# Patient Record
Sex: FEMALE | Race: WHITE | NOT HISPANIC OR LATINO | Employment: OTHER | URBAN - METROPOLITAN AREA
[De-identification: names, ages, dates, MRNs, and addresses within clinical notes are randomized per-mention and may not be internally consistent; named-entity substitution may affect disease eponyms.]

---

## 2019-12-06 ENCOUNTER — OFFICE VISIT - RIVER FALLS (OUTPATIENT)
Dept: FAMILY MEDICINE | Facility: CLINIC | Age: 64
End: 2019-12-06

## 2019-12-06 ENCOUNTER — TRANSFERRED RECORDS (OUTPATIENT)
Dept: HEALTH INFORMATION MANAGEMENT | Facility: CLINIC | Age: 64
End: 2019-12-06

## 2019-12-06 LAB
ALBUMIN (URINE) MG/SPEC: 2.6 MG/DL
ALBUMIN/CREATININE RATIO: 34 MCG/MG CREAT
CREAT SERPL-MCNC: 0.59 MG/DL (ref 0.5–0.99)
CREATININE (URINE): 76 MG/DL (ref 20–275)
GFR SERPL CREATININE-BSD FRML MDRD: 97 ML/MIN/1.73M2
GLUCOSE SERPL-MCNC: 329 MG/DL (ref 65–99)
HBA1C MFR BLD: 11.7 % (ref 0–5.7)
LDLC SERPL CALC-MCNC: 185 MG/DL
POTASSIUM SERPL-SCNC: 4.2 MMOL/L (ref 3.5–5.3)

## 2019-12-07 LAB
BNP SERPL-MCNC: 44 PG/ML
BUN SERPL-MCNC: 12 MG/DL (ref 7–25)
BUN/CREAT RATIO - HISTORICAL: ABNORMAL (ref 6–22)
CALCIUM SERPL-MCNC: 9.7 MG/DL (ref 8.6–10.4)
CHLORIDE BLD-SCNC: 98 MMOL/L (ref 98–110)
CO2 SERPL-SCNC: 26 MMOL/L (ref 20–32)
CREAT SERPL-MCNC: 0.59 MG/DL (ref 0.5–0.99)
CREAT UR-MCNC: 76 MG/DL (ref 20–275)
EGFRCR SERPLBLD CKD-EPI 2021: 97 ML/MIN/1.73M2
ERYTHROCYTE [DISTWIDTH] IN BLOOD BY AUTOMATED COUNT: 13.1 % (ref 11–15)
GLUCOSE BLD-MCNC: 329 MG/DL (ref 65–99)
HBA1C MFR BLD: 11.7 %
HCT VFR BLD AUTO: 43.6 % (ref 35–45)
HGB BLD-MCNC: 14.7 GM/DL (ref 11.7–15.5)
LDLC SERPL CALC-MCNC: 185 MG/DL
MCH RBC QN AUTO: 29.2 PG (ref 27–33)
MCHC RBC AUTO-ENTMCNC: 33.7 GM/DL (ref 32–36)
MCV RBC AUTO: 86.5 FL (ref 80–100)
MICROALBUMIN UR-MCNC: 2.6 MG/DL
MICROALBUMIN/CREAT UR: 34 MG/G{CREAT}
PLATELET # BLD AUTO: 268 10*3/UL (ref 140–400)
PMV BLD: 10.7 FL (ref 7.5–12.5)
POTASSIUM BLD-SCNC: 4.2 MMOL/L (ref 3.5–5.3)
RBC # BLD AUTO: 5.04 10*6/UL (ref 3.8–5.1)
SODIUM SERPL-SCNC: 136 MMOL/L (ref 135–146)
WBC # BLD AUTO: 5.2 10*3/UL (ref 3.8–10.8)

## 2019-12-12 ENCOUNTER — RECORDS - HEALTHEAST (OUTPATIENT)
Dept: ADMINISTRATIVE | Facility: OTHER | Age: 64
End: 2019-12-12

## 2019-12-12 ENCOUNTER — AMBULATORY - HEALTHEAST (OUTPATIENT)
Dept: CARDIOLOGY | Facility: CLINIC | Age: 64
End: 2019-12-12

## 2019-12-13 ENCOUNTER — COMMUNICATION - RIVER FALLS (OUTPATIENT)
Dept: FAMILY MEDICINE | Facility: CLINIC | Age: 64
End: 2019-12-13

## 2019-12-18 ENCOUNTER — OFFICE VISIT - HEALTHEAST (OUTPATIENT)
Dept: CARDIOLOGY | Facility: TELEHEALTH | Age: 64
End: 2019-12-18

## 2019-12-18 DIAGNOSIS — I25.119 CORONARY ARTERY DISEASE INVOLVING NATIVE CORONARY ARTERY OF NATIVE HEART WITH ANGINA PECTORIS (H): ICD-10-CM

## 2019-12-18 DIAGNOSIS — R06.09 DYSPNEA ON EXERTION: ICD-10-CM

## 2019-12-18 DIAGNOSIS — E78.5 HYPERLIPIDEMIA LDL GOAL <70: ICD-10-CM

## 2019-12-18 DIAGNOSIS — E11.59 TYPE 2 DIABETES MELLITUS WITH OTHER CIRCULATORY COMPLICATION, WITH LONG-TERM CURRENT USE OF INSULIN (H): ICD-10-CM

## 2019-12-18 DIAGNOSIS — Z79.4 TYPE 2 DIABETES MELLITUS WITH OTHER CIRCULATORY COMPLICATION, WITH LONG-TERM CURRENT USE OF INSULIN (H): ICD-10-CM

## 2019-12-20 ENCOUNTER — OFFICE VISIT - RIVER FALLS (OUTPATIENT)
Dept: FAMILY MEDICINE | Facility: CLINIC | Age: 64
End: 2019-12-20

## 2019-12-20 ENCOUNTER — OFFICE VISIT (OUTPATIENT)
Dept: PHARMACY | Facility: PHYSICIAN GROUP | Age: 64
End: 2019-12-20
Payer: MEDICAID

## 2019-12-20 DIAGNOSIS — I10 HYPERTENSION, UNSPECIFIED TYPE: ICD-10-CM

## 2019-12-20 DIAGNOSIS — G47.00 INSOMNIA, UNSPECIFIED TYPE: ICD-10-CM

## 2019-12-20 DIAGNOSIS — E78.5 DYSLIPIDEMIA: ICD-10-CM

## 2019-12-20 DIAGNOSIS — F32.A DEPRESSION, UNSPECIFIED DEPRESSION TYPE: ICD-10-CM

## 2019-12-20 DIAGNOSIS — E11.69 TYPE 2 DIABETES MELLITUS WITH OTHER SPECIFIED COMPLICATION, WITH LONG-TERM CURRENT USE OF INSULIN (H): Primary | ICD-10-CM

## 2019-12-20 DIAGNOSIS — I25.10 CORONARY ARTERY DISEASE INVOLVING NATIVE HEART, ANGINA PRESENCE UNSPECIFIED, UNSPECIFIED VESSEL OR LESION TYPE: ICD-10-CM

## 2019-12-20 DIAGNOSIS — J30.81 ALLERGIC RHINITIS DUE TO ANIMALS: ICD-10-CM

## 2019-12-20 DIAGNOSIS — Z79.4 TYPE 2 DIABETES MELLITUS WITH OTHER SPECIFIED COMPLICATION, WITH LONG-TERM CURRENT USE OF INSULIN (H): Primary | ICD-10-CM

## 2019-12-20 DIAGNOSIS — K21.9 GASTROESOPHAGEAL REFLUX DISEASE, ESOPHAGITIS PRESENCE NOT SPECIFIED: ICD-10-CM

## 2019-12-20 DIAGNOSIS — Z78.9 TAKES DIETARY SUPPLEMENTS: ICD-10-CM

## 2019-12-20 PROCEDURE — 99607 MTMS BY PHARM ADDL 15 MIN: CPT | Performed by: PHARMACIST

## 2019-12-20 PROCEDURE — 99605 MTMS BY PHARM NP 15 MIN: CPT | Performed by: PHARMACIST

## 2019-12-20 RX ORDER — METOPROLOL TARTRATE 25 MG/1
25 TABLET, FILM COATED ORAL 2 TIMES DAILY
COMMUNITY
End: 2022-02-10

## 2019-12-20 RX ORDER — CLOPIDOGREL BISULFATE 75 MG/1
75 TABLET ORAL EVERY MORNING
COMMUNITY
End: 2022-03-16

## 2019-12-20 RX ORDER — INSULIN ASPART 100 [IU]/ML
INJECTION, SOLUTION INTRAVENOUS; SUBCUTANEOUS
COMMUNITY
End: 2021-01-21

## 2019-12-20 RX ORDER — NITROGLYCERIN 0.4 MG/1
0.4 TABLET SUBLINGUAL EVERY 5 MIN PRN
COMMUNITY
End: 2022-12-20

## 2019-12-20 RX ORDER — MULTIPLE VITAMINS W/ MINERALS TAB 9MG-400MCG
1 TAB ORAL DAILY
COMMUNITY

## 2019-12-20 RX ORDER — LORATADINE 10 MG/1
10 TABLET ORAL
COMMUNITY

## 2019-12-20 RX ORDER — CITALOPRAM HYDROBROMIDE 40 MG/1
40 TABLET ORAL DAILY
COMMUNITY
End: 2020-01-03 | Stop reason: ALTCHOICE

## 2019-12-20 RX ORDER — METOPROLOL SUCCINATE 25 MG/1
25 TABLET, EXTENDED RELEASE ORAL 2 TIMES DAILY
COMMUNITY
End: 2019-12-20

## 2019-12-20 RX ORDER — INSULIN GLARGINE 100 [IU]/ML
17 INJECTION, SOLUTION SUBCUTANEOUS 2 TIMES DAILY
COMMUNITY
End: 2022-03-16

## 2019-12-20 RX ORDER — LISINOPRIL 40 MG/1
40 TABLET ORAL DAILY
COMMUNITY
End: 2022-03-16

## 2019-12-20 RX ORDER — ROSUVASTATIN CALCIUM 20 MG/1
20 TABLET, COATED ORAL AT BEDTIME
COMMUNITY
End: 2022-05-19

## 2019-12-20 NOTE — PATIENT INSTRUCTIONS
Recommendations from today's MTM visit:                                                    MTM (medication therapy management) is a service provided by a clinical pharmacist designed to help you get the most of out of your medicines.     1. Go to the pharmacy today to  refills of: Jardiance, nitroglycerin, metoprolol tartrate and lisinopril.    2. Stop taking your metformin 1000 mg tabs.    Start taking metformin  mg tabs: 2 tabs by mouth twice daily.  It is best to take these with food.    3. Take the old metformin and the other  tablets to the police station dropbox to dispose of the medications.    4. When you start to feel pain in your chest, if it feels like heartburn pain, take the ranitidine.  If it feels like chest pain/tightness/pain in your arm then take the nitroglycerin.  Nitroglycerin 0.4 mg sublingual tab: Place 1 tablet under the tongue at onset of chest pain.  If pain persists after 5 minutes, call 911 and place a second tablet under the tongue. If pain persists after another 5 minutes, okay to place a third tablet under the tongue.    Be sure to store this medication in the original bottle (out of light) and check the expiration date periodically to ensure your product is not  and will still work properly.    5. Novolog insulin: Start taking this BEFORE eating your meal.  Take 24 units with meals.  Take 8 units with snacks.    6. Start checking your blood sugars 2-3 times/day.  One when fasting in the morning and then 2 hours after meal(s).  Your blood sugars goals are:  Before eating (fasting): 80 - 130 mg/dL  2-hours after meals (post-prandial): less than 180 mg/dL        If you have blurry vision, weakness/fatigue, headache, irritability, shakiness, sweatiness, feeling dizzy or hungry, you may be experiencing low blood sugar.  1. Use your glucometer to check your blood sugar. If your sugar is <70 mg/dL, eat or drink fast-acting sugar (orange juice, 4 sugary candies, 4  glucose tabs).    2. Check blood sugar again 15 minutes later.  If your blood sugar is still below 80 mg/dL, treat again.    3. Always follow-up with a complex carbohydrate (peanut butter, nuts, eggs with cheese) or your regular meal if it is mealtime.    7. Place post-its on your door to help remember to take your medications.    It was great to speak with you today.  I value your experience and would be very thankful for your time with providing feedback on our clinic survey. You may receive a survey via email or text message in the next few days.     Next MTM visit: Follow-up in 2 weeks with Erick Pisano. Make an appointment at the .    To schedule another MTM appointment, please call the clinic directly or you may call the MTM scheduling line at [phone] or toll-free at [phone].     My Clinical Pharmacist's contact information:                                                      It was a pleasure talking with you today!  Please feel free to contact me with any questions or concerns you have.      Carly Pisano, Erick  Medication Therapy Management (MTM) Pharmacist  [facility] (Tu/Th/Fr)  Clinic Phone: [phone]  Pager: [phone]
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
## 2020-01-03 ENCOUNTER — OFFICE VISIT - RIVER FALLS (OUTPATIENT)
Dept: FAMILY MEDICINE | Facility: CLINIC | Age: 65
End: 2020-01-03

## 2020-01-03 ENCOUNTER — COMMUNICATION - RIVER FALLS (OUTPATIENT)
Dept: FAMILY MEDICINE | Facility: CLINIC | Age: 65
End: 2020-01-03

## 2020-01-03 ENCOUNTER — OFFICE VISIT (OUTPATIENT)
Dept: PHARMACY | Facility: PHYSICIAN GROUP | Age: 65
End: 2020-01-03
Payer: MEDICAID

## 2020-01-03 DIAGNOSIS — E11.69 TYPE 2 DIABETES MELLITUS WITH OTHER SPECIFIED COMPLICATION, WITH LONG-TERM CURRENT USE OF INSULIN (H): Primary | ICD-10-CM

## 2020-01-03 DIAGNOSIS — F32.A DEPRESSION, UNSPECIFIED DEPRESSION TYPE: ICD-10-CM

## 2020-01-03 DIAGNOSIS — G47.00 INSOMNIA, UNSPECIFIED TYPE: ICD-10-CM

## 2020-01-03 DIAGNOSIS — Z79.4 TYPE 2 DIABETES MELLITUS WITH OTHER SPECIFIED COMPLICATION, WITH LONG-TERM CURRENT USE OF INSULIN (H): Primary | ICD-10-CM

## 2020-01-03 PROCEDURE — 99607 MTMS BY PHARM ADDL 15 MIN: CPT | Performed by: PHARMACIST

## 2020-01-03 PROCEDURE — 99606 MTMS BY PHARM EST 15 MIN: CPT | Performed by: PHARMACIST

## 2020-01-03 RX ORDER — MIRTAZAPINE 15 MG/1
15 TABLET, FILM COATED ORAL AT BEDTIME
COMMUNITY
End: 2020-01-24

## 2020-01-03 RX ORDER — BLOOD-GLUCOSE METER
KIT MISCELLANEOUS
COMMUNITY
Start: 2017-03-30 | End: 2023-06-20

## 2020-01-03 SDOH — HEALTH STABILITY: MENTAL HEALTH: HOW OFTEN DO YOU HAVE A DRINK CONTAINING ALCOHOL?: MONTHLY OR LESS

## 2020-01-03 SDOH — HEALTH STABILITY: MENTAL HEALTH: HOW OFTEN DO YOU HAVE 6 OR MORE DRINKS ON ONE OCCASION?: NEVER

## 2020-01-03 SDOH — HEALTH STABILITY: MENTAL HEALTH: HOW MANY STANDARD DRINKS CONTAINING ALCOHOL DO YOU HAVE ON A TYPICAL DAY?: 1 OR 2

## 2020-01-03 ASSESSMENT — PATIENT HEALTH QUESTIONNAIRE - PHQ9: SUM OF ALL RESPONSES TO PHQ QUESTIONS 1-9: 12

## 2020-01-03 NOTE — PROGRESS NOTES
"SUBJECTIVE/OBJECTIVE:                Chanelle Billy is a 64 year old female coming in for a follow-up visit for Medication Therapy Management.  She was referred to me from Samanta Maza MD.     Chief Complaint: Follow up from MTM visit on 19.  Personal Healthcare Goals: ***  Tobacco:  reports that she has quit smoking. She has never used smokeless tobacco.  Alcohol: occasionally    Medication Adherence/Access:  Patient takes medications directly from bottles and uses pill box(es) - uses the pill boxes when going out of town.  Patient takes medications 2 time(s) per day; plus the ranitidine as needed.  Per patient, misses medication usually 2-3 times/week  - however, more recently with her move, she has missed more doses of medication. ***  Medication barriers: in the past, trouble getting medication from the pharmacy.  Now thinking that it will be easier since she lives here. ***  The patient fills medications at [place]: NO, fills medications at [facility]    Diabetes:  Testing supplies: {brand}  Pt currently taking:  Metformin  m tabs twice daily (changed from 1000 mg IR BID on 19)   Basaglar (glargine) long-acting insulin: 24 units twice daily  Novolog insulin: 24 units before meals *** and 8 units before snacks *** (dose change on 19)  Jardiance (empagliflozin) 10 mg: once daily (restarted on 19).  {sideeffects:160432}  Eye exam: 2018 - due again  Foot exam: unknown - due  ACEi/ARB: Yes: lisinopril.   Urine Albumin:     Secondary Prevention:  Aspirin: Not taking due to taking clopidogrel  Statin yes - rsocuvastatin.  Diet: ***  Exercise: no routine  Record of home blood sugars:  SMBG: {SELF MONITORIN}.   Ranges {Pt report:380225}: ***  Patient {IS NOT/IS:167849::\"is not experiencing hypoglycemia\"}  Recent symptoms of high blood sugar? {diabetessymptoms:954924}  Date Fasting AM Before lunch After lunch Before Dinner After Dinner Before Bedtime " "Late night                                                                                                 {IMMUNIZATION REMINDER LOOK IN NAVIGATOR:289277}    Depression/Insomnia:  Current medications include: Citalopram 40 mg once daily.   Pt reports that depression symptoms are { :566225}. Current depression symptoms include: ***. Patient reports the following stressors: {STRESSORS:220779::\"none\"}   Medication History: reports that she has been on other medication(s), but does not know name of medication. Outside records faxed over date back to 2017 and patient was on citalopram 40 mg at that time as well.  No flowsheet data found. ***    GERD:  Current medications include: Zantac (ranitidine) 150 mg twice daily as needed.  Pt c/o {gerdxmtm1:945294}.   The patient {DOES/NOT:665847} have a history of GI bleed.  The patient {DOES/NOT:794198} notice symptoms if they miss a dose.  Patient {HAS HAS NOT:391027} tried a trial off of therapy and {IS/IS NOT:9024} interested in doing so. Patient feels that current regimen {IS NOT/IS:899674::\"is not\"} effective.  Future plan to consult with Dr. Willingham for possibility of EGD.    Today's Vitals: There were no vitals taken for this visit.    ASSESSMENT:              {mtmpartdquestion:981847}    Medication Adherence: {adherenceassess:612157}, {ADHERENCEOPTIONSASSES:679732}    Diabetes: {assessmentquestions:245004}  Patient's A1c of ***% {is/is not:225569::\"is\"} at ADA goal of <7%. SMBG fasting sugars {are/arenot:280001::\"are\"} at ADA goal of  mg/dL and post-prandial sugars {are/arenot:024006::\"are\"} at goal of less than 180 mg/dL.  Patient would benefit from ***.    Depression: {assessmentquestions:916188}  All SSRIs are classified as use with caution in elderly population BEERs crit due to association with falls, fractures and hyponatremia risk. However, citalopram at 40 mg/day is additionally not recommended in patients >65 years due to increased QT prolongation risk. " "Since depression not well controlled at this time, recommend therapy modification to alternate serotonin specific reuptake inhibitor. With insomnia history, recommend addition of mirtazapine     GERD: {assessmentquestions:311174}       PLAN:                {RENU?:923276}  ***  1. Diabetes plan  - Basaglar:  - novolog:   - Due for a diabetic foot exam.  Be sure to get this with Dr. Maza at your follow-up appt.   - Due for diabetic eye exam.  Please make an appointment with the eye doctor and bring the communication form with you to the appointment.    2. Depression plan  - Reduce citalopram to 20 mg once daily  - Start mirtazapine 15 mg once daily in the evening for depression and sleep.  - establish care with a therapist. Francisca to help with transportation. ***      *** Symptoms of serotonin syndrome can include: mental status changes (agitation, hallucinations), autonomic instability (fast heart beat, flushing, dizziness, sweating), neuromuscular symptoms (rigidity, incoordination, muscle contractions), and GI symptoms (nausea, vomiting, diarrhea). Serotonin syndrome is a spectrum of symptoms, so it could be mild or severe. If you start having milder symptoms, please contact a health care professional and if you have severe symptoms, please seek medical attention immediately (call 911).      Follow-up appts:  Dr. Maza: make an appt in the next 2 weeks.  Erick Pisano: make an appt in 4 weeks.    I spent {time:680556} with this patient today{MTMpartdbillingquestion:560301}. { :345301}. A copy of the visit note was provided to the patient's {[facility] chart:810974} provider.     Will follow up in ***.    The patient {GIVEN/NOT GIVEN:307332::\"was given\"} a summary of these recommendations as an after visit summary.    Carly Pisano PharmD  Medication Therapy Management (MTM) Pharmacist  [facility] (Tu/Th/Fr)  Clinic Phone: [phone]  Pager: [phone]  "

## 2020-01-03 NOTE — PROGRESS NOTES
SUBJECTIVE/OBJECTIVE:                Chanelle Billy is a 64 year old female coming in for a follow-up visit for Medication Therapy Management.  She was referred to me from Samanta Maza MD.     Chief Complaint: Follow up from MTM visit on 19. Pt needs refills of basaglar and novolog. Needs citalopram refill.  Mildy overwhelmed by all the specialist visits/referrals that she needs to complete.  Personal Healthcare Goals: get home organized so she can find meds; lose weight.  Tobacco:  reports that she has quit smoking. She has never used smokeless tobacco.  Alcohol: occasionally    Medication Adherence/Access:  Patient takes medications directly from bottles and uses pill box(es) - uses the pill boxes when going out of town.  Patient takes medications 2 time(s) per day; plus the ranitidine as needed.  Per patient, misses medication usually 2-3 times/week  - however, more recently with her move, she has missed more doses of medication. States that in the past 2 weeks she has not forgotten her meds (1/3/20).  Medication barriers: in the past, trouble getting medication from the pharmacy.  Now thinking that it will be easier since she lives here. Daughter Dionna can  her to the pharmacy (1/3/20).  The patient fills medications at [facility]: NO, fills medications at [facility]    Diabetes:  Testing supplies: Truetest  Pt currently taking:  Metformin  m tabs twice daily (changed from 1000 mg IR BID on 19), loves the XR form, says that it does not hurt her stomach as much.  Basaglar (glargine) long-acting insulin: 24 units twice daily   Novolog insulin: says that the cannot explain how she is using this currently, she is confused about the sliding scale, says she'll adjust based on her BG, but does not know numbers. Still taking after meals.  Jardiance (empagliflozin) 10 mg: once daily (restarted on 19).  Pt is not experiencing side effects.  Eye exam: 2018 -  due  Foot exam: unknown - due  ACEi/ARB: Yes: lisinopril.   Urine Albumin:     Secondary Prevention:  Aspirin: Not taking due to taking clopidogrel  Statin yes - rosuvastatin.  Diet: eats 2-3 meals per day.  Continues to repeat that they told her to eat 3 small meals and 6 additional meals, but this is just not realistic for her.  She likes her daughter's cooking. Is getting food stamps and going to the food shelf regularly for food.  Exercise: no routine  Record of home blood sugars:  SMBG: see below.   Patient did have one hypoglycemic episode 2 weeks ago, has not since.  Recent symptoms of high blood sugar? none  Date Fasting AM Before lunch After lunch Before Dinner After Dinner Before Bedtime Late night   1/3 170          117      377 (22 units SA after she ate caramel corn and cookies)    115     210           249     98 161  128       83 277 156  292          368     124  160   339       112 345 81 123          Depression/Insomnia:  Current medications include: Citalopram 40 mg once daily. Has not been taking this for the last 2 weeks - they accidentally threw this away when the threw out the  medications.  Pt reports that depression symptoms are worsened since being off citalopram. Current depression symptoms include: becoming worked up and upset about things. Patient reports the following stressors: recent move and financial difficulties, without depression medication.    Medication History: reports that she has been on other medication(s), but does not know name of medication. Outside records faxed over date back to  and patient was on citalopram 40 mg at that time as well.  Still having trouble sleeping.  Does not want to go back on trazodone as it gave her bad nightmares.  Added to allergy list as side effect.  PHQ-9 SCORE 1/3/2020   PHQ-9 Total Score 12   Previous: total depression was 12 from [facility].    Today's Vitals: There were no vitals taken for this  visit.    ASSESSMENT:                Medication Adherence: fair, issues with understanding insulin.  Discussed that she should not be overwhelmed by the referrals and scheduled the appts as she is able.    Diabetes: Uncontrolled  Patient's A1c of 11.7% is not at ADA goal of <7%. SMBG fasting sugars are near ADA goal of  mg/dL and post-prandial sugars are not at goal of less than 180 mg/dL.  Recommend adherence to PRE-prandial insulin with simplified regimen of 24 units before meals and 8 units before snacks.  Patient is easily confused and overwhelmed by different insulin doses.  Teachback was used multiple times to give examples of meals v. Snacks and determine how much PRE-prandial insulin to use.  Suspect that A1c of >11% is reflective of months of non-adherence to insulin/metformin during move and anticipate improvement with next lab since patient has greatly improved adherence even since last visit.    Depression/Insomnia: Needs improvement  Citalopram at 40 mg/day is not recommended in patients >65 years due to increased QT prolongation risk (pt is near 65). Since depression not well controlled at this time and patient has not been taking even the citalopram, recommend therapy modification to mirtazapine which may also benefit insomnia.  Discussed potential to restart citalopram at 20 mg today concomitantly with mirtazapine with Samanta Maza MD - but prefer to start with 1 agent today.  Patient would also benefit from establishing care or returning to therapist.  [facility] nurses will help with this. Encourage that patient attend without daughter since previous records state that pt is discussing frustrations with the daughter during the visit.     PLAN:                  1. Diabetes plan  - Basaglar (silver - long acting insulin):   take 24 units in the morning and in the evening.  - Novolog (blue - short acting insulin):   take 24 units BEFORE meals  Take 8 units BEFORE snacks.    2. Depression plan  -  Start mirtazapine 15 mg once daily in the evening for depression and sleep.  - Establish care with a therapist or return to [place] for therapy. Francisca, one of our care coordinators can help with transportation. [facility] came to visit with pt after PharmD appt.    Follow-up appts:  Dr. Maza: make an appt in the next 1-2 weeks.  Erick Pisano: make an appt in 3-4 weeks.    Follow-up items:  - foot exam  - eye exam  - depression score    I spent 60 minutes with this patient today. All changes were made via verbal approval with Samanta Maza MD. A copy of the visit note was provided to the patient's primary care provider.    The patient was given a summary of these recommendations as an after visit summary.    Carly Pisano PharmD  Medication Therapy Management (MTM) Pharmacist  [facility] (Tu/Th/Fr)  Clinic Phone: [phone]  Pager: [phone]

## 2020-01-03 NOTE — PATIENT INSTRUCTIONS
Recommendations from today's MTM visit:                                                      1. Diabetes plan  - Basaglar (silver - long acting insulin):   take 24 units in the morning and in the evening.  - Novolog (blue - short acting insulin):   take 24 units BEFORE meals  Take 8 units BEFORE snacks.    2. Depression plan  - Start mirtazapine 15 mg once daily in the evening for depression and sleep.  - Establish care with a therapist or return to [place] for therapy. Francisca one of our care coordinators can help with transportation.    It was great to speak with you today.  I value your experience and would be very thankful for your time with providing feedback on our clinic survey. You may receive a survey via email or text message in the next few days.     Next visits:   Dr. Maza: make an appt in the next 1-2 weeks.  Erick Pisano: make an appt in 3-4 weeks for 60 minutes.    To schedule another MTM appointment, please call the clinic directly or you may call the MTM scheduling line at [phone] or toll-free at [phone].     My Clinical Pharmacist's contact information:                                                      It was a pleasure talking with you today!  Please feel free to contact me with any questions or concerns you have.      Carly Pisano PharmD  Medication Therapy Management (MTM) Pharmacist  [facility] (Tu/Th/Fr)  Clinic Phone: [phone]  Pager: [phone]

## 2020-01-10 ENCOUNTER — OFFICE VISIT - RIVER FALLS (OUTPATIENT)
Dept: FAMILY MEDICINE | Facility: CLINIC | Age: 65
End: 2020-01-10

## 2020-01-24 ENCOUNTER — OFFICE VISIT - RIVER FALLS (OUTPATIENT)
Dept: FAMILY MEDICINE | Facility: CLINIC | Age: 65
End: 2020-01-24

## 2020-01-24 ENCOUNTER — OFFICE VISIT (OUTPATIENT)
Dept: PHARMACY | Facility: PHYSICIAN GROUP | Age: 65
End: 2020-01-24
Payer: MEDICAID

## 2020-01-24 DIAGNOSIS — Z79.4 TYPE 2 DIABETES MELLITUS WITH OTHER SPECIFIED COMPLICATION, WITH LONG-TERM CURRENT USE OF INSULIN (H): Primary | ICD-10-CM

## 2020-01-24 DIAGNOSIS — E11.69 TYPE 2 DIABETES MELLITUS WITH OTHER SPECIFIED COMPLICATION, WITH LONG-TERM CURRENT USE OF INSULIN (H): Primary | ICD-10-CM

## 2020-01-24 DIAGNOSIS — F32.A DEPRESSION, UNSPECIFIED DEPRESSION TYPE: ICD-10-CM

## 2020-01-24 DIAGNOSIS — K21.9 GASTROESOPHAGEAL REFLUX DISEASE, ESOPHAGITIS PRESENCE NOT SPECIFIED: ICD-10-CM

## 2020-01-24 PROCEDURE — 99606 MTMS BY PHARM EST 15 MIN: CPT | Performed by: PHARMACIST

## 2020-01-24 PROCEDURE — 99607 MTMS BY PHARM ADDL 15 MIN: CPT | Performed by: PHARMACIST

## 2020-01-24 RX ORDER — ESCITALOPRAM OXALATE 10 MG/1
10 TABLET ORAL AT BEDTIME
COMMUNITY
End: 2020-12-05

## 2020-01-24 RX ORDER — METFORMIN HCL 500 MG
1000 TABLET, EXTENDED RELEASE 24 HR ORAL 2 TIMES DAILY WITH MEALS
COMMUNITY
End: 2022-05-19

## 2020-01-24 NOTE — PROGRESS NOTES
"SUBJECTIVE/OBJECTIVE:                Chanelle Billy is a 64 year old female coming in for a follow-up visit for Medication Therapy Management.  She was referred to me from Samanta Maza MD.     Chief Complaint: Follow up from MTM visit on 1/3/2020.  Pt concerns today: brings all the medications that she needs \"refills\" of.  These bottles do have refills available on the bottles.  Personal Healthcare Goals: get home organized so she can find meds; lose weight.  Tobacco:  reports that she has quit smoking. She has never used smokeless tobacco.  Alcohol: occasionally    Medication Adherence/Access:  Patient takes medications directly from bottles and uses pill box(es) - uses the pill boxes when going out of town.  Patient takes medications 2 time(s) per day; plus the ranitidine as needed.  Per patient, misses medication usually 1-2 times/week, she notices this to be an improvement.  Medication barriers: no trouble getting to the pharmacy, Dionna - daughter will bring her.  The patient fills medications at [facility]: NO, fills medications at [facility]    Diabetes:  Testing supplies: Truetest  Pt currently taking:  Metformin  m tabs twice daily (changed from 1000 mg IR BID on 19), loves the XR form, says that it does not hurt her stomach as much.  Basaglar (glargine) long-acting insulin: 24 units twice daily   Novolog insulin: take 24 units BEFORE meals - using this about twice daily: noon and 4pm.  Today ate a bowl of oatmeal before coming and did not use the insulin.  Take 8 units BEFORE snacks - states that she often forgets this one - especially at night time, but is working on remembering.  Jardiance (empagliflozin) 10 mg: once daily (restarted on 19).  Pt is not experiencing side effects.  Eye exam: 2018 - due  Foot exam: unknown - due  ACEi/ARB: Yes: lisinopril.   Urine Albumin:     Secondary Prevention:  Aspirin: Not taking due to taking clopidogrel  Statin yes - " "rosuvastatin.  Diet: food stamps $16/month.  Likes bread, will be meeting with Avril Martinez, dietitian. She states that she is nervous about this appointment because she doesn't have money to buy whatever food she likes and Dionna and Jaelyn provide a lot of her food.  She knows that Avril is going to tell her not to eat bread.  Exercise: no routine  Record of home blood sugars:  SMBG: see below.   Patient is not experiencing hypoglycemia  Recent symptoms of high blood sugar? fatigue  Date Fasting AM Before lunch After lunch Before Dinner After Dinner Before Bedtime Late night   1/24 172         1/23 216  160       1/22 137     396 (acknowledges that she overeats in the evneing           Depression:  Chanelle has not yet established with a counselor.  Current medications include: Escitalopram 10 mg once daily. Started on 1/10/2020 with 1/2 tab x 1 week, then 1 full tab daily.  Has been taking this once daily in the morning.  Thinks that she is starting to feel \"stiff\" from this medication, asking if she can take 1/2 tablet by mouth twice daily.  Medication history: citalopram 40 mg (unknown start - 1/3/2020) - stopped due to limited efficacy and CV risk with dose >20 mg, mirtazapine (1/3/2020 - 1/10/2020) - stopped due to strange dreams and 1 day of suicidal ideation.  Pt reports that depression symptoms are improved, been \"feeling pretty good\"   Had the inspection of her new place and will not have to worry about this for another year.  Has been sorting through her apartment and has gotten rid of a lot of things.  Patient reports the following stressors: recent move and financial difficulties.  PHQ-9 SCORE 1/3/2020   PHQ-9 Total Score 12     Heartburn:  Current medications include: Protonix (pantoprazole) 40 mg once daily in the morning.  Pt c/o no current symptoms, this has completely resolved her stomach pain.  Medication History: was on ranitidine - states that she would get up in the night and puke when she was on " this one.  The patient does state that she has had stomach ulcers before, but denies bleeding.    Today's Vitals: There were no vitals taken for this visit.    ASSESSMENT:                  Medication Adherence: fair, encouraged patient to start using a pill box every week, even if not traveling.    Diabetes: Uncontrolled  Patient's A1c of 11.7% is not at ADA goal of <7%. SMBG fasting sugars are not at ADA goal of  mg/dL and post-prandial sugars are not at goal of less than 180 mg/dL.  Patient would benefit from increasing long acting insulin dose and increasing adherence to short acting insulin when eating or having snacks.  Need to make small simple changes for patient and large adjustments or change in routine are very overwhelming for patient.    Depression: Improving - will continue current dose at this time.  Musculoskeletal side effects are not noted as common adverse effect with escitalopram, per micromedex.  Discussed that it would be okay to take escitalopram in the evening if she feels that it is causing stiffness after taking.     Heartburn: Improved. Continue current pantoprazole.  Possible that increased abdominal discomfort is related to situational stress. Dr. Maza has previously recommended consider referral to Dr. Willingham for GI assessment - recommend addressing this is future.     PLAN:                  Chanelle's goals:  1. Start taking insulin more consistently: Increase long acting insulin Basaglar to 26 units twice daily.    2. Use a pill box to simplify taking your medications:  Start using the pill box every week, not just when you're traveling.  Provided AM and PM pill boxes today.  Printed fridge list for patient.  3. Make an appointment with a counselor.  Handout provided today.    Chanelle will start taking escitalopram in the evening.  Appointment with Avril Martinez on 2/3/2020 at 10:30am.  Attend the appointment with the podiatrist (for your feet).    PharmD will send Dr. Montenegro  Ethan, cardiologist a message about the stress test - called today and spoke with nurse who will call patient to schedule this stress test.  PharmD will check with Dr. Maza on getting prescription for incontinence pads - message sent.    I spent 60 minutes with this patient today. All changes were made via verbal approval with Hoa Cordero NP. A copy of the visit note was provided to the patient's primary care provider and dietitian.     Will follow up in 5-6 weeks.  - will plan to start med sync at follow-up  - will discuss monthly medication delivery from [facility] (per [place], this would be free for her location).    The patient was given a summary of these recommendations as an after visit summary.    Carly Pisano PharmD  Medication Therapy Management (MTM) Pharmacist  [facility] (Tu/Th/Fr)  Clinic Phone: [phone]  Pager: [phone]

## 2020-01-24 NOTE — PATIENT INSTRUCTIONS
Recommendations from today's MTM visit:                                                      Chanelle's goals:  1. Start taking insulin more consistently: Increase long acting insulin Basaglar to 26 units twice daily.    2. Use a pill box to simplify taking your medications:  Start using the pill box every week, not just when you're traveling.  3. Make an appointment with a counselor.  Handout provided today.    Bring all your medications to our follow-up appointment.    Appointment with Avril Martinez on 2/3/2020 at 10:30am.  Attend the appointment with the podiatrist (for your feet).    PharmD will send Dr. Lan Long, cardiologist a message about the stress test.  PharmD will check with Dr. Maza on getting prescription for incontinence pads.    It was great to speak with you today.  I value your experience and would be very thankful for your time with providing feedback on our clinic survey. You may receive a survey via email or text message in the next few days.     Next MTM visit: schedule for 5-6 weeks. Schedule for 60 minutes at the . Bring all your medications and your pill boxes to this appointment.    To schedule another MTM appointment, please call the clinic directly or you may call the MTM scheduling line at [phone] or toll-free at [phone].     My Clinical Pharmacist's contact information:                                                      It was a pleasure talking with you today!  Please feel free to contact me with any questions or concerns you have.      Carly Pisano PharmD  Medication Therapy Management (MTM) Pharmacist  [facility] (Tu/Th/Fr)  Clinic Phone: [phone]  Pager: [phone]                                                                                                                                                 Medication List          Accurate as of January 24, 2020 10:02 AM. If you have any questions, ask your  nurse or doctor.            CHANGE how you take these medications        Morning Afternoon Evening Bedtime    metFORMIN 500 MG 24 hr tablet  Also known as:  GLUCOPHAGE-XR  Take 1,000 mg by mouth 2 times daily (with meals)  What changed:  Another medication with the same name was removed. Continue taking this medication, and follow the directions you see here.                   CONTINUE taking these medications        Morning Afternoon Evening Bedtime    B-D ULTRA-FINE 33 LANCETS Misc  Use to test 3 to 4 times a day as directed.             clopidogrel 75 MG tablet  Also known as:  PLAVIX  Take 75 mg by mouth every morning               empagliflozin 10 MG Tabs tablet  Also known as:  JARDIANCE  Take 10 mg by mouth daily               escitalopram 10 MG tablet  Also known as:  LEXAPRO  Take 10 mg by mouth daily               insulin glargine 100 UNIT/ML pen  Inject 26 Units Subcutaneous 2 times daily  Doctor's comments:  If Basaglar is not covered by insurance, may substitute Lantus at same dose and frequency.                   lisinopril 40 MG tablet  Also known as:  PRINIVIL/ZESTRIL  Take 40 mg by mouth daily               loratadine 10 MG tablet  Also known as:  CLARITIN  Take 10 mg by mouth At Bedtime               metoprolol tartrate 25 MG tablet  Also known as:  LOPRESSOR  Take 25 mg by mouth 2 times daily                 multivitamin w/minerals tablet  Take 1 tablet by mouth daily               nitroGLYcerin 0.4 MG sublingual tablet  Also known as:  NITROSTAT  Place 0.4 mg under the tongue every 5 minutes as needed For chest pain place 1 tablet under the tongue every 5 minutes for 3 doses. If symptoms persist 5 minutes after 1st dose call 911.             NovoLOG FLEXPEN 100 UNIT/ML pen  24 units under the skin three times daily with meals and 8 units with snacks.  Reason for med:  Type 2 Diabetes  Generic drug:  insulin aspart                     rosuvastatin 20 MG tablet  Also known as:  CRESTOR  Take 20 mg  by mouth At Bedtime               Sharps-A-Gator Locking Bracket Misc  Use as directed             True Metrix Blood Glucose Test test strip  Use to test 3 to 4 times daily as directed.  Generic drug:  blood glucose             ULTICARE MICRO 32G X 4 MM miscellaneous  Use for insulin injection under the skin 4 to 5 times a day as directed  Generic drug:  insulin pen needle

## 2020-02-03 ENCOUNTER — OFFICE VISIT - RIVER FALLS (OUTPATIENT)
Dept: FAMILY MEDICINE | Facility: CLINIC | Age: 65
End: 2020-02-03

## 2020-02-26 NOTE — PROGRESS NOTES
MTM ENCOUNTER  SUBJECTIVE/OBJECTIVE:                           Chanelle Billy is a 64 year old female coming in for a follow-up visit. She was referred to me from Samanta Maza MD. Patient was accompanied by Dionna (daughter) and Jaelyn (friend). Follow-up MTM visit on diabetes.     Chief Complaint: Follow-up from 2020.  Patient would like to discuss her diabetes blood sugars and she feels she has made great improvements.  She also states that she received a voicemail from a Francisca and is wondering if this is from someone at our clinic..  Personal Healthcare Goals: get home organized so she can find meds; lose weight.    Allergies/ADRs: Reviewed in Epic  Tobacco:  reports that she has quit smoking. She has never used smokeless tobacco.  Alcohol: Less than 1 beverage / month  Caffeine: 1 cups/day of coffee/day in the morning, no creamer, no sugar.  Occasional pop.  Activity: No routine   PMH: Reviewed in Epic    Medication Adherence/Access: Patient uses pill box(es) - started this recently, which has been extremely helpful for her to be more adherent.  She is very proud of this.  Patient takes medications 2 time(s) per day.   Per patient, misses medication 0 times per week. - a huge improvement!  Medication barriers: none.   The patient fills medications at [place]: NO, fills medications at [facility], in [place]..    Diabetes:  Testing supplies: Truetest; patient states that she is only getting 25-50 strips at a time and would like a higher quantity so that she does not need to continually run back to the pharmacy.  Pt currently taking:  Metformin  m tabs twice daily (changed from 1000 mg IR BID on 19), loves the XR form, says that it does not hurt her stomach as much.  Basaglar (glargine) long-acting insulin: 26 units twice daily   Novolog insulin: take 24 units BEFORE meals (about 3 times/day) and 8 units BEFORE snacks (about twice daily)  Has been wearing a catherine pack to carry  "insulin and help to remember using this before meals-even when they are out and about.  States that this is really improved her adherence.  Jardiance (empagliflozin) 10 mg: once daily (restarted on 19).  Pt is not experiencing side effects.  Eye exam: 2018 - due  Foot exam: 2020  ACEi/ARB: Yes: lisinopril.   Urine Albumin:     Secondary Prevention:  Aspirin: Not taking due to taking clopidogrel  Statin yes - rosuvastatin.  Diet: food stamps $16/month.  Has been eating smaller portion sizes and feels good about this.  States that she is trying not to snack later at night.  She did meet with our dietitian Yvonne Martinez and found this beneficial although patient does not want to get rid of the egg yolks from her eggs as Yvonne Martinez had recommended.  Exercise: no routine  Record of home blood sugars:  SMBG: see below.   Patient is experiencing hypoglycemia. Frequency of hypoglycemia? 3 times in last month felt symptoms per patient: 52-59, one 42 mg/dL.  Symptoms of low blood sugar? Feeling \"drunk\" drooping face, changes in facial expressions.  States that she will treat these by eating sugar.  Notes that her cat can tell she is low and will not leave her side.  Recent symptoms of high blood sugar? None  Highest B   Date Fasting AM After lunch After Dinner Before Bedtime    105       94 89 78 93    123 80 80     144 110 97     92 98 63 94    86  85     91 210 142     90 59 87 114    150 122 151 90     Lab Results   Component Value Date    A1C 11.7 2019     Depression: Meeting with Sada Aguero, licensed professional counselor in [place] (x1 visit, as recommended at last appt).  Finds that they get along really well (unlike many of her previous counselors) and she will continue to see her.  Current medications include: Escitalopram 10 mg once daily in the evening. States that she likes this medication and she is \"not crabby, not crying.\"  States that " her family also likes her on this medication.  Pt reports that depression symptoms are improved.  Medication History: citalopram 40 mg (unknown start - 1/3/2020) - stopped due to limited efficacy and CV/QT prolongation risk with dose >20 mg, mirtazapine (1/3/2020 - 1/10/2020) - stopped due to strange dreams and 1 day of suicidal ideation (per Dr. Maza's notes).  PHQ-9 SCORE 1/3/2020 2/28/2020   PHQ-9 Total Score 12 9     Today's Vitals: /62   Pulse 65   SpO2 96%     ASSESSMENT:                            Medication Adherence: good, much improving    Diabetes: Improving  Patient's last A1c of 11.7% is not at ADA goal of <7% -and suspect that patient's average blood sugar has significantly improved since this last A1c drawn.  Patient is due for A1c within next few weeks.  SMBG are mostly meeting goal in the AM and post-prandial BG are mostly less than 180 mg/dL with a few symptomatic HYPOglycemia events.  Since fasting sugars are near goal, will continue with current basal insulin and decrease bolus insulin doses.  Suspect that patient has increased her adherence significantly which is resulting in lower blood sugar results.   Encouraged patient to continue smaller portion sizes and limiting carbohydrates.  With frequent lows and continual adjustments in blood sugars, recommend that patient continue to test 3-4 times daily as directed.  Appropriate to send a prescription to pharmacy with a higher quantity of 10 strips.  Brief discussion today about continuous glucose monitor-patient is interested in this in the future -we will discuss at her follow-up visit.  Due for annual diabetes eye exam.  Due for A1c and appointment with Dr. Samanta Maza MD.    Depression: Improving -we will continue to monitor at follow-up.    Francisca, our care coordinator, had previously called patient regarding incontinence pads and will plan to meet with patient today.    PLAN:                            1. Decrease your short  acting insulin (novolog) to:   18 units BEFORE meals  6 units BEFORE snacks  Continue your long acting insulin (basaglar) at:   26 units twice daily    2. We can increase your test strips prescription to 100 strips. Rx sent.    3. Make an appointment for your diabetes eye exam.    4. Make an appointment with Dr. Samanta Maza MD.    5. Meet with care coordinator for incontinence pads today.    I spent 60 minutes with this patient today. All changes were made via verbal approval with Samanta Maza MD. A copy of the visit note was provided to the patient's primary care provider.    Will follow up in 1 month: 60 minutes.    The patient was given a summary of these recommendations.     Caryl Pisano, PharmD  Medication Therapy Management (MTM) Pharmacist  [facility] (Tu/Th/Fr)  Clinic Phone: [phone]  Pager: [phone]

## 2020-02-28 ENCOUNTER — OFFICE VISIT (OUTPATIENT)
Dept: PHARMACY | Facility: PHYSICIAN GROUP | Age: 65
End: 2020-02-28
Payer: MEDICAID

## 2020-02-28 ENCOUNTER — OFFICE VISIT - RIVER FALLS (OUTPATIENT)
Dept: FAMILY MEDICINE | Facility: CLINIC | Age: 65
End: 2020-02-28

## 2020-02-28 VITALS — HEART RATE: 65 BPM | OXYGEN SATURATION: 96 % | DIASTOLIC BLOOD PRESSURE: 62 MMHG | SYSTOLIC BLOOD PRESSURE: 132 MMHG

## 2020-02-28 DIAGNOSIS — Z79.4 TYPE 2 DIABETES MELLITUS WITH OTHER SPECIFIED COMPLICATION, WITH LONG-TERM CURRENT USE OF INSULIN (H): Primary | ICD-10-CM

## 2020-02-28 DIAGNOSIS — E11.69 TYPE 2 DIABETES MELLITUS WITH OTHER SPECIFIED COMPLICATION, WITH LONG-TERM CURRENT USE OF INSULIN (H): Primary | ICD-10-CM

## 2020-02-28 DIAGNOSIS — F32.A DEPRESSION, UNSPECIFIED DEPRESSION TYPE: ICD-10-CM

## 2020-02-28 PROCEDURE — 99607 MTMS BY PHARM ADDL 15 MIN: CPT | Performed by: PHARMACIST

## 2020-02-28 PROCEDURE — 99606 MTMS BY PHARM EST 15 MIN: CPT | Performed by: PHARMACIST

## 2020-02-28 ASSESSMENT — PATIENT HEALTH QUESTIONNAIRE - PHQ9: SUM OF ALL RESPONSES TO PHQ QUESTIONS 1-9: 9

## 2020-02-28 NOTE — PATIENT INSTRUCTIONS
Recommendations from today's MTM visit:                                                      1. Decrease your short acting insulin (novolog) to:   18 units BEFORE meals  6 units BEFORE snacks  Continue your long acting insulin (basaglar) at:   26 units twice daily    2. We can increase your test strips prescription to 100 strips - this should last you 25 days.    3. Make an appointment for your diabetes eye exam.    4. Make an appointment with Dr. Samanta Maza MD.  We can do diabetes labs at this appt.    5. Meet with care coordinator for incontinence pads today.    It was great to speak with you today.  I value your experience and would be very thankful for your time with providing feedback on our clinic survey. You may receive a survey via email or text message in the next few days.     Next MTM visit: 1 month, make an appointment for 60 minutes.    To schedule another MTM appointment, please call the clinic directly or you may call the MTM scheduling line at [phone] or toll-free at [phone].     My Clinical Pharmacist's contact information:                                                      It was a pleasure talking with you today!  Please feel free to contact me with any questions or concerns you have.      Carly Pisano, PharmD  Medication Therapy Management (MTM) Pharmacist  [facility] (Tu/Th/Fr)  Clinic Phone: [phone]  Pager: [phone]

## 2020-03-13 ENCOUNTER — OFFICE VISIT - RIVER FALLS (OUTPATIENT)
Dept: FAMILY MEDICINE | Facility: CLINIC | Age: 65
End: 2020-03-13

## 2020-03-13 ENCOUNTER — COMMUNICATION - RIVER FALLS (OUTPATIENT)
Dept: FAMILY MEDICINE | Facility: CLINIC | Age: 65
End: 2020-03-13

## 2020-03-13 ENCOUNTER — TRANSFERRED RECORDS (OUTPATIENT)
Dept: HEALTH INFORMATION MANAGEMENT | Facility: CLINIC | Age: 65
End: 2020-03-13

## 2020-03-13 LAB
ALBUMIN (URINE) MG/SPEC: 0.3 MG/DL
ALBUMIN/CREATININE RATIO: 6 MCG/MG CREAT
CHOLEST SERPL-MCNC: 167 MG/DL
CREAT SERPL-MCNC: 0.7 MG/DL (ref 0.5–0.99)
CREATININE (URINE): 52 MG/DL (ref 20–275)
GFR SERPL CREATININE-BSD FRML MDRD: 92 ML/MIN/1.73M2
GLUCOSE SERPL-MCNC: 70 MG/DL (ref 65–99)
HBA1C MFR BLD: 6.8 % (ref 0–5.7)
HDLC SERPL-MCNC: 60 MG/DL
LDLC SERPL CALC-MCNC: 71 MG/DL
NONHDLC SERPL-MCNC: 107 MG/DL
POTASSIUM SERPL-SCNC: 4.1 MMOL/L (ref 3.5–5.3)
TRIGL SERPL-MCNC: 276 MG/DL

## 2020-03-13 ASSESSMENT — MIFFLIN-ST. JEOR
SCORE: 1237.61
SCORE: 1232.61

## 2020-03-18 ENCOUNTER — COMMUNICATION - RIVER FALLS (OUTPATIENT)
Dept: FAMILY MEDICINE | Facility: CLINIC | Age: 65
End: 2020-03-18

## 2020-04-14 ENCOUNTER — COMMUNICATION - RIVER FALLS (OUTPATIENT)
Dept: FAMILY MEDICINE | Facility: CLINIC | Age: 65
End: 2020-04-14

## 2020-05-12 ENCOUNTER — OFFICE VISIT - RIVER FALLS (OUTPATIENT)
Dept: FAMILY MEDICINE | Facility: CLINIC | Age: 65
End: 2020-05-12

## 2020-07-02 ENCOUNTER — COMMUNICATION - RIVER FALLS (OUTPATIENT)
Dept: FAMILY MEDICINE | Facility: CLINIC | Age: 65
End: 2020-07-02

## 2020-08-20 ENCOUNTER — TELEPHONE (OUTPATIENT)
Dept: PHARMACY | Facility: PHYSICIAN GROUP | Age: 65
End: 2020-08-20

## 2020-08-20 NOTE — TELEPHONE ENCOUNTER
Pharm[employer]IP Outbound Call:    Reason for call: due for MTM appt  Call attempt: once  Voicemail left: yes - requested pt call back and schedule MTM Appt.    Carly Pisano PharmD  Medication Therapy Management (MTM) Pharmacist  [facility] (Tu/Th/Fr)  Clinic Phone: [phone]  Pager: [phone]

## 2020-09-17 ENCOUNTER — OFFICE VISIT - RIVER FALLS (OUTPATIENT)
Dept: FAMILY MEDICINE | Facility: CLINIC | Age: 65
End: 2020-09-17

## 2020-09-17 ENCOUNTER — VIRTUAL VISIT (OUTPATIENT)
Dept: PHARMACY | Facility: PHYSICIAN GROUP | Age: 65
End: 2020-09-17
Payer: MEDICAID

## 2020-09-17 VITALS
OXYGEN SATURATION: 96 % | DIASTOLIC BLOOD PRESSURE: 70 MMHG | HEART RATE: 64 BPM | SYSTOLIC BLOOD PRESSURE: 130 MMHG | HEIGHT: 62 IN | BODY MASS INDEX: 30 KG/M2 | WEIGHT: 163 LBS

## 2020-09-17 DIAGNOSIS — Z79.4 TYPE 2 DIABETES MELLITUS WITH OTHER SPECIFIED COMPLICATION, WITH LONG-TERM CURRENT USE OF INSULIN (H): Primary | ICD-10-CM

## 2020-09-17 DIAGNOSIS — F32.A DEPRESSION, UNSPECIFIED DEPRESSION TYPE: ICD-10-CM

## 2020-09-17 DIAGNOSIS — R53.83 OTHER FATIGUE: ICD-10-CM

## 2020-09-17 DIAGNOSIS — E78.5 DYSLIPIDEMIA: ICD-10-CM

## 2020-09-17 DIAGNOSIS — E11.69 TYPE 2 DIABETES MELLITUS WITH OTHER SPECIFIED COMPLICATION, WITH LONG-TERM CURRENT USE OF INSULIN (H): Primary | ICD-10-CM

## 2020-09-17 PROCEDURE — 99606 MTMS BY PHARM EST 15 MIN: CPT | Mod: TEL | Performed by: PHARMACIST

## 2020-09-17 PROCEDURE — 99607 MTMS BY PHARM ADDL 15 MIN: CPT | Mod: TEL | Performed by: PHARMACIST

## 2020-09-17 RX ORDER — CALCIUM CARBONATE 500 MG/1
1 TABLET, CHEWABLE ORAL 2 TIMES DAILY
COMMUNITY

## 2020-09-17 RX ORDER — PANTOPRAZOLE SODIUM 20 MG/1
20 TABLET, DELAYED RELEASE ORAL 2 TIMES DAILY
COMMUNITY
End: 2022-05-19

## 2020-09-17 NOTE — PROGRESS NOTES
MTM ENCOUNTER  SUBJECTIVE/OBJECTIVE:                           Chanelle Billy is a 65 year old female called for a follow-up visit. She was referred to me from Samanta Maza MD.  Today's visit is a follow-up MTM visit from 2020.     Patient consented to a telehealth visit: yes  Telemedicine Visit Details  Type of service:  Telephone visit  Start Time: 9:03 am  End Time: 9:51 AM  Originating Location (pt. Location): Home  Distant Location (provider location):  [facility] [place]  Mode of Communication:  Telephone    Chief Complaint: diabetes and depression follow-up.  Personal Healthcare Goals: get home organized so she can find meds; lose weight.    Allergies/ADRs: Reviewed in chart  Tobacco: She reports that she has quit smoking. She has never used smokeless tobacco.  Alcohol: Less than 1 beverage / month  Caffeine: 1 cups/day of coffee/day in the morning, no creamer, no sugar.  Occasional pop.  Activity: No routine, denies walking at this time.  PMH: Reviewed in chart    Providers:  Primary care provider: Samanta Maza MD at Samanta Maza [facility]  Cardiology: Dr. Long  Medication Therapy Management (MTM): Carly Pisano, Erick at [facility]  Diabetes Education: Avril Martinez, RD, CD, CDCES at [facility]    Medication Adherence/Access:   Patient uses pill box(es) - started using these early .  Patient takes medications 2 time(s) per day.   Per patient, misses medication 0 times per week.  Reports that she has been taking her medications as prescribed.  Medication barriers: none.   The patient fills medications at [place]: NO, fills medications at [facility].    Pt routine:   Mornin metformin  1 jardiance  1 clopidogrel  1 lisinpril  1 metoprolol   1 multivitamin  1 pantoprazole  Evenin metformin  1 lortadine  1 escitalopram  1 metoprolol  1 rosuvastatin - pt not taking at this time (thinks she  "ran out of it?)    Type 2 Diabetes:  Testing supplies: Truetest  Following with certified diabetes care and  (CDCES)? Yes - last visit 2020.  Pt currently taking:  Metformin  m tabs twice daily - taking with food.  Basaglar (glargine) long-acting insulin: 26 units twice daily  Novolog insulin: take 18 units BEFORE meals and 6 units BEFORE snacks. Reports that she has been eating less and thinks she has only been using insulin about twice daily with food.  Has been sometimes wearing a catherine pack to carry insulin, which has improved adherence.  Jardiance (empagliflozin) 10 mg: once daily.  Pt does report some increased in urination with jardiance, but still finds the medication beneficial.  Also notices a little stomach upset with metformin, taking TUMS for this or drinking bubly water and this settles it.  Record of home blood sugars:  Lost her BG monitoring log, so does not have any readings today.  Last reading was 123 mg/dL in the afternoon, recently the highest BG has been 150s. Lowest BG was 98 mg/dL.  Symptoms of low blood sugar? Does feel sweating and sticky sometimes - states that she checks her BG then, but today she cannot remember what those readings are.  Pt denies any BG less than 70 mg/dL.  Symptoms of high blood sugar? \"buzzing sound\" - will take BG and then use \"some\" insulin (estimates about 6 units) to lower it and go for a walk.  Lab Results   Component Value Date    A1C 6.8 2020    A1C 11.7 2019   Eye exam: 2018 - due  Foot exam: 2020  Diet: food stamps $16/month.  Has been eating smaller portion sizes and feels good about this.  States that she is trying not to snack later at night.  She did meet with our dietitian Avril Martinez, RD, CD, [facility] and found this beneficial although patient does not want to get rid of the egg yolks from her eggs as [employer] had recommended.  Exercise: no routine - pt would like to get a stroller for her cat so that " she can go walking with the cat to get exercise.  Secondary Prevention:  Aspirin: Not taking due to taking clopidogrel  Statin yes - rosuvastatin - pt does not have this at this time.  ACEi/ARB: Yes: lisinopril.   Urine Albumin:       Dyslipidemia:  Current therapy includes prescribed rosuvastatin 20 mg daily but pt states that she is not taking this anymore. She is not exactly sure why, maybe ran out (?).  Pt reports no significant myalgias or other side effects, history of adverse effects to lipitor (in allergies).  Cannot calculate 10-year ASCVD because history of MI in 2011.      Depression/Fatigue: Has been meeting with Sada Aguero, licensed professional counselor in [place] - but states that she lost her number to Sada, so she has not talked with her recently.  Feels very fatigued, but states that when she gets bored she takes a nap, thinks that this might be keeping her from sleeping at night. Has been helping her daughter with the grandkids and this tires her out. Read books to help make herself tired at night. Also uses a sound machine to help her fall asleep. Wearing an incontinence pad to bed just in case she has an accident, has not been happening very often.  Activities to help with her depression: praying, going to [employer], meeting with Sada, playing with the cat  Current medications include:   Escitalopram 10 mg once daily in the evening, states that she did go on the 20 mg for a little while when the pandemic started but now is back down to 10 mg. Happy on this dose.  Medication History: citalopram 40 mg (unknown start - 1/3/2020) - stopped due to limited efficacy and CV/QT prolongation risk with dose >20 mg, mirtazapine (1/3/2020 - 1/10/2020) - stopped due to strange dreams and 1 day of suicidal ideation (per Dr. Maza's notes).  PHQ 1/3/2020 2/28/2020   PHQ-9 Total Score 12 9   Q9: Thoughts of better off dead/self-harm past 2 weeks Not at all Not at all     Today's Vitals: There were no  vitals taken for this visit.  Telemedicine due to COVID19 precautions.  Pt reports that according to her daughter's scale she is 170 lbs. She is frustrated by this and would like to lose weight, not gain it.    ASSESSMENT:                            Medication Adherence: Pt would benefit from CGM monitoring and bubble packing at [place] [place]. \[facility].\"".    Type 2 Diabetes: recommend improved SMBG.  Patient's A1c of 6.8% is at ADA goal of <7% - however this was from 3/2020 - due for 6 month A1c. SMBG fasting sugars are at ADA goal of  mg/dL and post-prandial sugars are at goal of less than 180 mg/dL- per pt report, but no objective data today.   Education about hypoglycemia provided today and sweating symptoms may be HYPOglycemic. Recommend pt document SMBG - will provide log today.  Will also pursue CGM - have previously discussed with pt, but now pt agrees to trial. Recommend the Freestyle roula 2 due to small size, ease of self-administration and option for setting high and low alarms.  W` ill discuss with PCP and  [facility].  No dose changes today    Dyslipidemia:needs improvement - recommend restart rosuvastatin - will send Rx.    Depression/Fatigue: Recommend follow-up with counselor - will provide telephone number for pt today in mailout. No med changes. Discussed recommendation not to nap in the daytime so that she can get better sleep at night.    PLAN:                             1. Due for A1c lab. You can complete this when you come to clinic for your next appointment with Avril Martinez RD, H[employer], [facility].  2. Start writing down your blood sugars again. See the following pages for a log.  3. Call Sdaa to schedule a counseling appointment  Sada Aguero - Licensed Professional Counselor  [phone]  4. Start taking the rosuvastatin 20 mg tablet: 1 tablet by mouth once daily in the evening.  5. We will try to get you a continuous glucose monitor.  Once you get this, make an appointment with Avril Martinez RD, HAFSA,  [facility] to teach you how to use it.  6. I recommend that you start using bubble packing at [facility] so that you do not have to worry about setting up your pill boxes or missing medications.  7. See the attached updated medication list.    MTM to communicate with CDCES and PCP re: CGM and A1c.  MTM to communicate with [facility] re med packaging.    Pt knows that she is due to see cardiology - will make appt.  Pt knows that she is due for eye appt - will make appt.    I spent 48 minutes with this patient today. All changes were made via collaborative practice agreement with Samanta Maza MD. A copy of the visit note was provided to the patient's primary care provider.    Will follow up in 1 month: : telemedicine: 10 am.    The patient was mailed a summary of these recommendations.  MTM coord to mail.    Kushal BradleyD  Medication Therapy Management (MTM) Pharmacist  [facility] ()  Clinic Phone: [phone]  Pager: [phone]    Addended on 2020.  Called [facility] pharmacy today - they will contact pt and discuss bubble packing options. Cost is $2/card so could package AM in 1 card and PM in 1 card.  Will have [facility] pool fax AM and PM list to [facility] in case pt does want to pack.  Pt routine:   Mornin metformin  1 jardiance  1 clopidogrel  1 lisinpril  1 metoprolol   1 multivitamin  1 pantoprazole  Evenin metformin  1 lortadine  1 escitalopram  1 metoprolol  1 rosuvastatin  KB

## 2020-09-17 NOTE — LETTER
September 17, 2020      Chanelle Milton Wenceslao  [address] [address]        Dear Chanelle,     It was nice to talk with you on the phone today about your medications.  Here is the plan that we agreed upon during your visit.    1. Due for A1c lab. You can complete this when you come to clinic for your next appointment with Avril Martinez RD, CD, [facility].  2. Start writing down your blood sugars again. See the following pages for a log.  3. Call Sada to schedule a counseling appointment  Sada Aguero - Licensed Professional Counselor  [phone]  4. Start taking the rosuvastatin 20 mg tablet: 1 tablet by mouth once daily in the evening.  5. We will try to get you a continuous glucose monitor.  Once you get this, make an appointment with Avril Martinez RD, CD, [facility] to teach you how to use it.  6. I recommend that you start using bubble packing at [facility] so that you do not have to worry about setting up your pill boxes or missing medications.  7. See the attached updated medication list. You can carry this with you.    Follow-up: Tuesday, October 20th: telemedicine: 10 am    Please let me know if you have any further questions or concerns.    Sincerely,    Carly Pisano, PharmD  Medication Therapy Management (MTM) Pharmacist  [facility] (Tu/Th/Fr)  Clinic Phone: [phone]  Pager: [phone]                September Blood Sugar Log:  Goal: Test blood sugar 4 times each day.  Blood sugar goals  Fasting (AM):  mg/dL  2-hours after meal: less than 180 mg/dL    Date Fasting AM 2-hours after breakfast 2-hours after lunch 2-hours after evening meal Before Bedtime   9/18 9/19 9/20 9/21 9/22 9/23 9/24 9/25 9/26 9/27 9/28 9/29 9/30 October Blood Sugar Log:  Goal: Test blood sugar 4 times each day.  Blood sugar  goals  Fasting (AM):  mg/dL  2-hours after meal: less than 180 mg/dL    Date Fasting AM 2-hours after breakfast 2-hours after lunch 2-hours after evening meal Before Bedtime   10/1        10/2        10/3        10/4        10/5        10/6        10/7        10/8        10/9        10/10        10/11        10/12        10/13        10/14        10/15        10/16        10/17        10/18        10/19        10/20        10/21        10/22        10/23        10/24        10/25        10/26        10/27        10/28        10/29        10/30        10/31                          November Blood Sugar Log:  Goal: Test blood sugar 4 times each day.  Blood sugar goals  Fasting (AM):  mg/dL  2-hours after meal: less than 180 mg/dL    Date Fasting AM 2-hours after breakfast 2-hours after lunch 2-hours after evening meal Before Bedtime   11/1        11/2        11/3        11/4        11/5        11/6        11/7        11/8        11/9        11/10        11/11        11/12        11/13        11/14        11/15        11/16        11/17        11/18        11/19        11/20        11/21        11/22        11/23        11/24        11/25        11/26        11/27        11/28        11/29        11/30                            December Blood Sugar Log:  Goal: Test blood sugar 4 times each day.  Blood sugar goals  Fasting (AM):  mg/dL  2-hours after meal: less than 180 mg/dL    Date Fasting AM 2-hours after breakfast 2-hours after lunch 2-hours after evening meal Before Bedtime   12/1        12/2        12/3        12/4        12/5        12/6        12/7        12/8        12/9        12/10        12/11        12/12        12/13        12/14        12/15        12/16        12/17        12/18        12/19        12/20        12/21        12/22        12/23        12/24        12/25        12/26        12/27        12/28        12/29        12/30        12/31

## 2020-10-01 ENCOUNTER — OFFICE VISIT - RIVER FALLS (OUTPATIENT)
Dept: FAMILY MEDICINE | Facility: CLINIC | Age: 65
End: 2020-10-01
Payer: MEDICARE

## 2020-10-01 ASSESSMENT — MIFFLIN-ST. JEOR: SCORE: 1275.72

## 2020-10-07 ENCOUNTER — COMMUNICATION - RIVER FALLS (OUTPATIENT)
Dept: FAMILY MEDICINE | Facility: CLINIC | Age: 65
End: 2020-10-07
Payer: MEDICARE

## 2020-10-09 ENCOUNTER — COMMUNICATION - RIVER FALLS (OUTPATIENT)
Dept: FAMILY MEDICINE | Facility: CLINIC | Age: 65
End: 2020-10-09

## 2020-10-20 ENCOUNTER — VIRTUAL VISIT (OUTPATIENT)
Dept: PHARMACY | Facility: PHYSICIAN GROUP | Age: 65
End: 2020-10-20
Payer: MEDICAID

## 2020-10-20 ENCOUNTER — OFFICE VISIT - RIVER FALLS (OUTPATIENT)
Dept: FAMILY MEDICINE | Facility: CLINIC | Age: 65
End: 2020-10-20

## 2020-10-20 DIAGNOSIS — I25.10 CORONARY ARTERY DISEASE INVOLVING NATIVE HEART, ANGINA PRESENCE UNSPECIFIED, UNSPECIFIED VESSEL OR LESION TYPE: ICD-10-CM

## 2020-10-20 DIAGNOSIS — I10 HYPERTENSION, UNSPECIFIED TYPE: ICD-10-CM

## 2020-10-20 DIAGNOSIS — E11.69 TYPE 2 DIABETES MELLITUS WITH OTHER SPECIFIED COMPLICATION, WITH LONG-TERM CURRENT USE OF INSULIN (H): Primary | ICD-10-CM

## 2020-10-20 DIAGNOSIS — E78.5 DYSLIPIDEMIA: ICD-10-CM

## 2020-10-20 DIAGNOSIS — Z79.4 TYPE 2 DIABETES MELLITUS WITH OTHER SPECIFIED COMPLICATION, WITH LONG-TERM CURRENT USE OF INSULIN (H): Primary | ICD-10-CM

## 2020-10-20 DIAGNOSIS — R12 HEARTBURN: ICD-10-CM

## 2020-10-20 PROCEDURE — 99606 MTMS BY PHARM EST 15 MIN: CPT | Mod: TEL | Performed by: PHARMACIST

## 2020-10-20 PROCEDURE — 99607 MTMS BY PHARM ADDL 15 MIN: CPT | Mod: TEL | Performed by: PHARMACIST

## 2020-10-20 NOTE — PROGRESS NOTES
MTM ENCOUNTER  SUBJECTIVE/OBJECTIVE:                           Chanelle Billy is a 65 year old female called for a follow-up visit. She was referred to me from Samanta Maza MD.  Today's visit is a follow-up MTM visit from 2020.     Chief Complaint: needs rosuvastatin, lisinopril and metformin refills.    Allergies/ADRs: Reviewed in chart  Tobacco: She reports that she has quit smoking. She has never used smokeless tobacco.  Alcohol: Less than 1 beverage / month  Caffeine:1 cups/day of coffee/day in the morning, no creamer, no sugar.  Occasional pop.  Activity: No routine, denies walking at this time.  Past Medical History: Reviewed in chart  Social: lives alone, often visits with her daughter Kunal (family friend).    Providers:  Primary care provider: Samanta Maza MD at [facility]  Cardiology: Dr. Long  Medication Therapy Management (MTM): Carly Pisano PharmD at [facility]  Diabetes Education: Avril Martinez RD, CD, CDCTANK at [facility]     Medication Adherence/Access:   Patient uses pill box(es) - started using these early . Did explore having the pills packed at [facility], but this would be too expensive for her $4/month.  Patient takes medications 2 time(s) per day.   Per patient, misses medication (pills) 0 times per week.  States that she does sometimes miss her insulin.  Medication barriers: none.   The patient fills medications at [place]: NO, fills medications at [facility].     Pt routine:   Mornin metformin  1 jardiance  1 clopidogrel  1 lisinopril  1 metoprolol   1 multivitamin  1 pantoprazole  Evenin metformin  1 lortadine  1 escitalopram  1 metoprolol  1 rosuvastatin  1 pantoprazole    Type 2 Diabetes:  Testing supplies: Truetest  Following with certified diabetes care and  (CDCES)? Yes - last visit 2020.  Reports that recently she has been snacking a lot more,  "not taking her insulin with her as much in the catherine pack, and not watching portion sizes. She is worried that her next A1c will be high.  Pt currently taking:  Metformin  m tabs twice daily - taking with food. If stomach upset, takes some TUMS as needed.  Basaglar (glargine) long-acting insulin: 26 units twice daily  Novolog insulin: take 18 units BEFORE meals and 6 units BEFORE snacks. Reports that sometimes when she snacks (when watching TV), then she does not always use the short-acting insulin.  When she wears the catherine pack to carry insulin, this increases adherence.  Jardiance (empagliflozin) 10 mg: once daily.  Record of home blood sugars:  Checks BG 4 times daily.  Says that she just relocated her BG log paper just this morning. And wrote the 300 mg/dL blood sugar that she had this morning. Otherwise does not have the readings handy today.  Symptoms of low blood sugar? sweaty  Pt denies any BG less than 70 mg/dL.  Symptoms of high blood sugar? \"buzzing sound\"  Lab Results   Component Value Date    A1C 6.8 2020    A1C 11.7 2019   Eye exam: 2018 - due  Foot exam: 2020, also 10/1/2020 with GRETTA.  Diet: food stamps $16/month. When she is out at her daughters, she has good portion sizes. Notes that when she gets bored, she eats. She also starts eating when she watches TV.  Meeting with registered dietitian, Avril Martinez, RD, CD, [facility].  Current weight = 171 lbs, she notes that she has been gaining weight.  Exercise: no routine - pt would like to get a stroller for her cat so that she can go walking with the cat to get exercise. Has not done this yet, but brings it up today.  Secondary Prevention:   Aspirin: Not taking due to taking clopidogrel  Statin yes - rosuvastatin.  ACEi/ARB: Yes: lisinopril.   Urine Albumin:       Dyslipidemia:   Current therapy includes prescribed rosuvastatin 20 mg daily - would like 3 month supply.  Pt reports no significant myalgias or other side " effects, history of adverse effects to lipitor (in allergies).  Cannot calculate 10-year ASCVD because history of MI in 2011.      Hypertension/CAD:  Per chart notes, history of CAD and MI.  Does not currently follow with a cardiologist, but has been referred to see Dr. Arron Long, visit on 12/17/19 and the plan is to have a repeat stress test.  3 stents in her heart: heart attack 7/27/2011, stents put in 9/2/2011.  Saw Dr. Zavala at [facility] did her surgery.  Went back and saw this provider once for a stress test.  Current Meds:  Lisinopril 40 mg: once daily - states that she only got 14 pills last time, wondering if she needs an appointment to get refills.  Metoprolol succinate 25 mg: twice daily  Clopidogrel 75 mg: once daily for secondary prevention.  Nitroglycerin 0.4 mg sublingual tab: Place 1 tablet under the tongue at onset of chest pain.  May repeat x 3 doses q 5 min.  Call 911 after first dose if pain persists. Pt use:last month - found this effective.  Patient does not self-monitor BP.  Patient reports no current medication side effects.  Medication History: amlodipine (stopped due to BP control from other meds).        Heartburn:  Current medications include: Protonix (pantoprazole) 20 mg twice daily (AM when waking and PM before bed). This was changed from 40 mg daily to 20 mg twice daily by PCP.  Notes that she was having an increase in heartburn/stomach pain in th evening so dividing this through the day has helped.  Medication History: ranitidine (ineffective)  The patient does state that she has had stomach ulcers before, but denies bleeding.    Today's Vitals: There were no vitals taken for this visit.    ASSESSMENT:                            Medication Adherence: needs improvement on adherence to insulin.    Type 2 Diabetes:  Patient's A1c of 6.8% is at ADA goal of <7%, however, this is old and she is due for labs - Patient educated again today on this. Patient would benefit from CGM to  ease SMBG, has been testing 4 times/day with insulin injections 4-6 times/day, recommend freestyle roula 2 as this has low and high alarms - will send Rx today.  Also recommend Patient to follow-up with Avril Martinez RD, HAFSA, YUMIKO.  Discussed adherence to insulin and using cell phone alarms and using the catherine pack to carry insulin with her and remind her to use it.    Dyslipidemia: will increase rosuvastatin fill to 3 month supply.    Hypertension/CAD: needs access to lisinopril - will send refill.    Heartburn: improved with pantoprazole 20 mg twice daily.    PLAN:                            1. Schedule an appointment for labs: BMP + A1c this week and BP check. Labs entered.  2. MTM will send 3 month supply of refills, sent for lisinopril, clopidogrel, and rosuvastatin. Patient already has 3 month Rx's for others.  3. Start continuous glucose monitor. Will send Rx for Freestyle Roula 2. Rx sent.  4. Be sure to review your medication list when you fill your pill box and call if you are missing any of the meds on your list.  5. Start using your catherine pack regularly and start using alarms to keep you on a schedule.  6. Future appointments needed:  - make an appointment with Avril Martinez RD, HAFSA, YUMIKO after you receive your continuous glucose monitor.  - make an appointment with cardiology: Dr. Long  - make an appointment with the eye doctor.  - follow-up with Dr. Samanta Maza MD in April 2021.    MTM called [facility] and cancelled pantoprazole 40 mg tab, they have Rx for 20 mg tabs twice daily.    I spent 40 minutes with this patient today. All changes were made via collaborative practice agreement with Samanta Maza MD. A copy of the visit note was provided to the patient's primary care provider.    Will follow up in 1 month: Friday, November 20th: 10am telemedicine.    The patient was mailed a summary of these recommendations. MTM coord to mail.    Carly Pisano, Erick  Medication Therapy Management  (MTM) Pharmacist  [facility] (Tu/Th/Fr)  Clinic Phone: [phone]  Pager: [phone]    Patient consented to a telehealth visit: yes  Telemedicine Visit Details  Type of service:  Telephone visit  Start Time: 10:08 pm  End Time: 10:48 pm  Originating Location (pt. Location): Home  Distant Location (provider location):  [facility]  Mode of Communication:  Telephone    Addended on October 22, 2020  [facility] and stated that they are unable to fill her CGM.   Will send Rx for Freestyle roula 2 to [facility] instead.  KB    Addended on October 23, 2020  Provided CMN form and instructions to GRETTA's pool.  TERRY

## 2020-10-20 NOTE — PATIENT INSTRUCTIONS
Recommendations from today's MTM visit:                                                    MTM (medication therapy management) is a service provided by a clinical pharmacist designed to help you get the most of out of your medicines.     1. Schedule an appointment for labs this week and blood pressure check with the nurse.  2. I sent send 3 month supply of refills for lisinopril, clopidogrel, and rosuvastatin. Your other Rx's already have 3 month orders.  3. Start continuous glucose monitor. Will send Rx for Freestyle Dm 2. Shell Martinez our dietitian and diabetes educator can teach you how to use this.  4. Be sure to review your medication list when you fill your pill box and call if you are missing any of the meds on your list.  I'm including your medication list below.  5. Start using your catherine pack regularly and start using alarms to keep you on a schedule.  6. Future appointments needed:  - [facility] after you receive your continuous glucose monitor.  - make an appointment with cardiology: Dr. Long  - make an appointment with the eye doctor.  - follow-up with Dr. Samanta Maza MD in April 2021.    It was great to speak with you today.  I value your experience and would be very thankful for your time with providing feedback on our clinic survey. You may receive a survey via email or text message in the next few days.     Next MTM visit: Friday, November 20th: 10am telemedicine.    To schedule another MTM appointment, please call the clinic directly or you may call the MTM scheduling line at [phone] or toll-free at [phone].     My Clinical Pharmacist's contact information:                                                      It was a pleasure talking with you today!  Please feel free to contact me with any questions or concerns you have.      Carly Pisano, PharmD  Medication Therapy Management (MTM) Pharmacist  [facility]  "Family Clinics (Tu/Th/Fr)  Clinic Phone: [phone]  Pager: [phone]       Medication List          Accurate as of October 20, 2020  1:51 PM. If you have any questions, ask your nurse or doctor.            CONTINUE taking these medications      Morning Afternoon Evening Bedtime   ASPIRIN NOT PRESCRIBED  Also known as: INTENTIONAL  Antiplatelet medication not prescribed intentionally due to Risk for drug interaction, already on clopidogrel            B-D ULTRA-FINE 33 LANCETS Misc  Use to test 3 to 4 times a day as directed.            calcium carbonate 500 MG chewable tablet  Also known as: TUMS  Take 1 chew tab by mouth 2 times daily As needed for heartburn, acid reflux            clopidogrel 75 MG tablet  Also known as: PLAVIX  Take 75 mg by mouth every morning              empagliflozin 10 MG Tabs tablet  Also known as: JARDIANCE  Take 10 mg by mouth daily              EQ EYE ALLERGY RELIEF OP  Pt does not know what the active drug is. \"Eye allergy relief\"            escitalopram 10 MG tablet  Also known as: LEXAPRO  Take 10 mg by mouth At Bedtime              FreeStyle Dm 2 [place] Systm Nelia            FreeStyle Dm 2 Sensor Systm Misc            insulin glargine 100 UNIT/ML pen  Inject 26 Units Subcutaneous 2 times daily  Doctor's comments: If Basaglar is not covered by insurance, may substitute Lantus at same dose and frequency.          26 units      26 units      lisinopril 40 MG tablet  Also known as: ZESTRIL  Take 40 mg by mouth daily              loratadine 10 MG tablet  Also known as: CLARITIN  Take 10 mg by mouth At Bedtime              metFORMIN 500 MG 24 hr tablet  Also known as: GLUCOPHAGE-XR  Take 1,000 mg by mouth 2 times daily (with meals)        With food if upset stomach      With food if upset stomach      metoprolol tartrate 25 MG tablet  Also known as: LOPRESSOR  Take 25 mg by mouth 2 times daily                multivitamin w/minerals tablet  Take 1 tablet by mouth daily            "   nitroGLYcerin 0.4 MG sublingual tablet  Also known as: NITROSTAT  Place 0.4 mg under the tongue every 5 minutes as needed For chest pain place 1 tablet under the tongue every 5 minutes for 3 doses. If symptoms persist 5 minutes after 1st dose call 911.            NovoLOG FLEXPEN 100 UNIT/ML pen  18 units under the skin three times daily with meals and 6 units with snacks.  Reason for med: Type 2 Diabetes  Generic drug: insulin aspart        18 units BEFORE meals, 6 units BEFORE snacks.     18 units BEFORE meals, 6 units BEFORE snacks     18 units BEFORE meals, 6 units BEFORE snacks      pantoprazole 20 MG EC tablet  Also known as: PROTONIX  Take 20 mg by mouth 2 times daily        Best taken before your meal.      Best taken before your meal.      rosuvastatin 20 MG tablet  Also known as: CRESTOR  Take 20 mg by mouth At Bedtime              Sharps-A-Gator Locking Bracket Misc  Use as directed            True Metrix Blood Glucose Test test strip  Use to test 3 to 4 times daily as directed.  Generic drug: blood glucose            ULTICARE MICRO 32G X 4 MM miscellaneous  Use for insulin injection under the skin 4 to 5 times a day as directed  Generic drug: insulin pen needle

## 2020-10-22 ENCOUNTER — AMBULATORY - RIVER FALLS (OUTPATIENT)
Dept: FAMILY MEDICINE | Facility: CLINIC | Age: 65
End: 2020-10-22

## 2020-10-22 ENCOUNTER — TRANSFERRED RECORDS (OUTPATIENT)
Dept: HEALTH INFORMATION MANAGEMENT | Facility: CLINIC | Age: 65
End: 2020-10-22

## 2020-10-22 LAB
CREAT SERPL-MCNC: 0.73 MG/DL (ref 0.5–0.99)
GFR SERPL CREATININE-BSD FRML MDRD: 86 ML/MIN/1.73M2
GLUCOSE SERPL-MCNC: 239 MG/DL (ref 65–99)
HBA1C MFR BLD: 11.4 % (ref 0–5.7)
POTASSIUM SERPL-SCNC: 3.8 MMOL/L (ref 3.5–5.3)

## 2020-10-23 ENCOUNTER — COMMUNICATION - RIVER FALLS (OUTPATIENT)
Dept: FAMILY MEDICINE | Facility: CLINIC | Age: 65
End: 2020-10-23

## 2020-10-23 LAB
BUN SERPL-MCNC: 19 MG/DL (ref 7–25)
BUN/CREAT RATIO - HISTORICAL: ABNORMAL (ref 6–22)
CALCIUM SERPL-MCNC: 9.7 MG/DL (ref 8.6–10.4)
CHLORIDE BLD-SCNC: 100 MMOL/L (ref 98–110)
CO2 SERPL-SCNC: 24 MMOL/L (ref 20–32)
CREAT SERPL-MCNC: 0.73 MG/DL (ref 0.5–0.99)
EGFRCR SERPLBLD CKD-EPI 2021: 86 ML/MIN/1.73M2
GLUCOSE BLD-MCNC: 239 MG/DL (ref 65–99)
HBA1C MFR BLD: 11.4 %
POTASSIUM BLD-SCNC: 3.8 MMOL/L (ref 3.5–5.3)
SODIUM SERPL-SCNC: 137 MMOL/L (ref 135–146)

## 2020-10-28 ENCOUNTER — COMMUNICATION - RIVER FALLS (OUTPATIENT)
Dept: FAMILY MEDICINE | Facility: CLINIC | Age: 65
End: 2020-10-28

## 2020-11-20 ENCOUNTER — VIRTUAL VISIT (OUTPATIENT)
Dept: PHARMACY | Facility: PHYSICIAN GROUP | Age: 65
End: 2020-11-20
Payer: MEDICAID

## 2020-11-20 ENCOUNTER — OFFICE VISIT - RIVER FALLS (OUTPATIENT)
Dept: FAMILY MEDICINE | Facility: CLINIC | Age: 65
End: 2020-11-20

## 2020-11-20 DIAGNOSIS — E11.69 TYPE 2 DIABETES MELLITUS WITH OTHER SPECIFIED COMPLICATION, WITH LONG-TERM CURRENT USE OF INSULIN (H): Primary | ICD-10-CM

## 2020-11-20 DIAGNOSIS — Z79.4 TYPE 2 DIABETES MELLITUS WITH OTHER SPECIFIED COMPLICATION, WITH LONG-TERM CURRENT USE OF INSULIN (H): Primary | ICD-10-CM

## 2020-11-20 PROCEDURE — 99606 MTMS BY PHARM EST 15 MIN: CPT | Mod: TEL | Performed by: PHARMACIST

## 2020-11-20 PROCEDURE — 99607 MTMS BY PHARM ADDL 15 MIN: CPT | Mod: TEL | Performed by: PHARMACIST

## 2020-11-20 NOTE — PATIENT INSTRUCTIONS
Recommendations from today's MTM visit:                                                    MTM (medication therapy management) is a service provided by a clinical pharmacist designed to help you get the most of out of your medicines.     1. Use insulin routinely:  Basaglar (glargine) long-acting insulin: 26 units twice daily  Novolog insulin: take 18 units BEFORE meals and 6 units BEFORE snacks.    2. Start testing your insulin 4 times a day again.  Your blood sugars goals are:  Before eating (fasting): 80 - 130 mg/dL  2-hours after meals (post-prandial): less than 180 mg/dL    3.  Pharmacy will call you to schedule a time to deliver your continuous glucose monitor. If they leave you a voicemail   [facility] phone: [phone]    4. Once you receive the CGM, meet with Avril Martinez RD, CD, CDCES at [facility] to teach you how to use it and get it set up.    5. Chanelle to call [facility] for another therapy appointment: [phone]    It was great to speak with you today.  I value your experience and would be very thankful for your time with providing feedback on our clinic survey. You may receive a survey via email or text message in the next few days.     Next MTM visit:  December 4th: 10 am telemedicine.    To schedule another MTM appointment, please call the clinic directly or you may call the MTM scheduling line at [phone] or toll-free at [phone].     My Clinical Pharmacist's contact information:                                                      It was a pleasure talking with you today!  Please feel free to contact me with any questions or concerns you have.      Carly Pisano, PharmD  Medication Therapy Management (MTM) Pharmacist  [facility] (Tu/Th/Fr)  Clinic Phone: [phone]  Pager: [phone]

## 2020-11-20 NOTE — PROGRESS NOTES
MTM ENCOUNTER  SUBJECTIVE/OBJECTIVE:                           Chanelle Billy is a 65 year old female called for a follow-up visit. She was referred to me from Samanta Maza MD.  Today's visit is a follow-up MTM visit from 10/20/2020.     Reason for visit: diabetes follow-up.  Patient requests Sada the counselor's phone number again.  States that she got voicemails from , but she is confused by them and cannot get her phone to work correctly.    Allergies/ADRs: Reviewed in chart  Tobacco: She reports that she has quit smoking. She has never used smokeless tobacco.  Alcohol: Less than 1 beverage / month  Caffeine: 1 cup/day coffee in the morning, no creamer, no sugar. Occasional pop.  Activity: No routine, denies walking at this time.  Past Medical History: Reviewed in chart  Social: lives alone, often visits with her daughter Kunal (family family).    Providers:  Primary care provider: Samanta Maza MD at [facility]aza [facility]  Cardiology: Dr. Long  Medication Therapy Management (MTM): Kushal BradleyD at [facility]  Diabetes Education: Avril Martinez, RD, CD, CDCES at [facility]    Medication Adherence/Access:   Patient uses pill box(es) - started using these early .  Patient takes medications 2 time(s) per day.   Per patient, misses medication (pills) 1 times per week.  Has been missing her insulin doses routinely, states that her daughter is getting mad at her for missing things.  Medication barriers: none.   The patient fills medications at [place]: NO, fills medications at [facility].     Pt routine:   Mornin metformin  1 jardiance  1 clopidogrel  1 lisinopril  1 metoprolol   1 multivitamin  1 pantoprazole  Evenin metformin  1 lortadine  1 escitalopram  1 metoprolol  1 rosuvastatin  1 pantoprazole    Type 2 Diabetes:  Testing supplies: truetest. Trying to get CGM From  - has been a hold-up, unclear  "why.  Following with certified diabetes care and  (CDCES)? Yes, but last visit was: 2020.  Pt currently taking:  Metformin  m tabs twice daily - taking with food. If stomach upset, takes some TUMS as needed.  Basaglar (glargine) long-acting insulin: 26 units twice daily, not using routinely.  Novolog insulin: take 18 units BEFORE meals and 6 units BEFORE snacks. Reports that sometimes when she snacks (when watching TV), then she does not always use the short-acting insulin.  States that she lost her catherine pack and so she has been carrying a purse.instead, has not been taking her insulin with her.  Missing lots of insulin doses, especially with snacks.  Jardiance (empagliflozin) 10 mg: once daily.  Record of home blood sugars: has a new notebook to write all her BG readings on.  SMBG: rarely.    276 mg/dL this morning, Prior to that had not tested in a long time.. unable to give readings.  Symptoms of low blood sugar? none, sweaty  Symptoms of high blood sugar? \"buzzing sound\"  Lab Results   Component Value Date    A1C 11.4 10/22/2020    A1C 6.8 2020    A1C 11.7 2019   Eye exam: 2018 - due  Foot exam: 2020, also 10/1/2020 with GRETTA.  Diet: food stamps $16/month. When she is out at her daughters, she has good portion sizes. Notes that when she gets bored, she eats. She also starts eating when she watches TV.  Meeting with registered dietitian, Avril Martinez, RD, CD, [facility].  Exercise: Did get a cat stroller so she has been walking her dog in the stroller.  Secondary Prevention:  Aspirin: Not taking due to taking clopidogrel  Statin yes - rosuvastatin.  ACEi/ARB: Yes: lisinopril.   Urine Albumin:      Today's Vitals: There were no vitals taken for this visit.    ASSESSMENT:                            Medication Adherence: See below for considerations    Type 2 Diabetes:  Patient's A1c of 11.4% is not at ADA goal of <7%. SMBG fasting sugars are not at ADA goal of "  mg/dL and post-prandial sugars are not at goal of less than 180 mg/dL.  Patient would benefit from increased adherence to long and short acting insulin and resume checking BG to help keep her self-motivated and aware of DM control.  Working toward CGM -  Called  Mailorder pharmacy together today during the appointment. Patient needed to provide medicaid insurance information, this was completed.  mailorder pharmacy will contact [place] Medicaid for a PA and then call Patient back to schedule delivery. Patient is aware.    Will provide phone # for counselor again.    PLAN:                            1. Increase adherence to insulin (both long acting and short acting insulin).    2. Start testing your insulin 4 times a day again.  Your blood sugars goals are:  Before eating (fasting): 80 - 130 mg/dL  2-hours after meals (post-prandial): less than 180 mg/dL    3.  Pharmacy will call you to schedule a time to deliver your continuous glucose monitor. If they leave you a voicemail   [facility] [phone]    4. Once you receive the CGM, meet with Avril Martinez RD, CD, CDCES at [facility] to teach you how to use it and get it set up.    5. Chanelle to call Sada for another therapy appointment: [phone]    I spent 45 minutes with this patient today. All changes were made via collaborative practice agreement with Samanta Maza MD. A copy of the visit note was provided to the patient's primary care provider.    Will follow up in 2 weeks - check on CGM delivery and insulin dose adjustment. December 4th: 10 am telemedicine.    The patient was mailed a summary of these recommendations.     Carly Pisano, Erick  Medication Therapy Management (MTM) Pharmacist  [facility] (Tu/Th/Fr)  Clinic Phone: [phone]  Pager: [phone]    Patient consented to a telehealth visit: yes  Telemedicine Visit Details  Type of service:  Telephone visit  Start Time: 10:00  am  End Time: 10:45 am  Originating Location (patient location): Home  Distant Location (provider location):  [facility]  Mode of Communication:  Telephone

## 2020-12-04 ENCOUNTER — OFFICE VISIT - RIVER FALLS (OUTPATIENT)
Dept: FAMILY MEDICINE | Facility: CLINIC | Age: 65
End: 2020-12-04
Payer: MEDICARE

## 2020-12-04 ENCOUNTER — VIRTUAL VISIT (OUTPATIENT)
Dept: PHARMACY | Facility: PHYSICIAN GROUP | Age: 65
End: 2020-12-04
Payer: MEDICAID

## 2020-12-04 DIAGNOSIS — E11.69 TYPE 2 DIABETES MELLITUS WITH OTHER SPECIFIED COMPLICATION, WITH LONG-TERM CURRENT USE OF INSULIN (H): Primary | ICD-10-CM

## 2020-12-04 DIAGNOSIS — Z79.4 TYPE 2 DIABETES MELLITUS WITH OTHER SPECIFIED COMPLICATION, WITH LONG-TERM CURRENT USE OF INSULIN (H): Primary | ICD-10-CM

## 2020-12-04 DIAGNOSIS — F32.A DEPRESSION, UNSPECIFIED DEPRESSION TYPE: ICD-10-CM

## 2020-12-04 DIAGNOSIS — R53.83 OTHER FATIGUE: ICD-10-CM

## 2020-12-04 PROCEDURE — 99607 MTMS BY PHARM ADDL 15 MIN: CPT | Mod: TEL | Performed by: PHARMACIST

## 2020-12-04 PROCEDURE — 99606 MTMS BY PHARM EST 15 MIN: CPT | Mod: TEL | Performed by: PHARMACIST

## 2020-12-04 NOTE — PATIENT INSTRUCTIONS
Recommendations from today's MTM visit:                                                    MTM (medication therapy management) is a service provided by a clinical pharmacist designed to help you get the most of out of your medicines.     1. You should receive a call from [facility] pharmacy when the continuous glucose monitor is ready to be delivered.  2. Stop escitalopram (generic lexapro).  3. Start fluoxetine (generic prozac) 20 mg capsule: 1 capsule by mouth once daily in the morning.  This can help to give you more energy in the morning.  Lowering your blood sugars can also give you more energy in the morning.  4. Call Sada baker to schedule follow-up with her for therapy.  Sada Aguero, Licensed professional Counselor: phone: [phone]    It was great to speak with you today.  I value your experience and would be very thankful for your time with providing feedback on our clinic survey. You may receive a survey via email or text message in the next few days.     Next MTM visit: 2 weeks: Friday December 18th: at 10am:  telephone.    To schedule another MTM appointment, please call the clinic directly or you may call the MTM scheduling line at [phone] or toll-free at [phone].     My Clinical Pharmacist's contact information:                                                      It was a pleasure talking with you today!  Please feel free to contact me with any questions or concerns you have.      Carly Pisano, PharmD  Medication Therapy Management (MTM) Pharmacist  [facility] (Tu/Th/Fr)  Clinic Phone: [phone]  Pager: [phone]

## 2020-12-04 NOTE — PROGRESS NOTES
MTM ENCOUNTER  SUBJECTIVE/OBJECTIVE:                           Chanelle Billy is a 65 year old female called for a follow-up visit. She was referred to me from Samanta Maza MD.  Today's visit is a follow-up MTM visit from 2020.     Reason for visit: diabetes check in; ensure Patient got CGM.    Allergies/ADRs: Reviewed in chart  Tobacco: She reports that she has quit smoking. She has never used smokeless tobacco.  Alcohol: Less than 1 beverage / month  Caffeine: 1 cup/day coffee in the morning, no creamer, no sugar. Occasional pop.  Activity: No routine, denies walking at this time.  Past Medical History: Reviewed in chart  Social: lives alone, often visits with her daughter Kunal (family friend).    Providers:  Primary care provider: Samanta Maza MD at [facility]  Cardiology: Dr. Long  Medication Therapy Management (MTM): Carly Pisano PharmD at [facility]  Diabetes Education: Avril Martinez, RD, CD, CDCTANK at [facility]     Medication Adherence/Access:   Patient uses pill box(es) - started using these early .  Patient takes medications 2 time(s) per day.   Per patient, misses medication (pills) 0-1 times per week.  Feels that she's been more consistent with her insulin dosing, now that she's documenting her readings.  Medication barriers: none.   The patient fills medications at [place]: NO, fills medications at [facility].     Pt routine:   Mornin metformin  1 jardiance  1 clopidogrel  1 lisinopril  1 metoprolol   1 multivitamin  1 pantoprazole  Evenin metformin  1 lortadine  1 escitalopram  1 metoprolol  1 rosuvastatin - confirmed that Patient is taking this at night.  1 pantoprazole    Type 2 Diabetes:  Testing supplies: truetest. Trying to get CGM From  - has been a hold-up, unclear why.  Following with certified diabetes care and  (CDCES)? Yes, but last visit was:  "2020.  Pt currently taking:  Metformin  m tabs twice daily - taking with food. If stomach upset, takes some TUMS as needed.  Basaglar (glargine) long-acting insulin: 26 units twice daily.  Novolog insulin: take 18 units BEFORE meals and 6 units BEFORE snacks. Reports that sometimes when she snacks (when watching TV), then she does not always use the short-acting insulin.  Since last visit, has been taking her insulin more consistently. Comments that she notices her BG to be higher when she has a very large evening meal.  Jardiance (empagliflozin) 10 mg: once daily.  Record of home blood sugars: has a new notebook to write all her BG readings on.  SMBG:   Date Fasting AM Before lunch After lunch Before Dinner After Dinner    190       12/3 157 156  113 178    143  130  257    272 174   225, 236   Symptoms of low blood sugar? none, sweaty  Symptoms of high blood sugar? \"buzzing sound\"        Lab Results   Component Value Date     A1C 11.4 10/22/2020     A1C 6.8 2020     A1C 11.7 2019   Eye exam: 2018 - due  Foot exam: 2020, also 10/1/2020 with GRETTA.  Diet: food stamps $16/month. Overeats when bored or watching TV.  Meeting with registered dietitian, Avril Martinez, RD, CD, [facility].  Likes potato salad, trying to eat less of this.  Exercise: Did get a cat stroller so she has been walking her dog in the stroller.  Secondary Prevention:  Aspirin: Not taking due to taking clopidogrel  Statin yes - rosuvastatin.  ACEi/ARB: Yes: lisinopril.   Urine Albumin:      Depression/Fatigue: Has been meeting with Sada Aguero, licensed professional counselor in [place].  Comments that she just had a bad bout of depression and now has a big mess to clean up at her place.  Feels that she gets pretty tired in the morning and she thinks it is due to her antidepressant, even though she takes it in the evening. Gets irritable toward to family.  Activities to help with her depression: praying, " going to [employer], meeting with Sada, playing with the cat, pushing the cat in the stroller.  Current medications include:   Escitalopram 10 mg once daily in the evening, states that she did go on the 20 mg for a little while when the pandemic started but now is back down to 10 mg. Feeling like this is making her very tired not sure how well it is controlling things at this time.  Medication History: citalopram 40 mg (unknown start - 1/3/2020) - stopped due to limited efficacy and CV/QT prolongation risk with dose >20 mg, mirtazapine (1/3/2020 - 1/10/2020) - stopped due to strange dreams and 1 day of suicidal ideation (per Dr. Maza's notes).  PHQ 1/3/2020 2/28/2020   PHQ-9 Total Score 12 9   Q9: Thoughts of better off dead/self-harm past 2 weeks Not at all Not at all      Today's Vitals: There were no vitals taken for this visit.    ASSESSMENT:                            Medication Adherence: see below for considerations.    Type 2 Diabetes:  Patient's A1c of 11.4% is not at ADA goal of <7%. self-monitoring blood glucose are improved with improved adherence to insulin regimen, yet very above goal which Patient acknowledges can be improved with different dietary choices. Patient has previously been well controlled on this insulin plan with good dietary habits so will not make med changes today.  Called [facility] together. paperwork was faxed to [facility] for PA on the CGM, conistent with [employer] messages. Dr. Samanta Maza MD is planning to address this next week.  Due for diabetic eye exam.    Depression/Fatigue: will try alternate selective serotonin reuptake inhibitor with more morning activating effects, fluoxetine.  Discussed that high blood sugars also cause fatigue so lowering those sugars will also help with fatigue.    PLAN:                            1. We will work on the forms for the continuous glucose monitor at the clinic. You should receive a call from [place] when the CGM is ready  to be delivered.  2. Stop escitalopram (generic lexapro).  3. Start fluoxetine (generic prozac) 20 mg capsule: 1 capsule by mouth once daily in the morning.  This can help to give you more energy in the morning.  Lowering your blood sugars can also give you more energy in the morning.  4. Call Sada therapist to schedule follow-up with her for therapy.  Sada Aguero, Licensed professional Counselor: phone: [phone]    Future: diabetic eye exam - will address at next appointment, trying to keep plan simple as possible each visit.    I spent 35 minutes with this patient today. All changes were made via collaborative practice agreement with Samanta Maza MD. A copy of the visit note was provided to the patient's primary care provider.    Will follow up in 2 weeks: Friday December 18th: at 10am:  telephone.    The patient was mailed a summary of these recommendations. MTM Coord to mail.    Carly Pisano PharmD  Medication Therapy Management (MTM) Pharmacist  [facility] (Tu/Th/Fr)  Clinic Phone: [phone]  Pager: [phone]    Patient consented to a telehealth visit: yes  Telemedicine Visit Details  Type of service:  Telephone visit  Start Time: 10:00 am  End Time: 10:35 am  Originating Location (patient location): Home  Distant Location (provider location[facility] [place]  Mode of Communication:  Telephone      Medication Therapy Recommendations Needing Review  Depression, unspecified depression type    Current Medication: escitalopram (LEXAPRO) 10 MG tablet (Discontinued)   Rationale: More effective medication available - Ineffective medication - Effectiveness   Recommendation: Change Medication - FLUoxetine 20 MG capsule   Status: Accepted per CPA          Rationale: More effective medication available - Ineffective medication - Effectiveness   Recommendation: Change Medication - change from escitalopram to fluoxetine   Status:  Accepted per CPA

## 2020-12-10 ENCOUNTER — COMMUNICATION - RIVER FALLS (OUTPATIENT)
Dept: FAMILY MEDICINE | Facility: CLINIC | Age: 65
End: 2020-12-10

## 2020-12-18 ENCOUNTER — OFFICE VISIT - RIVER FALLS (OUTPATIENT)
Dept: FAMILY MEDICINE | Facility: CLINIC | Age: 65
End: 2020-12-18

## 2020-12-18 ENCOUNTER — VIRTUAL VISIT (OUTPATIENT)
Dept: PHARMACY | Facility: PHYSICIAN GROUP | Age: 65
End: 2020-12-18
Payer: MEDICAID

## 2020-12-18 DIAGNOSIS — F32.A DEPRESSION, UNSPECIFIED DEPRESSION TYPE: ICD-10-CM

## 2020-12-18 DIAGNOSIS — Z79.4 TYPE 2 DIABETES MELLITUS WITH OTHER SPECIFIED COMPLICATION, WITH LONG-TERM CURRENT USE OF INSULIN (H): Primary | ICD-10-CM

## 2020-12-18 DIAGNOSIS — E11.69 TYPE 2 DIABETES MELLITUS WITH OTHER SPECIFIED COMPLICATION, WITH LONG-TERM CURRENT USE OF INSULIN (H): Primary | ICD-10-CM

## 2020-12-18 DIAGNOSIS — R53.83 OTHER FATIGUE: ICD-10-CM

## 2020-12-18 PROCEDURE — 99606 MTMS BY PHARM EST 15 MIN: CPT | Mod: TEL | Performed by: PHARMACIST

## 2020-12-18 PROCEDURE — 99607 MTMS BY PHARM ADDL 15 MIN: CPT | Mod: TEL | Performed by: PHARMACIST

## 2020-12-18 NOTE — PATIENT INSTRUCTIONS
Recommendations from today's MTM visit:                                                    MTM (medication therapy management) is a service provided by a clinical pharmacist designed to help you get the most of out of your medicines.      1. Make an appointment with the dentist.  2. Make an appointment with the eye doctor.  3. Increase Jardiance to 25 mg once daily. Finish the 10 mg tabs first.  4. Decrease Novolog insulin with morning meals: take 6 units BEFORE breakfast and 18 UNITS before lunch and evening meal; 6 units BEFORE snacks. The teach-back method was used to confirm patient understanding.    It was great to speak with you today.  I value your experience and would be very thankful for your time with providing feedback on our clinic survey. You may receive a survey via email or text message in the next few days.     Next MTM visit:  Tuesday Jan 5th :11:00 am telemedicine.    To schedule another MTM appointment, please call the clinic directly or you may call the MTM scheduling line at [phone] or toll-free at [phone].     My Clinical Pharmacist's contact information:                                                      It was a pleasure talking with you today!  Please feel free to contact me with any questions or concerns you have.      Carly Pisano, PharmD  Medication Therapy Management (MTM) Pharmacist

## 2020-12-18 NOTE — PROGRESS NOTES
MTM ENCOUNTER  SUBJECTIVE/OBJECTIVE:                           Chanelle Billy is a 65 year old female called for a follow-up visit. She was referred to me from Samanta Maza MD.  Today's visit is a follow-up MTM visit from 2020.     Reason for visit: diabetes follow-up.    Allergies/ADRs: Reviewed in chart  Tobacco: She reports that she has quit smoking. She has never used smokeless tobacco.  Alcohol: Less than 1 beverage / month  Caffeine: 1 cup/day coffee in the morning, no creamer, no sugar. Occasional pop.  Activity: No routine, denies walking at this time.  Past Medical History: Reviewed in chart  Social: lives alone, often visits with her daughter Kunal (family friend).    Providers:  Primary care provider: Samanta Maza MD at [facility]  Cardiology: Dr. Long  Medication Therapy Management (MTM): Carly Pisano PharmD at [facility]  Diabetes Education: Avril Martinez, RD, CD, [facility] at [facility]    Medication Adherence/Access:   Patient uses pill box(es) - started using these early .  Patient takes medications 2 time(s) per day.   Per patient, misses medication (pills) 0-1 times per week.  Feels that she's been more consistent with her insulin dosing, now that she's documenting her readings.  Medication barriers: none.   The patient fills medications at [place]: NO, fills medications at [facility].     Pt routine:   Mornin metformin  1 jardiance  1 clopidogrel  1 lisinopril  1 metoprolol   1 multivitamin  1 pantoprazole  1 fluoxetine  Evenin metformin  1 lortadine  1 metoprolol  1 rosuvastatin - confirmed that Patient is taking this at night.  1 pantoprazole    Type 2 Diabetes:  Testing supplies:  truetest glucometer. Trying to get CGM From  - has been a hold-up, unclear why.  Last visit was waiting on [facility] paperwork.  Following with certified diabetes care and  (CDCES)?  Yes, Avril Martinez RD, CD, [facility], last visit was 2020.  Pt currently taking:  Metformin  m tabs twice daily - taking with food. If stomach upset, takes some TUMS as needed.  Basaglar (glargine) long-acting insulin: 26 units twice daily.  Novolog insulin: take 18 units BEFORE meals and 6 units BEFORE snacks. Reports that sometimes when she snacks (when watching TV), then she does not always use the short-acting insulin.  : states that she has been taking her insulin with her when going out to her daughters, has also been taking her logbook with her - which she is very proud of.  Jardiance (empagliflozin) 10 mg: once daily.  Glucose tabs: has been carrying these with her when out and about  Record of home blood sugars:  SMB times daily.   Ranges (Patient reported): as below  Symptoms of low blood sugar? Felt tired and icky; lowest since last appointment was 69 mg/dL  Symptoms of high blood sugar? none  Date Fasting AM Before lunch After lunch After Dinner Before Bedtime    94        94 119 218 303 (Patient reports she went out to eat, thinks she was late taking the insulin and also snacked on [place] cookies)     89  77  240   12/15 133  272, 140 237     143 69 (was late to having lunch, was out running errands)  164     115  232 281     164 87  114    12/10 84 193  153      Lab Results   Component Value Date    A1C 11.4 10/22/2020    A1C 6.8 2020    A1C 11.7 2019   Eye exam: 2018 - due  Foot exam: 2020, also 10/1/2020 with GRETTA.  Dental exam: unknown when last appointment was; states that she recently broke a tooth. Planning to get an appointment soon.  Diet:  food stamps $16/month. Overeats when bored or watching TV.  Meeting with registered dietitian, Avril Martinez RD, CD, [facility].  Likes potato salad, trying to eat less of this.  Exercise: Did get a cat stroller so she has been walking her dog in the stroller.  Secondary  "Prevention:  Aspirin: Not taking due to clopidogrel  Statin yes - rosuvastatin.  ACEi/ARB: Yes: lisinopril.   Urine Albumin:       Depression/Fatigue:   Following with Sada Aguero, licensed professional counselor in [place]. Has tried calling her recently, unable to connect. Will keep trying to get an appointment with Sada.  Activities to help with her depression: book club at [employer], praying, going to [employer], meeting with Sada, playing with the cat, pushing the cat in the stroller.  Current medications include:   Fluoxetine 20 mg: once daily in the morning (started 2 weeks ago at [facility] appointment)- states that this is \"working beautifully,\" - feeling less tired since switching meds.  Medication History: escitalopram (stopped 12/2020 when started fluoxetine), citalopram 40 mg (unknown start - 1/3/2020) - stopped due to limited efficacy and CV/QT prolongation risk with dose >20 mg, mirtazapine (1/3/2020 - 1/10/2020) - stopped due to strange dreams and 1 day of suicidal ideation (per Dr. Maza's notes).  PHQ 1/3/2020 2/28/2020   PHQ-9 Total Score 12 9   Q9: Thoughts of better off dead/self-harm past 2 weeks Not at all Not at all     Today's Vitals: There were no vitals taken for this visit.    ASSESSMENT:                            Medication Adherence: improving adherence to insulin.    Type 2 Diabetes:  Patient's A1c of 11.4% is not at ADA goal of <7%. SMBG fasting sugars are much improved and near ADA goal of  mg/dL and post-prandial sugars are not at goal of less than 180 mg/dL.  Recommend dose increase of jardiance to 25 mg to lower post-prandial glucose.  Glucose pattern has been trending lower in the AM and higher in the PM, therefore will decrease short-acting insulin with morning meal (likely smaller meal), to reduce risk of HYPOglycemia mid/late morning. Will use the snacktime dosing of 6 units with breakfast, to help keep instructions simple for Patient to be confident in " remembering.  Reviewed importance of taking glucometer, insulin and glucose tabs with when out or at daughter's house. Praised a job well done of this over the past 2 weeks.  Discussed her higher risk for oral/dental infection due to diabetes and recommend seeing dentist very soon. Also due to diabetes eye exam.  CGM update: called [facility] pharmacy - still no receipt of [place] paperwork; messaged provider and [facility] pool to address this.  Future: recommend BMP And A1c 1/2021.    Depression/Fatigue: improved with selective serotonin reuptake inhibitor change to fluoxetine, will continue monitoring at follow-ups.  Encouraged apt with counselor.    PLAN:                            1. Make an appointment with the dentist.  2. Make an appointment with the eye doctor.  3. Increase Jardiance to 25 mg once daily. (has 3 days left of 10 mg tabs).  4. Decrease Novolog insulin with morning meals: take 6 units BEFORE breakfast and 18 UNITS before lunch and evening meal; 6 units BEFORE snacks. The teach-back method was used to confirm patient understanding.    I spent 40 minutes with this patient today. All changes were made via collaborative practice agreement with Samanta Maza MD. A copy of the visit note was provided to the patient's primary care provider.    Will follow up in 2 weeks: Tuesday Jan 5th :11:00 am telemedicine.    The patient was mailed a summary of these recommendations. MTM coord to mail.    Kushal BradleyD  Medication Therapy Management (MTM) Pharmacist  [facility] (Tu/Th/Fr)  Clinic Phone: [phone]  Pager: [phone]    Patient consented to a telehealth visit: yes  Telemedicine Visit Details  Type of service:  Telephone visit  Start Time: 10:00 am  End Time: 10:40 am  Originating Location (patient location): Home  Distant Location (provider location):  [facility] MTM  Mode of Communication:  Telephone      Medication  Therapy Recommendations  Type 2 diabetes mellitus with other specified complication, with long-term current use of insulin (H)    Current Medication: empagliflozin (JARDIANCE) 25 MG TABS tablet   Rationale: Dose too low - Dosage too low - Effectiveness   Recommendation: Increase Dose - increase from 10 to 25 mg   Status: Accepted per CPA          Current Medication: insulin aspart (NOVOLOG FLEXPEN) 100 UNIT/ML pen   Rationale: Dose too high - Dosage too high - Safety   Recommendation: Decrease Dose - decrease AM mealtime dose   Status: Accepted per CPA

## 2020-12-31 ENCOUNTER — TELEPHONE (OUTPATIENT)
Dept: PHARMACY | Facility: CLINIC | Age: 65
End: 2020-12-31

## 2020-12-31 ENCOUNTER — COMMUNICATION - RIVER FALLS (OUTPATIENT)
Dept: FAMILY MEDICINE | Facility: CLINIC | Age: 65
End: 2020-12-31

## 2020-12-31 NOTE — TELEPHONE ENCOUNTER
We are currently unable to fill the Freestyle Dm 2 for the patient due to anna issues with [employer]. Per rejection patient must fill at [facility] ([phone]) or [facility] ([phone]).        [place] [facility]  [phone]

## 2021-01-05 ENCOUNTER — VIRTUAL VISIT (OUTPATIENT)
Dept: PHARMACY | Facility: PHYSICIAN GROUP | Age: 66
End: 2021-01-05
Payer: MEDICAID

## 2021-01-05 ENCOUNTER — OFFICE VISIT - RIVER FALLS (OUTPATIENT)
Dept: FAMILY MEDICINE | Facility: CLINIC | Age: 66
End: 2021-01-05
Payer: MEDICARE

## 2021-01-05 DIAGNOSIS — J00 ACUTE NASOPHARYNGITIS (COMMON COLD): ICD-10-CM

## 2021-01-05 DIAGNOSIS — Z79.4 TYPE 2 DIABETES MELLITUS WITH OTHER SPECIFIED COMPLICATION, WITH LONG-TERM CURRENT USE OF INSULIN (H): Primary | ICD-10-CM

## 2021-01-05 DIAGNOSIS — E11.69 TYPE 2 DIABETES MELLITUS WITH OTHER SPECIFIED COMPLICATION, WITH LONG-TERM CURRENT USE OF INSULIN (H): Primary | ICD-10-CM

## 2021-01-05 PROCEDURE — 99607 MTMS BY PHARM ADDL 15 MIN: CPT | Mod: TEL | Performed by: PHARMACIST

## 2021-01-05 PROCEDURE — 99606 MTMS BY PHARM EST 15 MIN: CPT | Mod: TEL | Performed by: PHARMACIST

## 2021-01-05 NOTE — PROGRESS NOTES
Medication Therapy Management (MTM) Encounter    ASSESSMENT/PLAN:                            Medication Adherence/Access: working on CGM access - see below.    Sinus symptoms: recommend assessment by primary care provider, as may warrant COVID test - Patient will call and make appointment.  Patient instructed to isolate in the meantime.  Recommend to avoid emergency-C and airborne products that are high in sugar.    Type 2 Diabetes:  Patient's A1c of 11.4% is not at ADA goal of <7%.   SMBG fasting sugars are not at ADA goal of  mg/dL and post-prandial sugars are not at goal of less than 180 mg/dL. BG likely higher secondary to acute bacterial/virus sinus infection, emergency-C and airborne use (high sugar products), and change in dietary patterns due to the holidays.  Notable that Patient did not decrease her AM novolog dose to 6 units as recommended at last appointment. Since blood sugars have been elevated anyway, will continue the previous 18 units before meals and 6 units before snacks doses. Also educated about not taking short-acting insulin if not planning to eat.  Discussed that BG will likely decline now that jardiance dose was increased yesterday. Patient to monitor urinary symptoms.  CGM update: call to [employer] today 1/5 - they had trouble finding her insurance coverage, but were able to locate it and can move forward. Estimate 6 business days processing.  HM: due for dm eye exam, dental exam - will wait until sinus symptoms resolved.  Future: recommend BMP And A1c 1/2021 - will wait until sinus symptoms resolved.    PLAN:   1. Not safe to take novolog when not having a meal. Revert back to previous novolog dose: 18 units before meals and 6 units before snacks.  2. Make an appointment with Samanta Maza MD to discuss head cold symptoms / COVID test?  3. Patient to be prepared for a phone call from [employer] to confirm her CGM order.  Order #[id number].  4. Make an appointment with Sada Aguero  Licensed professional Counselor: phone: [phone]. 5. Check over the counter products to make sure that they do not have high sugar content. Usually gummies and powders to mix with water are higher in sugar.    Future: BMP and A1c (after 1/22/2021) - waiting until cold sx resolve.  Make an appointment with the dentist  - waiting until cold sx resolve.  Make an appointment with the eye doctor - waiting until cold sx resolve.    Will follow up in 2 weeks for 2021 CMR: Thursday Jan 21 at 9:00 am. 60 minute visit. Appointment scheduled x2.    SUBJECTIVE/OBJECTIVE:                           Chanelle Billy is a 65 year old female called for a follow-up visit. She was referred to me from Samanta Maza MD.  Today's visit is a follow-up MTM visit from 12/18/2020.     Reason for visit: diabetes follow-up, also having sinus symptoms.    Allergies/ADRs: Reviewed in chart  Tobacco: She reports that she has quit smoking. She has never used smokeless tobacco.  Alcohol: Less than 1 beverage / month  Caffeine: 1 cup/day coffee in the morning, no creamer, no sugar. Occasional pop.  Activity: minimal, some walking the cat in cat stroller.  Past Medical History: Reviewed in chart  Social: lives alone, often visits with her daughter Kunal (family friend).    Providers:  Primary care provider: Samanta Maza MD at [facility]  Cardiology: Dr. Long  Medication Therapy Management (MTM): Carly Pisano, Erick at [facility]  Diabetes Education: Avril Martinez, RD, CD, CDCES at [facility]     Medication Adherence/Access:   Patient uses pill box(es) - started using these early 2020.  Patient takes medications 2 time(s) per day.   Per patient, misses medication (pills) 0-1 times per week.  Feels that she's been more consistent with her insulin dosing, now that she's documenting her readings.  Medication barriers: none.   The patient fills  medications at [facility]: NO, fills medications at [facility].     Pt routine:   Mornin metformin  1 jardiance  1 clopidogrel  1 lisinopril  1 metoprolol   1 multivitamin  1 pantoprazole  1 fluoxetine  Evenin metformin  1 lortadine  1 metoprolol  1 rosuvastatin  1 pantoprazole    Sinus symptoms:  Has had symptoms since 2020. Was outside during the snow storm, so she suspects that she is having a head cold.  Symptoms include: sneezing, running nose, head cold, eyes watering.  Denies any shortness of breath, denies any nausea, no changes in taste or smell; has not been isolating.  Has been taking airborne OTC and emergency-C.    Type 2 Diabetes:  Testing supplies:  truetest glucometer. Trying to get CGM - [employer] insurance will not fill through  pharmacy; script info was sent to [facility] on 2020. Have not heard an update.  Following with certified diabetes care and  (CDCES)? Yes, Avril Martinez RD, CD, CDCES, last visit was 2020.  Pt currently taking:  Metformin  m tabs twice daily - taking with food. If stomach upset, takes some TUMS as needed.  Basaglar (glargine) long-acting insulin: 26 units twice daily.  Novolog insulin: taking 18 units BEFORE meals and 6 units BEFORE snacks; last visit was instructed to decrease AM pre-meal breakfast insulin to 6 units as well - Patient has not done this since blood sugars were higher.  Jardiance (empagliflozin) 25 mg: once daily (dose increase 2020) - completed the 10 mg tabs before starting the 25 mg - so just started 25 mg tabs yesterday.  History of some urinary urgency, reports that she does wear a incontinence pad overnight.  Glucose tabs: has been carrying these with her when out and about  Record of home blood sugars:  SMBG: 3-4 times daily.   Ranges (Patient reported): as below  Symptoms of low blood sugar? Felt tired and icky; lowest since last appointment was 69 mg/dL  Symptoms of high blood sugar? none  Date  Fasting AM Before lunch After lunch Before Dinner After Dinner Before Bedtime Late night   1/5 164         1/4 112 70 (gave herself novolog insulin in the morning, but had not eaten anything) 144   194    1/3 156 152   184     1/2 188 161 94 119  164    1/1 167 144   374 (had airborne drink)  241   12/31 194   320  225    12/26      471 (had lots of junkfood, also took airborne)      Lab Results   Component Value Date    A1C 11.4 10/22/2020    A1C 6.8 03/13/2020    A1C 11.7 12/06/2019     Eye exam: 4/12/2018 - due; instructed to make appointment at last MTM visit.  Foot exam: 1/31/2020, also 10/1/2020 with [facility].  Dental exam: was instructed to make appointment at last MTM visit - did not discuss today.  Diet:  food stamps $16/month. Overeats when bored or watching TV.  Comments that over the holidays she was eating more sweets, also drinking the airborne powder mixed with water (sugarcontent) and noted increased blood sugars after drinking that.  Meeting with registered dietitian, Avril Martinez, RD, CD, CDCES.  Exercise: walking cat in the cat stroller.  Secondary Prevention:  Aspirin: Not taking due to clopidogrel  Statin yes - rosuvastatin.  ACEi/ARB: Yes: lisinopril.   Urine Albumin:        Today's Vitals: There were no vitals taken for this visit.    I spent 35 minutes with this patient today. All changes were made via collaborative practice agreement with Salo Ruiz MD. A copy of the visit note was provided to the patient's primary care provider.    The patient was mailed a summary of these recommendations. Aliya to mail.    Carly Pisano, KushalD  Medication Therapy Management (MTM) Pharmacist  [facility] (Tu/Th/Fr)  Clinic Phone: [phone]  Pager: [phone]    Patient consented to a telehealth visit: yes  Telemedicine Visit Details  Type of service:  Telephone visit  Start Time: 11:20 am  End Time: 11: 55 am  Originating Location (patient location):  Home  Distant Location (provider location):  [facility]  Mode of Communication:  Telephone      Medication Therapy Recommendations  Type 2 diabetes mellitus with other specified complication, with long-term current use of insulin (H)    Current Medication: insulin aspart (NOVOLOG FLEXPEN) 100 UNIT/ML pen   Rationale: Incorrect administration - Adverse medication event - Safety   Recommendation: Provide Education - education about not taking short acting insulin without eating   Status: Patient Agreed - Adherence/Education

## 2021-01-05 NOTE — PATIENT INSTRUCTIONS
Recommendations from today's MTM visit:                                                    MTM (medication therapy management) is a service provided by a clinical pharmacist designed to help you get the most of out of your medicines.      1. Not safe to take novolog when not having a meal.  Novolog (short-acting insulin) dose: 18 units before meals and 6 units before snacks.  2. Make an appointment with Samanta Maza MD to discuss head cold symptoms.  3. Watch for a phone call from [employer] to confirm your CGM order. Your Order number is [id number].  4. Make an appointment with Sada Aguero, Licensed professional Counselor: phone: [phone].  5. Check over the counter products to make sure that they do not have high sugar content. Usually gummies and powders to mix with water are higher in sugar.    It was great to speak with you today.  I value your experience and would be very thankful for your time with providing feedback on our clinic survey. You may receive a survey via email or text message in the next few days.     Next MTM visit:  Thursday Jan 21 at 9:00 am - please have all of your medications available at this appointment.    To schedule another MTM appointment, please call the clinic directly or you may call the MTM scheduling line at [phone] or toll-free at [phone].     My Clinical Pharmacist's contact information:                                                      It was a pleasure talking with you today!  Please feel free to contact me with any questions or concerns you have.      Carly Pisano, Erick  Medication Therapy Management (MTM) Pharmacist

## 2021-01-12 ENCOUNTER — OFFICE VISIT - RIVER FALLS (OUTPATIENT)
Dept: FAMILY MEDICINE | Facility: CLINIC | Age: 66
End: 2021-01-12

## 2021-01-12 ENCOUNTER — AMBULATORY - RIVER FALLS (OUTPATIENT)
Dept: FAMILY MEDICINE | Facility: CLINIC | Age: 66
End: 2021-01-12

## 2021-01-15 LAB — SARS-COV-2 RNA RESP QL NAA+PROBE: POSITIVE

## 2021-01-21 ENCOUNTER — OFFICE VISIT - RIVER FALLS (OUTPATIENT)
Dept: FAMILY MEDICINE | Facility: CLINIC | Age: 66
End: 2021-01-21

## 2021-01-21 ENCOUNTER — VIRTUAL VISIT (OUTPATIENT)
Dept: PHARMACY | Facility: PHYSICIAN GROUP | Age: 66
End: 2021-01-21
Payer: MEDICAID

## 2021-01-21 ENCOUNTER — COMMUNICATION - RIVER FALLS (OUTPATIENT)
Dept: FAMILY MEDICINE | Facility: CLINIC | Age: 66
End: 2021-01-21

## 2021-01-21 DIAGNOSIS — Z78.9 TAKES DIETARY SUPPLEMENTS: ICD-10-CM

## 2021-01-21 DIAGNOSIS — I10 HYPERTENSION, UNSPECIFIED TYPE: ICD-10-CM

## 2021-01-21 DIAGNOSIS — F32.A DEPRESSION, UNSPECIFIED DEPRESSION TYPE: ICD-10-CM

## 2021-01-21 DIAGNOSIS — U07.1 CLINICAL DIAGNOSIS OF COVID-19: ICD-10-CM

## 2021-01-21 DIAGNOSIS — J30.81 ALLERGIC RHINITIS DUE TO ANIMALS: ICD-10-CM

## 2021-01-21 DIAGNOSIS — Z79.4 TYPE 2 DIABETES MELLITUS WITH OTHER SPECIFIED COMPLICATION, WITH LONG-TERM CURRENT USE OF INSULIN (H): Primary | ICD-10-CM

## 2021-01-21 DIAGNOSIS — G47.00 INSOMNIA, UNSPECIFIED TYPE: ICD-10-CM

## 2021-01-21 DIAGNOSIS — E78.5 DYSLIPIDEMIA: ICD-10-CM

## 2021-01-21 DIAGNOSIS — Z87.11 HISTORY OF GASTRIC ULCER: ICD-10-CM

## 2021-01-21 DIAGNOSIS — I25.10 CORONARY ARTERY DISEASE INVOLVING NATIVE HEART, ANGINA PRESENCE UNSPECIFIED, UNSPECIFIED VESSEL OR LESION TYPE: ICD-10-CM

## 2021-01-21 DIAGNOSIS — R12 HEARTBURN: ICD-10-CM

## 2021-01-21 DIAGNOSIS — R53.83 OTHER FATIGUE: ICD-10-CM

## 2021-01-21 DIAGNOSIS — E11.69 TYPE 2 DIABETES MELLITUS WITH OTHER SPECIFIED COMPLICATION, WITH LONG-TERM CURRENT USE OF INSULIN (H): Primary | ICD-10-CM

## 2021-01-21 PROCEDURE — 99605 MTMS BY PHARM NP 15 MIN: CPT | Mod: TEL | Performed by: PHARMACIST

## 2021-01-21 PROCEDURE — 99607 MTMS BY PHARM ADDL 15 MIN: CPT | Mod: TEL | Performed by: PHARMACIST

## 2021-01-21 RX ORDER — TRIAMCINOLONE ACETONIDE 55 UG/1
2 SPRAY, METERED NASAL DAILY
COMMUNITY
End: 2022-02-09

## 2021-01-21 RX ORDER — INSULIN LISPRO 100 [IU]/ML
INJECTION, SOLUTION INTRAVENOUS; SUBCUTANEOUS
COMMUNITY
End: 2022-05-03

## 2021-01-21 NOTE — PATIENT INSTRUCTIONS
Recommendations from today's MTM visit:                                                    MTM (medication therapy management) is a service provided by a clinical pharmacist designed to help you get the most of out of your medicines.      1. Refill loratadine.  2. Make an appointment for labs at [facility] ([facility]  3. Today we talked about how to treat low blood sugars - see below for more information.  more glucose tablets at the pharmacy to have on hand.  If you have a blood sugar less than 70 mg/dL treat your low blood sugar.  4. Decrease long-acting (basaglar) insulin to 13 units twice daily.   Decrease short-acting (novolog/humalog) insulin to 9 units before meals, no insulin before snacks. Only take your insulin when you are going to eat within 15 minutes.  If you are about to eat a meal and blood sugars is less than 90 mg/dL, then skip the 9 units and do not take any short-acting (novolog/humalog) insulin with that meal.   5. Call and make an appointment with cardiology. Make an appointment with Dr. Lan Long: [phone]    It was great to speak with you today.  I value your experience and would be very thankful for your time with providing feedback on our clinic survey. You may receive a survey via email or text message in the next few days.     Next MTM visit: Friday February 5th, 2021 at 9am: telemedicine.    To schedule another MTM appointment, please call the clinic directly or you may call the MTM scheduling line at [phone] or toll-free at [phone].     My Clinical Pharmacist's contact information:                                                      It was a pleasure talking with you today!  Please feel free to contact me with any questions or concerns you have.      Carly Pisano, PharmD  Medication Therapy Management (MTM) Pharmacist      TREATING LOW blood sugars:    If you have blurry vision, weakness/fatigue, headache, irritability, shakiness, sweatiness, feeling dizzy or hungry, you  may be experiencing low blood sugar.    A) Use your glucometer to check your blood sugar. If your sugar is <70 mg/dL, eat or drink 16 grams of fast-acting sugar (8 oz orange juice, sugary candies, 4 glucose tabs).    B) Check blood sugar again 15 minutes later.  If your blood sugar is still below 80 mg/dL, treat again with fast acting sugar (as above).  C) Once your blood sugar has normalized, always follow-up with a complex carbohydrate (peanut butter, nuts, eggs with cheese) or your regular meal if it is mealtime.

## 2021-01-21 NOTE — PROGRESS NOTES
Medication Therapy Management (MTM) Encounter    ASSESSMENT:                            Medication Adherence/Access: See below for considerations    Positive COVID-19 infection: recovering as expected. Encouraged her to continue resting at home.    Allergic rhinitis: will refill loratadine.    Type 2 Diabetes:  Patient's A1c of 11.4% is not at ADA goal of <7%.   SMBG fasting sugars: several episodes of HYPOglycemia that Patient did not properly treat and re-test.  We reviewed proper insulin administration and not taking insulin unless going to eat within 15 minutes.  We reviewed low blood sugars treatment (and walked through it together on the phone today).  Insulin: will decrease both short-acting and long-acting insulins by 50% and eliminate snacktime insulin.  SGLT2: dose increase to 25 mg has been very effective, likely contributing to her lows - in addition to decreased appetite secondary to COVID  Metformin: continue daily dose 2000 mg/day (at max dose)    Dyslipidemia: planning for fasting lipid panel after 3/2021.    Hypertension/CAD: overdue for cards appointment.    Depression/Fatigue/insomnia: depression has been stable, fatigue is increased secondary to recent COVID infection as expected.    Heartburn/History of stomach ulcers: Stable    Vitamins/Supplements: Stable    PLAN:                            1. Refill loratadine. Rx sent.  2. Make an appointment for labs: Due for labs: A1c, BMP order entered.  3. Re-educate about low blood sugars.  more glucose tablets at the pharmacy to have on hand.  If you have a blood sugar less than 70 mg/dL treat your low blood sugar.  4. Decrease long-acting (basaglar) insulin to 13 units twice daily.   Decrease short-acting (novolog) insulin to 9 units before meals, no insulin before snacks. If you are about to eat a meal and blood sugars is less than 90 mg/dL, then skip the 9 units and do not take any short-acting (novolog) insulin with that meal.   5. Call and  make an appointment with cardiology. Make an appointment with Dr. Lan Long: [phone]    The teach-back method was used to confirm patient understanding.    MTM:   1. Check CGM status.  Update, I called to caty today [phone] to check on CGM status - see other MTM note dated  for more details.    Future: needs eye and dental exam.    Follow-up: 2 weeks: 2021 at 9am: telemedicine.    SUBJECTIVE/OBJECTIVE:                          Chanelle Billy is a 65 year old female called for a follow-up visit. She was referred to me from Samanta Maza MD.  Today's visit is a follow-up MTM visit from 2021.     Reason for visit:  CMR and diabetes follow-up.    Allergies/ADRs: Reviewed in chart  Tobacco: She reports that she has quit smoking. She has never used smokeless tobacco.  Alcohol: not currently using  Caffeine: 1-2 cups/day of coffee, sometimes with cream and sugar.  Activity: minimal, some walking the cat in cat stroller.  Past Medical History: Reviewed in chart  Social: lives alone, often visits with her daughter Kunal (family friend).    Providers:  Primary care provider: [facility]anta [facility]aza MD at [facility]  Cardiology: Dr. Long  Medication Therapy Management (MTM): Carly Pisano, Erick at [facility]  Diabetes Education: Avril Martinez, RD, CD, CDCES at [facility]     Medication Adherence/Access:   Patient uses pill box(es) - started using these early .  Patient takes medications 2 time(s) per day.   Per patient, misses medication (pills) 0-1 times per week.  Medication barriers: none.   The patient fills medications at [place]: NO, fills medications at [facility].    Pt routine:   Mornin metformin y  1 jardiance y  1 clopidogrel y  1 lisinopril y  1 metoprolol y  1 multivitamin y  1 pantoprazole y  1 fluoxetine y  Evenin metformin y  1 loratadine y - but out of at  "this time.  1 metoprolol y  1 rosuvastatin y  1 pantoprazole y    Positive COVID-19 infection:  Tested positive on 2021. States that the state informed her she is past her required quarantine time based on when her symptoms started, but she is still staying home because she is still pretty tired.  States that she is still tired, and having some \"allergy\" symptoms. Some sneezing, running nose.  Has a lowered appetite.  Very thankful that she did not have to go to the hospital.    Allergic Rhinitis:  Historically on allergy medication, seasonal allergies. Also suspected allergy to the cat. Not interested in getting rid of her cat, even if she is allergic.  Recent COVID infection (as above) so unclear what symptoms were sinus/allergy related v. COVID infection symptoms.  Current medications include   Loratadine (Claritin) 10 mg: once daily - historically took at bedtime, but states that she is out of this at this time. Would like a prescription.  Allergy eye relief (does not know drug name): using as needed intermittently.  Triamcinolone (Nasacort) nasal spray: 2 sprays in each nostril daily. Purchasing OTC and using as needed intermittently - was using initially for sinus symptoms: sneezing.  Pt feels that current therapy is effective when she has the loratadine.    Type 2 Diabetes:  Testing supplies: glucometer, trying to get CGM.   Following with certified diabetes care and  (CDCES)? Avril Martinez, RD, CD, CDCES  Pt currently taking:  Metformin  m tabs twice daily - pt taking with food; if having an upset stomach, taking TUMS as needed: not using much recently.  Basaglar (glargine) long-acting insulin: 26 units twice daily.  Novolog/Humalog insulin: taking 18 units BEFORE meals and 6 units BEFORE snacks; last visit was instructed to decrease AM pre-meal breakfast insulin to 6 units as well - Patient has not done this since blood sugars were higher.  Jardiance (empagliflozin) 25 mg: " "once daily (has been on this dose for 2-3 weeks). States that she is going to the bathroom \"a little bit, but not as much.\"  History of some urinary urgency, reports that she does wear a incontinence pad overnight.  Glucose Tabs 4 mg: has been carrying these with her when out and about - cannot find these today.  Record of home blood sugars:  SMBG: 3-4 times daily.   Ranges (patient reported): see below  Symptoms of low blood sugar? Felt tired and icky on 1/19/with low BG.   Today does not have any symptoms: still has not eaten anything since her 69 early this morning, I asked her to check her blood sugars while on the call: 55 mg/dL; Instructed via phone to chew 4 glucose tabs  Immediately - cannot find these. Found orange juice and drank ~8 oz glass. We waited 15 minutes on the phone together and now BG is 54. She sounded surprised that it was falling. I asked if she took her insulin today.  She said yes because she was about to eat breakfast before I called so she has already taken her insulin for breakfast meal (and still has not eaten). I instructed her to drink 2 glasses of orange juice followed by her morning meal. 15 minutes later BG is 68 mg/dL. Checked again 30 minutes later (I called Patient back) blood sugars was >100 mg/dL.  Symptoms of high blood sugar? none  Date Fasting AM Before lunch After lunch Before Bedtime   1/21 69 (early this morning, has not eaten anything yet), 55 (during our call today - see above).      1/20 75  129 159   1/19 65 (felt tired, laid down) 43 (was laying on the couch, ate candy and a sandwich) 52 155   States that she has only had one blood sugar in the 200s since our last appointment.  Lab Results   Component Value Date    A1C 11.4 10/22/2020    A1C 6.8 03/13/2020    A1C 11.7 12/06/2019   Eye exam: 4/12/2018 - due; instructed to make appointment at last MTM visit. Will wait until feeling better post-COVID.  Foot exam: up to date, last 10/1/2020 with GRETTA.  Dental exam: was " instructed to make appointment at last MTM visit, waiting until feeling better from COVID.  Diet: food stamps $16/month. Overeats when bored or watching TV.  Reports that recently she has had a lower appetite, hungry sporadically throughout the day.  Exercise: walking cat in the car stroller.  Secondary Prevention:  Aspirin: Not taking due to taking clopidogrel.  Statin yes - rosuvastatin.  ACEi/ARB: Yes: lisinopril.   Urine Albumin:        Dyslipidemia:  Current therapy includes rosuvastatin 20 mg daily.  Pt reports no significant myalgias or other side effects, history of adverse effects to lipitor (in allergies).  Cannot calculate 10-year ASCVD because history of MI in 2011.      Hypertension/CAD:  Per chart notes, history of CAD and MI.  Does not currently follow with a cardiologist, but has been referred to see Dr. Arron Long, visit on 12/17/19 and the plan is to have a repeat stress test. Still has not followed up with cardiology.  3 stents in her heart: heart attack 7/27/2011, stents put in 9/2/2011.  Saw Dr. Zavala at [facility] did her surgery.  Current Meds:  Lisinopril 40 mg: once daily  Metoprolol succinate 25 mg: twice daily  Clopidogrel 75 mg: once daily for secondary prevention.  Nitroglycerin 0.4 mg sublingual tab: Place 1 tablet under the tongue at onset of chest pain.  May repeat x 3 doses q 5 min.  Call 911 after first dose if pain persists. Pt use: reports last used sometime in 2020, does not know when.  Patient does not self-monitor BP.  Patient reports no current medication side effects.  Medication History: amlodipine (stopped due to BP control from other meds).  BP Readings from Last 3 Encounters:   10/01/20 (!) (P) 140/80   03/13/20 130/70   02/28/20 132/62         Depression/Fatigue/insomnia:  Following with Sada Aguero, licensed professional counselor in [place]. States that she was able to have an appointment with Sada over the past few weeks which went well.  Activities to help  "with her depression: book club at [employer], praying, going to [employer], meeting with Sada, playing with the cat, pushing the cat in the stroller.  Feels better mood during the daytime, some ongoing fatigue (see COVID section above), notes that when she naps during the day she has trouble sleeping at nighttime.  Current medications include:  Fluoxetine 20 mg: once daily in the morning (started 12/2020).  \"It works beautifully\"   Medication History: escitalopram (stopped 12/2020 when started fluoxetine), citalopram 40 mg (unknown start - 1/3/2020) - stopped due to limited efficacy and CV/QT prolongation risk with dose >20 mg, mirtazapine (1/3/2020 - 1/10/2020) - stopped due to strange dreams and 1 day of suicidal ideation (per Dr. Maza's notes).  PHQ-9 SCORE 1/3/2020 2/28/2020   PHQ-9 Total Score 12 9     Heartburn/History of stomach ulcers:  Current medications include: Protonix (pantoprazole) 20 mg twice daily (AM when waking and PM before bed).   Reports that this helps, but she still uses Calcium carbonate as needed for breakthrough stomach/acid reflux symptoms. Estimated use: last use weeks-months ago.  Medication History: ranitidine (ineffective)    Vitamins/Supplements:  Multivitamin: once daily    Today's Vitals: There were no vitals taken for this visit.  ----------------    I spent 70 minutes with this patient today. All changes were made via collaborative practice agreement with Samanta Maza A copy of the visit note was provided to the patient's primary care provider.    The patient was mailed a summary of these recommendations. MTM coord to mail.    Carly Pisano PharmD  Medication Therapy Management (MTM) Pharmacist  [facility] (Tu/Th/Fr)  Clinic Phone: [phone]  Pager: [phone]    Telemedicine Visit Details  Type of service:  Telephone visit  Start Time: 9:05am  End Time: 10:15 am  Originating Location (patient location): Home  Distant Location " (provider location):  [facility]      Medication Therapy Recommendations  Type 2 diabetes mellitus with other specified complication, with long-term current use of insulin (H)    Current Medication: empagliflozin (JARDIANCE) 25 MG TABS tablet   Rationale: Medication requires monitoring - Needs additional monitoring - Safety   Recommendation: Order Lab - rec BMP and A1c - order entered today, pt to make appt   Status: Accepted per CPA          Current Medication: Continuous Blood Gluc  (FREESTYLE ROSAURA 2 READER SYST) LOUIS   Rationale: Medication requires monitoring - Needs additional monitoring - Safety   Recommendation: Self-Monitoring - education about low BG provided. still needs access to CGM with low alarms (other DTP For this)   Status: Patient Agreed - Adherence/Education          Current Medication: insulin aspart (NOVOLOG FLEXPEN) 100 UNIT/ML pen   Rationale: Dose too high - Dosage too high - Safety   Recommendation: Decrease Dose - decrease novolog and basaglar insulin doses   Status: Accepted per CPA

## 2021-02-05 ENCOUNTER — OFFICE VISIT - RIVER FALLS (OUTPATIENT)
Dept: FAMILY MEDICINE | Facility: CLINIC | Age: 66
End: 2021-02-05

## 2021-02-05 ENCOUNTER — VIRTUAL VISIT (OUTPATIENT)
Dept: PHARMACY | Facility: PHYSICIAN GROUP | Age: 66
End: 2021-02-05
Payer: MEDICAID

## 2021-02-05 DIAGNOSIS — E11.69 TYPE 2 DIABETES MELLITUS WITH OTHER SPECIFIED COMPLICATION, WITH LONG-TERM CURRENT USE OF INSULIN (H): Primary | ICD-10-CM

## 2021-02-05 DIAGNOSIS — J30.81 ALLERGIC RHINITIS DUE TO ANIMALS: ICD-10-CM

## 2021-02-05 DIAGNOSIS — Z79.4 TYPE 2 DIABETES MELLITUS WITH OTHER SPECIFIED COMPLICATION, WITH LONG-TERM CURRENT USE OF INSULIN (H): Primary | ICD-10-CM

## 2021-02-05 PROCEDURE — 99607 MTMS BY PHARM ADDL 15 MIN: CPT | Mod: TEL | Performed by: PHARMACIST

## 2021-02-05 PROCEDURE — 99606 MTMS BY PHARM EST 15 MIN: CPT | Mod: TEL | Performed by: PHARMACIST

## 2021-02-05 NOTE — PATIENT INSTRUCTIONS
Recommendations from today's MTM visit:                                                    MTM (medication therapy management) is a service provided by a clinical pharmacist designed to help you get the most of out of your medicines.      1. Increase long-acting tresiba insulin to 15 units twice daily  Increase short-acting humalog insulin to 11 units three times daily with meals; 4 units with snacks.  If blood sugar at the beginning of the meal is less than 90 mg/dL - do not take any short-acting (novolog) insulin.   Continue testing your blood sugars four times daily.    2. Make an appointment with our diabetes educator Avril Martinez RD, CD, CDCES.  Bring your freestyle roula to this appointment and Shell will help you get this set up.    3. Make an appointment with Dr. Samanta Maza MD for labs and diabetes check.    4. Start using your nasacort nasal spray 2 sprays in each nostril every day for the next 2 weeks. This should help with some of your allergy symptoms.    5. Call and make an appointment with cardiology. Make an appointment with Dr. Lan Long: [phone]    It was great to speak with you today.  I value your experience and would be very thankful for your time with providing feedback on our clinic survey. You may receive a survey via email or text message in the next few days.     Next MTM visit: 2/19/2021 at 9:00 am by telephone.    To schedule another MTM appointment, please call the clinic directly or you may call the MTM scheduling line at [phone] or toll-free at [phone].     My Clinical Pharmacist's contact information:                                                      It was a pleasure talking with you today!  Please feel free to contact me with any questions or concerns you have.      Carly Pisano, PharmD  Medication Therapy Management (MTM) Pharmacist

## 2021-02-05 NOTE — PROGRESS NOTES
Medication Therapy Management (MTM) Encounter    ASSESSMENT:                            Medication Adherence/Access: No issues identified    Allergic rhinitis: would benefit from increased adherence to INCS d/t allergy symptoms.  Would benefit from avoidance of triggers, not interested in this.    Type 2 Diabetes:  Patient's A1c of 11.4% is not at ADA goal of <7%. SMBG fasting sugars have gone up since last appointment, as expected due to insulin dose decrease d/t significant HYPOglycemia at last appointment and also as her appetite has increased again. Discussed today that we need to slowly increase insulin again, but Patient needs to report HYPOglycemia.  Patient would benefit from:  Metformin: continue current 2000 mg/day (at max 2000 mg/day)  Long-acting insulin: increase by 2 units  Short-acting insulin: increase by 2 units, will restart the 4 units with snacks d/t high late night blood sugars with snacking.  SGLT2: continue current dose, due for renal monitoring.  CGM: needs education on monitor application - best completed in person; recommend Patient meet with [facility] in clinic for this.  Do not recommend attending in person diabetes education group classes at this time d/t COVID risk. However, this would be a great option for Patient in the future. At this time, recommend follow-up with [facility]  HM: due for A1c, UACR (soon - added to next lab order), BMP    PLAN:                            1. Increase long-acting tresiba insulin to 15 units twice daily  Increase short-acting humalog insulin to 11 units three times daily with meals; 4 units with snacks.  If blood sugar at the beginning of the meal is less than 90 mg/dL - do not take any short-acting (novolog) insulin.   Continue testing your blood sugars four times daily.    2. Make an appointment with our diabetes educator Avril Martinez RD, CD, [facility].  Bring your freestyle roula to this appointment and Shell will help you get this set up.    3. Make an  appointment with Dr. Samanta Maza MD for labs and diabetes check.    4. Start using your nasacort nasal spray 2 sprays in each nostril every day for the next 2 weeks. This should help with some of your allergy symptoms.    5. Call and make an appointment with cardiology. Make an appointment with Dr. Lan Lnog: [phone]    The teach-back method was used to confirm patient understanding.    Future: needs eye and dental exam    Follow-up: 2 weeks telephone appointment 2021 at 9:00 am scheduled x2.    SUBJECTIVE/OBJECTIVE:                          Chanelle Billy is a 65 year old female called for a follow-up visit. She was referred to me from Samanta Maza.  Today's visit is a follow-up MTM visit from 2021     Reason for visit: diabetes follow-up. Patient's runny nose is bothersome.    Allergies/ADRs: Reviewed in chart  Tobacco: She reports that she has quit smoking. She has never used smokeless tobacco.  Alcohol: not currently using  Caffeine: 1-2 cups/day of coffee; sometimes with cream and sugar.  Activity: minimal, walking cat in the cat stroller.  Past Medical History: Reviewed in chart  Social: lives alone, often visits with her daughter Kunal (family friend).    Providers:  Primary care provider: Samanta Maza MD at [facility]  Cardiology: Dr. Long  Medication Therapy Management (MTM): Carly Pisano PharmD at [facility]  Diabetes Education: Avril Martinez, RD, CD, CDCES at [facility]    Medication Adherence/Access:   Patient uses pill box(es) - started using these early .  Patient takes medications 2 time(s) per day.   Per patient, misses medication (pills) 0-1 times per week.  Medication barriers: none.   The patient fills medications at [place]: NO, fills medications at [facility].     Pt routine:   Mornin metformin   1 jardiance   1 clopidogrel  1 lisinopril   1 metoprolol   1  multivitamin   1 pantoprazole   1 fluoxetine   Evenin metformin   1 loratadine   1 metoprolol   1 rosuvastatin   1 pantoprazole     Allergic Rhinitis:  Historically on allergy medication, seasonal allergies. Also suspected allergy to the cat. Not interested in getting rid of her cat, even if she is allergic.  Currently runny nose, no sneezing, no coughing.  Current medications include:  Loratadine (Claritin) 10 mg: once daily at bedtime  - confirms that she received and started taking this.  Allergy eye relief (does not know drug name): using as needed intermittently.  Triamcinolone (Nasacort) nasal spray: purchased OTC, states that she isn't using much, but has sufficient qty at home.    Type 2 Diabetes:  Testing supplies: glucometer, just got her CGM - does not know how to use - needs appointment in person to set up.  Following with certified diabetes care and  (CDCES)? Last visit 2020.  Pt currently taking:  Metformin  m tabs twice daily - pt taking with food; if having an upset stomach, taking TUMS as needed: not using much recently.  Tresiba (glargine) long-acting insulin: 13 units twice daily (dose decrease after lows at last appointment)  Humalog rapid acting insulin: 9 units three times daily before meals; not taking anything with snack (dose decrease after lows at last appointment)  Jardiance (empagliflozin) 25 mg: once daily  History of some urinary urgency, reports that she does wear a incontinence pad overnight. Denies any UTIs or pain/dburning with urination.  Glucose Tabs 4 mg: has been carrying these with her when out and about - confirmed that she found these.  Record of home blood sugars:  SMBG: 3-4 times daily, needs to get set up with her CGM.  Ranges (see below)  Symptoms of low blood sugar? No lows since last appointment; history of lows   Symptoms of high blood sugar? none  Date Fasting AM Before lunch After lunch Before Dinner Before Bedtime    136        2/4 134  193  263   2/3 123  240  348, 351 (ate lots of animal crackers)   2/2 128  201  210   2/1 187 86 (thinks that she added insulin to her prescribed prandial insulin)  255 211     Lab Results   Component Value Date    A1C 11.4 10/22/2020    A1C 6.8 03/13/2020    A1C 11.7 12/06/2019   Eye exam: due - Patient aware.  Foot exam: up to date, last 10/1/2020 with MJP  Diet: food stamps $16/month. Appetite is back - Overeats when bored or watching TV; not buying soda. Started a food journal, reading labels when going shopping, trying to watch portion sizes - states that she knows this is a problem.  Interested in Diabetes education classes - wondering if we'll have this in the future.  Exercise: walking the cat in the stroller.  Secondary Prevention:  Aspirin: Not taking due to taking clopidogrel  Statin rosuvastatin.  ACEi/ARB: Yes: lisinopril.   Urine Albumin:       Today's Vitals: There were no vitals taken for this visit.  ----------------    I spent 37 minutes with this patient today. All changes were made via collaborative practice agreement with Samanta Maza A copy of the visit note was provided to the patient's primary care provider.    The patient was mailed a summary of these recommendations.    Carly Pisano, KushalD  Medication Therapy Management (MTM) Pharmacist  [facility] (Tu/Th/Fr)  Clinic Phone: [phone]  Pager: [phone]    Telemedicine Visit Details  Type of service:  Telephone visit  Start Time: 9:00 am  End Time: 9:37 am  Originating Location (patient location): Home  Distant Location (provider location):  [facility] MTM      Medication Therapy Recommendations  Allergic rhinitis due to animals    Current Medication: triamcinolone (NASACORT) 55 MCG/ACT nasal aerosol   Rationale: Patient forgets to take - Adherence - Adherence   Recommendation: Provide Education   Status: Patient Agreed - Adherence/Education          Type 2 diabetes mellitus with other specified complication, with long-term current use of insulin (H)    Current Medication: insulin glargine (BASAGLAR KWIKPEN) 100 UNIT/ML pen   Rationale: Dose too low - Dosage too low - Effectiveness   Recommendation: Increase Dose   Status: Accepted per CPA          Current Medication: Continuous Blood Gluc  (FREESTYLE ROSAURA 2 READER SYSTM) LOUIS   Rationale: Medication product not available - Adherence - Adherence   Recommendation: Provide Adherence Intervention - working to get CGM through [facility] mail[facility] - pt received 2/2021   Status: Patient Agreed - Adherence/Education          Current Medication: insulin lispro (HUMALOG KWIKPEN) 100 UNIT/ML (1 unit dial) KWIKPEN   Rationale: Dose too low - Dosage too low - Effectiveness   Recommendation: Increase Dose   Status: Accepted per CPA

## 2021-02-11 ENCOUNTER — AMBULATORY - RIVER FALLS (OUTPATIENT)
Dept: FAMILY MEDICINE | Facility: CLINIC | Age: 66
End: 2021-02-11

## 2021-02-11 ENCOUNTER — OFFICE VISIT - RIVER FALLS (OUTPATIENT)
Dept: FAMILY MEDICINE | Facility: CLINIC | Age: 66
End: 2021-02-11

## 2021-02-11 ENCOUNTER — TRANSFERRED RECORDS (OUTPATIENT)
Dept: HEALTH INFORMATION MANAGEMENT | Facility: CLINIC | Age: 66
End: 2021-02-11

## 2021-02-11 LAB
ALBUMIN (URINE) MG/SPEC: 0.4 MG/DL
ALBUMIN/CREATININE RATIO: 7 MCG/MG CREAT
CREAT SERPL-MCNC: 0.68 MG/DL (ref 0.5–0.99)
CREATININE (URINE): 60 MG/DL (ref 20–275)
GFR SERPL CREATININE-BSD FRML MDRD: 92 ML/MIN/1.73M2
GLUCOSE SERPL-MCNC: 105 MG/DL (ref 65–99)
HBA1C MFR BLD: 8.6 % (ref 0–5.7)
POTASSIUM SERPL-SCNC: 4.2 MMOL/L (ref 3.5–5.3)

## 2021-02-11 ASSESSMENT — MIFFLIN-ST. JEOR
SCORE: 1233.98
SCORE: 1228.98

## 2021-02-12 ENCOUNTER — COMMUNICATION - RIVER FALLS (OUTPATIENT)
Dept: FAMILY MEDICINE | Facility: CLINIC | Age: 66
End: 2021-02-12

## 2021-02-12 LAB
BUN SERPL-MCNC: 17 MG/DL (ref 7–25)
BUN/CREAT RATIO - HISTORICAL: ABNORMAL (ref 6–22)
CALCIUM SERPL-MCNC: 9.4 MG/DL (ref 8.6–10.4)
CHLORIDE BLD-SCNC: 101 MMOL/L (ref 98–110)
CO2 SERPL-SCNC: 28 MMOL/L (ref 20–32)
CREAT SERPL-MCNC: 0.68 MG/DL (ref 0.5–0.99)
CREAT UR-MCNC: 60 MG/DL (ref 20–275)
EGFRCR SERPLBLD CKD-EPI 2021: 92 ML/MIN/1.73M2
GLUCOSE BLD-MCNC: 105 MG/DL (ref 65–99)
HBA1C MFR BLD: 8.6 %
MICROALBUMIN UR-MCNC: 0.4 MG/DL
MICROALBUMIN/CREAT UR: 7 MG/G{CREAT}
POTASSIUM BLD-SCNC: 4.2 MMOL/L (ref 3.5–5.3)
SODIUM SERPL-SCNC: 139 MMOL/L (ref 135–146)

## 2021-02-17 ENCOUNTER — COMMUNICATION - RIVER FALLS (OUTPATIENT)
Dept: FAMILY MEDICINE | Facility: CLINIC | Age: 66
End: 2021-02-17

## 2021-02-19 ENCOUNTER — VIRTUAL VISIT (OUTPATIENT)
Dept: PHARMACY | Facility: PHYSICIAN GROUP | Age: 66
End: 2021-02-19
Payer: MEDICAID

## 2021-02-19 ENCOUNTER — OFFICE VISIT - RIVER FALLS (OUTPATIENT)
Dept: FAMILY MEDICINE | Facility: CLINIC | Age: 66
End: 2021-02-19

## 2021-02-19 DIAGNOSIS — Z79.4 TYPE 2 DIABETES MELLITUS WITH OTHER SPECIFIED COMPLICATION, WITH LONG-TERM CURRENT USE OF INSULIN (H): Primary | ICD-10-CM

## 2021-02-19 DIAGNOSIS — E11.69 TYPE 2 DIABETES MELLITUS WITH OTHER SPECIFIED COMPLICATION, WITH LONG-TERM CURRENT USE OF INSULIN (H): Primary | ICD-10-CM

## 2021-02-19 PROCEDURE — 99606 MTMS BY PHARM EST 15 MIN: CPT | Mod: TEL | Performed by: PHARMACIST

## 2021-02-19 PROCEDURE — 99607 MTMS BY PHARM ADDL 15 MIN: CPT | Mod: TEL | Performed by: PHARMACIST

## 2021-02-19 NOTE — PATIENT INSTRUCTIONS
Recommendations from today's MTM visit:                                                    MTM (medication therapy management) is a service provided by a clinical pharmacist designed to help you get the most of out of your medicines.      Great job checking your blood sugars and taking your insulin!  1. Bring freestyle roula 2  into clinic to have Avril Martinez, RD, CD, CDCES reset your low alarms and teach you how to do this.  2. Increase long-acting insulin (basaglar/tresiba) from 15 to 17 units twice daily.    It was great to speak with you today.  I value your experience and would be very thankful for your time with providing feedback on our clinic survey. You may receive a survey via email or text message in the next few days.     Next MTM visit:  Thursday March 11, 9am by telephone.    To schedule another MTM appointment, please call the clinic directly or you may call the MTM scheduling line at [phone] or toll-free at [phone].     My Clinical Pharmacist's contact information:                                                      It was a pleasure talking with you today!  Please feel free to contact me with any questions or concerns you have.      Carly Pisano, PharmD  Medication Therapy Management (MTM) Pharmacist

## 2021-02-19 NOTE — PROGRESS NOTES
Medication Therapy Management (MTM) Encounter    ASSESSMENT:                            Medication Adherence/Access: No issues identified    Type 2 Diabetes:  Patient's A1c of 8.6% is not at ADA goal of <7% - but much improved from >11% previously. SMBG fasting sugars are not at ADA goal of  mg/dL and post-prandial sugars are at goal of less than 180 mg/dL.   Patient would benefit from:  Metformin: continue current 2000 mg/day (at max 2000 mg/day)  Long-acting insulin: increase by 2 units/dose (targeting fasting blood sugars that are elevated).  Short-acting insulin:continue current dose.  SGLT2: continue current dose, renal function stable  CGM: needs assistance with low alarm - [facility]  HM: due for eye and dental exam; UACR stable    PLAN:                            1. Bring freestyle roula 2  into clinic to have Avril Martinez RD, CD, YUMIKO reset your low alarms and teach you how to do this.  2. Increase long-acting insulin to 17 units twice daily.    Future: needs eye and dental exam.    Follow-up: 3 weeks - Thursday March 11, 9am by telephone.    SUBJECTIVE/OBJECTIVE:                          Chanelle Billy is a 65 year old female called for a follow-up visit. She was referred to me from Samanta Maza.  Today's visit is a follow-up MTM visit from 2/5/2021.     Called Patient x 6 times, finally was able to connect with Patient. States that she got a new phone and does not know how to work it very well.    Reason for visit: diabetes follow-up.    Allergies/ADRs: Reviewed in chart  Tobacco: She reports that she has quit smoking. She has never used smokeless tobacco.  Alcohol: not currently using  Caffeine:1-2 cups/day of coffee; sometimes with cream and sugar.  Activity: minimal, walking cat in the cat stroller.  Past Medical History: Reviewed in chart  Social: lives alone, often visits with her daughter Kunal (family friend).    Providers:  Primary care provider: Samanta  MD Shaunna at Samanta Maza MD  Cardiology: Dr. Long  Medication Therapy Management (MTM): Carly Pisano, PharmD at [facility]  Diabetes Education: Avril Martinez RD, CD, YUMIKO at [facility]     Medication Adherence/Access:   Patient uses pill box(es) - started using these early .  Patient takes medications 2 time(s) per day.   Per patient, misses medication (pills) 0-1 times per week.  Medication barriers: none.   The patient fills medications at [place]: NO, fills medications at [facility].    Type 2 Diabetes:  Testing supplies: has freestyle roula 2 and glucometer.  Per YUMIKO note:  Set Up Display  alarms    Low setting 70 and high setting 240mg/dL - thinks that she accidentally reset this to low alarm at 90.  Following with certified diabetes care and  (YUMIKO)? Yes - Avril Martinez RD, HAFSA, YUMIKO  Pt currently taking:  Metformin  m tabs twice daily - pt taking with food; if having an upset stomach, taking TUMS as needed: not using much recently.  Tresiba (glargine) long-acting insulin: 15 units twice daily (dose decrease after lows at last appointment)  Humalog rapid acting insulin: 11 units three times daily before meals; 4 units with snacks; If blood sugar at the beginning of the meal is less than 90 mg/dL - do not take any short-acting (novolog) insulin.  When in the 200s, has been correcting with 4 units - usually late at night.  Jardiance (empagliflozin) 25 mg: once daily  History of some urinary urgency, reports that she does wear a incontinence pad overnight. Denies any UTIs or pain/burning with urination.  Glucose Tabs 4 mg: has been carrying these with her when out and about - confirmed that she found these.  Record of home blood sugars:  SMBG: 3-4 times daily, needs to get set up with her CGM.  Ranges (see below)  Symptoms of low blood sugar? No lows since last appointment; history of lows    Symptoms of high blood sugar? none  Date Fasting AM Before lunch After lunch Before Dinner After Dinner Before Bedtime   2/19 143        2/18 145, 121 131 140 96 204 228, 229   2/17 147, 157 158 97 142 197 197   2/16 90 148 182 151 163 119   2/15 118 199 242 137 206, 222 197   2/14 241, 223 192, 154 115 205, 211 294 174     Lab Results   Component Value Date    A1C 8.6 02/11/2021    A1C 11.4 10/22/2020    A1C 6.8 03/13/2020    A1C 11.7 12/06/2019   Eye exam: due - Patient aware.  Foot exam: up to date, last 10/1/2020 with [facility]  Diet: food stamps $16/month. trying to watch portion sizes and make healthier choices; goes back and forth on eating healthy v. Not.  Exercise: walking the cat in the stroller.  Secondary Prevention:  Aspirin: Not taking due to taking clopidogrel  Statin rosuvastatin.  ACEi/ARB: Yes: lisinopril.   Urine Albumin:      Today's Vitals: There were no vitals taken for this visit.  ----------------    I spent 28 minutes with this patient today. All changes were made via collaborative practice agreement with Samanta Maza A copy of the visit note was provided to the patient's primary care provider.    The patient was mailed a summary of these recommendations.     Carly Pisano, PharmD  Medication Therapy Management (MTM) Pharmacist  [facility] (Tu/Th/Fr)  Clinic Phone: [phone]  Pager: [phone]    Telemedicine Visit Details  Type of service:  Telephone visit  Start Time: 9:52 am  End Time: 10:20 am  Originating Location (patient location): Home  Distant Location (provider location):  [facility] MTM      Medication Therapy Recommendations  Type 2 diabetes mellitus with other specified complication, with long-term current use of insulin (H)    Current Medication: insulin glargine (BASAGLAR KWIKPEN) 100 UNIT/ML pen   Rationale: Dose too low - Dosage too low - Effectiveness   Recommendation: Increase Dose   Status:  Accepted per CPA          Current Medication: empagliflozin (JARDIANCE) 25 MG TABS tablet   Rationale: Medication requires monitoring - Needs additional monitoring - Safety   Recommendation: Order Lab - rec BMP and A1c - order entered today, pt to make appt   Status: Accepted per CPA   Note: labs resulted          Current Medication: Continuous Blood Gluc  ([facility] ROSAURA 2 READER SYSTM) LOUIS   Rationale: Does not understand instructions - Adherence - Adherence   Recommendation: Provide Adherence Intervention - make CDCES appt to change low alarm to less than 70   Status: Patient Agreed - Adherence/Education

## 2021-03-11 ENCOUNTER — OFFICE VISIT - RIVER FALLS (OUTPATIENT)
Dept: FAMILY MEDICINE | Facility: CLINIC | Age: 66
End: 2021-03-11
Payer: MEDICARE

## 2021-03-11 ENCOUNTER — VIRTUAL VISIT (OUTPATIENT)
Dept: PHARMACY | Facility: PHYSICIAN GROUP | Age: 66
End: 2021-03-11
Payer: MEDICAID

## 2021-03-11 VITALS
BODY MASS INDEX: 29.85 KG/M2 | WEIGHT: 162.2 LBS | HEART RATE: 92 BPM | HEIGHT: 62 IN | DIASTOLIC BLOOD PRESSURE: 70 MMHG | SYSTOLIC BLOOD PRESSURE: 109 MMHG

## 2021-03-11 DIAGNOSIS — Z79.4 TYPE 2 DIABETES MELLITUS WITH OTHER SPECIFIED COMPLICATION, WITH LONG-TERM CURRENT USE OF INSULIN (H): Primary | ICD-10-CM

## 2021-03-11 DIAGNOSIS — F32.A DEPRESSION, UNSPECIFIED DEPRESSION TYPE: ICD-10-CM

## 2021-03-11 DIAGNOSIS — R53.83 OTHER FATIGUE: ICD-10-CM

## 2021-03-11 DIAGNOSIS — G47.00 INSOMNIA, UNSPECIFIED TYPE: ICD-10-CM

## 2021-03-11 DIAGNOSIS — E11.69 TYPE 2 DIABETES MELLITUS WITH OTHER SPECIFIED COMPLICATION, WITH LONG-TERM CURRENT USE OF INSULIN (H): Primary | ICD-10-CM

## 2021-03-11 PROCEDURE — 99607 MTMS BY PHARM ADDL 15 MIN: CPT | Mod: TEL | Performed by: PHARMACIST

## 2021-03-11 PROCEDURE — 99606 MTMS BY PHARM EST 15 MIN: CPT | Mod: TEL | Performed by: PHARMACIST

## 2021-03-11 NOTE — PROGRESS NOTES
Medication Therapy Management (MTM) Encounter    ASSESSMENT:                            Medication Adherence/Access: No issues identified    Type 2 Diabetes:  Patient's A1c of 8.6% is not at ADA goal of <7%.   SMBG fasting sugars are mostly at ADA goal of  mg/dL and post-prandial sugars are not at goal of less than 180 mg/dL.  Patient would benefit from:  Metformin: continue current 2000 mg/day (at max 2000 mg/day)  Long-acting insulin: continue current dose  Short-acting insulin: increase short-acting insulin with high carb meal/snack chicken fries.  SGLT2: continue current dose (at max 25 mg/day)  CGM: continue use, working well.  HM: due for eye and dental exam - Patient is aware.    Depression/Fatigue/insomnia: agree with following up with Sada - Patient to make an appointment.  Additionally, higher blood sugars in the evening >250/300 and hx of COVID infection likely contributing to sleepiness/fatigue.  Will work to improve insulin dosing as above.    PLAN:                            1. Continue 17 units long-acting insulin twice daily.  2. If you decide to have the chicken fries, please increase your insulin dose to 11 units (mealtime insulin) + 4 units (snack insulin) = 15 units with chicken fries.  3. Make an appointment with Sada Aguero, Licensed professional Counselor: phone: [phone].    Follow-up: 4 weeks: Friday April 9th at 9am: telephone. Scheduled x2.    SUBJECTIVE/OBJECTIVE:                          Chanelle Billy is a 65 year old female called for a follow-up visit. She was referred to me from Samanta Maza.  Today's visit is a follow-up MTM visit from 2/19/2021.     Reason for visit: diabetes follow-up.    Allergies/ADRs: Reviewed in chart  Tobacco: She reports that she has quit smoking. She has never used smokeless tobacco.  Alcohol: not currently using  Caffeine:1-2 cups/day of coffee; sometimes with cream and sugar.  Activity: minimal, walking cat in the cat  k[place]ar[place].  Past Medical History: Reviewed in chart  Social: lives alone, often visits with her daughter Kunal (family friend).    Providers:  Primary care provider: Samanta Maza MD at [facility]aza [facility]  Cardiology: Dr. Long  Medication Therapy Management (MTM): Carly Pisano, PharmD at [facility]  Diabetes Education: Avril Martinez RD, CD, YUMIKO at [facility]     Medication Adherence/Access:   Patient uses pill box(es).  Patient takes medications 2 time(s) per day + insulin  Per patient, misses medication (pills) 0-1 times per week.  Medication barriers: none.   The patient fills medications at [place]: NO, fills medications at [facility].    Type 2 Diabetes:  Testing supplies: has freestyle roula 2 and glucometer. Will be changing her sensor today.  Per CDCES note:  Low setting 70 and high setting 240mg/dL - confirms that this was corrected.  Following with certified diabetes care and  (CDCES)? Yes - Avril Martinez RD, CD, CDCES  Pt currently taking:  Metformin  m tabs twice daily - pt taking with food; if having an upset stomach, taking TUMS as needed: not using much recently.  Tresiba (glargine) long-acting insulin: 17 units twice daily (increased from 15 units to 17 units at last [facility] appt)  Humalog rapid acting insulin: 11 units three times daily before meals; 4 units with snacks - not using every day.  If blood sugar at the beginning of the meal is less than 90 mg/dL - do not take any short-acting (novolog) insulin.    When in the 200s, has been correcting with 4 units - usually late at night.  Jardiance (empagliflozin) 25 mg: once daily  History of some urinary urgency, reports that she does wear a incontinence pad overnight. Denies any UTIs or pain/burning with urination.  Glucose Tabs 4 mg: Patient has available if needed.  Record of home blood sugars:  SMBG: 3-4 times daily by CGM.  Ranges (see  "below)  Symptoms of low blood sugar? No lows since last appointment; history of lows.  Symptoms of high blood sugar? feels \"blah\"  Date Fasting AM Before lunch After lunch Before Dinner After Dinner Before bedtime Late night/overnight   3/11 Has not checked yet today         3/10 139  120, 108 111 163 142    3/9 132 149, 94  141 97, bored and ate potatoes, fries, junk food. 251 (8:22 pm), 275 (9:11), 264 123, 136, 108   3/8 129 107 127, 164, 180, 172 148 168     3/7 109 115 84,  161 185, 254 Ate chicken fries: 315, 286, 242 142   *taking 11 units short-acting insulin with the chicken fries (mealtime insulin dose). If she sees numbers over 200 in the evening, will correct with an additional 4-5 units.  Lab Results   Component Value Date    A1C 8.6 02/11/2021    A1C 11.4 10/22/2020    A1C 6.8 03/13/2020    A1C 11.7 12/06/2019   Eye exam: due - Patient aware.  Foot exam: up to date, last 10/1/2020 with [facility]  Diet: food stamps $16/month. trying to watch portion sizes and make healthier choices; goes back and forth on eating healthy v. Not. Has been snacking on chicken fries (eats 10+ chicken fries at a time).  Exercise: walking the cat in the stroller.  Secondary Prevention:  Aspirin: Not taking due to taking clopidogrel  Statin rosuvastatin.  ACEi/ARB: Yes: lisinopril.   Urine Albumin:       Depression/Fatigue/insomnia:  Following with Sada Aguero, licensed professional counselor in [place].  3/11: has been feeling more sleepy and down mood - planning to reach out to Sada again, does not recall the last time she had an appointment with Sada.  Activities to help with her depression: book club at [employer], praying, going to [employer], meeting with Sada, playing with the cat, pushing the cat in the stroller.  History of COVID infection 1/2021.  Current medications include:  Fluoxetine 20 mg: once daily in the morning (started 12/2020), prefers this med over previous meds.  Medication History: escitalopram (stopped " 12/2020 when started fluoxetine), citalopram 40 mg (unknown start - 1/3/2020) - stopped due to limited efficacy and CV/QT prolongation risk with dose >20 mg, mirtazapine (1/3/2020 - 1/10/2020) - stopped due to strange dreams and 1 day of suicidal ideation (per Dr. Maza's notes).  PHQ 1/3/2020 2/28/2020   PHQ-9 Total Score 12 9   Q9: Thoughts of better off dead/self-harm past 2 weeks Not at all Not at all     Today's Vitals: There were no vitals taken for this visit.     ----------------    I spent 34 minutes with this patient today. All changes were made via collaborative practice agreement with Samanta Maza. A copy of the visit note was provided to the patient's primary care provider.    The patient was mailed a summary of these recommendations.     Carly Pisano, PharmD  Medication Therapy Management (MTM) Pharmacist  [facility] (Tu/Th/Fr)  Clinic Phone: [phone]  Pager: [phone]    Telemedicine Visit Details  Type of service:  Telephone visit  Start Time: 9:04 am  End Time: 9:38 am  Originating Location (patient location): [place]  Distant Location (provider location):  [facility] MTM      Medication Therapy Recommendations  Type 2 diabetes mellitus with other specified complication, with long-term current use of insulin (H)    Current Medication: Continuous Blood Gluc  (FREESTYLE ROSAURA 2 READER SYSTM) LOUIS   Rationale: Does not understand instructions - Adherence - Adherence   Recommendation: Provide Adherence Intervention - make CDCES appt to change low alarm to less than 70   Status: Patient Agreed - Adherence/Education   Note: Pt corrected this          Current Medication: insulin lispro (HUMALOG KWIKPEN) 100 UNIT/ML (1 unit dial) KWIKPEN   Rationale: Dose too low - Dosage too low - Effectiveness   Recommendation: Increase Dose   Status: Accepted per CPA

## 2021-03-11 NOTE — PATIENT INSTRUCTIONS
Recommendations from today's MTM visit:                                                    MTM (medication therapy management) is a service provided by a clinical pharmacist designed to help you get the most of out of your medicines.      1. Continue 17 units long-acting insulin twice daily.  2. If you decide to have the chicken fries, please increase your insulin dose to 11 units (mealtime insulin) + 4 units (snack insulin) = 15 units with chicken fries.  3. Make an appointment with Sada Aguero, Licensed professional Counselor: phone: [phone].    It was great to speak with you today.  I value your experience and would be very thankful for your time with providing feedback on our clinic survey. You may receive a survey via email or text message in the next few days.     Next MTM visit:  4 weeks: Friday April 9th at 9am: telephone    To schedule another MTM appointment, please call the clinic directly or you may call the MTM scheduling line at [phone] or toll-free at [phone].     My Clinical Pharmacist's contact information:                                                      It was a pleasure talking with you today!  Please feel free to contact me with any questions or concerns you have.      Carly Pisano, PharmD  Medication Therapy Management (MTM) Pharmacist

## 2021-04-13 ENCOUNTER — OFFICE VISIT - RIVER FALLS (OUTPATIENT)
Dept: FAMILY MEDICINE | Facility: CLINIC | Age: 66
End: 2021-04-13
Payer: MEDICARE

## 2021-04-13 ENCOUNTER — TELEPHONE (OUTPATIENT)
Dept: PHARMACY | Facility: PHYSICIAN GROUP | Age: 66
End: 2021-04-13

## 2021-04-13 NOTE — TELEPHONE ENCOUNTER
PharmD Outbound Call:    Reason for call: MTM appointment scheduled for today.  Call attempt: x1, spoke with Patient.    She is currently admitted at [facility], presented to the ED with chest pain, transferred to [facility], planning to undergo bypass surgery.     Will follow-up with MTM upon hospital discharge.  Routing to PCP as DONN Pisano PharmD  Medication Therapy Management (MTM) Pharmacist  [facility]  Pager: [phone]

## 2021-04-29 ENCOUNTER — OFFICE VISIT - RIVER FALLS (OUTPATIENT)
Dept: FAMILY MEDICINE | Facility: CLINIC | Age: 66
End: 2021-04-29

## 2021-04-30 ENCOUNTER — OFFICE VISIT - RIVER FALLS (OUTPATIENT)
Dept: FAMILY MEDICINE | Facility: CLINIC | Age: 66
End: 2021-04-30
Payer: MEDICARE

## 2021-05-04 ENCOUNTER — COMMUNICATION - HEALTHEAST (OUTPATIENT)
Dept: CARDIOLOGY | Facility: CLINIC | Age: 66
End: 2021-05-04

## 2021-05-05 ENCOUNTER — COMMUNICATION - RIVER FALLS (OUTPATIENT)
Dept: FAMILY MEDICINE | Facility: CLINIC | Age: 66
End: 2021-05-05
Payer: MEDICARE

## 2021-06-04 VITALS
RESPIRATION RATE: 16 BRPM | DIASTOLIC BLOOD PRESSURE: 66 MMHG | WEIGHT: 143 LBS | SYSTOLIC BLOOD PRESSURE: 158 MMHG | HEART RATE: 88 BPM

## 2021-06-16 PROBLEM — I25.119 CORONARY ARTERY DISEASE INVOLVING NATIVE CORONARY ARTERY OF NATIVE HEART WITH ANGINA PECTORIS (H): Status: ACTIVE | Noted: 2019-12-18

## 2021-06-16 PROBLEM — E78.5 HYPERLIPIDEMIA LDL GOAL <70: Status: ACTIVE | Noted: 2019-12-18

## 2021-06-16 PROBLEM — R06.09 DYSPNEA ON EXERTION: Status: ACTIVE | Noted: 2019-12-18

## 2021-06-16 PROBLEM — Z79.4 TYPE 2 DIABETES MELLITUS WITH CIRCULATORY DISORDER, WITH LONG-TERM CURRENT USE OF INSULIN (H): Status: ACTIVE | Noted: 2019-12-18

## 2021-06-16 PROBLEM — E11.59 TYPE 2 DIABETES MELLITUS WITH CIRCULATORY DISORDER, WITH LONG-TERM CURRENT USE OF INSULIN (H): Status: ACTIVE | Noted: 2019-12-18

## 2021-06-21 NOTE — LETTER
Letter by Lan Long MD at      Author: Lan Long MD Service: -- Author Type: --    Filed:  Encounter Date: 5/4/2021 Status: (Other)         Chanelle Billy  [address] [address]      May 4, 2021      Dear Chanelle,    This letter is to remind you that you are due for your follow up appointment with Dr. Lan Long. To help ensure you are in the best health possible, a regular follow-up with your cardiologist is essential.     Please call our Patient Scheduling Line at [phone] to schedule your appointment at your earliest convenience.  If you have recently scheduled an appointment, please disregard this letter.    We look forward to seeing you again. As always, we are available at the number  above for any questions or concerns you may have.      Sincerely,     The Physicians and Staff of [facility]

## 2021-06-28 NOTE — PROGRESS NOTES
Progress Notes by Lan Long MD at 12/18/2019  1:50 PM     Author: Lan Long MD Service: -- Author Type: Physician    Filed: 12/18/2019  2:47 PM Encounter Date: 12/18/2019 Status: Signed    : Lan Long MD (Physician)         Thank you, Dr. Maza, for asking the [facility] team to see Ms. Chanelle Billy to evaluate for any artery disease and exercise and.      Assessment/Recommendations   Patient with known coronary artery disease, status post percutaneous intervention for acute coronary syndrome in 2011 but without follow-up thereafter.  She is appropriately on a statin therapy antiplatelet agent but has reduction in her functional capacity with shortness of breath with exertion and some chest discomfort albeit atypical.  Given her type 2 diabetes, known coronary artery disease and shortness of breath, I think further evaluation is warranted and I have recommended a pharmacologic nuclear study.  Will obtain that in the near future.  LDL cholesterol in February 2019 was 145 with triglycerides of 151 total cholesterol of 224 and HDL of 49.  Pending the results of her nuclear study we will review whether or not she was taking her Crestor at that time and if so she may be a candidate for Repatha.    Thank you for allowing us to participate in her care.       History of Present Illness/Subjective    Ms. Chanelle Billy is a 64 y.o. female with known artery disease, status post percutaneous intervention in 2011 for an acute coronary syndrome.  She had a severe lesion in her proximal LAD and by her report had 3 stents placed.  She did have a nuclear exercise test in February 2017 which showed no perfusion defects and a left ventricular ejection fraction of 73%.    She currently complains of exercise intolerance.  She has shortness of breath with activity and she will not take the stairs because she just does not feel like she has the energy or the breathing power.  She  denies orthopnea or paroxysmal nocturnal dyspnea and occasionally can get peripheral edema.  She gets a chest discomfort which is just above her epigastric area and this will typically come on when she lies down at night but can also come on when she is emotionally stressed.  She does not typically get it when she is physically stressed but it can come on at that time.  It is unpredictable, would last for several minutes and not associated with diaphoresis or any radiation.  It is moderate in intensity.    She does not have a history of rheumatic fever, cerebrovascular accident or TIA.  She has not had syncopal episodes in the past.    The risk factors include type 2 diabetes, hypertension, and hyperlipidemia.  Her mother  of a presumed heart attack in her 60s.  Father  of diabetes.  Her graph she lives in [place] but spent a [place] amount of time in [place] to [place].  She is a .  She has 1 daughter and some stepchildren.  She loves cats.           Physical Examination Review of Systems   Vitals:    19 1413   BP: 158/66   Pulse: 88   Resp: 16     There is no height or weight on file to calculate BMI.  Wt Readings from Last 3 Encounters:   19 143 lb (64.9 kg)     General Appearance:   Alert, cooperative and in no acute distress.   ENT/Mouth: Oral mucuos membranes pink and moist .      EYES:  no scleral icterus, normal conjunctivae   Neck: JVP normal. No Hepatojugular reflux. Thyroid not visualized.   Chest/Lungs:   Lungs are clear to auscultation, equal chest wall expansion.   Cardiovascular:   S1, S2 with an over 6 systolic murmur , no clicks or rubs. Brachial, radial and posterior tibial pulses are intact and symetric.  The left posterior tibial pulses slightly diminished when compared to the right.  No carotid bruits noted   Abdomen:  Nontender. BS+. No bruits.   Extremities: No cyanosis, clubbing or edema   Skin: no xanthelasma, warm.    Neurologic: normal   bilateral, no tremors normal gait     Psychiatric: Appropriate affect.      General: WNL  Eyes: Visual Distubance  Ears/Nose/Throat: WNL  Lungs: WNL  Heart: WNL  Stomach: WNL  Bladder: WNL  Muscle/Joints: WNL  Skin: WNL  Nervous System: WNL  Mental Health: WNL     Blood: WNL       Medical History  Surgical History Family History Social History   No past medical history on file. No past surgical history on file. No family history on file. Social History     Socioeconomic History   ? Marital status:      Spouse name: Not on file   ? Number of children: Not on file   ? Years of education: Not on file   ? Highest education level: Not on file   Occupational History   ? Not on file   Social Needs   ? Financial resource strain: Not on file   ? Food insecurity:     Worry: Not on file     Inability: Not on file   ? Transportation needs:     Medical: Not on file     Non-medical: Not on file   Tobacco Use   ? Smoking status: Never Smoker   ? Smokeless tobacco: Never Used   Substance and Sexual Activity   ? Alcohol use: Not Currently   ? Drug use: Never   ? Sexual activity: Not on file   Lifestyle   ? Physical activity:     Days per week: Not on file     Minutes per session: Not on file   ? Stress: Not on file   Relationships   ? Social connections:     Talks on phone: Not on file     Gets together: Not on file     Attends Christian service: Not on file     Active member of club or organization: Not on file     Attends meetings of clubs or organizations: Not on file     Relationship status: Not on file   ? Intimate partner violence:     Fear of current or ex partner: Not on file     Emotionally abused: Not on file     Physically abused: Not on file     Forced sexual activity: Not on file   Other Topics Concern   ? Not on file   Social History Narrative   ? Not on file          Medications  Allergies   Current Outpatient Medications   Medication Sig Dispense Refill   ? citalopram (CELEXA) 40 MG tablet Take 40 mg by  mouth daily.     ? clopidogrel (PLAVIX) 75 mg tablet Take 75 mg by mouth daily.     ? empagliflozin 10 mg Tab Take 10 mg by mouth daily.     ? insulin glargine,hum.rec.anlog (BASAGLAR KWIKPEN U-100 INSULIN SUBQ) Inject under the skin.     ? lisinopril (PRINIVIL,ZESTRIL) 40 MG tablet Take 40 mg by mouth daily.     ? loratadine (CLARITIN) 10 mg tablet Take 10 mg by mouth daily.     ? metFORMIN (GLUCOPHAGE) 1000 MG tablet Take 1,000 mg by mouth 2 (two) times a day.     ? metoprolol tartrate (LOPRESSOR) 25 MG tablet Take 25 mg by mouth daily.     ? NOVOLIN 70-30 FLEXPEN U-100 100 unit/mL (70-30) pen Inject under the skin.     ? ranitidine (ZANTAC) 150 MG tablet Take 150 mg by mouth 2 (two) times a day.     ? rosuvastatin (CRESTOR) 20 MG tablet Take 20 mg by mouth daily.       No current facility-administered medications for this visit.     Allergies not on file      Lab Results    Chemistry/lipid CBC Cardiac Enzymes/BNP/TSH/INR   No results found for: CHOL, HDL, LDLCALC, TRIG, CREATININE, BUN, K, NA, CL, CO2 No results found for: WBC, HGB, HCT, MCV, PLT No results found for: CKTOTAL, CKMB, CKMBINDEX, TROPONINI, BNP, TSH, INR

## 2021-08-19 ENCOUNTER — TRANSFERRED RECORDS (OUTPATIENT)
Dept: HEALTH INFORMATION MANAGEMENT | Facility: CLINIC | Age: 66
End: 2021-08-19

## 2021-08-19 ENCOUNTER — TRANSFERRED RECORDS (OUTPATIENT)
Dept: MULTI SPECIALTY CLINIC | Facility: CLINIC | Age: 66
End: 2021-08-19

## 2021-08-19 ENCOUNTER — OFFICE VISIT - RIVER FALLS (OUTPATIENT)
Dept: FAMILY MEDICINE | Facility: CLINIC | Age: 66
End: 2021-08-19

## 2021-08-19 LAB
CHOLESTEROL (EXTERNAL): 167 MG/DL
CREATININE (EXTERNAL): 0.63 MG/DL (ref 0.5–0.99)
GFR ESTIMATED (EXTERNAL): 93 ML/MIN/1.73M2
GFR ESTIMATED (IF AFRICAN AMERICAN) (EXTERNAL): 108 ML/MIN/1.73M2
GLUCOSE (EXTERNAL): 125 MG/DL (ref 65–99)
HBA1C MFR BLD: 8.7 %
HDLC SERPL-MCNC: 51 MG/DL
LDL CHOLESTEROL (EXTERNAL): 88 MG/DL
NON HDL CHOLESTEROL (EXTERNAL): 116 MG/DL
POTASSIUM (EXTERNAL): 3.7 MMOL/L (ref 3.5–5.3)
TRIGLYCERIDES (EXTERNAL): 185 MG/DL

## 2021-08-19 ASSESSMENT — MIFFLIN-ST. JEOR: SCORE: 1191.9

## 2021-08-20 LAB
BUN SERPL-MCNC: 9 MG/DL (ref 7–25)
BUN/CREAT RATIO - HISTORICAL: ABNORMAL (ref 6–22)
CALCIUM SERPL-MCNC: 9.2 MG/DL (ref 8.6–10.4)
CHLORIDE BLD-SCNC: 109 MMOL/L (ref 98–110)
CHOLEST SERPL-MCNC: 167 MG/DL
CHOLEST/HDLC SERPL: 3.3 {RATIO}
CO2 SERPL-SCNC: 25 MMOL/L (ref 20–32)
CREAT SERPL-MCNC: 0.63 MG/DL (ref 0.5–0.99)
EGFRCR SERPLBLD CKD-EPI 2021: 93 ML/MIN/1.73M2
GLUCOSE BLD-MCNC: 125 MG/DL (ref 65–99)
HBA1C MFR BLD: 8.7 %
HDLC SERPL-MCNC: 51 MG/DL
LDLC SERPL CALC-MCNC: 88 MG/DL
NONHDLC SERPL-MCNC: 116 MG/DL
POTASSIUM BLD-SCNC: 3.7 MMOL/L (ref 3.5–5.3)
SODIUM SERPL-SCNC: 143 MMOL/L (ref 135–146)
TRIGL SERPL-MCNC: 185 MG/DL

## 2021-08-21 LAB
CREAT UR-MCNC: 38 MG/DL (ref 20–275)
MICROALBUMIN UR-MCNC: 0.3 MG/DL
MICROALBUMIN/CREAT UR: 8 MG/G{CREAT}

## 2021-08-26 ENCOUNTER — COMMUNICATION - RIVER FALLS (OUTPATIENT)
Dept: FAMILY MEDICINE | Facility: CLINIC | Age: 66
End: 2021-08-26
Payer: MEDICARE

## 2021-08-26 ENCOUNTER — COMMUNICATION - RIVER FALLS (OUTPATIENT)
Dept: FAMILY MEDICINE | Facility: CLINIC | Age: 66
End: 2021-08-26

## 2021-08-27 ENCOUNTER — COMMUNICATION - RIVER FALLS (OUTPATIENT)
Dept: FAMILY MEDICINE | Facility: CLINIC | Age: 66
End: 2021-08-27

## 2021-09-01 ENCOUNTER — COMMUNICATION - RIVER FALLS (OUTPATIENT)
Dept: FAMILY MEDICINE | Facility: CLINIC | Age: 66
End: 2021-09-01

## 2021-09-20 ENCOUNTER — COMMUNICATION - RIVER FALLS (OUTPATIENT)
Dept: FAMILY MEDICINE | Facility: CLINIC | Age: 66
End: 2021-09-20

## 2021-09-27 ENCOUNTER — OFFICE VISIT - RIVER FALLS (OUTPATIENT)
Dept: FAMILY MEDICINE | Facility: CLINIC | Age: 66
End: 2021-09-27

## 2021-09-27 ASSESSMENT — MIFFLIN-ST. JEOR: SCORE: 1179.65

## 2021-10-01 ENCOUNTER — COMMUNICATION - RIVER FALLS (OUTPATIENT)
Dept: FAMILY MEDICINE | Facility: CLINIC | Age: 66
End: 2021-10-01

## 2021-11-08 ENCOUNTER — OFFICE VISIT - RIVER FALLS (OUTPATIENT)
Dept: FAMILY MEDICINE | Facility: CLINIC | Age: 66
End: 2021-11-08
Payer: MEDICARE

## 2021-11-08 ASSESSMENT — MIFFLIN-ST. JEOR: SCORE: 1191.9

## 2021-11-09 ENCOUNTER — RECORDS - RIVER FALLS (OUTPATIENT)
Dept: FAMILY MEDICINE | Facility: CLINIC | Age: 66
End: 2021-11-09
Payer: MEDICARE

## 2021-11-30 ENCOUNTER — OFFICE VISIT - RIVER FALLS (OUTPATIENT)
Dept: FAMILY MEDICINE | Facility: CLINIC | Age: 66
End: 2021-11-30

## 2021-12-01 ENCOUNTER — COMMUNICATION - RIVER FALLS (OUTPATIENT)
Dept: FAMILY MEDICINE | Facility: CLINIC | Age: 66
End: 2021-12-01
Payer: MEDICARE

## 2021-12-01 LAB
BNP SERPL-MCNC: 564 PG/ML
BUN SERPL-MCNC: 16 MG/DL (ref 7–25)
BUN/CREAT RATIO - HISTORICAL: ABNORMAL (ref 6–22)
CALCIUM SERPL-MCNC: 8.6 MG/DL (ref 8.6–10.4)
CHLORIDE BLD-SCNC: 106 MMOL/L (ref 98–110)
CO2 SERPL-SCNC: 26 MMOL/L (ref 20–32)
CREAT SERPL-MCNC: 0.65 MG/DL (ref 0.5–0.99)
EGFRCR SERPLBLD CKD-EPI 2021: 93 ML/MIN/1.73M2
ERYTHROCYTE [DISTWIDTH] IN BLOOD BY AUTOMATED COUNT: 15.6 % (ref 11–15)
GLUCOSE BLD-MCNC: 106 MG/DL (ref 65–99)
HBA1C MFR BLD: 8.1 %
HCT VFR BLD AUTO: 36.3 % (ref 35–45)
HGB BLD-MCNC: 11.2 GM/DL (ref 11.7–15.5)
MCH RBC QN AUTO: 23.2 PG (ref 27–33)
MCHC RBC AUTO-ENTMCNC: 30.9 GM/DL (ref 32–36)
MCV RBC AUTO: 75.2 FL (ref 80–100)
PLATELET # BLD AUTO: 311 10*3/UL (ref 140–400)
PMV BLD: 9.7 FL (ref 7.5–12.5)
POTASSIUM BLD-SCNC: 4 MMOL/L (ref 3.5–5.3)
RBC # BLD AUTO: 4.83 10*6/UL (ref 3.8–5.1)
SODIUM SERPL-SCNC: 139 MMOL/L (ref 135–146)
WBC # BLD AUTO: 8.2 10*3/UL (ref 3.8–10.8)

## 2021-12-06 ENCOUNTER — OFFICE VISIT - RIVER FALLS (OUTPATIENT)
Dept: FAMILY MEDICINE | Facility: CLINIC | Age: 66
End: 2021-12-06

## 2021-12-06 ENCOUNTER — MEDICAL CORRESPONDENCE (OUTPATIENT)
Dept: HEALTH INFORMATION MANAGEMENT | Facility: CLINIC | Age: 66
End: 2021-12-06
Payer: MEDICARE

## 2021-12-06 ASSESSMENT — MIFFLIN-ST. JEOR: SCORE: 1192.81

## 2021-12-10 ENCOUNTER — OFFICE VISIT - RIVER FALLS (OUTPATIENT)
Dept: FAMILY MEDICINE | Facility: CLINIC | Age: 66
End: 2021-12-10

## 2021-12-10 ASSESSMENT — MIFFLIN-ST. JEOR: SCORE: 1179.2

## 2021-12-11 LAB
BNP SERPL-MCNC: 559 PG/ML
BUN SERPL-MCNC: 21 MG/DL (ref 7–25)
BUN/CREAT RATIO - HISTORICAL: ABNORMAL (ref 6–22)
CALCIUM SERPL-MCNC: 9.7 MG/DL (ref 8.6–10.4)
CHLORIDE BLD-SCNC: 106 MMOL/L (ref 98–110)
CO2 SERPL-SCNC: 27 MMOL/L (ref 20–32)
CREAT SERPL-MCNC: 0.73 MG/DL (ref 0.5–0.99)
EGFRCR SERPLBLD CKD-EPI 2021: 86 ML/MIN/1.73M2
GLUCOSE BLD-MCNC: 112 MG/DL (ref 65–99)
POTASSIUM BLD-SCNC: 4.7 MMOL/L (ref 3.5–5.3)
SODIUM SERPL-SCNC: 141 MMOL/L (ref 135–146)

## 2022-01-04 ENCOUNTER — COMMUNICATION - RIVER FALLS (OUTPATIENT)
Dept: FAMILY MEDICINE | Facility: CLINIC | Age: 67
End: 2022-01-04
Payer: COMMERCIAL

## 2022-02-09 ENCOUNTER — OFFICE VISIT - RIVER FALLS (OUTPATIENT)
Dept: FAMILY MEDICINE | Facility: CLINIC | Age: 67
End: 2022-02-09

## 2022-02-09 ENCOUNTER — OFFICE VISIT (OUTPATIENT)
Dept: CARDIOLOGY | Facility: TELEHEALTH | Age: 67
End: 2022-02-09
Payer: COMMERCIAL

## 2022-02-09 VITALS
HEART RATE: 84 BPM | RESPIRATION RATE: 16 BRPM | SYSTOLIC BLOOD PRESSURE: 114 MMHG | HEIGHT: 62 IN | BODY MASS INDEX: 29.09 KG/M2 | WEIGHT: 158.1 LBS | DIASTOLIC BLOOD PRESSURE: 54 MMHG

## 2022-02-09 DIAGNOSIS — R06.09 DYSPNEA ON EXERTION: ICD-10-CM

## 2022-02-09 DIAGNOSIS — E78.5 HYPERLIPIDEMIA LDL GOAL <70: ICD-10-CM

## 2022-02-09 DIAGNOSIS — I25.119 CORONARY ARTERY DISEASE INVOLVING NATIVE CORONARY ARTERY OF NATIVE HEART WITH ANGINA PECTORIS (H): Primary | ICD-10-CM

## 2022-02-09 PROCEDURE — 99214 OFFICE O/P EST MOD 30 MIN: CPT | Performed by: INTERNAL MEDICINE

## 2022-02-09 RX ORDER — FUROSEMIDE 20 MG
20 TABLET ORAL DAILY
COMMUNITY
Start: 2022-01-04 | End: 2022-05-27

## 2022-02-09 RX ORDER — INSULIN DEGLUDEC 100 U/ML
28 INJECTION, SOLUTION SUBCUTANEOUS DAILY
COMMUNITY
Start: 2021-04-27 | End: 2023-01-24 | Stop reason: DRUGHIGH

## 2022-02-09 RX ORDER — POTASSIUM CHLORIDE 750 MG/1
10 TABLET, EXTENDED RELEASE ORAL DAILY
COMMUNITY
Start: 2022-01-04 | End: 2022-05-27

## 2022-02-09 RX ORDER — ASPIRIN 81 MG/1
81 TABLET, CHEWABLE ORAL DAILY
COMMUNITY
Start: 2021-04-28

## 2022-02-09 ASSESSMENT — MIFFLIN-ST. JEOR: SCORE: 1210.39

## 2022-02-09 NOTE — LETTER
"2/9/2022    Samanta Maza MD  [address] [address]    RE: Chanelle Billy       Dear Colleague,     I had the pleasure of seeing Chanelle Billy in the [facility].    [facility]  [phone]      Assessment/Recommendations   Patient with known coronary artery disease who underwent previous percutaneous intervention in this last year had coronary artery bypass surgery at [facility].  I have not seen her for a few years.  She is doing okay but has reduced left ventricular systolic function on recent echo with apical akinesis.    I would like to titrate her beta-blocker but she does not know her medications.  Because of this we will call her tomorrow and get her med list and hopefully titrate up on her metoprolol or carvedilol which I do when she is on.  I would like to see her back in a month.    Thanks for allowing us to participate in her care    35 minutes spent with patient, chart review, and documentation.       History of Present Illness/Subjective    Ms. Chanelle Billy is a 66 year old female with known coronary artery disease with bypass surgery in 2021.  She had what sounds like unstable angina.  She has been feeling well and had a lot of energy right after surgery but not as much anymore.  She does get little short of breath climbing a flight of stairs.  She denies orthopnea, paroxysmal nocturnal dyspnea and has mild peripheral edema.           Physical Examination Review of Systems   /54 (BP Location: Right arm, Patient Position: Sitting, Cuff Size: Adult Regular)   Pulse 84   Resp 16   Ht 1.575 m (5' 2\")   Wt 71.7 kg (158 lb 1.6 oz)   BMI 28.92 kg/m    Body mass index is 28.92 kg/m .  Wt Readings from Last 3 Encounters:   02/09/22 71.7 kg (158 lb 1.6 oz)   02/11/21 73.6 kg (162 lb 3.2 oz)   10/01/20 (P) 77.7 kg (171 lb 6.4 oz)     General Appearance:   Alert, cooperative and in no acute distress. " "  ENT/Mouth: Patient wearing a mask.      EYES:  no scleral icterus, normal conjunctivae   Neck: JVP normal. No Hepatojugular reflux. Thyroid not visualized.   Chest/Lungs:   Lungs are clear to auscultation, equal chest wall expansion.   Cardiovascular:   S1, S2 without murmur ,clicks or rubs. Brachial, radial pulses are intact and symetric. No carotid bruits noted   Abdomen:  Nontender. BS+.   Extremities: No cyanosis, clubbing or edema   Skin: no xanthelasma, warm.    Neurologic: normal arm movement bilateral, no tremors     Psychiatric: Appropriate affect.      Enc Vitals  BP: 114/54  Pulse: 84  Resp: 16  Weight: 71.7 kg (158 lb 1.6 oz)  Height: 157.5 cm (5' 2\")                                           Medical History  Surgical History Family History Social History   No past medical history on file. No past surgical history on file. No family history on file. Social History     Socioeconomic History     Marital status:      Spouse name: Not on file     Number of children: Not on file     Years of education: Not on file     Highest education level: Not on file   Occupational History     Not on file   Tobacco Use     Smoking status: Former Smoker     Smokeless tobacco: Never Used     Tobacco comment: smoked in high school and a little after her  .   Substance and Sexual Activity     Alcohol use: Yes     Comment: occasionally     Drug use: Not on file     Sexual activity: Not on file   Other Topics Concern     Parent/sibling w/ CABG, MI or angioplasty before 65F 55M? Not Asked   Social History Narrative     Not on file     Social Determinants of Health     Financial Resource Strain: Not on file   Food Insecurity: Not on file   Transportation Needs: Not on file   Physical Activity: Not on file   Stress: Not on file   Social Connections: Not on file   Intimate Partner Violence: Not on file   Housing Stability: Not on file          Medications  Allergies   Current Outpatient Medications   Medication " Sig Dispense Refill     aspirin (ASA) 81 MG chewable tablet Take 81 mg by mouth daily       B-D ULTRA-FINE 33 LANCETS MISC Use to test 3 to 4 times a day as directed.       blood glucose (TRUE METRIX BLOOD GLUCOSE TEST) test strip Use to test 3 to 4 times daily as directed.       calcium carbonate (TUMS) 500 MG chewable tablet Take 1 chew tab by mouth 2 times daily As needed for heartburn, acid reflux       clopidogrel (PLAVIX) 75 MG tablet Take 75 mg by mouth every morning        Continuous Blood Gluc  (FREESTYLE ROSAURA 2 READER SYSTM) LOUIS        Continuous Blood Gluc Sensor (FREESTYLE ROSAURA 2 SENSOR SYSTM) MISC        empagliflozin (JARDIANCE) 25 MG TABS tablet Take 25 mg by mouth daily        FLUoxetine (PROZAC) 20 MG capsule Take 20 mg by mouth daily In the morning       furosemide (LASIX) 20 MG tablet Take 20 mg by mouth daily       insulin degludec (TRESIBA FLEXTOUCH) 100 UNIT/ML pen Inject 30 units once daily in the morning.       insulin glargine (BASAGLAR KWIKPEN) 100 UNIT/ML pen Inject 17 Units Subcutaneous 2 times daily        insulin lispro (HUMALOG KWIKPEN) 100 UNIT/ML (1 unit dial) KWIKPEN 11 units before breakfast, noon and evening meals. 4 units before snacks. If blood sugar at the beginning of the meal is less than 90 mg/dL - do not take any short-acting (novolog) insulin.       insulin pen needle (ULTICARE MICRO) 32G X 4 MM miscellaneous Use for insulin injection under the skin 4 to 5 times a day as directed       lisinopril (PRINIVIL/ZESTRIL) 40 MG tablet Take 40 mg by mouth daily       loratadine (CLARITIN) 10 MG tablet Take 10 mg by mouth At Bedtime        metFORMIN (GLUCOPHAGE-XR) 500 MG 24 hr tablet Take 1,000 mg by mouth 2 times daily (with meals)       metoprolol tartrate (LOPRESSOR) 25 MG tablet Take 25 mg by mouth 2 times daily       multivitamin w/minerals (THERA-VIT-M) tablet Take 1 tablet by mouth daily       Naphazoline-Pheniramine (EQ EYE ALLERGY RELIEF OP) Pt does not know  "what the active drug is. \"Eye allergy relief\"       nitroGLYcerin (NITROSTAT) 0.4 MG sublingual tablet Place 0.4 mg under the tongue every 5 minutes as needed For chest pain place 1 tablet under the tongue every 5 minutes for 3 doses. If symptoms persist 5 minutes after 1st dose call 911.       pantoprazole (PROTONIX) 20 MG EC tablet Take 20 mg by mouth 2 times daily        rosuvastatin (CRESTOR) 20 MG tablet Take 20 mg by mouth At Bedtime        Sharps Container (SHARPS-A-GATOR LOCKING BRACKET) MISC Use as directed       potassium chloride ER (K-TAB/KLOR-CON) 10 MEQ CR tablet Take 10 mEq by mouth daily      Allergies   Allergen Reactions     Atorvastatin Nausea and Vomiting and Diarrhea     Pt reported     Mirtazapine      Nightmares and suicidal ideation for 1 day (per Dr. Maza's note)     Sodium Hypochlorite      Bleeding from nose and eyes after using a bleach product, patient/daughter reported     Animal Dander Other (See Comments)     Cats      Runny nose, pt reported.  Takes allergy medication for this and still lives with a cat.     Trazodone      nightmares         Lab Results    Chemistry/lipid CBC Cardiac Enzymes/BNP/TSH/INR   Lab Results   Component Value Date    CHOL 167 03/13/2020    HDL 60 03/13/2020    TRIG 276 03/13/2020    No results found for: WBC, HGB, HCT, MCV, PLT No results found for: CKTOTAL, CKMB, TROPONINI, BNP, TSH, INR                     "

## 2022-02-09 NOTE — PROGRESS NOTES
"    [facility]  [phone]          Assessment/Recommendations   Patient with known coronary artery disease who underwent previous percutaneous intervention in this last year had coronary artery bypass surgery at [facility].  I have not seen her for a few years.  She is doing okay but has reduced left ventricular systolic function on recent echo with apical akinesis.    I would like to titrate her beta-blocker but she does not know her medications.  Because of this we will call her tomorrow and get her med list and hopefully titrate up on her metoprolol or carvedilol which I do when she is on.  I would like to see her back in a month.    Thanks for allowing us to participate in her care    35 minutes spent with patient, chart review, and documentation.       History of Present Illness/Subjective    Ms. Chanelle Billy is a 66 year old female with known coronary artery disease with bypass surgery in 2021.  She had what sounds like unstable angina.  She has been feeling well and had a lot of energy right after surgery but not as much anymore.  She does get little short of breath climbing a flight of stairs.  She denies orthopnea, paroxysmal nocturnal dyspnea and has mild peripheral edema.           Physical Examination Review of Systems   /54 (BP Location: Right arm, Patient Position: Sitting, Cuff Size: Adult Regular)   Pulse 84   Resp 16   Ht 1.575 m (5' 2\")   Wt 71.7 kg (158 lb 1.6 oz)   BMI 28.92 kg/m    Body mass index is 28.92 kg/m .  Wt Readings from Last 3 Encounters:   02/09/22 71.7 kg (158 lb 1.6 oz)   02/11/21 73.6 kg (162 lb 3.2 oz)   10/01/20 (P) 77.7 kg (171 lb 6.4 oz)     General Appearance:   Alert, cooperative and in no acute distress.   ENT/Mouth: Patient wearing a mask.      EYES:  no scleral icterus, normal conjunctivae   Neck: JVP normal. No Hepatojugular reflux. Thyroid not visualized.   Chest/Lungs:   Lungs are clear to auscultation, equal chest wall " "expansion.   Cardiovascular:   S1, S2 without murmur ,clicks or rubs. Brachial, radial pulses are intact and symetric. No carotid bruits noted   Abdomen:  Nontender. BS+.   Extremities: No cyanosis, clubbing or edema   Skin: no xanthelasma, warm.    Neurologic: normal arm movement bilateral, no tremors     Psychiatric: Appropriate affect.      Enc Vitals  BP: 114/54  Pulse: 84  Resp: 16  Weight: 71.7 kg (158 lb 1.6 oz)  Height: 157.5 cm (5' 2\")                                           Medical History  Surgical History Family History Social History   No past medical history on file. No past surgical history on file. No family history on file. Social History     Socioeconomic History     Marital status:      Spouse name: Not on file     Number of children: Not on file     Years of education: Not on file     Highest education level: Not on file   Occupational History     Not on file   Tobacco Use     Smoking status: Former Smoker     Smokeless tobacco: Never Used     Tobacco comment: smoked in high school and a little after her  .   Substance and Sexual Activity     Alcohol use: Yes     Comment: occasionally     Drug use: Not on file     Sexual activity: Not on file   Other Topics Concern     Parent/sibling w/ CABG, MI or angioplasty before 65F 55M? Not Asked   Social History Narrative     Not on file     Social Determinants of Health     Financial Resource Strain: Not on file   Food Insecurity: Not on file   Transportation Needs: Not on file   Physical Activity: Not on file   Stress: Not on file   Social Connections: Not on file   Intimate Partner Violence: Not on file   Housing Stability: Not on file          Medications  Allergies   Current Outpatient Medications   Medication Sig Dispense Refill     aspirin (ASA) 81 MG chewable tablet Take 81 mg by mouth daily       B-D ULTRA-FINE 33 LANCETS MISC Use to test 3 to 4 times a day as directed.       blood glucose (TRUE METRIX BLOOD GLUCOSE TEST) test " "strip Use to test 3 to 4 times daily as directed.       calcium carbonate (TUMS) 500 MG chewable tablet Take 1 chew tab by mouth 2 times daily As needed for heartburn, acid reflux       clopidogrel (PLAVIX) 75 MG tablet Take 75 mg by mouth every morning        Continuous Blood Gluc  (FREESTYLE ROSAURA 2 READER SYSTM) LOUIS        Continuous Blood Gluc Sensor (FREESTYLE ROSAURA 2 SENSOR SYSTM) MISC        empagliflozin (JARDIANCE) 25 MG TABS tablet Take 25 mg by mouth daily        FLUoxetine (PROZAC) 20 MG capsule Take 20 mg by mouth daily In the morning       furosemide (LASIX) 20 MG tablet Take 20 mg by mouth daily       insulin degludec (TRESIBA FLEXTOUCH) 100 UNIT/ML pen Inject 30 units once daily in the morning.       insulin glargine (BASAGLAR KWIKPEN) 100 UNIT/ML pen Inject 17 Units Subcutaneous 2 times daily        insulin lispro (HUMALOG KWIKPEN) 100 UNIT/ML (1 unit dial) KWIKPEN 11 units before breakfast, noon and evening meals. 4 units before snacks. If blood sugar at the beginning of the meal is less than 90 mg/dL - do not take any short-acting (novolog) insulin.       insulin pen needle (ULTICARE MICRO) 32G X 4 MM miscellaneous Use for insulin injection under the skin 4 to 5 times a day as directed       lisinopril (PRINIVIL/ZESTRIL) 40 MG tablet Take 40 mg by mouth daily       loratadine (CLARITIN) 10 MG tablet Take 10 mg by mouth At Bedtime        metFORMIN (GLUCOPHAGE-XR) 500 MG 24 hr tablet Take 1,000 mg by mouth 2 times daily (with meals)       metoprolol tartrate (LOPRESSOR) 25 MG tablet Take 25 mg by mouth 2 times daily       multivitamin w/minerals (THERA-VIT-M) tablet Take 1 tablet by mouth daily       Naphazoline-Pheniramine (EQ EYE ALLERGY RELIEF OP) Pt does not know what the active drug is. \"Eye allergy relief\"       nitroGLYcerin (NITROSTAT) 0.4 MG sublingual tablet Place 0.4 mg under the tongue every 5 minutes as needed For chest pain place 1 tablet under the tongue every 5 minutes for 3 " doses. If symptoms persist 5 minutes after 1st dose call 911.       pantoprazole (PROTONIX) 20 MG EC tablet Take 20 mg by mouth 2 times daily        rosuvastatin (CRESTOR) 20 MG tablet Take 20 mg by mouth At Bedtime        Sharps Container (SHARPS-A-GATOR LOCKING BRACKET) MISC Use as directed       potassium chloride ER (K-TAB/KLOR-CON) 10 MEQ CR tablet Take 10 mEq by mouth daily      Allergies   Allergen Reactions     Atorvastatin Nausea and Vomiting and Diarrhea     Pt reported     Mirtazapine      Nightmares and suicidal ideation for 1 day (per Dr. Maza's note)     Sodium Hypochlorite      Bleeding from nose and eyes after using a bleach product, patient/daughter reported     Animal Dander Other (See Comments)     Cats      Runny nose, pt reported.  Takes allergy medication for this and still lives with a cat.     Trazodone      nightmares         Lab Results    Chemistry/lipid CBC Cardiac Enzymes/BNP/TSH/INR   Lab Results   Component Value Date    CHOL 167 03/13/2020    HDL 60 03/13/2020    TRIG 276 03/13/2020    No results found for: WBC, HGB, HCT, MCV, PLT No results found for: CKTOTAL, CKMB, TROPONINI, BNP, TSH, INR

## 2022-02-10 ENCOUNTER — TELEPHONE (OUTPATIENT)
Dept: CARDIOLOGY | Facility: CLINIC | Age: 67
End: 2022-02-10
Payer: COMMERCIAL

## 2022-02-10 DIAGNOSIS — I25.119 CORONARY ARTERY DISEASE INVOLVING NATIVE CORONARY ARTERY OF NATIVE HEART WITH ANGINA PECTORIS (H): Primary | ICD-10-CM

## 2022-02-10 DIAGNOSIS — R06.09 DYSPNEA ON EXERTION: ICD-10-CM

## 2022-02-10 RX ORDER — CARVEDILOL 6.25 MG/1
6.25 TABLET ORAL 2 TIMES DAILY WITH MEALS
COMMUNITY
End: 2022-02-14

## 2022-02-10 NOTE — TELEPHONE ENCOUNTER
----- Message from Lan Long MD sent at 2/9/2022  5:01 PM CST -----  Ivy, this patient was in the clinic and did not know her medication list.  Could you call her tomorrow to get the medication list as I would like to titrate up on her beta-blocker.Thanks,Lan

## 2022-02-11 VITALS
HEART RATE: 74 BPM | SYSTOLIC BLOOD PRESSURE: 118 MMHG | TEMPERATURE: 98.2 F | DIASTOLIC BLOOD PRESSURE: 68 MMHG | HEART RATE: 82 BPM | OXYGEN SATURATION: 99 % | OXYGEN SATURATION: 97 % | TEMPERATURE: 97.3 F | DIASTOLIC BLOOD PRESSURE: 60 MMHG | WEIGHT: 148.6 LBS | SYSTOLIC BLOOD PRESSURE: 130 MMHG | WEIGHT: 142.2 LBS | WEIGHT: 151.2 LBS | WEIGHT: 157.4 LBS

## 2022-02-11 VITALS
BODY MASS INDEX: 28.5 KG/M2 | HEIGHT: 62 IN | HEIGHT: 62 IN | BODY MASS INDEX: 28.01 KG/M2 | RESPIRATION RATE: 16 BRPM | SYSTOLIC BLOOD PRESSURE: 120 MMHG | WEIGHT: 152.2 LBS | BODY MASS INDEX: 28.5 KG/M2 | DIASTOLIC BLOOD PRESSURE: 60 MMHG | HEIGHT: 62 IN | WEIGHT: 154.9 LBS | WEIGHT: 154.9 LBS | OXYGEN SATURATION: 98 % | HEART RATE: 76 BPM

## 2022-02-11 VITALS
OXYGEN SATURATION: 96 % | HEIGHT: 62 IN | OXYGEN SATURATION: 98 % | TEMPERATURE: 98.5 F | HEART RATE: 80 BPM | BODY MASS INDEX: 27.99 KG/M2 | WEIGHT: 159.5 LBS | WEIGHT: 152.1 LBS | HEART RATE: 71 BPM | DIASTOLIC BLOOD PRESSURE: 64 MMHG | WEIGHT: 155.1 LBS | BODY MASS INDEX: 28.54 KG/M2 | BODY MASS INDEX: 29.41 KG/M2 | SYSTOLIC BLOOD PRESSURE: 120 MMHG | DIASTOLIC BLOOD PRESSURE: 60 MMHG | SYSTOLIC BLOOD PRESSURE: 120 MMHG | HEIGHT: 62 IN

## 2022-02-11 VITALS
HEART RATE: 82 BPM | HEART RATE: 92 BPM | WEIGHT: 171.4 LBS | BODY MASS INDEX: 31.35 KG/M2 | SYSTOLIC BLOOD PRESSURE: 140 MMHG | DIASTOLIC BLOOD PRESSURE: 80 MMHG | DIASTOLIC BLOOD PRESSURE: 70 MMHG | SYSTOLIC BLOOD PRESSURE: 109 MMHG | TEMPERATURE: 97.2 F | OXYGEN SATURATION: 96 %

## 2022-02-11 VITALS
SYSTOLIC BLOOD PRESSURE: 130 MMHG | HEIGHT: 62 IN | SYSTOLIC BLOOD PRESSURE: 132 MMHG | DIASTOLIC BLOOD PRESSURE: 70 MMHG | HEART RATE: 65 BPM | WEIGHT: 163 LBS | OXYGEN SATURATION: 96 % | BODY MASS INDEX: 30 KG/M2 | HEART RATE: 64 BPM | DIASTOLIC BLOOD PRESSURE: 62 MMHG | TEMPERATURE: 97.4 F

## 2022-02-11 VITALS
HEART RATE: 79 BPM | SYSTOLIC BLOOD PRESSURE: 90 MMHG | WEIGHT: 163.3 LBS | RESPIRATION RATE: 16 BRPM | DIASTOLIC BLOOD PRESSURE: 60 MMHG | OXYGEN SATURATION: 99 %

## 2022-02-11 VITALS — HEIGHT: 62 IN | WEIGHT: 162.2 LBS | BODY MASS INDEX: 29.85 KG/M2

## 2022-02-14 RX ORDER — CARVEDILOL 12.5 MG/1
12.5 TABLET ORAL 2 TIMES DAILY WITH MEALS
Qty: 180 TABLET | Refills: 1 | Status: SHIPPED | OUTPATIENT
Start: 2022-02-14 | End: 2022-03-16

## 2022-02-14 NOTE — TELEPHONE ENCOUNTER
Recommendations discussed with pt.  Pt verbalized understanding as she wrote down instructions and read them back to me.  Pt had no questions.  Carvedilol dose updated at pharmacy.  -rah    ===View-only below this line===  ----- Message -----  From: Lan Long MD  Sent: 2/10/2022  12:16 PM CST  To: JOSE CARLOS Chapin,    Could we double the carvedilol to  12.5 mg twice daily

## 2022-02-16 NOTE — PROGRESS NOTES
Patient:   GARRICK RAMIREZ            MRN: [id number]            FIN: [id number]               Age:   66 years     Sex:  Female     :  1955   Associated Diagnoses:   Type 2 diabetes mellitus without complications   Author:   Avril Martinez      Visit Information   Visit type:  Medical Nutrition Therapy for diabetes management.    Referral source:  Samanta Maza MD.       Chief Complaint   Type II diabetes, Hyperlipidemia, HTN       History of Present Illness   2021 -  Freestyle Dm 2 instruction and ongoing diabetes education     2021 - Follow up diabetes education and download Freestyle Dm 2.      2021 - Follow up education, heart healthy education, download Freestyle Dm 2    2021 - Follow up education, heart healthy meal planning, download Freestyle Dm 2    Patient continues to meet criteria for CGM coverage;   - patient has type two diabetes mellitus; and  - patient has been using CGM daily; and  - patient is insulin treated with multiple daily injections of three or more times per day; and  - patient is frequently adjusting insulin on the basis of CGM readings  - within six months prior to ordering the CGM, the patient has had an in-person visit with the practitioner; and determined that criteria (1-4) above are met; and   - every six months following the initial prescription of the CGM, the treating practitioner has an in-person visit with the patient to assess adherence to their CGM regimen and diabetes treatment plan    Nutrition: Pt and her daughter are working on eating healthier and going grocery shopping together.  They live 5 minutes apart. Pt recalls microwavable meals or convenience foods when cooking independently, daughter is trying to provide more vegetables and leaner proteins for pt.       Insulin: evening dose at 30u Tresiba and this is working much better than dividing it as she had been forgetting often  Humalog not taking often or as directed - may only  take 4u at a meal   11 units afternoon and 15 u evening, does not typically eat breakfast   4u prior to snacks - does not take this in the evening/ HS     Metformin XR 500mg ii tabs BID  Jardiance 25 mg daily   Taking medications as directed.        Exercise: none, went through cardiac rehab and has not been doing the recommended exercises   Uses food stamps and trying to purchase fruits and vegetables   Eye exam due  Skin - no areas of concern  Immunizations - UTD    A1C  8.1% = 186 mg/dL estimated Average Glucose (eAG)  improved but still not at goal   Average sensor glucose 155 mg/dL 90 day   Average sensor glucose 141 mg/dL 28 day would anticipate the next a1c to be improved     28 day report    Time in target range 70 - 180  69%    Very High >250   9%  High >180     17%  Low <70     5%  Very Low <54   0%  CGM active     91%         Health Status   Allergies:    Allergic Reactions (Selected)  Severity Not Documented  Cats (Runny nose)  Lipitor (Diarrhea, nausea, vomiting)  Mirtazapine (Nightmare and suicidal ideation)  Sodium Hypochlorite (Runny nose)  Nonallergic Reactions (Selected)  Low  TraZODone (Nightmares)   Medications:  (Selected)   Prescriptions  Prescribed  Basaglar KwikPen 100 units/mL subcutaneous solution: ( 17 units ), Subcutaneous, bid, # 15 mL, 1 Refill(s), Type: Maintenance, Pharmacy: [facility], dose change as of 2/5/2021, 62, in, 03/13/20 9:35:00 CDT, Height Measured, 171.4, lb, 10/01/20 12:54:00 CDT, Weight Measured  FLUoxetine 20 mg oral capsule: = 1 cap(s), Oral, qam, # 90 cap(s), 3 Refill(s), Type: Maintenance, Pharmacy: [facility], 1 cap(s) Oral qam, 62, in, 02/11/21 16:18:00 CST, Height Measured, 163.3, lb, 04/30/21 10:08:00 CDT, Weight Measured  Freestyle Dm 2 14 day reader: Freestyle Dm 2 14 day reader, See Instructions, Instructions: Use to test blood sugars 4 times daily per , Supply, # 1 EA, 0 Refill(s), Type: Maintenance, faxed to pharmacy (Rx)  Calibrus  Dm 2 14 day sensor: Freestyle Dm 2 14 day sensor, See Instructions, Instructions: Use to test blood sugars 4 times daily. Change sensor every 14 days., Supply, # 6 EA, 3 Refill(s), Type: Maintenance, faxed to pharmacy (Rx)  HumaLOG KwikPen 100 units/mL injectable solution: See Instructions, Instructions: 11u qac and 4u q snacks.+SSI, for -199 +2U, 200-249 +4U, 250-349+8u, 350-399 +10U, >399 +10u and call MD   HOLD if pre-meal bg is <90 mg/dL., # 45 mL, 3 Refill(s), Type: Maintenance, Pharmacy: [facility], dose c...  Incontience diapers: Incontience diapers, See Instructions, Instructions: use 1 diaper per night, Supply, # 30 EA, 11 Refill(s), Type: Maintenance  Jardiance 25 mg oral tablet: = 1 tab(s) ( 25 mg ), Oral, qam, # 90 tab(s), 2 Refill(s), Type: Maintenance, Pharmacy: [facility], dose increase; please d/c the 10 mg tab so it does not get accidentally refilled. Pt would like delivered., 1 tab(s) Oral qam, 62, in, 03/13/20 9:35:...  Lachydrin: Lachydrin, See Instructions, Instructions: Lachydrin or similar product- apply to feet QHS, Supply, PRN: dry skin, # 1 EA, 1 Refill(s), Type: Maintenance, Pharmacy: [facility], Lachydrin or similar product- apply to feet QHS,PRN:dry skin  MetFORMIN (Eqv-Glucophage XR) 500 mg oral tablet, extended release: = 2 tab(s) ( 1,000 mg ), Oral, bid, # 360 tab(s), 3 Refill(s), Type: Maintenance, Pharmacy: [facility], 2 tab(s) Oral bid, 62, in, 02/11/21 16:18:00 CST, Height Measured, 163.3, lb, 04/30/21 10:08:00 CDT, Weight Measured  NITROGLYCERIN 0.4MG TAB SUBLINGUAL: NITROGLYCERIN 0.4MG TAB SUBLINGUAL, See Instructions, Instructions: DISSOLVE ONE TABLET UNDER TONGUE EVERY FIVE MIN FOR CHEST PAIN AS NEEDED UP TO THREE DOSES CALL IF SYMPTOMS PERSIST 5 MIN AFTER 1ST DOSE CALL 911 AFTER 1ST DOSE IF PAIN PERSISTS, Supp...  One-A-Day Women 50 Plus oral tablet: See Instructions, Instructions: may substiute with multivitamin with minerals preferred by insurance  company, # 90 EA, 3 Refill(s), Type: Maintenance, Pharmacy: [facility], may substiute with multivitamin with minerals preferred by insurance company...  ULTICARE MICRO PEN NEEDLES/32G X 4MM [id number] MISC: ULTICARE MICRO PEN NEEDLES/32G X 4MM [id number] MISC, See Instructions, Instructions: USE FOUR TIMES A DAY, Supply, # 100 EA, 3 Refill(s), Type: Maintenance, Pharmacy: [facility], USE FOUR TIMES A DAY, 61.75, in, 09/27/21 15:09:00 CDT, Height Measured,...  ULTICARE MICRO PEN TIP 32G 4MM 50CT MG INJECTABLE: ULTICARE MICRO PEN TIP 32G 4MM 50CT MG INJECTABLE, See Instructions, Instructions: USE FOUR TIMES A DAY, Supply, # 100 unknown unit, 3 Refill(s), Type: Maintenance, Pharmacy: [facility], USE FOUR TIMES A DAY  acetaminophen 500 mg oral tablet: = 2 tab(s) ( 1,000 mg ), Oral, q6 hrs, Instructions: not to exceed 4000 mg/day, PRN: for pain, # 120 tab(s), 0 Refill(s), Type: Maintenance, Pharmacy: [facility], 2 tab(s) Oral q6 hrs,PRN:for pain,Instr:not to exceed 4000 mg/day, 62, in, 02/11/21 16...  aspirin 81 mg oral delayed release tablet: = 1 tab(s) ( 81 mg ), Oral, daily, # 90 tab(s), 3 Refill(s), Type: Maintenance, Pharmacy: [facility], 1 tab(s) Oral daily, 62, in, 02/11/21 16:18:00 CST, Height Measured, 163.3, lb, 04/30/21 10:08:00 CDT, Weight Measured  carvedilol 6.25 mg oral tablet: = 1 tab(s), Oral, bid, # 180 tab(s), 1 Refill(s), Type: Maintenance, Pharmacy: [facility], 1 tab(s) Oral bid, 61.75, in, 08/19/21 12:36:00 CDT, Height Measured, 154.9, lb, 08/19/21 12:36:00 CDT, Weight Measured  clopidogrel 75 mg oral tablet: = 1 tab(s) ( 75 mg ), Oral, daily, Instructions: in the AM, # 90 tab(s), 1 Refill(s), Type: Maintenance, Pharmacy: [facility],  req 90 day supply, 1 tab(s) Oral daily,Instr:in the AM, 62, in, 03/13/20 9:35:00 CDT, Height Measured, 171.4, lb, 10/01...  furosemide 20 mg oral tablet: = 1 tab(s) ( 20 mg ), Oral, daily, # 30 tab(s), 0 Refill(s), Type: Maintenance, Pharmacy: [facility],   tab(s) Oral daily, 61.75, in, 11/08/21 14:11:00 CST, Height Measured, 159.5, lb, 11/30/21 13:50:00 CST, Weight Measured  glucose 4 g oral tablet, chewable: = 4 tab(s) ( 16 gm ), Chewed, once, Instructions: Use if BG <70 mg/dL,  check BG 15.  Repeat if BG remains less than 80 mg/dL., # 4 tab(s), 0 Refill(s), Type: Soft Stop, Pharmacy: [facility], 4 tab(s) Chewed once,Instr:Use if BG <70 mg/dL,  check BG...  lancet bowman: lancet bowman, See Instructions, Instructions: use as directed, Supply, # 1 EA, 0 Refill(s), Type: Maintenance, Pharmacy: [facility], use as directed  lisinopril 5 mg oral tablet: = 1 tab(s) ( 5 mg ), Oral, daily, # 90 tab(s), 3 Refill(s), Type: Maintenance, Pharmacy: [facility], 1 tab(s) Oral daily, 62, in, 02/11/21 16:18:00 CST, Height Measured, 163.3, lb, 04/30/21 10:08:00 CDT, Weight Measured  loratadine 10 mg oral tablet: = 1 tab(s) ( 10 mg ), Oral, qhs, # 90 tab(s), 3 Refill(s), Type: Maintenance, Pharmacy: [facility], 1 tab(s) Oral qhs, 62, in, 02/11/21 16:18:00 CST, Height Measured, 163.3, lb, 04/30/21 10:08:00 CDT, Weight Measured  pantoprazole 20 mg oral delayed release tablet: = 1 tab(s) ( 20 mg ), Oral, bid, # 180 tab(s), 3 Refill(s), Type: Maintenance, Pharmacy: [facility], please cancel the 40 mg 1 po qday if this is covered by her insurance, thanks, 1 tab(s) Oral bid, 62, in, 02/11/21 16:18:00 CST, Height Measured, 16...  potassium chloride 10 mEq oral tablet, extended release: = 1 tab(s) ( 10 mEq ), Oral, daily, # 30 tab(s), 0 Refill(s), Type: Maintenance, Pharmacy: [facility], 1 tab(s) Oral daily, 61.75, in, 11/08/21 14:11:00 CST, Height Measured, 159.5, lb, 11/30/21 13:50:00 CST, Weight Measured  rosuvastatin 20 mg oral tablet: = 1 tab(s) ( 20 mg ), Oral, qhs, # 90 tab(s), 3 Refill(s), Type: Maintenance, Pharmacy: [facility], Pt req 90 day supply, 1 tab(s) Oral qhs, 62, in, 02/11/21 16:18:00 CST, Height Measured, 163.3, lb, 04/30/21 10:08:00 CDT, Weight  Measured  torsemide 20 mg oral tablet: = 1 tab(s) ( 20 mg ), Oral, daily, # 30 tab(s), 0 Refill(s), Type: Maintenance, Pharmacy: [facility], 1 tab(s) Oral daily, 62, in, 02/11/21 16:18:00 CST, Height Measured, 163.3, lb, 04/30/21 10:08:00 CDT, Weight Measured  true metrix glucometer test strips: true metrix glucometer test strips, See Instructions, Instructions: check QID: fasting BG, 2 hours after meals and before bed ., Supply, # 100 EA, 11 Refill(s), Type: Maintenance, Pharmacy: [facility], check QID: fasting BG, 2 hours after meals and...  Documented Medications  Documented  Insulin Lispro KwikPen 100 units/mL injectable solution: 0 Refill(s), Type: Soft Stop  Misc Prescription: Instructions: Eye allergy relief  (pt does not know drug ingredient). Using as needed for eye allergy symptoms., 0 Refill(s), Type: Maintenance  Tresiba FlexTouch 100 units/mL subcutaneous solution: 0 Refill(s), Type: Soft Stop  Tums 500 mg oral tablet, chewable: = 1 tab(s) ( 500 mg ), Chewed, bid, Instructions: as needed for heartburn, stomach pain, acid reflux, 0 Refill(s), Type: Maintenance,    Medications          *denotes recorded medication          FLUoxetine 20 mg oral capsule: 1 cap(s), Oral, qam, 90 cap(s), 3 Refill(s).          lancet bowman: See Instructions, use as directed, 1 EA, 0 Refill(s).          true metrix glucometer test strips: See Instructions, check QID: fasting BG, 2 hours after meals and before bed ., 100 EA, 11 Refill(s).          ULTICARE MICRO PEN TIP 32G 4MM 50CT MG INJECTABLE: See Instructions, USE FOUR TIMES A DAY, 100 unknown unit, 3 Refill(s).          *Misc Prescription: Eye allergy relief  (pt does not know drug ingredient). Using as needed for eye allergy symptoms., 0 Refill(s).          Freestyle Dm 2 14 day sensor: See Instructions, Use to test blood sugars 4 times daily. Change sensor every 14 days., 6 EA, 3 Refill(s).          Freestyle Dm 2 14 day reader: See Instructions, Use to test  blood sugars 4 times daily per , 1 EA, 0 Refill(s).          NITROGLYCERIN 0.4MG TAB SUBLINGUAL: See Instructions, DISSOLVE ONE TABLET UNDER TONGUE EVERY FIVE MIN FOR CHEST PAIN AS NEEDED UP TO THREE DOSES CALL IF SYMPTOMS PERSIST 5 MIN AFTER 1ST DOSE CALL 911 AFTER 1ST DOSE IF PAIN PERSISTS, 25 EA, 3 Refill(s).          ULTICARE MICRO PEN NEEDLES/32G X 4MM [id number] MISC: See Instructions, USE FOUR TIMES A DAY, 100 EA, 3 Refill(s).          Lachydrin: See Instructions, Lachydrin or similar product- apply to feet QHS, PRN: dry skin, 1 EA, 1 Refill(s).          Incontience diapers: See Instructions, use 1 diaper per night, 30 EA, 11 Refill(s).          acetaminophen 500 mg oral tablet: 1,000 mg, 2 tab(s), Oral, q6 hrs, not to exceed 4000 mg/day, PRN: for pain, 120 tab(s), 0 Refill(s).          aspirin 81 mg oral delayed release tablet: 81 mg, 1 tab(s), Oral, daily, 90 tab(s), 3 Refill(s).          *Tums 500 mg oral tablet, chewable: 500 mg, 1 tab(s), Chewed, bid, as needed for heartburn, stomach pain, acid reflux, 0 Refill(s).          carvedilol 6.25 mg oral tablet: 1 tab(s), Oral, bid, 180 tab(s), 1 Refill(s).          clopidogrel 75 mg oral tablet: 75 mg, 1 tab(s), Oral, daily, in the AM, 90 tab(s), 1 Refill(s).          Jardiance 25 mg oral tablet: 25 mg, 1 tab(s), Oral, qam, 90 tab(s), 2 Refill(s).          furosemide 20 mg oral tablet: 20 mg, 1 tab(s), Oral, daily, 30 tab(s), 0 Refill(s).          glucose 4 g oral tablet, chewable: 16 gm, 4 tab(s), Chewed, once, Use if BG <70 mg/dL,  check BG 15.  Repeat if BG remains less than 80 mg/dL., 4 tab(s), 0 Refill(s).          *Tresiba FlexTouch 100 units/mL subcutaneous solution: 0 Refill(s).          Basaglar KwikPen 100 units/mL subcutaneous solution: 17 units, Subcutaneous, bid, 15 mL, 1 Refill(s).          HumaLOG KwikPen 100 units/mL injectable solution: See Instructions, 11u qac and 4u q snacks.+SSI, for -199 +2U, 200-249 +4U, 250-349+8u,  350-399 +10U, >399 +10u and call MD   HOLD if pre-meal bg is <90 mg/dL., 45 mL, 3 Refill(s).          *Insulin Lispro KwikPen 100 units/mL injectable solution: 0 Refill(s).          lisinopril 5 mg oral tablet: 5 mg, 1 tab(s), Oral, daily, 90 tab(s), 3 Refill(s).          loratadine 10 mg oral tablet: 10 mg, 1 tab(s), Oral, qhs, 90 tab(s), 3 Refill(s).          MetFORMIN (Eqv-Glucophage XR) 500 mg oral tablet, extended release: 1,000 mg, 2 tab(s), Oral, bid, 360 tab(s), 3 Refill(s).          One-A-Day Women 50 Plus oral tablet: See Instructions, may substiute with multivitamin with minerals preferred by insurance company, 90 EA, 3 Refill(s).          pantoprazole 20 mg oral delayed release tablet: 20 mg, 1 tab(s), Oral, bid, 180 tab(s), 3 Refill(s).          potassium chloride 10 mEq oral tablet, extended release: 10 mEq, 1 tab(s), Oral, daily, 30 tab(s), 0 Refill(s).          rosuvastatin 20 mg oral tablet: 20 mg, 1 tab(s), Oral, qhs, 90 tab(s), 3 Refill(s).          torsemide 20 mg oral tablet: 20 mg, 1 tab(s), Oral, daily, 30 tab(s), 0 Refill(s).       Problem list:    All Problems  Type 2 diabetes mellitus without complications / SNOMED CT 975236743 / Confirmed  DM type 2 causing vascular disease / SNOMED CT 924724448 / Confirmed  Stable angina / SNOMED CT 092317586 / Confirmed  Pure hypercholesterolemia, unspecified / SNOMED CT 575736803 / Confirmed  Unstable angina / SNOMED CT 4689246 / Confirmed  Polypharmacy / SNOMED CT 558438301 / Confirmed  Unspecified open wound, left foot, initial encounter / SNOMED CT 142104902 / Confirmed  Obstructive sleep apnea (adult) (pediatric) / SNOMED CT 543507079 / Confirmed  Myopia, bilateral / SNOMED CT 23480693 / Confirmed  Major depressive disorder, single episode, unspecified / SNOMED CT 04259685 / Confirmed  Insomnia, unspecified / SNOMED CT 038059610 / Confirmed  Infectious gastroenteritis and colitis, unspecified / SNOMED CT 96915071 / Confirmed  History of MI  (myocardial infarction) / SNOMED CT 4036190750 / Confirmed  Hx of CABG / SNOMED CT 8786672834 / Confirmed  Unspecified hearing loss, bilateral / SNOMED CT 69956169 / Confirmed  Generalized anxiety disorder / SNOMED CT 84390101 / Confirmed  Stress incontinence (female) (male) / SNOMED CT 242056258 / Confirmed  Essential (primary) hypertension / SNOMED CT 97881467 / Confirmed  Atherosclerotic heart disease of native coronary artery without angina pectoris / SNOMED CT 2378869185 / Confirmed  CAD in native artery / SNOMED CT 2787151010 / Confirmed  Combined forms of age-related cataract, left eye / SNOMED CT 86242992 / Confirmed  Combined forms of age-related cataract, right eye / SNOMED CT 75889963 / Confirmed  Acute myocardial infarction, unspecified / SNOMED CT 67035020 / Confirmed  Resolved: Pregnancy / SNOMED CT 198264030      Histories   Past Medical History:    Active  Acute myocardial infarction, unspecified (15587151): Onset in the month of 7/2011 at 55 years  Major depressive disorder, single episode, unspecified (13280780)  Type 2 diabetes mellitus without complications (450506408)  Obstructive sleep apnea (adult) (pediatric) (015082370)  Unspecified hearing loss, bilateral (21554777)  Myopia, bilateral (60057619)  Unspecified open wound, left foot, initial encounter (347686709)  Atherosclerotic heart disease of native coronary artery without angina pectoris (2636018178)  Combined forms of age-related cataract, right eye (37418708)  Generalized anxiety disorder (95870724)  Essential (primary) hypertension (03787864)  Pure hypercholesterolemia, unspecified (731911323)  Insomnia, unspecified (887849590)  Unstable angina (5293180)  Stress incontinence (female) (male) (854101502)  Infectious gastroenteritis and colitis, unspecified (70314587)  Resolved  Pregnancy (235625515):  Resolved in 1976 at 20 years.   Family History:    Diabetes mellitus  Mother  Father  Arthritis  Grandmother (M)  CVA - Cerebrovascular  accident  Mother  Father  Glaucoma  Sister     Procedure history:    Esophagogastroduodenoscopy (SNOMED CT 600639131) on 9/27/2019 at 64 Years.  Comments:  11/22/2019 1:48 PM Ana Langley  Indication: Chronic heartburn.  IOL - Cataract extraction and insertion of intraocular lens (SNOMED CT 0786663898) performed by Jovanni Harris MD on 5/21/2019 at 63 Years.  Comments:  11/22/2019 1:50 PM Ana Langley  Right.  IOL - Cataract extraction and insertion of intraocular lens (SNOMED CT 9473908810) performed by Jovanni Harris MD on 5/7/2019 at 63 Years.  Comments:  11/22/2019 1:50 PM Ana Langley  Left.  Coronary artery stent x 3 (SNOMED CT 8961814503) in the month of 7/2011 at 56 Years.  Angioplasty (SNOMED CT 6564714839) in 2011 at 56 Years.  Tubal ligation (SNOMED CT 532054944) in 1979 at 24 Years.   Social History:        Electronic Cigarette/Vaping Assessment            Electronic Cigarette Use: Never.      Alcohol Assessment            Never      Tobacco Assessment: Past            Former smoker, quit more than 30 days ago, Cigarettes      Substance Abuse Assessment            Never      Home and Environment Assessment            Marital status: Single.  1 children.  Living situation: Home/Independent.  Injuries/Abuse/Neglect in               household: No.  Feels unsafe at home: No.  Family/Friends available for support: Yes.      Nutrition and Health Assessment            Type of diet: Regular.  Wants to lose weight: Yes.  Sleeping concerns: Yes.  Feels highly stressed: Yes.      Exercise and Physical Activity Assessment            Exercise frequency: 1-2 times/week.  Exercise type: Walking.      Sexual Assessment            Sexually active: No.  Identifies as female, Sexual orientation: Straight or heterosexual.  History of STD:               No.  Contraceptive Use Details: Abstinence.  History of sexual abuse: No.        Physical Examination   Measurements from flowsheet : Measurements    12/6/2021 2:21 PM CST Height Measured - Standard 61.75 in    Weight Measured - Standard 155.1 lb    BSA 1.75 m2    Body Mass Index 28.6 kg/m2  HI         Review / Management   Results review:  Lab results   11/30/2021 3:03 PM CST Sodium Level 139 mmol/L    Potassium Level 4.0 mmol/L    Chloride Level 106 mmol/L    CO2 Level 26 mmol/L    Glucose Level 106 mg/dL  HI    BUN 16 mg/dL    Creatinine 0.65 mg/dL    BUN/Creat Ratio NOT APPLICABLE    eGFR 93 mL/min/1.73m2    eGFR African American 107 mL/min/1.73m2    Calcium Level 8.6 mg/dL    Hgb A1c 8.1  HI    B-Natriuretic Peptide 564 pg/mL  HI    WBC 8.2    RBC 4.83    Hgb 11.2 gm/dL  LOW    Hct 36.3 %    MCV 75.2 fL  LOW    MCH 23.2 pg  LOW    MCHC 30.9 gm/dL  LOW    RDW 15.6 %  HI    Platelet 311    MPV 9.7 fL   .       Impression and Plan   Diagnosis     Type 2 diabetes mellitus without complications (QOT26-RA E11.9).         Counseled: Patient, AADE7 Self Care Behaviors     Healthy Eating - Focused on eating the same/ steady amount of carbohydrates at meals for safety and stability of glucose as pt is on set dosing.  Reviewed what foods are primary sources of carbohydrates and how intake affects BG readings, discussed foods pt enjoys and how to incorporate them into her meals.  Added additional foods to list of low cost healthy options for meals and snacks.  Pt is to incorporate more tuna, canned chicken, beans, eggs, and cottage cheese for lower cost protein sources and fruit cups in natural juices, frozen or fresh fruits, frozen vegetables or rinse canned if necessary.    Instructed on Therapeutic Lifestyle Changes (TLC) nutrition plan for heart healthy eating.     Low cholesterol (<200mg), low fat, low saturated fat, zero trans fat   Low sodium (2000mg)   High fiber diet (20 - 30gm); Soluble fiber 10+ gm/ day    Eat more omega 3 fats  Vegetarian at least 1 day per week  Discussed well balanced meals, diabetic plate method as a general rule.  Patient is provided  with numerous ideas to incorporate dietary recommendations.    Being Active - Education on how exercise helps with : education on simple easy ways to move more during the day and exercises that wouldn't require going outside.  Sit to stand x 10, wall push up, chair leg lifts etc...    - lowering BG by increasing the muscles ability to take up and use glucose   - weight loss   - healthier heart (improve lipid profile)   - improve sleep, mood, energy   - decrease stress      Monitoring -  Freestyle roula 2 and will always have BG meter as a back up   Taking Medication -Continue with Tresiba once a day and decrease from 30u to 28u   rapid acting focus on taking it every evening meal as BG mean is 216mg/dL 11u noon meal and 15u evening meal, 4u snack   education on insulin action time, encouraged taking rapid acting 15 min prior to meals and really focus on taking the 4u prior to HS snack   Problem Solving - medical support team assistance, resources provided (written and apps, internet); good control of stress  Healthy Coping - support of friends, family, and medical support team   Reducing Risks - Detailed education on potential complications associated with uncontrolled diabetes and prevention recommendations.  Recommendations include: annual eye exam, good oral cares with brushing BID and flossing daily, routine dental appointments, good foot care tips and shoe wear - discussed monofilament test yearly.  Importance of adequate sleep and good control of BG to prevent developing complications related to uncontrolled DM including nephropathy, neuropathy, retinopathy, and cardiovascular disease.  Weight loss goal of 7 - 10% of current body weight pounds as a reasonable goal to help increase insulin sensitivity.    Goals:   1. BG goals 80 - 130 and post-prandial less than 180 mg/dL; time in target 70%+  2.  A1c goal <7%   3.  Walk/ be active 15 min 5 or more days/ week   4.  Use the diabetic plate method as a reference,  consistent carbohydrate intake due to set insulin dosing   5.  Take medication as directed  Tresiba to 28u once a day  rapid acting 11u noon meal and 15u evening meal, 4u snack   Metformin XR 500mg ii tabs BID  Jardiance 25 mg daily     Follow up in 6-8 weeks       Professional Services   Time spent with pt 60 min   cc Dr. Maza

## 2022-02-16 NOTE — NURSING NOTE
Quick Intake Entered On:  2/5/2020 10:17 PM CST    Performed On:  2/3/2020 10:17 PM CST by Avril Martinez               Summary   Weight Measured :   157.4 lb(Converted to: 157 lb 6 oz, 71.40 kg)    Avril Martinez - 2/5/2020 10:17 PM CST

## 2022-02-16 NOTE — LETTER
(Inserted Image. Unable to display)         February 13, 2020        GARRICK ASHLEY  [address]  [place], [place] [id number]        Dear GARRICK,    Thank you for selecting [facility] for your healthcare needs.    Our records indicate you are due for the following services:     Follow-up office visit      To schedule an appointment or if you have further questions, please contact your primary clinic:   [facility]       [phone]   [facility]       [phone]              [facility]     [phone]      Powered by [employer]    Sincerely,    Samanta Maza MD

## 2022-02-16 NOTE — NURSING NOTE
Medicare Visit Entered On:  8/19/2021 12:57 PM CDT    Performed On:  8/19/2021 12:36 PM CDT by Ruth Ann Adams               Summary   Chief Complaint :   WTM   Weight Measured :   154.9 lb(Converted to: 154 lb 14 oz, 70.261 kg)    Height Measured :   61.75 in(Converted to: 5 ft 2 in, 156.84 cm)    Body Mass Index :   28.56 kg/m2 (HI)    Body Surface Area :   1.75 m2   Systolic Blood Pressure :   120 mmHg   Diastolic Blood Pressure :   60 mmHg   Mean Arterial Pressure :   80 mmHg   Peripheral Pulse Rate :   76 bpm   BP Site :   Right arm   BP Method :   Manual   Respiratory Rate :   16 br/min   Oxygen Saturation :   98 %   Ruth Ann Adams - 8/19/2021 12:36 PM CDT   Health Status   Allergies Verified? :   Yes   Medication History Verified? :   Yes   Pre-Visit Planning Status :   Completed   Tobacco Use? :   Former smoker   Ruth Ann Adams - 8/19/2021 12:36 PM CDT   Consents   Consent for Immunization Exchange :   Consent Granted   Consent for Immunizations to Providers :   Consent Granted   Ruth Ann Adams - 8/19/2021 12:36 PM CDT   Meds / Allergies   (As Of: 8/19/2021 12:57:53 PM CDT)   Allergies (Active)   Cats  Estimated Onset Date:   Unspecified ; Reactions:   Runny nose ; Created By:   Ana Reese; Reaction Status:   Active ; Category:   Drug ; Substance:   Cats ; Type:   Allergy ; Updated By:   Ana Reese; Reviewed Date:   8/19/2021 12:53 PM CDT      Lipitor  Estimated Onset Date:   Unspecified ; Reactions:   Diarrhea, nausea, vomiting ; Created By:   Ana Reese; Reaction Status:   Active ; Category:   Drug ; Substance:   Lipitor ; Type:   Allergy ; Updated By:   Ana Reese; Reviewed Date:   8/19/2021 12:53 PM CDT      mirtazapine  Estimated Onset Date:   Unspecified ; Reactions:   Nightmare, Suicidal ideation ; Comments:     Comment 1: intolerance   ; Created By:   Kushal Pisano , Carly; Reaction Status:   Active ; Category:   Drug ; Substance:   mirtazapine ; Type:   Allergy ; Updated By:   Aliya  Carly Guerrero; Source:   Patient ; Reviewed Date:   8/19/2021 12:53 PM CDT      Sodium Hypochlorite  Estimated Onset Date:   Unspecified ; Reactions:   Runny nose ; Created By:   Ana Reese; Reaction Status:   Active ; Category:   Drug ; Substance:   Sodium Hypochlorite ; Type:   Allergy ; Updated By:   Ana Reese; Reviewed Date:   8/19/2021 12:53 PM CDT      traZODone  Estimated Onset Date:   Unspecified ; Reactions:   Nightmares ; Created By:   Kushal Pisano Kaity; Reaction Status:   Active ; Category:   Drug ; Substance:   traZODone ; Type:   Side Effect ; Severity:   Low ; Updated By:   Kushal Pisano Kaity; Source:   Patient ; Reviewed Date:   8/19/2021 12:53 PM CDT        Medication List   (As Of: 8/19/2021 12:57:53 PM CDT)   Prescription/Discharge Order    acetaminophen  :   acetaminophen ; Status:   Prescribed ; Ordered As Mnemonic:   acetaminophen 500 mg oral tablet ; Simple Display Line:   1,000 mg, 2 tab(s), Oral, q6 hrs, not to exceed 4000 mg/day, PRN: for pain, 120 tab(s), 0 Refill(s) ; Ordering Provider:   Samanta Maza MD; Catalog Code:   acetaminophen ; Order Dt/Tm:   4/30/2021 10:59:48 AM CDT          aspirin  :   aspirin ; Status:   Prescribed ; Ordered As Mnemonic:   aspirin 81 mg oral delayed release tablet ; Simple Display Line:   81 mg, 1 tab(s), Oral, daily, 90 tab(s), 3 Refill(s) ; Ordering Provider:   Samanta Maza MD; Catalog Code:   aspirin ; Order Dt/Tm:   4/30/2021 11:00:03 AM CDT          carvedilol  :   carvedilol ; Status:   Prescribed ; Ordered As Mnemonic:   carvedilol 6.25 mg oral tablet ; Simple Display Line:   6.25 mg, 1 tab(s), Oral, bid, 180 tab(s), 0 Refill(s) ; Ordering Provider:   Samanta Maza MD; Catalog Code:   carvedilol ; Order Dt/Tm:   4/30/2021 11:01:17 AM CDT          clopidogrel  :   clopidogrel ; Status:   Prescribed ; Ordered As Mnemonic:   clopidogrel 75 mg oral tablet ; Simple Display Line:   75 mg, 1 tab(s), Oral, daily, in the AM, 90  tab(s), 1 Refill(s) ; Ordering Provider:   Samanta Maza MD; Catalog Code:   clopidogrel ; Order Dt/Tm:   10/20/2020 12:28:42 PM CDT          empagliflozin  :   empagliflozin ; Status:   Prescribed ; Ordered As Mnemonic:   Jardiance 25 mg oral tablet ; Simple Display Line:   25 mg, 1 tab(s), Oral, qam, 90 tab(s), 2 Refill(s) ; Ordering Provider:   Samanta Maza MD; Catalog Code:   empagliflozin ; Order Dt/Tm:   12/18/2020 10:32:55 AM CST          FLUoxetine  :   FLUoxetine ; Status:   Prescribed ; Ordered As Mnemonic:   FLUoxetine 20 mg oral capsule ; Simple Display Line:   1 cap(s), Oral, qam, 90 cap(s), 3 Refill(s) ; Ordering Provider:   Samanta Maza MD; Catalog Code:   FLUoxetine ; Order Dt/Tm:   4/30/2021 11:12:05 AM CDT          glucose  :   glucose ; Status:   Prescribed ; Ordered As Mnemonic:   glucose 4 g oral tablet, chewable ; Simple Display Line:   16 gm, 4 tab(s), Chewed, once, Use if BG <70 mg/dL,  check BG 15.  Repeat if BG remains less than 80 mg/dL., 4 tab(s), 0 Refill(s) ; Ordering Provider:   Samanta Maza MD; Catalog Code:   glucose ; Order Dt/Tm:   12/20/2019 11:50:23 AM CST          insulin glargine  :   insulin glargine ; Status:   Prescribed ; Ordered As Mnemonic:   Basaglar KwikPen 100 units/mL subcutaneous solution ; Simple Display Line:   17 units, Subcutaneous, bid, 15 mL, 1 Refill(s) ; Ordering Provider:   Samanta Maza MD; Catalog Code:   insulin glargine ; Order Dt/Tm:   1/21/2021 11:28:37 AM CST          insulin lispro  :   insulin lispro ; Status:   Prescribed ; Ordered As Mnemonic:   HumaLOG KwikPen 100 units/mL injectable solution ; Simple Display Line:   See Instructions, 11u qac and 4u q snacks.+SSI, for -199 +2U, 200-249 +4U, 250-349+8u, 350-399 +10U, >399 +10u and call MD   HOLD if pre-meal bg is <90 mg/dL., 45 mL, 3 Refill(s) ; Ordering Provider:   Samanta Maza MD; Catalog Code:   insulin lispro ; Order Dt/Tm:   4/30/2021 11:04:41 AM CDT           lisinopril  :   lisinopril ; Status:   Prescribed ; Ordered As Mnemonic:   lisinopril 5 mg oral tablet ; Simple Display Line:   5 mg, 1 tab(s), Oral, daily, 90 tab(s), 3 Refill(s) ; Ordering Provider:   Samanta Maza MD; Catalog Code:   lisinopril ; Order Dt/Tm:   4/30/2021 11:04:15 AM CDT          loratadine  :   loratadine ; Status:   Prescribed ; Ordered As Mnemonic:   loratadine 10 mg oral tablet ; Simple Display Line:   10 mg, 1 tab(s), Oral, qhs, 90 tab(s), 3 Refill(s) ; Ordering Provider:   Samanta Maza MD; Catalog Code:   loratadine ; Order Dt/Tm:   4/30/2021 11:12:38 AM CDT          metFORMIN  :   metFORMIN ; Status:   Prescribed ; Ordered As Mnemonic:   MetFORMIN (Eqv-Glucophage XR) 500 mg oral tablet, extended release ; Simple Display Line:   1,000 mg, 2 tab(s), Oral, bid, 360 tab(s), 3 Refill(s) ; Ordering Provider:   Samanta Maza MD; Catalog Code:   metFORMIN ; Order Dt/Tm:   4/30/2021 11:12:55 AM CDT          Miscellaneous Prescription  :   Miscellaneous Prescription ; Status:   Prescribed ; Ordered As Mnemonic:   Freestyle Dm 2 14 day reader ; Simple Display Line:   See Instructions, Use to test blood sugars 4 times daily per , 1 EA, 0 Refill(s) ; Ordering Provider:   Samanta Maza MD; Catalog Code:   Miscellaneous Prescription ; Order Dt/Tm:   1/21/2021 11:02:18 AM CST          Miscellaneous Prescription  :   Miscellaneous Prescription ; Status:   Prescribed ; Ordered As Mnemonic:   Freestyle Dm 2 14 day sensor ; Simple Display Line:   See Instructions, Use to test blood sugars 4 times daily. Change sensor every 14 days., 6 EA, 3 Refill(s) ; Ordering Provider:   Samanta Maza MD; Catalog Code:   Miscellaneous Prescription ; Order Dt/Tm:   1/21/2021 11:02:26 AM CST          Miscellaneous Prescription  :   Miscellaneous Prescription ; Status:   Prescribed ; Ordered As Mnemonic:   lancet bowman ; Simple Display Line:   See Instructions, use as directed, 1 EA, 0  Refill(s) ; Ordering Provider:   Samanta Maza MD; Catalog Code:   Miscellaneous Prescription ; Order Dt/Tm:   12/6/2019 10:58:20 AM CST          Miscellaneous Prescription  :   Miscellaneous Prescription ; Status:   Prescribed ; Ordered As Mnemonic:   NITROGLYCERIN 0.4MG TAB SUBLINGUAL ; Simple Display Line:   See Instructions, DISSOLVE ONE TABLET UNDER TONGUE EVERY FIVE MIN FOR CHEST PAIN AS NEEDED UP TO THREE DOSES CALL IF SYMPTOMS PERSIST 5 MIN AFTER 1ST DOSE CALL 911 AFTER 1ST DOSE IF PAIN PERSISTS, 25 EA, 3 Refill(s) ; Ordering Provider:   Samanta Maza MD; Catalog Code:   Miscellaneous Prescription ; Order Dt/Tm:   4/30/2021 11:13:23 AM CDT          Miscellaneous Prescription  :   Miscellaneous Prescription ; Status:   Prescribed ; Ordered As Mnemonic:   true metrix glucometer test strips ; Simple Display Line:   See Instructions, check QID: fasting BG, 2 hours after meals and before bed ., 100 EA, 11 Refill(s) ; Ordering Provider:   Samanta Maza MD; Catalog Code:   Miscellaneous Prescription ; Order Dt/Tm:   2/28/2020 11:37:26 AM CST          Miscellaneous Prescription  :   Miscellaneous Prescription ; Status:   Prescribed ; Ordered As Mnemonic:   ULTICARE MICRO PEN TIP 32G 4MM 50CT MG INJECTABLE ; Simple Display Line:   See Instructions, USE FOUR TIMES A DAY, 100 unknown unit, 3 Refill(s) ; Ordering Provider:   Samanta Maza MD; Catalog Code:   Miscellaneous Prescription ; Order Dt/Tm:   4/23/2020 3:35:28 PM CDT          Miscellaneous Rx Supply  :   Miscellaneous Rx Supply ; Status:   Prescribed ; Ordered As Mnemonic:   Incontience diapers ; Simple Display Line:   See Instructions, use 1 diaper per night, 30 EA, 11 Refill(s) ; Ordering Provider:   Samanta Maza MD; Catalog Code:   Miscellaneous Rx Supply ; Order Dt/Tm:   3/11/2020 10:27:58 AM CDT ; Comment:   Faxed to [facility] @ [phone]          Miscellaneous Rx Supply  :   Miscellaneous Rx Supply ; Status:   Prescribed ;  Ordered As Mnemonic:   Lachydrin ; Simple Display Line:   See Instructions, Lachydrin or similar product- apply to feet QHS, PRN: dry skin, 1 EA, 1 Refill(s) ; Ordering Provider:   Samanta Maza MD; Catalog Code:   Miscellaneous Rx Supply ; Order Dt/Tm:   1/10/2020 10:31:46 AM CST          multivitamin with minerals  :   multivitamin with minerals ; Status:   Prescribed ; Ordered As Mnemonic:   One-A-Day Women 50 Plus oral tablet ; Simple Display Line:   See Instructions, may substiute with multivitamin with minerals preferred by insurance company, 90 EA, 3 Refill(s) ; Ordering Provider:   Samanta Maza MD; Catalog Code:   multivitamin with minerals ; Order Dt/Tm:   4/30/2021 11:15:35 AM CDT          pantoprazole  :   pantoprazole ; Status:   Prescribed ; Ordered As Mnemonic:   pantoprazole 20 mg oral delayed release tablet ; Simple Display Line:   20 mg, 1 tab(s), Oral, bid, 180 tab(s), 3 Refill(s) ; Ordering Provider:   Samanta Maza MD; Catalog Code:   pantoprazole ; Order Dt/Tm:   4/30/2021 11:17:30 AM CDT          rosuvastatin  :   rosuvastatin ; Status:   Prescribed ; Ordered As Mnemonic:   rosuvastatin 20 mg oral tablet ; Simple Display Line:   20 mg, 1 tab(s), Oral, qhs, 90 tab(s), 3 Refill(s) ; Ordering Provider:   Samanta Maza MD; Catalog Code:   rosuvastatin ; Order Dt/Tm:   4/30/2021 11:17:38 AM CDT          torsemide  :   torsemide ; Status:   Prescribed ; Ordered As Mnemonic:   torsemide 20 mg oral tablet ; Simple Display Line:   20 mg, 1 tab(s), Oral, daily, 30 tab(s), 0 Refill(s) ; Ordering Provider:   Samanta Maza MD; Catalog Code:   torsemide ; Order Dt/Tm:   4/30/2021 11:02:30 AM CDT            Home Meds    calcium carbonate  :   calcium carbonate ; Status:   Documented ; Ordered As Mnemonic:   Tums 500 mg oral tablet, chewable ; Simple Display Line:   500 mg, 1 tab(s), Chewed, bid, as needed for heartburn, stomach pain, acid reflux, 0 Refill(s) ; Catalog Code:   calcium  carbonate ; Order Dt/Tm:   9/17/2020 9:47:04 PM CDT          Miscellaneous Prescription  :   Miscellaneous Prescription ; Status:   Documented ; Ordered As Mnemonic:   Misc Prescription ; Simple Display Line:   Eye allergy relief  (pt does not know drug ingredient). Using as needed for eye allergy symptoms., 0 Refill(s) ; Catalog Code:   Miscellaneous Prescription ; Order Dt/Tm:   9/17/2020 9:49:08 PM CDT            Social History   Social History   (As Of: 8/19/2021 12:57:53 PM CDT)   Alcohol:        Never   (Last Updated: 12/10/2019 9:46:11 AM CST by Ludy Arriola)          Tobacco:  Past      Former smoker, quit more than 30 days ago, Cigarettes   (Last Updated: 1/12/2021 2:27:15 PM CST by Ruth Ann Adams)          Electronic Cigarette/Vaping:        Electronic Cigarette Use: Never.   (Last Updated: 1/12/2021 2:27:20 PM CST by Ruth Ann Adams)          Substance Abuse:        Never   (Last Updated: 12/10/2019 9:46:22 AM CST by Ludy Arriola)          Home/Environment:        Marital status: Single.  1 children.  Living situation: Home/Independent.  Injuries/Abuse/Neglect in household: No.  Feels unsafe at home: No.  Family/Friends available for support: Yes.   (Last Updated: 12/10/2019 9:46:51 AM CST by Ludy Arriola)          Nutrition/Health:        Type of diet: Regular.  Wants to lose weight: Yes.  Sleeping concerns: Yes.  Feels highly stressed: Yes.   (Last Updated: 12/10/2019 9:47:05 AM CST by Ludy Arriola)          Exercise:        Exercise frequency: 1-2 times/week.  Exercise type: Walking.   (Last Updated: 12/10/2019 9:47:18 AM CST by Ludy Arriola)          Sexual:        Sexually active: No.  Identifies as female, Sexual orientation: Straight or heterosexual.  History of STD: No.  Contraceptive Use Details: Abstinence.  History of sexual abuse: No.   (Last Updated: 12/10/2019 9:48:22 AM CST by Ludy Arriola)            Geriatric Depression Screening   Geriatric Depression Satisfied Life :   Yes   Geriatric  Depression Dropped Activities :   Yes   Geriatric Depression Life Empty :   No   Geriatric Depression Bored :   Yes   Geriatric Depression Good Spirits :   Yes   Geriatric Depression Afraid Bad Things :   No   Geriatric Depression Feel Happy :   Yes   Geriatric Depression Feel Helpless :   No   Ruth Ann Adams 8/19/2021 12:36 PM CDT   Hearing and Vision Screening   Audiogram Result Right Ear :   Fail   Audiogram Result Left Ear :   Fail   Hearing Screen Comments :   bilateral 2000 and 4000   Visual Acuity Evaluated Left Eye :   20/25   Visual Acuity Evaluated Right Eye :   20/20   Vision Test Type :   Snellen   Ruth Ann Adams 8/19/2021 12:36 PM CDT   Home Safety Screen   Emergency Numbers Kept/Updated :   Yes   Aware of Smoking Dangers :   Yes   Smoke Alarms/Fire Extinguisher Available :   Yes   Mats in Bathtub/Shower :   Yes   Stairway Rails or Banisters :   Yes   Outdoor Clutter Safety :   Yes   Indoor Clutter Safety :   Yes   Electrical Cord Safety :   Yes   Ruth Ann Adams 8/19/2021 12:36 PM CDT   Advance Directives   Advance Directive :   No   Ruth Ann Adams 8/19/2021 12:36 PM CDT   Graham Fall Risk   History of Fall in Last 3 Months Graham :   Yes   Ruth Ann Adams 8/19/2021 12:36 PM CDT   Functional Assessment   Focused Functional Assessment Grid   Bathing :   Independent (2)   Dressing :   Independent (2)   Toileting :   Independent (2)   Transferring Bed or Chair :   Independent (2)   Continence :   Independent (2)   Feeding :   Independent (2)   Ruth Ann Adams 8/19/2021 12:36 PM CDT   ADL Index Score :   12    Capable of Shopping :   Yes   Capable of Walking :   Yes   Capable of Housekeeping :   Yes   Capable of Managing Medications :   Yes   Capable of Handling Finances :   No   Ruth Ann Adams 8/19/2021 12:36 PM CDT

## 2022-02-16 NOTE — TELEPHONE ENCOUNTER
---------------------  From: Caren James (eRx Pool ([id number]))   To: [facility] Message Pool ([id number] [facility] [place]);     Sent: 12/30/2021 2:47:32 PM CST  Subject: FW: Medication Management   Due Date/Time: 12/30/2021 9:34:00 AM CST     Medications started 12/1/21 due to heart failure. She followed up with [facility] on 12/10/21- Okay for refill?    ------------------------------------------  From: ITC  To: Samanta Maza MD  Sent: December 28, 2021 9:34:07 AM CST  Subject: Medication Management  Due: December 15, 2021 8:20:29 PM CST     ** On Hold Pending Signature **     Drug: potassium chloride (Potassium Chloride (Eqv-Klor-Con 10) 10 mEq oral tablet, extended release), TAKE 1 TABLET BY MOUTH ONCE DAILY  Quantity: 30 tab(s)  Days Supply: 30  Refills: 0  Substitutions Allowed  Notes from Pharmacy:     Dispensed Drug: potassium chloride (Potassium Chloride (Eqv-Klor-Con 10) 10 mEq oral tablet, extended release), TAKE 1 TABLET BY MOUTH ONCE DAILY  Quantity: 30 tab(s)  Days Supply: 30  Refills: 0  Substitutions Allowed  Notes from Pharmacy:     ** On Hold Pending Signature **     Drug: furosemide (furosemide 20 mg oral tablet), TAKE 1 TABLET BY MOUTH ONCE DAILY  Quantity: 30 tab(s)  Days Supply: 30  Refills: 0  Substitutions Allowed  Notes from Pharmacy:     Dispensed Drug: furosemide (furosemide 20 mg oral tablet), TAKE 1 TABLET BY MOUTH ONCE DAILY  Quantity: 30 tab(s)  Days Supply: 30  Refills: 0  Substitutions Allowed  Notes from Pharmacy:  ------------------------------------------

## 2022-02-16 NOTE — TELEPHONE ENCOUNTER
---------------------  From: Avril Martinez   To: Samanta Maza MD;     Sent: 12/22/2020 10:25:23 AM CST  Subject: General Message     Please add to your last note       Patient has met criteria for CGM coverage;   - patient has type two diabetes mellitus; and  - patient has been using a blood glucose monitor and performing four or more blood glucose test per day; and  - patient is insulin treated with multiple daily injections of three or more times per day; and  - patient is requiring frequent insulin adjustments on the basis of blood glucose readings  - within six months prior to ordering the CGM, the patient has had an in-person visit with the practitioner; and determined that criteria (1-4) above are met; and   - every six months following the initial prescription of the CGM, the treating practitioner has an in-person visit with the patient to assess adherence to their CGM regimen and diabetes treatment plan---------------------  From: Samanta Maza MD   To: Avril Martinez;     Sent: 12/22/2020 12:44:48 PM CST  Subject: RE: General Message     It is in the HPI of her note from 10/2020---------------------  From: Avril Martinez   To: Samanta Maza MD;     Sent: 12/22/2020 1:16:39 PM CST  Subject: RE: General Message     sorry, I missed that.  I will print and have sent with prior auth---------------------  From: Samanta Maza MD   To: Avril Martinez;     Sent: 12/22/2020 2:28:01 PM CST  Subject: RE: General Message     thanks!

## 2022-02-16 NOTE — TELEPHONE ENCOUNTER
---------------------  From: Samanta Maza MD   To: Kushal Pisano Kaity;     Cc: Care Coordinators [facility]);      Sent: 1/28/2020 7:23:25 PM CST  Subject: RE: MTM note     Sure, will forward to our care coordinators who should be able to help with this.  Thanks to all!      ---------------------  From: Kushal Pisano Kaity   To: Samanta Maza MD;     Sent: 1/24/2020 11:12:15 AM CST  Subject: MTM note     Omero England,    See attached for today's visit note.  A couple thoughts for you:  1. We adjusted her insulin slightly today (hawa gave verbal approval).  2. Doing well on escitalopram.  3. At end of visit she asked for incontinence pads by prescription.  We didn't go into this much (and personally I've never prescribed these).  I told her I would ask you.  4. Has not had cardiology stress test - I called cards and they will reach out to her to schedule this.  5. We're working on med sync and setting up pill boxes. I provided her with an AM and a PM pill box today.    Can you follow-up on #3?    Thanks for the referral  [employer] does not pay for incontinence supplies. [facility]  but it would have to be through a [employer] company (not through pharmacy).     Typically [facility]    [facility] [employer]  [address] [place] [address]  Telephone: [phone]  Fax: [phone]---------------------  From: Francisca August CMA ([facility] Coordinators Pool ([facility]   To: Kushal Pisano Kaity;     Sent: 1/29/2020 2:32:41 PM CST  Subject: RE: MTM note---------------------  From: Kushal Pisano Kaity   To: [facility] Coordinators Pool ([facility]);     Cc: Samanta Maza MD;      Sent: 1/30/2020 7:55:25 AM CST  Subject: RE: MTM note     ThanksFrancisca. Can you send the prescription to the DME provider and help the patient navigate this?  Thanks!!  KBLMTCB to discuss number of pads she uses per day and advise that we will be faxing rx to [facility] but unsure  of coverage.Discussed with pt at her most recent [place] appt. I have faxed order for Incontinence diapers to [facility] along w/ face sheet and insurance cards. Pt uses 1 diaper per night.     Pt is aware insurance may not cover.

## 2022-02-16 NOTE — PROGRESS NOTES
Chief Complaint    Pt c/o retaining water bilateral legs, she thinks she needs a diuretic.  She is also feeling sob and coughing a little when walking up a flight of stairs or walking long distant.  History of Present Illness      as above in [facility], here with her daughter, legs get a little swollen, tries to keep them elevated, has CAD s/p M and cabgI and has DM with complications, poorly controlled      gen anx disorder poorly controlled  Review of Systems      as above  Physical Exam   Vitals & Measurements    HR: 71 (Peripheral)  BP: 120/60  SpO2: 98%     WT: 159.5 lb       lungs ctab, CV nml perfusion rrr  Assessment/Plan       Decreased exercise tolerance (R68.89)        concern for heart failure las as bellow, get echo and f/u with me and with cards         Ordered:          B type natriuretic peptide (BNP)* ([facility]), Specimen Type: Plasma, Collection Date: 11/30/21 14:15:00 CST          Basic Metabolic Panel* ([facility]), Specimen Type: Serum, Collection Date: 11/30/21 14:15:00 CST          CBC (h/h, RBC, indices, WBC, Plt)* ([facility]), Specimen Type: Blood, Collection Date: 11/30/21 14:15:00 CST          Echocardiogram (Request), Priority: Routine, Instructions: transthoracic, Decreased exercise tolerance  Shortness of breath  History of MI (myocardial infarction)  Hx of CABG          Return to Clinic (Request), RFV: later this week or next week                DM type 2 causing vascular disease (E11.59)        recheck hgba1c and follow up with diettina         Ordered:          Hemoglobin A1c* ([facility]), Specimen Type: Blood, Collection Date: 11/30/21 14:39:00 CST                Generalized anxiety disorder (F41.1)        warm hand off done today, pt able to meet CS while in clinic         Ordered:          Referral (Request), 11/30/21 14:37:00 CST, Referred to: Behavioral Health, Generalized anxiety disorder                History of MI (myocardial infarction) (I25.2)        as above         Ordered:           Echocardiogram (Request), Priority: Routine, Instructions: transthoracic, Decreased exercise tolerance  Shortness of breath  History of MI (myocardial infarction)  Hx of CABG          Return to Clinic (Request), RFV: later this week or next week                Hx of CABG (Z95.1)        as above         Ordered:          Echocardiogram (Request), Priority: Routine, Instructions: transthoracic, Decreased exercise tolerance  Shortness of breath  History of MI (myocardial infarction)  Hx of CABG          Return to Clinic (Request), RFV: later this week or next week                Shortness of breath (R06.02)        suspect cad         Ordered:          B type natriuretic peptide (BNP)* ([facility]), Specimen Type: Plasma, Collection Date: 11/30/21 14:15:00 CST          Basic Metabolic Panel* ([facility]), Specimen Type: Serum, Collection Date: 11/30/21 14:15:00 CST          CBC (h/h, RBC, indices, WBC, Plt)* ([facility]), Specimen Type: Blood, Collection Date: 11/30/21 14:15:00 CST          Echocardiogram (Request), Priority: Routine, Instructions: transthoracic, Decreased exercise tolerance  Shortness of breath  History of MI (myocardial infarction)  Hx of CABG          Return to Clinic (Request), RFV: later this week or next week                Orders:         Return to Clinic (Request), RFV: FOB      Total time spent reviewing chart and preparing for appointment, with patient for appointment, and time spent charting and coordinating care on the day of the appointment in minutes was:45  Patient Information     Name:GARRICK RAMIREZ      Address:      [address] [place] [id number]     Sex:Female     YOB: 1955     [phone]     Emergency Contact:KADE MURRELL     MRN:[id number]     FIN:[id number]     Location:[facility]     Date of Service:11/30/2021      Primary Care Physician:       Samanta Maza MD, [phone]      Attending Physician:       Samanta Maza MD,  [phone]  Problem List/Past Medical History    Ongoing     Acute myocardial infarction, unspecified     Atherosclerotic heart disease of native coronary artery without angina pectoris     CAD in native artery     Combined forms of age-related cataract, left eye     Combined forms of age-related cataract, right eye     DM type 2 causing vascular disease     Essential (primary) hypertension     Generalized anxiety disorder     History of MI (myocardial infarction)     Hx of CABG     Infectious gastroenteritis and colitis, unspecified     Insomnia, unspecified     Major depressive disorder, single episode, unspecified     Myopia, bilateral     Obstructive sleep apnea (adult) (pediatric)     Polypharmacy     Pure hypercholesterolemia, unspecified     Stable angina     Stress incontinence (female) (male)     Type 2 diabetes mellitus without complications     Unspecified hearing loss, bilateral     Unspecified open wound, left foot, initial encounter     Unstable angina    Historical     Pregnancy  Procedure/Surgical History     Esophagogastroduodenoscopy (09/27/2019)      Comments: Indication: Chronic heartburn..     IOL - Cataract extraction and insertion of intraocular lens (05/21/2019)      Comments: Right..     IOL - Cataract extraction and insertion of intraocular lens (05/07/2019)      Comments: Left..     Coronary artery stent x 3 (07/2011)     Angioplasty (2011)     Tubal ligation (1979)  Medications    acetaminophen 500 mg oral tablet, 1000 mg= 2 tab(s), Oral, q6 hrs, PRN    aspirin 81 mg oral delayed release tablet, 81 mg= 1 tab(s), Oral, daily, 3 refills    Basaglar KwikPen 100 units/mL subcutaneous solution, 17 units, Subcutaneous, bid, 1 refills    carvedilol 6.25 mg oral tablet, 1 tab(s), Oral, bid, 1 refills    clopidogrel 75 mg oral tablet, 75 mg= 1 tab(s), Oral, daily, 1 refills    FLUoxetine 20 mg oral capsule, 1 cap(s), Oral, qam, 3 refills    Freestyle Dm 2 14 day reader, See Instructions     Freestyle Dm 2 14 day sensor, See Instructions, 3 refills    glucose 4 g oral tablet, chewable, 16 gm= 4 tab(s), Chewed, once    HumaLOG KwikPen 100 units/mL injectable solution, See Instructions, 3 refills    Incontience diapers, See Instructions, 11 refills    Insulin Lispro KwikPen 100 units/mL injectable solution    Jardiance 25 mg oral tablet, 25 mg= 1 tab(s), Oral, qam, 2 refills    Lachydrin, See Instructions, PRN, 1 refills    lancet bowman, See Instructions    lisinopril 5 mg oral tablet, 5 mg= 1 tab(s), Oral, daily, 3 refills    loratadine 10 mg oral tablet, 10 mg= 1 tab(s), Oral, qhs, 3 refills    MetFORMIN (Eqv-Glucophage XR) 500 mg oral tablet, extended release, 1000 mg= 2 tab(s), Oral, bid, 3 refills    Misc Prescription    NITROGLYCERIN 0.4MG TAB SUBLINGUAL, See Instructions, 3 refills    One-A-Day Women 50 Plus oral tablet, See Instructions, 3 refills    pantoprazole 20 mg oral delayed release tablet, 20 mg= 1 tab(s), Oral, bid, 3 refills    rosuvastatin 20 mg oral tablet, 20 mg= 1 tab(s), Oral, qhs, 3 refills    torsemide 20 mg oral tablet, 20 mg= 1 tab(s), Oral, daily    Tresiba FlexTouch 100 units/mL subcutaneous solution    true metrix glucometer test strips, See Instructions, 11 refills    Tums 500 mg oral tablet, chewable, 500 mg= 1 tab(s), Chewed, bid    ULTICARE MICRO PEN NEEDLES/32G X 4MM [id number] MISC, See Instructions, 3 refills    ULTICARE MICRO PEN TIP 32G 4MM 50CT MG INJECTABLE, See Instructions, 3 refills  Allergies    traZODone (Nightmares)    Cats (Runny nose)    Lipitor (Diarrhea, nausea, vomiting)    Sodium Hypochlorite (Runny nose)    mirtazapine (Nightmare, Suicidal ideation)  Social History    Smoking Status     Former smoker     Alcohol      Never     Electronic Cigarette/Vaping      Electronic Cigarette Use: Never.     Exercise      Exercise frequency: 1-2 times/week. Exercise type: Walking.     Home/Environment      Marital status: Single. 1 children. Living situation:  Home/Independent. Injuries/Abuse/Neglect in household: No. Feels unsafe at home: No. Family/Friends available for support: Yes.     Nutrition/Health      Type of diet: Regular. Wants to lose weight: Yes. Sleeping concerns: Yes. Feels highly stressed: Yes.     Sexual      Sexually active: No. Identifies as female, Sexual orientation: Straight or heterosexual. History of STD: No. Contraceptive Use Details: Abstinence. History of sexual abuse: No.     Substance Abuse      Never     Tobacco - Past      Former smoker, quit more than 30 days ago, Cigarettes  Family History    Arthritis: Grandmother (M).    CVA - Cerebrovascular accident: Mother and Father.    Diabetes mellitus: Mother and Father.    Glaucoma: Sister.  Immunizations       Scheduled Immunizations       Dose Date(s)       influenza virus vaccine, inactivated       09/21/2009, 12/18/2013, 10/20/2015, 12/16/2016       pneumococcal (PCV13)       12/16/2016       pneumococcal (PPSV23)       10/20/2015       SARS-CoV-2 (COVID-19) Pfizer-162b2       03/19/2021, 04/09/2021       zoster vaccine, inactivated       08/25/2021       Other Immunizations               influenza virus vaccine, inactivated       10/02/2018, 12/06/2019, 10/01/2020       pneumococcal (PPSV23)       10/01/2020       Td       03/23/2018       tetanus/diphth/pertuss (Tdap) adult/adol       03/23/2018

## 2022-02-16 NOTE — PROGRESS NOTES
Patient:   GARRICK RAMIREZ            MRN: [id number]            FIN: [id number]               Age:   64 years     Sex:  Female     :  1955   Associated Diagnoses:   None   Author:   Shaunna SANTANA, Samanta      Procedure   EKG procedure   Indication: polypharmacy.     Position: supine.     EKG findings   Interpretation: by primary care provider.     Rhythm: sinus normal.     Axis: normal axis, normal configuration.     Within normal limits.     Intervals: CO normal, QRS normal, QT normal.     Normal EKG.     P waves: normal.     QRS complex: normal.     ST-T-U complex: normal.     Interpretation: normal EKG.     Discussed: with patient.        Impression and Plan   Orders

## 2022-02-16 NOTE — TELEPHONE ENCOUNTER
Entered by Marj Iglesias CMA on September 16, 2021 12:03:15 PM CDT  ---------------------  From: Marj Iglesias CMA   To: [employer]    Sent: 9/16/2021 12:03:15 PM CDT  Subject: Medication Management     ** Submitted: **  Order:carvedilol (carvedilol 6.25 mg oral tablet)  1 tab(s)  Oral  bid  Qty:  180 tab(s)        Days Supply:  90        Refills:  1          Substitutions Allowed     Route To [facility]  [facility]    Signed by Marj Iglesias CMA  9/16/2021 5:02:00 PM [facility]    ** Submitted: **  Complete:carvedilol (carvedilol 6.25 mg oral tablet)   Signed by Marj Iglesias CMA  9/16/2021 5:03:00 PM [facility]    ** Not Approved:  **  carvedilol (CARVEDILOL 6.25MG TABLET)  TAKE 1 TABLET BY MOUTH TWICE DAILY  Qty:  180 tab(s)        Days Supply:  90        Refills:  0          Substitutions Allowed     Route To [facility]  [facility]   Signed by Marj Iglesias CMA            PCP:  GRETTA    Medication:  carvedilol  Last Filled:   4/30/21   Quantity: 180  Refills:  0    Date of last office visit and reason:  8/19/21 AWV  Date of last labs pertaining to condition:  8/19/21            ------------------------------------------  From: [employer]  To: Samanta Maza MD  Sent: September 13, 2021 4:27:32 PM CDT  Subject: Medication Management  Due: August 20, 2021 11:16:59 AM CDT     ** On Hold Pending Signature **     Drug: carvedilol (carvedilol 6.25 mg oral tablet), TAKE 1 TABLET BY MOUTH TWICE DAILY  Quantity: 180 tab(s)  Days Supply: 90  Refills: 0  Substitutions Allowed  Notes from Pharmacy:     Dispensed Drug: carvedilol (carvedilol 6.25 mg oral tablet), TAKE 1 TABLET BY MOUTH TWICE DAILY  Quantity: 180 tab(s)  Days Supply: 90  Refills: 0  Substitutions Allowed  Notes from Pharmacy:  ------------------------------------------

## 2022-02-16 NOTE — RESULTS
Diabetes Eye Testing Entered On:  12/20/2021 4:36 PM CST    Performed On:  11/9/2021 4:36 PM CST by Beatris Calvo               Diabetes Eye Testing   Retinopathy Present TR :   Yes   Dilated Retinal Exam Date TR :   11/9/2021 CST   Beatris Calvo - 12/20/2021 4:36 PM CST

## 2022-02-16 NOTE — TELEPHONE ENCOUNTER
Patient:   GARRICK RAMIREZ            MRN: [id number]            FIN: [id number]               Age:   65 years     Sex:  Female     :  1955   Associated Diagnoses:   None   Author:   Kushal Pisano , Carly Suarez Outbound Call:    Reason for call: MTM appointment scheduled for today.  Call attempt: x1, spoke with Patient.    She is currently admitted at [facility], presented to the ED with chest pain, transferred to [facility], planning to undergo bypass surgery.     Will follow-up with MTM upon hospital discharge.  Routing to PCP as FYI.    Carly Pisano PharmD  Medication Therapy Management (MTM) Pharmacist  [facility]  Pager: [phone]

## 2022-02-16 NOTE — LETTER
(Inserted Image. Unable to display)     December 23, 2019      GARRICK RAMIREZ  [address]  [place], [place] [id number]          Dear GARRICK,      Thank you for selecting [facility]) for your healthcare needs.    Your Healthcare Provider has recommended that you schedule a diabetes management appointment with our Certified Diabetes Educator, Avril Martinez RD, CD, CDE.     Please bring your glucose meter and your blood glucose diary to your appointment.    Available services include:  - Education about diabetes with guidance and support   - Education on the 7 Self Care Behaviors for Diabetes Management:     Healthy Eating   portion control, label readings, carbohydrate recommendations, heart healthy guidelines, meal planning    Being Active - Physical activity guidelines     Monitoring   blood glucose targets, evaluation of blood glucose readings and lab results    Taking Medication   medication management    Problem Solving   discuss any concerns/ questions     Healthy Coping   disease progression and management    Reducing Risks   prevention strategies to help avoid potential complications related to uncontrolled diabetes  - Weight loss strategies    To schedule an appointment or if you have further questions, please contact your primary clinic:   [facility]       [phone]   [facility]       [phone]              [facility]     [phone]      Powered by [employer] and [email or web address]    Sincerely,    [facility]

## 2022-02-16 NOTE — TELEPHONE ENCOUNTER
Entered by Ishan Soni CMA on December 10, 2020 11:50:11 AM CST  ---------------------  From: Ishan Soni CMA   To: [employer]    Sent: 12/10/2020 11:50:11 AM CST  Subject: Medication Management     ** Submitted: **  Complete:metoprolol (Metoprolol Tartrate 25 mg oral tablet)   Signed by Ishan Soni CMA  12/10/2020 5:50:00 PM [facility]    ** Approved with modifications: **  metoprolol (METOPROLOL TARTRATE 25MG TABLET)  TAKE ONE TABLET BY MOUTH TWICE DAILY  Qty:  180 EA        Days Supply:  90        Refills:  1          Substitutions Allowed     Route To Pharmacy - [facility]   Signed by Ishan Soni CMA            ** Patient matched by Ishan Soni CMA on 12/10/2020 11:46:18 AM CST **      ------------------------------------------  From: [employer]  To: Samanta Maza MD  Sent: December 10, 2020 9:02:35 AM CST  Subject: Medication Management  Due: December 8, 2020 10:07:06 AM CST     ** On Hold Pending Signature **     Drug: metoprolol (Metoprolol Tartrate 25 mg oral tablet), TAKE ONE TABLET BY MOUTH TWICE DAILY  Quantity: 180 EA  Days Supply: 90  Refills: 1  Substitutions Allowed  Notes from Pharmacy:     Dispensed Drug: metoprolol (Metoprolol Tartrate 25 mg oral tablet), TAKE ONE TABLET BY MOUTH TWICE DAILY  Quantity: 180 EA  Days Supply: 90  Refills: 1  Substitutions Allowed  Notes from Pharmacy:  ------------------------------------------Med Refill      Date of last office visit and reason:  12/4/20 MTM visit      Date of last Med Check / Px:   10/1/20 DM check  Date of last labs pertaining to med:  10/22/20    Note:  Rx filled per protocol.  Ishan Soni CMA    RTC order in chart:  yes    For Protocol refill, has patient been contacted:  _
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
## 2022-02-16 NOTE — TELEPHONE ENCOUNTER
Entered by Cathi Arenas CMA on April 23, 2020 3:37:01 PM CDT  ---------------------  From: Cathi Arenas CMA   To: [facility]    Sent: 4/23/2020 3:37:01 PM CDT  Subject: Medication Management     ** Submitted: **  Order:metoprolol (Metoprolol Tartrate 25 mg oral tablet)  1 tab(s)  Oral  bid  Qty:  180 tab(s)        Refills:  1          Substitutions Allowed     Route To Pharmacy - [facility]    Signed by Cathi Arenas CMA  4/23/2020 8:36:00 PM    ** Submitted: **  Complete:metoprolol (metoprolol tartrate 25 mg oral tablet)   Signed by Cathi Arenas CMA  4/23/2020 8:36:00 PM    ** Not Approved:  **  metoprolol (METOPROLOL TART  25 MG  TAB TABLET)  TAKE 1 TABLET BY MOUTH TWICE DAILY  Qty:  60 unknown unit        Days Supply:  0        Refills:  0          Substitutions Allowed     Route To Pharmacy - [facility]   Signed by Cathi Arenas CMA            ** Submitted: **  Order:metFORMIN (MetFORMIN (Eqv-Glucophage XR) 500 mg oral tablet, extended release)  2 tab(s)  Oral  bid  Qty:  360 tab(s)        Refills:  1          Substitutions Allowed     Route To Pharmacy - [facility]    Signed by Cathi Arenas CMA  4/23/2020 8:35:00 PM    ** Submitted: **  Complete:metFORMIN (metFORMIN 500 mg oral tablet, extended release)   Signed by Cathi Arenas CMA  4/23/2020 8:36:00 PM    ** Not Approved:  **  metFORMIN (METFORMIN    TAB 500MG ER TABLET)  TAKE 2 TABLETS BY MOUTH TWICE DAILY WITH FOOD  Qty:  120 unknown unit        Days Supply:  0        Refills:  0          Substitutions Allowed     Route To Pharmacy - [facility]   Signed by Cathi Arenas CMA            ** Submitted: **  Order:Miscellaneous Prescription (ULTICARE MICRO PEN TIP 32G 4MM 50CT MG INJECTABLE)  See Instructions  USE FOUR TIMES A DAY  Qty:  100 unknown unit        Days Supply:  0        Refills:  3          Substitutions Allowed     Route To Pharmacy - [facility]    Signed by Dagoberto GROVER  Cathi  4/23/2020 8:35:00 PM    ** Not Approved:  **  Order:Miscellaneous Prescription (ULTICARE MICRO PEN TIP 32G 4MM 50CT MG INJECTABLE)  USE FOUR TIMES A DAY  Qty:  100 unknown unit        Days Supply:  0        Refills:  0          Substitutions Allowed     Route To Pharmacy - [facility]   Signed by Cathi Arenas CMA            ** Submitted: **  Order:empagliflozin (Jardiance 10 mg oral tablet)  1 tab(s)  Oral  qam  Qty:  90 tab(s)        Refills:  1          Substitutions Allowed     Route To Pharmacy - [facility]    Signed by Cathi Arenas CMA  4/23/2020 8:33:00 PM    ** Submitted: **  Complete:empagliflozin (Jardiance 10 mg oral tablet)   Signed by Cathi Arenas CMA  4/23/2020 8:35:00 PM    ** Not Approved:  **  empagliflozin (JARDIANCE 10MG TABLET)  TAKE 1 TABLET BY MOUTH EVERY MORNING  Qty:  30 unknown unit        Days Supply:  0        Refills:  0          Substitutions Allowed     Route To Pharmacy - [facility]   Signed by Cathi Arenas CMA            ** Patient matched by Cathi Arenas CMA on 4/23/2020 3:32:10 PM CDT **      ------------------------------------------  From: [facility]  To: Samanta Maza MD  Sent: April 23, 2020 10:51:01 AM CDT  Subject: Medication Management  Due: April 24, 2020 10:51:01 AM CDT    ** On Hold Pending Signature **  Drug: ULTICARE MICRO PEN TIP 32G 4MM 50CT MG INJECTABLE  USE FOUR TIMES A DAY  Quantity: 100 unknown unit  Days Supply: 0  Refills: 0  Substitutions Allowed  Notes from Pharmacy:     Dispensed Drug: ULTICARE MICRO PEN TIP 32G 4MM 50CT MG INJECTABLE  USE FOUR TIMES A DAY  Quantity: 100 unknown unit  Days Supply: 0  Refills: 0  Substitutions Allowed  Notes from Pharmacy:     ** On Hold Pending Signature **  Drug: metoprolol (Metoprolol Tartrate 25 mg oral tablet)  TAKE 1 TABLET BY MOUTH TWICE DAILY  Quantity: 60 unknown unit  Days Supply: 0  Refills: 0  Substitutions Allowed  Notes from Pharmacy:     Dispensed  Drug: metoprolol (Metoprolol Tartrate 25 mg oral tablet)  TAKE 1 TABLET BY MOUTH TWICE DAILY  Quantity: 60 unknown unit  Days Supply: 0  Refills: 0  Substitutions Allowed  Notes from Pharmacy:     ** On Hold Pending Signature **  Drug: empagliflozin (Jardiance 10 mg oral tablet)  TAKE 1 TABLET BY MOUTH EVERY MORNING  Quantity: 30 unknown unit  Days Supply: 0  Refills: 0  Substitutions Allowed  Notes from Pharmacy:     Dispensed Drug: empagliflozin (Jardiance 10 mg oral tablet)  TAKE 1 TABLET BY MOUTH EVERY MORNING  Quantity: 30 unknown unit  Days Supply: 0  Refills: 0  Substitutions Allowed  Notes from Pharmacy:     ** On Hold Pending Signature **  Drug: metFORMIN (MetFORMIN (Eqv-Glucophage XR) 500 mg oral tablet, extended release)  TAKE 2 TABLETS BY MOUTH TWICE DAILY WITH FOOD  Quantity: 120 unknown unit  Days Supply: 0  Refills: 0  Substitutions Allowed  Notes from Pharmacy:     Dispensed Drug: metFORMIN (MetFORMIN (Eqv-Glucophage XR) 500 mg oral tablet, extended release)  TAKE 2 TABLETS BY MOUTH TWICE DAILY WITH FOOD  Quantity: 120 unknown unit  Days Supply: 0  Refills: 0  Substitutions Allowed  Notes from Pharmacy:   ------------------------------------------Med Refill      Date of last office visit and reason:  3/13/20      Date of last Med Check / Px:   3/13/20  Date of last labs pertaining to med:  3/13/20

## 2022-02-16 NOTE — NURSING NOTE
Comprehensive Intake Entered On:  12/6/2019 10:01 AM CST    Performed On:  12/6/2019 9:54 AM CST by Ambika Han CMA               Summary   Chief Complaint :   establish care   Weight Measured :   142.2 lb(Converted to: 142 lb 3 oz, 64.50 kg)    Systolic Blood Pressure :   118 mmHg   Diastolic Blood Pressure :   68 mmHg   Mean Arterial Pressure :   85 mmHg   Peripheral Pulse Rate :   74 bpm   BP Site :   Right arm   BP Method :   Manual   HR Method :   Electronic   Temperature Tympanic :   97.3 DegF(Converted to: 36.3 DegC)  (LOW)    Oxygen Saturation :   97 %   Ambika Han CMA - 12/6/2019 9:54 AM CST   Health Status   Allergies Verified? :   Yes   Medication History Verified? :   Yes   Pre-Visit Planning Status :   Completed   Tobacco Use? :   Never smoker   Ambika Han CMA - 12/6/2019 9:54 AM CST   Consents   Consent for Immunization Exchange :   Consent Granted   Consent for Immunizations to Providers :   Consent Granted   Ambika Han CMA - 12/6/2019 9:54 AM CST   Problems   (As Of: 12/6/2019 10:01:39 AM CST)   Problems(Active)    Acute myocardial infarction, unspecified (SNOMED CT  :37086249 )  Name of Problem:   Acute myocardial infarction, unspecified ; Onset Date:   7/2011 ; Recorder:   Ana Reese; Confirmation:   Confirmed ; Classification:   Medical ; Code:   [id number] ; Contributor System:   [employer] ; Last Updated:   11/22/2019 1:42 PM CST ; Life Cycle Date:   11/22/2019 ; Life Cycle Status:   Active ; Vocabulary:   SNOMED CT        Atherosclerotic heart disease of native coronary artery without angina pectoris (SNOMED CT  :8524214187 )  Name of Problem:   Atherosclerotic heart disease of native coronary artery without angina pectoris ; Recorder:   Ana Reese; Confirmation:   Confirmed ; Classification:   Medical ; Code:   [id number] ; Contributor System:   PowerChart ; Last Updated:   11/22/2019 1:25 PM CST ; Life Cycle Date:   11/22/2019 ; Life Cycle Status:   Active ; Vocabulary:   SNOMED  CT        Combined forms of age-related cataract, left eye (SNOMED CT  :73507237 )  Name of Problem:   Combined forms of age-related cataract, left eye ; Onset Date:   4/30/2019 ; Recorder:   Ana Reese; Confirmation:   Confirmed ; Classification:   Medical ; Code:   [id number] ; Contributor System:   PowerChart ; Last Updated:   11/22/2019 1:27 PM CST ; Life Cycle Date:   11/22/2019 ; Life Cycle Status:   Active ; Vocabulary:   SNOMED CT        Combined forms of age-related cataract, right eye (SNOMED CT  :50418440 )  Name of Problem:   Combined forms of age-related cataract, right eye ; Recorder:   Ana Reese; Confirmation:   Confirmed ; Classification:   Medical ; Code:   [id number] ; Contributor System:   PowerChart ; Last Updated:   11/22/2019 1:27 PM CST ; Life Cycle Date:   11/22/2019 ; Life Cycle Status:   Active ; Vocabulary:   SNOMED CT        Essential (primary) hypertension (SNOMED CT  :09968752 )  Name of Problem:   Essential (primary) hypertension ; Recorder:   Ana Reese; Confirmation:   Confirmed ; Classification:   Medical ; Code:   [id number] ; Contributor System:   PowerChart ; Last Updated:   11/22/2019 1:29 PM CST ; Life Cycle Date:   11/22/2019 ; Life Cycle Status:   Active ; Vocabulary:   SNOMED CT        Generalized anxiety disorder (SNOMED CT  :88684680 )  Name of Problem:   Generalized anxiety disorder ; Recorder:   Ana Reese; Confirmation:   Confirmed ; Classification:   Medical ; Code:   06037018 ; Contributor System:   PowerChart ; Last Updated:   11/22/2019 1:29 PM CST ; Life Cycle Date:   11/22/2019 ; Life Cycle Status:   Active ; Vocabulary:   SNOMED CT        Infectious gastroenteritis and colitis, unspecified (SNOMED CT  :85956994 )  Name of Problem:   Infectious gastroenteritis and colitis, unspecified ; Recorder:   Ana Reese; Confirmation:   Confirmed ; Classification:   Medical ; Code:   [id number] ; Contributor System:   PowerChart ; Last Updated:   11/22/2019 2:20 PM  CST ; Life Cycle Date:   11/22/2019 ; Life Cycle Status:   Active ; Vocabulary:   SNOMED CT        Insomnia, unspecified (SNOMED CT  :663241169 )  Name of Problem:   Insomnia, unspecified ; Recorder:   Ana Reese; Confirmation:   Confirmed ; Classification:   Medical ; Code:   [id number] ; Contributor System:   PowerChart ; Last Updated:   11/22/2019 1:30 PM CST ; Life Cycle Date:   11/22/2019 ; Life Cycle Status:   Active ; Vocabulary:   SNOMED CT        Major depressive disorder, single episode, unspecified (SNOMED CT  :78643022 )  Name of Problem:   Major depressive disorder, single episode, unspecified ; Recorder:   Ana Reese; Confirmation:   Confirmed ; Classification:   Medical ; Code:   83384541 ; Contributor System:   PowerChart ; Last Updated:   11/22/2019 1:28 PM CST ; Life Cycle Date:   11/22/2019 ; Life Cycle Status:   Active ; Vocabulary:   SNOMED CT        Myopia, bilateral (SNOMED CT  :89617464 )  Name of Problem:   Myopia, bilateral ; Recorder:   Ana Reese; Confirmation:   Confirmed ; Classification:   Medical ; Code:   69302552 ; Contributor System:   PowerChart ; Last Updated:   11/22/2019 1:31 PM CST ; Life Cycle Date:   11/22/2019 ; Life Cycle Status:   Active ; Vocabulary:   SNOMED CT        Obstructive sleep apnea (adult) (pediatric) (SNOMED CT  :099871981 )  Name of Problem:   Obstructive sleep apnea (adult) (pediatric) ; Recorder:   Ana Reese; Confirmation:   Confirmed ; Classification:   Medical ; Code:   252853118 ; Contributor System:   PowerChart ; Last Updated:   11/22/2019 1:31 PM CST ; Life Cycle Date:   11/22/2019 ; Life Cycle Status:   Active ; Vocabulary:   SNOMED CT        Pure hypercholesterolemia, unspecified (SNOMED CT  :777229309 )  Name of Problem:   Pure hypercholesterolemia, unspecified ; Recorder:   Ana Reese; Confirmation:   Confirmed ; Classification:   Medical ; Code:   384461743 ; Contributor System:   PowerChart ; Last Updated:   11/22/2019 1:29 PM  CST ; Life Cycle Date:   11/22/2019 ; Life Cycle Status:   Active ; Vocabulary:   SNOMED CT        Stress incontinence (female) (male) (SNOMED CT  :590422817 )  Name of Problem:   Stress incontinence (female) (male) ; Recorder:   Ana Reese; Confirmation:   Confirmed ; Classification:   Medical ; Code:   537184522 ; Contributor System:   PowerChart ; Last Updated:   11/22/2019 2:17 PM CST ; Life Cycle Date:   11/22/2019 ; Life Cycle Status:   Active ; Vocabulary:   SNOMED CT        Type 2 diabetes mellitus without complications (SNOMED CT  :241966669 )  Name of Problem:   Type 2 diabetes mellitus without complications ; Recorder:   Ana Reese; Confirmation:   Confirmed ; Classification:   Medical ; Code:   925964034 ; Contributor System:   PowerChart ; Last Updated:   11/22/2019 1:28 PM CST ; Life Cycle Date:   11/22/2019 ; Life Cycle Status:   Active ; Vocabulary:   SNOMED CT        Unspecified hearing loss, bilateral (SNOMED CT  :46052693 )  Name of Problem:   Unspecified hearing loss, bilateral ; Recorder:   Ana Reese; Confirmation:   Confirmed ; Classification:   Medical ; Code:   12506500 ; Contributor System:   PowerChart ; Last Updated:   11/22/2019 1:25 PM CST ; Life Cycle Date:   11/22/2019 ; Life Cycle Status:   Active ; Vocabulary:   SNOMED CT        Unspecified open wound, left foot, initial encounter (SNOMED CT  :044129023 )  Name of Problem:   Unspecified open wound, left foot, initial encounter ; Recorder:   Ana Reese; Confirmation:   Confirmed ; Classification:   Medical ; Code:   171435276 ; Contributor System:   PowerChart ; Last Updated:   11/22/2019 1:31 PM CST ; Life Cycle Date:   11/22/2019 ; Life Cycle Status:   Active ; Vocabulary:   SNOMED CT        Unstable angina (SNOMED CT  :6889573 )  Name of Problem:   Unstable angina ; Recorder:   Ana Reese; Confirmation:   Confirmed ; Classification:   Medical ; Code:   6152344 ; Contributor System:   PowerChart ; Last Updated:    11/22/2019 1:32 PM CST ; Life Cycle Date:   11/22/2019 ; Life Cycle Status:   Active ; Vocabulary:   SNOMED CT          Meds / Allergies   (As Of: 12/6/2019 10:01:39 AM CST)   Allergies (Active)   atorvastatin  Estimated Onset Date:   Unspecified ; Created By:   Ana Reese; Reaction Status:   Active ; Category:   Drug ; Substance:   atorvastatin ; Type:   Allergy ; Updated By:   Ana Reese; Reviewed Date:   11/22/2019 1:34 PM CST      Cats  Estimated Onset Date:   Unspecified ; Reactions:   Runny nose ; Created By:   Ana Reese; Reaction Status:   Active ; Category:   Drug ; Substance:   Cats ; Type:   Allergy ; Updated By:   Ana Reese; Reviewed Date:   11/22/2019 1:37 PM CST      Lipitor  Estimated Onset Date:   Unspecified ; Reactions:   Diarrhea, nausea, vomiting ; Created By:   Ana Reese; Reaction Status:   Active ; Category:   Drug ; Substance:   Lipitor ; Type:   Allergy ; Updated By:   Ana Reese; Reviewed Date:   11/22/2019 1:38 PM CST      Sodium Hypochlorite  Estimated Onset Date:   Unspecified ; Reactions:   Runny nose ; Created By:   Ana Reese; Reaction Status:   Active ; Category:   Drug ; Substance:   Sodium Hypochlorite ; Type:   Allergy ; Updated By:   Ana Reese; Reviewed Date:   11/22/2019 1:37 PM CST        Medication List   (As Of: 12/6/2019 10:01:39 AM CST)   Home Meds    citalopram  :   citalopram ; Status:   Documented ; Ordered As Mnemonic:   citalopram 40 mg oral tablet ; Simple Display Line:   40 mg, 1 tab(s), Oral, daily, 0 Refill(s) ; Catalog Code:   citalopram ; Order Dt/Tm:   12/6/2019 9:55:57 AM CST          clopidogrel  :   clopidogrel ; Status:   Documented ; Ordered As Mnemonic:   clopidogrel 75 mg oral tablet ; Simple Display Line:   75 mg, 1 tab(s), Oral, daily, 0 Refill(s) ; Catalog Code:   clopidogrel ; Order Dt/Tm:   12/6/2019 9:57:03 AM CST          empagliflozin  :   empagliflozin ; Status:   Documented ; Ordered As Mnemonic:   Jardiance 10 mg oral  tablet ; Simple Display Line:   10 mg, 1 tab(s), Oral, qam, 0 Refill(s) ; Catalog Code:   empagliflozin ; Order Dt/Tm:   12/6/2019 9:57:32 AM CST          insulin glargine  :   insulin glargine ; Status:   Documented ; Ordered As Mnemonic:   Basaglar KwikPen 100 units/mL subcutaneous solution ; Simple Display Line:   Subcutaneous, daily, 0 Refill(s) ; Catalog Code:   insulin glargine ; Order Dt/Tm:   12/6/2019 9:59:06 AM CST          insulin isophane-insulin regular  :   insulin isophane-insulin regular ; Status:   Documented ; Ordered As Mnemonic:   NovoLIN 70/30 FlexPen ; Simple Display Line:   Subcutaneous, 0 Refill(s) ; Catalog Code:   insulin isophane-insulin regular ; Order Dt/Tm:   12/6/2019 9:58:40 AM CST          lisinopril  :   lisinopril ; Status:   Documented ; Ordered As Mnemonic:   lisinopril 40 mg oral tablet ; Simple Display Line:   40 mg, 1 tab(s), Oral, daily, 0 Refill(s) ; Catalog Code:   lisinopril ; Order Dt/Tm:   12/6/2019 9:57:18 AM CST          loratadine  :   loratadine ; Status:   Documented ; Ordered As Mnemonic:   loratadine 10 mg oral capsule ; Simple Display Line:   10 mg, 1 cap(s), Oral, daily, 0 Refill(s) ; Catalog Code:   loratadine ; Order Dt/Tm:   12/6/2019 9:58:20 AM CST          metFORMIN  :   metFORMIN ; Status:   Documented ; Ordered As Mnemonic:   metFORMIN 1000 mg oral tablet ; Simple Display Line:   1,000 mg, 1 tab(s), Oral, bid, 0 Refill(s) ; Catalog Code:   metFORMIN ; Order Dt/Tm:   12/6/2019 9:56:12 AM CST          metoprolol  :   metoprolol ; Status:   Documented ; Ordered As Mnemonic:   metoprolol succinate 25 mg oral capsule, extended release ; Simple Display Line:   25 mg, 1 cap(s), Oral, daily, 0 Refill(s) ; Catalog Code:   metoprolol ; Order Dt/Tm:   12/6/2019 9:57:50 AM CST          raNITIdine  :   raNITIdine ; Status:   Documented ; Ordered As Mnemonic:   raNITIdine 150 mg oral capsule ; Simple Display Line:   150 mg, 1 cap(s), Oral, bid, 0 Refill(s) ; Catalog  Code:   raNITIdine ; Order Dt/Tm:   12/6/2019 9:56:29 AM CST          rosuvastatin  :   rosuvastatin ; Status:   Documented ; Ordered As Mnemonic:   rosuvastatin 20 mg oral tablet ; Simple Display Line:   20 mg, 1 tab(s), Oral, daily, 0 Refill(s) ; Catalog Code:   rosuvastatin ; Order Dt/Tm:   12/6/2019 9:56:47 AM CST

## 2022-02-16 NOTE — TELEPHONE ENCOUNTER
---------------------  From: Samanta Maza MD   To: Care Coordinators Pool ([facility]);     Sent: 4/27/2021 12:07:38 PM CDT  Subject: General Message     pt's daughter reports pt is at [facility] following a CABG and is needing a home health nurse and PT for her to be abl eto be discharged, but the hospital is having a hard time finding a home health care agency, Pt's Daughter, Daksha, has additional hospital SW info at home.  Could you possibly provide them with info for home health resources in this area that they could share with the hospital?Spoke with Daksha at 1228pm.  Gave her [employer] , [facility] [phone] and [employer] .  She is having trouble with insurance coverage with [employer], she has not contacted [employer] or [employer] at this time it is on her list to do.  She can work with the hospital , at [employer] to see if they can reach out to the [facility] of [facility] to see if there is anything that can be changed as far as insurance. We at the clinic cannot do much if it is an insurance coverage problem but we will do our best to help in any way.  She will call back at any time if we can help  in any way.  She verbalized understanding and had no further questions.  Rosalie Suarez CMA.

## 2022-02-16 NOTE — PROGRESS NOTES
Patient:   GARRICK RAMIREZ            MRN: [id number]            FIN: [id number]               Age:   64 years     Sex:  Female     :  1955   Associated Diagnoses:   Type 2 diabetes mellitus without complications   Author:   Avril Martinez      Visit Information   Visit type:  Diabetes Self Management Education .    Referral source:  Shaunna SANTANA, Samanta.       Chief Complaint   Uncontrolled Type II diabetes, Hyperlipidemia, HTN       History of Present Illness   Pt transferred cares 2019 from [facility].  During her transition and moving pt did not take insulin as directed which resulted in a high a1c of 11.7% = 289mg/dL eAG 19 up from 8.5% 19.  Pt had maintained a1c's in the 8-9% range.    Pt has worked with [facility] to simply insulin regimen which has been working well for pt.  Pt was able to correctly recite insulin recommendation and has been testing 4-5x/ day.    BG Testing:    range 66 - 160 readings prior to meals   7 day avg 114 mg/dL  14 day avg 133 mg/dL  30 day avg 156 mg/dL       Insulin: Basaglar 26u BID  Novolog 24 units working on taking this prior to meals  8u prior to snacks  Pt states she is doing much better with insulin adherence   Metformin XR 500mg ii tabs BID  Jardiance 10 mg daily     Pt also brings today a weekly pill box with am and pm dosing.  Pt is very proud to have this organized and taking as directed.      Nutrition: Pt states she is given $16 in food stamps and her daughter will purchase food, diet recall very vague, pt did not want to state her typical intake.  Focus of today's meeting primarily on nutrition recommendations.      Eye exam 18 Dilated Retinal Eye Exam Communication Form provided  Skin - no areas of concern      Health Status   Allergies:    Allergic Reactions (Selected)  Severity Not Documented  Cats (Runny nose)  Lipitor (Diarrhea, nausea, vomiting)  Mirtazapine (Nightmare and suicidal ideation)  Sodium Hypochlorite (Runny  nose)  Nonallergic Reactions (Selected)  Low  TraZODone (Nightmares)   Medications:  (Selected)   Prescriptions  Prescribed  Basaglar KwikPen 100 units/mL subcutaneous solution: ( 26 unit(s) ), Subcutaneous, bid, # 15 mL, 1 Refill(s), Type: Maintenance, Pharmacy: [facility]  Jardiance 10 mg oral tablet: = 1 tab(s) ( 10 mg ), Oral, qam, # 30 tab(s), 3 Refill(s), Type: Maintenance, Pharmacy: [facility], 1 tab(s) Oral qam  Lachydrin: Lachydrin, See Instructions, Instructions: Lachydrin or similar product- apply to feet QHS, Supply, PRN: dry skin, # 1 EA, 1 Refill(s), Type: Maintenance, Pharmacy: [facility], Lachydrin or similar product- apply to feet QHS,PRN:dry skin  Lexapro 10 mg oral tablet: See Instructions, Instructions: 1 tab PO daily, # 30 tab(s), 1 Refill(s), Type: Maintenance, Pharmacy: [facility]  NovoLOG FlexPen 100 units/mL injectable solution: See Instructions, Instructions: inject under the skin:  24 units before meals and 8 units before snacks., # 30 mL, 1 Refill(s), Type: Maintenance, Pharmacy: [facility], inject under the skin:  24 units before meals and 8 units before snacks.  glucose 4 g oral tablet, chewable: = 4 tab(s) ( 16 gm ), Chewed, once, Instructions: Use if BG <70 mg/dL,  check BG 15.  Repeat if BG remains less than 80 mg/dL., # 4 tab(s), 0 Refill(s), Type: Soft Stop, Pharmacy: [facility], 4 tab(s) Chewed once,Instr:Use if BG <70 mg/dL,  check BG...  lancet bowman: lancet bowman, See Instructions, Instructions: use as directed, Supply, # 1 EA, 0 Refill(s), Type: Maintenance, Pharmacy: [facility], use as directed  lisinopril 40 mg oral tablet: = 1 tab(s) ( 40 mg ), Oral, daily, # 30 tab(s), 3 Refill(s), Type: Maintenance, Pharmacy: [facility], 1 tab(s) Oral daily  metFORMIN 500 mg oral tablet, extended release: = 2 tab(s) ( 1,000 mg ), Oral, bid, Instructions: take with food, # 120 tab(s), 3 Refill(s), Type: Maintenance, Pharmacy: [facility], 2 tab(s) Oral bid,Instr:take  with food  metoprolol tartrate 25 mg oral tablet: = 1 tab(s) ( 25 mg ), Oral, bid, # 60 tab(s), 3 Refill(s), Type: Maintenance, Pharmacy: [facility], 1 tab(s) Oral bid  nitroglycerin 0.4 mg sublingual tablet: See Instructions, Instructions: 1 tab under tongue at chest pain.  May repeat x 3 q 5 min.  Call 911 after first dose if pain persists., PRN: for chest pain, # 25 tab(s), 3 Refill(s), Type: Maintenance, Pharmacy: [facility], 1 tab under tongue at ch...  pantoprazole 40 mg oral delayed release tablet: = 1 tab(s) ( 40 mg ), Oral, daily, # 90 tab(s), 3 Refill(s), Type: Maintenance, Pharmacy: [facility], 1 tab(s) Oral daily  true metrix glucometer test strips: true metrix glucometer test strips, See Instructions, Instructions: check blood sugar before each meal and at bedtime, Supply, # 1 box(es), 11 Refill(s), Type: Maintenance, Pharmacy: [facility], check blood sugar before each meal and at bedtime  Documented Medications  Documented  clopidogrel 75 mg oral tablet: = 1 tab(s) ( 75 mg ), Oral, daily, 0 Refill(s), Type: Maintenance  loratadine 10 mg oral capsule: = 1 cap(s) ( 10 mg ), Oral, daily, 0 Refill(s), Type: Maintenance  multivitamin with minerals (w/ Iron): 1 tablet, Oral, daily, 0 Refill(s), Type: Maintenance  raNITIdine 150 mg oral capsule: = 1 cap(s) ( 150 mg ), Oral, bid, PRN: for heartburn, 0 Refill(s), Type: Maintenance  rosuvastatin 20 mg oral tablet: = 1 tab(s) ( 20 mg ), Oral, daily, 0 Refill(s), Type: Maintenance,    Medications          *denotes recorded medication          true metrix glucometer test strips: See Instructions, check blood sugar before each meal and at bedtime, 1 box(es), 11 Refill(s).          lancet bowman: See Instructions, use as directed, 1 EA, 0 Refill(s).          Lachydrin: See Instructions, Lachydrin or similar product- apply to feet QHS, PRN: dry skin, 1 EA, 1 Refill(s).          *clopidogrel 75 mg oral tablet: 75 mg, 1 tab(s), Oral, daily, 0 Refill(s).           Jardiance 10 mg oral tablet: 10 mg, 1 tab(s), Oral, qam, 30 tab(s), 3 Refill(s).          Lexapro 10 mg oral tablet: See Instructions, 1 tab PO daily, 30 tab(s), 1 Refill(s).          glucose 4 g oral tablet, chewable: 16 gm, 4 tab(s), Chewed, once, Use if BG <70 mg/dL,  check BG 15.  Repeat if BG remains less than 80 mg/dL., 4 tab(s), 0 Refill(s).          NovoLOG FlexPen 100 units/mL injectable solution: See Instructions, inject under the skin:  24 units before meals and 8 units before snacks., 30 mL, 1 Refill(s).          Basaglar KwikPen 100 units/mL subcutaneous solution: 26 unit(s), Subcutaneous, bid, 15 mL, 1 Refill(s).          lisinopril 40 mg oral tablet: 40 mg, 1 tab(s), Oral, daily, 30 tab(s), 3 Refill(s).          *loratadine 10 mg oral capsule: 10 mg, 1 cap(s), Oral, daily, 0 Refill(s).          metFORMIN 500 mg oral tablet, extended release: 1,000 mg, 2 tab(s), Oral, bid, take with food, 120 tab(s), 3 Refill(s).          metoprolol tartrate 25 mg oral tablet: 25 mg, 1 tab(s), Oral, bid, 60 tab(s), 3 Refill(s).          *multivitamin with minerals (w/ Iron): 1 tablet, Oral, daily, 0 Refill(s).          nitroglycerin 0.4 mg sublingual tablet: See Instructions, 1 tab under tongue at chest pain.  May repeat x 3 q 5 min.  Call 911 after first dose if pain persists., PRN: for chest pain, 25 tab(s), 3 Refill(s).          pantoprazole 40 mg oral delayed release tablet: 40 mg, 1 tab(s), Oral, daily, 90 tab(s), 3 Refill(s).          *raNITIdine 150 mg oral capsule: 150 mg, 1 cap(s), Oral, bid, PRN: for heartburn, 0 Refill(s).          *rosuvastatin 20 mg oral tablet: 20 mg, 1 tab(s), Oral, daily, 0 Refill(s).       Problem list:    All Problems (Selected)  Acute myocardial infarction, unspecified / SNOMED CT 41276016 / Confirmed  Combined forms of age-related cataract, left eye / SNOMED CT 58769420 / Confirmed  Combined forms of age-related cataract, right eye / SNOMED CT 42757785 / Confirmed  CAD in native  artery / SNOMED CT 7021809575 / Confirmed  Atherosclerotic heart disease of native coronary artery without angina pectoris / SNOMED CT 5772516261 / Confirmed  Essential (primary) hypertension / SNOMED CT 11521455 / Confirmed  Stress incontinence (female) (male) / SNOMED CT 132241851 / Confirmed  Generalized anxiety disorder / SNOMED CT 12909550 / Confirmed  Unspecified hearing loss, bilateral / SNOMED CT 06138627 / Confirmed  History of MI (myocardial infarction) / SNOMED CT 3891985867 / Confirmed  Infectious gastroenteritis and colitis, unspecified / SNOMED CT 68248525 / Confirmed  Insomnia, unspecified / SNOMED CT 491171444 / Confirmed  Major depressive disorder, single episode, unspecified / SNOMED CT 86556550 / Confirmed  Myopia, bilateral / SNOMED CT 21653231 / Confirmed  Obstructive sleep apnea (adult) (pediatric) / SNOMED CT 467578086 / Confirmed  Unspecified open wound, left foot, initial encounter / SNOMED CT 196843335 / Confirmed  Polypharmacy / SNOMED CT 338255483 / Confirmed  Unstable angina / SNOMED CT 0722517 / Confirmed  Pure hypercholesterolemia, unspecified / SNOMED CT 845367248 / Confirmed  Stable angina / SNOMED CT 392742876 / Confirmed  Type 2 diabetes mellitus without complications / SNOMED CT 982964443 / Confirmed      Histories   Past Medical History:    Active  Acute myocardial infarction, unspecified (19753430): Onset in the month of 7/2011 at 55 years  Major depressive disorder, single episode, unspecified (11007013)  Type 2 diabetes mellitus without complications (511740525)  Obstructive sleep apnea (adult) (pediatric) (237399372)  Unspecified hearing loss, bilateral (46122804)  Myopia, bilateral (39300195)  Unspecified open wound, left foot, initial encounter (194151019)  Atherosclerotic heart disease of native coronary artery without angina pectoris (4170856694)  Combined forms of age-related cataract, right eye (24052457)  Generalized anxiety disorder (43496780)  Essential (primary)  hypertension (40230321)  Pure hypercholesterolemia, unspecified (835062213)  Insomnia, unspecified (672389747)  Unstable angina (8547018)  Stress incontinence (female) (male) (330300868)  Infectious gastroenteritis and colitis, unspecified (82449523)  Resolved  Pregnancy (404076832):  Resolved in 1976 at 20 years.   Family History:    Diabetes mellitus  Mother  Father  Arthritis  Grandmother (M)  CVA - Cerebrovascular accident  Mother  Father  Glaucoma  Sister     Procedure history:    Esophagogastroduodenoscopy (SNOMED CT 593912147) on 9/27/2019 at 64 Years.  Comments:  11/22/2019 1:48 PM FAVIAN - Ana Reese  Indication: Chronic heartburn.  IOL - Cataract extraction and insertion of intraocular lens (SNOMED CT 2051531588) performed by Jovanni Harris MD on 5/21/2019 at 63 Years.  Comments:  11/22/2019 1:50 PM Ana Langley  Right.  IOL - Cataract extraction and insertion of intraocular lens (SNOMED CT 3978764184) performed by Jovanni Harris MD on 5/7/2019 at 63 Years.  Comments:  11/22/2019 1:50 PM Ana Langley  Left.  Coronary artery stent x 3 (SNOMED CT 1232405735) in the month of 7/2011 at 56 Years.  Angioplasty (SNOMED CT 4829388039) in 2011 at 56 Years.  Tubal ligation (SNOMED CT 057389799) in 1979 at 24 Years.   Social History:        Alcohol Assessment            Never      Tobacco Assessment: Past            Quit in 1999, Cigarettes      Substance Abuse Assessment            Never      Home and Environment Assessment            Marital status: Single.  1 children.  Living situation: Home/Independent.  Injuries/Abuse/Neglect in               household: No.  Feels unsafe at home: No.  Family/Friends available for support: Yes.      Nutrition and Health Assessment            Type of diet: Regular.  Wants to lose weight: Yes.  Sleeping concerns: Yes.  Feels highly stressed: Yes.      Exercise and Physical Activity Assessment            Exercise frequency: 1-2 times/week.  Exercise type: Walking.       Sexual Assessment            Sexually active: No.  Identifies as female, Sexual orientation: Straight or heterosexual.  History of STD:               No.  Contraceptive Use Details: Abstinence.  History of sexual abuse: No.        Health Maintenance      Recommendations     Pending (in the next year)        OverDue           DM - Eye Exam due  04/12/19  and every 1  year(s)        Due            Alcohol Misuse Screen (Female) due  02/05/20  and every 1  year(s)           Aspirin Therapy for Prevention of CVD (Female) due  02/05/20  and every 5  year(s)           Body Mass Index Check (Female) due  02/05/20  and every 1  year(s)           Cervical Cancer Screen (if sexually active) due  02/05/20  Variable frequency           Colorectal Cancer Screen (Colonoscopy) (Female) due  02/05/20  Variable frequency           Colorectal Cancer Screen (Occult Blood) (Female) due  02/05/20  Variable frequency           Colorectal Cancer Screen (Sigmoidoscopy) (Female) due  02/05/20  Variable frequency           DM - Communication with Managing Provider due  02/05/20  and every 1  year(s)           DM - Foot Exam due  02/05/20  and every 1  year(s)           HIV Screen (if sexually active) (Female) due  02/05/20  and every 1  year(s)           Hepatitis C Screen [id number] (Female) due  02/05/20  One-time only           Lung Cancer Screen (Female) due  02/05/20  and every 1  year(s)           STD Counseling (if sexually active) (Female) due  02/05/20  and every 1  year(s)           Syphilis Screen (if sexually active) (Female) due  02/05/20  and every 1  year(s)           Zoster/Shingles Vaccine due  02/05/20  One-time only        Near Due            DM - HgbA1c near due  03/06/20  and every 3  month(s)        Due In Future            Influenza Vaccine not due until  08/31/20  and every 1  year(s)           Breast Cancer Screen not due until  12/04/20  and every 2  year(s)           DM - Microalbumin not due until  12/06/20  and  every 1  year(s)           Lipid Disorders Screen (Female) not due until  12/06/20  and every 1  year(s)           Depression Screen (Female) not due until  01/10/21  and every 1  year(s)           High Blood Pressure Screen (Female) not due until  01/10/21  and every 1  year(s)           Tobacco Use Screen (Female) not due until  01/10/21  and every 1  year(s)     Satisfied (in the past 1 year)        Satisfied            DM - HgbA1c on  12/06/19.           DM - Microalbumin on  12/06/19.           DM - Microalbumin on  12/06/19.           Depression Screen (Female) on  01/10/20.           Depression Screen (Female) on  01/10/20.           Depression Screen (Female) on  01/10/20.           Depression Screen (Female) on  01/03/20.           Depression Screen (Female) on  01/03/20.           Depression Screen (Female) on  01/03/20.           Depression Screen (Female) on  12/06/19.           Depression Screen (Female) on  12/06/19.           Depression Screen (Female) on  12/06/19.           High Blood Pressure Screen (Female) on  01/10/20.           High Blood Pressure Screen (Female) on  12/06/19.           Influenza Vaccine on  12/06/19.           Lipid Disorders Screen (Female) on  12/06/19.           Tobacco Use Screen (Female) on  01/10/20.           Tobacco Use Screen (Female) on  12/06/19.          Review / Management   Results review:  Lab results   12/6/2019 11:22 AM CST Sodium Level 136 mmol/L    Potassium Level 4.2 mmol/L    Chloride Level 98 mmol/L    CO2 Level 26 mmol/L    Glucose Level 329 mg/dL  HI    BUN 12 mg/dL    Creatinine 0.59 mg/dL    BUN/Creat Ratio NOT APPLICABLE    eGFR 97 mL/min/1.73m2    eGFR African American 112 mL/min/1.73m2    Calcium Level 9.7 mg/dL    Hgb A1c 11.7  HI    LDL Direct 185 mg/dL  HI    B-Natriuretic Peptide 44 pg/mL    U Microalbumin 2.6 mg/dL    Ur Creatinine 76 mg/dL    Ur Microalb/Creat Ratio 34  HI    WBC 5.2    RBC 5.04    Hgb 14.7 gm/dL    Hct 43.6 %    MCV 86.5 fL     MCH 29.2 pg    MCHC 33.7 gm/dL    RDW 13.1 %    Platelet 268    MPV 10.7 fL   .       Impression and Plan   Diagnosis     Type 2 diabetes mellitus without complications (EMR83-XX E11.9).       Counseled: Patient, TIEN Self Care Behaviors     Healthy Eating - Education on what foods are primary sources of carbohydrates and how intake affects BG readings, discussed foods pt enjoys and how to incorporate them into her meals.  We made a list of low cost healthy options for meals and snacks.  Pt is to incorporate more tuna, canned chicken, beans, eggs, and cottage cheese for lower cost protein sources and fruit cups in natural juices, frozen or fresh fruits, frozen vegetables or rinse canned if necessary.    Because pt is taking set doses of insulin encouraged pt to have a steady amount of carbohydrates at each meal for safety and stability of glucose.  Pt does have sporadic low glucose readings likely due to variability of intake.  Discussed well balanced meals, diabetic plate method as a general rule.  Patient is provided with numerous ideas to incorporate dietary recommendations.    Being Active - Education on how exercise helps with :    - lowering BG by increasing the muscles ability to take up and use glucose   - weight loss   - healthier heart (improve lipid profile)   - improve sleep, mood, energy   - decrease stress      Monitoring - QID ac, pt would benefit from CGMS and this was briefly discussed, pt will likely want to move forward with this as she feels more settled.     Taking Medication - Continue with insulin as directed, education on insulin action time, encouraged taking Novolog 15 min prior to meals and snacks   Problem Solving - medical support team assistance, resources provided (written and apps, internet); good control of stress  Healthy Coping - support of friends, family, and medical support team   Reducing Risks - Will discuss at f/u apts.  Weight loss goal of 7 - 10% of current body weight  pounds as a reasonable goal to help increase insulin sensitivity   .      Goals:   1. BG goals 80 - 130 and post-prandial less than 180 mg/dL, Test QID  2.  A1c goal <7% by 8/2020  3.  Walk 15 min 5 or more days/ week   4.  Use the diabetic plate method as a reference, consistent carbohydrate intake due to set insulin dosing       Professional Services   Time spent with pt 60 min   cc Dr. Maza

## 2022-02-16 NOTE — CARE COORDINATION
Pt appears on  [facility] chronic disease panel as out of parameters for elevated A1c.  Pt has appointment 04/29/21 after heart surgery.  Rosalie Suarez, CMA

## 2022-02-16 NOTE — TELEPHONE ENCOUNTER
---------------------  From: Kushal Pisano Kaity   To: [facility] Message Pool ([id number]_[facility]);     Cc: Samanta Maza MD;      Sent: 12/31/2020 3:18:49 PM CST  Subject: CGM Freestyle dm 2 through [facility] pharmacy      Pharmacy cannot fill pt's CGM due to anna issues with [employer] (see marvin below).  Therefore will have [facility] staff fax the following to [facility].  - Rx for freestyle dm 2 sensor  - Rx for freestyle dm 2 reader  - detailed written order (find in chart dated 10/27/2020  - [facility] note from 10/1/2020.    (Inserted Image. Unable to display)     Please let me know if any questions.  KB    We are currently unable to fill the Freestyle Dm 2 for the patient due to anna issues with [employer]. Per rejection patient must fill at [facility] ([phone]) or [facility] ([phone]).     [facility]  [phone]---------------------  From: Samanta Maza MD   To: [facility] Message Pool ([id number] [facility]);     Sent: 12/31/2020 3:44:30 PM CST  Subject: RE: CGM Freestyle dm 2 through [facility] pharmacy     pls help send [facility] pharm orders, thanksEverything was printed and sent back to [facility] for Micky called [facility] for an update. They are processing order and pt will receive a call confirming the order.  [facility] called [facility] for an update today. They stated that the DWO and prescriptions sent are not valid. Please see new CGM documentation note datd 1/21/2021.

## 2022-02-16 NOTE — NURSING NOTE
Quick Intake Entered On:  10/22/2020 2:34 PM CDT    Performed On:  10/22/2020 2:25 PM CDT by Meche Mak CMA               Summary   Chief Complaint :   Blood pressure check.   Systolic Blood Pressure :   109 mmHg   Diastolic Blood Pressure :   70 mmHg   Mean Arterial Pressure :   83 mmHg   Peripheral Pulse Rate :   92 bpm   BP Site :   Right arm   BP Method :   Electronic   HR Method :   Electronic   Meche Mak CMA - 10/22/2020 2:33 PM CDT

## 2022-02-16 NOTE — TELEPHONE ENCOUNTER
Order, GRETTA notes, demographics, and insurance faxed to [facility] @ [phone].  They will call patient to schedule.

## 2022-02-16 NOTE — NURSING NOTE
Comprehensive Intake Entered On:  2021 2:35 PM CST    Performed On:  2021 2:26 PM CST by Ruth Ann Adams               Summary   Chief Complaint :   Pt c/o runny nose and sneezing was advised to get Covid tested. Pt was exposed from her daughter .    Ruth Ann Adams - 2021 2:26 PM CST   Health Status   Allergies Verified? :   Yes   Medication History Verified? :   Yes   Tobacco Use? :   Former smoker   Ruth Ann Adams - 2021 2:26 PM CST   Demographics   Last Name :   ASHLEY   First Name :   GARRICK Crocker Initial :   YANICK   Responsible Party Date of Birth () :   1955 CDT   Contact Relationship to Patient (other) :   Patient   Ruth Ann Adams - 2021 2:26 PM CST   Providers Grid   Provider Name :    Dr. Harris              Provider Specialty :    Eye Doctor                Danis Adamsnna - 2021 2:26 PM CST         Ancillary Services Grid   Name :    William Mast              Type of Service :    Pharmacy                Danis Adamsnna - 2021 2:26 PM CST         Consents   Consent for Immunization Exchange :   Consent Granted   Consent for Immunizations to Providers :   Consent Granted   Bryan Ruth Ann - 2021 2:26 PM CST   Meds / Allergies   (As Of: 2021 2:35:25 PM CST)   Allergies (Active)   Cats  Estimated Onset Date:   Unspecified ; Reactions:   Runny nose ; Created By:   Ana Reese; Reaction Status:   Active ; Category:   Drug ; Substance:   Cats ; Type:   Allergy ; Updated By:   Ana Reese; Reviewed Date:   3/13/2020 9:38 AM CDT      Lipitor  Estimated Onset Date:   Unspecified ; Reactions:   Diarrhea, nausea, vomiting ; Created By:   Ana Reese; Reaction Status:   Active ; Category:   Drug ; Substance:   Lipitor ; Type:   Allergy ; Updated By:   Ana Reese; Reviewed Date:   3/13/2020 9:38 AM CDT      mirtazapine  Estimated Onset Date:   Unspecified ; Reactions:   Nightmare, Suicidal ideation ; Comments:     Comment 1: intolerance   ; Created By:    Bader, Pharm D , Carly; Reaction Status:   Active ; Category:   Drug ; Substance:   mirtazapine ; Type:   Allergy ; Updated By:   Kushal Pisano Kaity; Source:   Patient ; Reviewed Date:   3/13/2020 9:38 AM CDT      Sodium Hypochlorite  Estimated Onset Date:   Unspecified ; Reactions:   Runny nose ; Created By:   Ana Reese; Reaction Status:   Active ; Category:   Drug ; Substance:   Sodium Hypochlorite ; Type:   Allergy ; Updated By:   Ana Reese; Reviewed Date:   3/13/2020 9:38 AM CDT      traZODone  Estimated Onset Date:   Unspecified ; Reactions:   Nightmares ; Created By:   Kushal Pisano Kaity; Reaction Status:   Active ; Category:   Drug ; Substance:   traZODone ; Type:   Side Effect ; Severity:   Low ; Updated By:   Kushal Pisano Kaity; Source:   Patient ; Reviewed Date:   3/13/2020 9:38 AM CDT        Medication List   (As Of: 1/12/2021 2:35:25 PM CST)   Prescription/Discharge Order    clopidogrel  :   clopidogrel ; Status:   Prescribed ; Ordered As Mnemonic:   clopidogrel 75 mg oral tablet ; Simple Display Line:   75 mg, 1 tab(s), Oral, daily, in the AM, 90 tab(s), 1 Refill(s) ; Ordering Provider:   Samanta Maza MD; Catalog Code:   clopidogrel ; Order Dt/Tm:   10/20/2020 12:28:42 PM CDT          empagliflozin  :   empagliflozin ; Status:   Prescribed ; Ordered As Mnemonic:   Jardiance 25 mg oral tablet ; Simple Display Line:   25 mg, 1 tab(s), Oral, qam, 90 tab(s), 2 Refill(s) ; Ordering Provider:   Samanta Maza MD; Catalog Code:   empagliflozin ; Order Dt/Tm:   12/18/2020 10:32:55 AM CST          FLUoxetine  :   FLUoxetine ; Status:   Prescribed ; Ordered As Mnemonic:   FLUoxetine 20 mg oral capsule ; Simple Display Line:   20 mg, 1 cap(s), Oral, daily, in the morning, 30 cap(s), 2 Refill(s) ; Ordering Provider:   Samanta Maza MD; Catalog Code:   FLUoxetine ; Order Dt/Tm:   12/4/2020 10:20:13 AM CST          glucose  :   glucose ; Status:   Prescribed ; Ordered As Mnemonic:    glucose 4 g oral tablet, chewable ; Simple Display Line:   16 gm, 4 tab(s), Chewed, once, Use if BG <70 mg/dL,  check BG 15.  Repeat if BG remains less than 80 mg/dL., 4 tab(s), 0 Refill(s) ; Ordering Provider:   Samanta Maza MD; Catalog Code:   glucose ; Order Dt/Tm:   12/20/2019 11:50:23 AM CST          insulin degludec  :   insulin degludec ; Status:   Prescribed ; Ordered As Mnemonic:   Tresiba FlexTouch 100 units/mL subcutaneous solution ; Simple Display Line:   See Instructions, 26u twice a day, 60 mL, 1 Refill(s) ; Ordering Provider:   Samanta Maza MD; Catalog Code:   insulin degludec ; Order Dt/Tm:   4/8/2020 8:49:57 AM CDT          insulin lispro  :   insulin lispro ; Status:   Prescribed ; Ordered As Mnemonic:   HumaLOG KwikPen 100 units/mL injectable solution ; Simple Display Line:   See Instructions, 6units before breakfast, 18units before lunch and evening meals  6u before snacks  take 15 min prior to eating, 45 mL, 3 Refill(s) ; Ordering Provider:   Samanta Maza MD; Catalog Code:   insulin lispro ; Order Dt/Tm:   12/18/2020 11:57:48 AM CST          lisinopril  :   lisinopril ; Status:   Prescribed ; Ordered As Mnemonic:   lisinopril 40 mg oral tablet ; Simple Display Line:   See Instructions, TAKE 1 TABLET BY MOUTH ONCE DAILY, 90 tab(s), 3 Refill(s) ; Ordering Provider:   Samanta Maza MD; Catalog Code:   lisinopril ; Order Dt/Tm:   10/20/2020 12:26:17 PM CDT          metFORMIN  :   metFORMIN ; Status:   Prescribed ; Ordered As Mnemonic:   MetFORMIN (Eqv-Glucophage XR) 500 mg oral tablet, extended release ; Simple Display Line:   1,000 mg, 2 tab(s), Oral, bid, 360 tab(s), 3 Refill(s) ; Ordering Provider:   Samanta Maza MD; Catalog Code:   metFORMIN ; Order Dt/Tm:   9/17/2020 9:37:33 AM CDT          metoprolol  :   metoprolol ; Status:   Prescribed ; Ordered As Mnemonic:   Metoprolol Tartrate 25 mg oral tablet ; Simple Display Line:   See Instructions, TAKE ONE TABLET BY MOUTH  TWICE DAILY, 180 EA, 1 Refill(s) ; Ordering Provider:   Samanta Maza MD; Catalog Code:   metoprolol ; Order Dt/Tm:   12/10/2020 11:50:08 AM CST          Miscellaneous Prescription  :   Miscellaneous Prescription ; Status:   Prescribed ; Ordered As Mnemonic:   Freestyle Dm 2 14 day reader ; Simple Display Line:   See Instructions, Use to test blood sugars at least every 8 hours., 1 EA, 0 Refill(s) ; Ordering Provider:   Samanta Maza MD; Catalog Code:   Miscellaneous Prescription ; Order Dt/Tm:   10/22/2020 4:24:10 PM CDT          Miscellaneous Prescription  :   Miscellaneous Prescription ; Status:   Prescribed ; Ordered As Mnemonic:   Freestyle Dm 2 14 day sensor ; Simple Display Line:   See Instructions, Use to test blood sugars at least every 8 hours. Change sensor every 14 days., 6 EA, 3 Refill(s) ; Ordering Provider:   Samanta Maza MD; Catalog Code:   Miscellaneous Prescription ; Order Dt/Tm:   10/22/2020 4:24:23 PM CDT          Miscellaneous Prescription  :   Miscellaneous Prescription ; Status:   Prescribed ; Ordered As Mnemonic:   lancet bowman ; Simple Display Line:   See Instructions, use as directed, 1 EA, 0 Refill(s) ; Ordering Provider:   Samanta Maza MD; Catalog Code:   Miscellaneous Prescription ; Order Dt/Tm:   12/6/2019 10:58:20 AM CST          Miscellaneous Prescription  :   Miscellaneous Prescription ; Status:   Prescribed ; Ordered As Mnemonic:   true metrix glucometer test strips ; Simple Display Line:   See Instructions, check QID: fasting BG, 2 hours after meals and before bed ., 100 EA, 11 Refill(s) ; Ordering Provider:   Samanta Maza MD; Catalog Code:   Miscellaneous Prescription ; Order Dt/Tm:   2/28/2020 11:37:26 AM CST          Miscellaneous Prescription  :   Miscellaneous Prescription ; Status:   Prescribed ; Ordered As Mnemonic:   ULTICARE MICRO PEN TIP 32G 4MM 50CT MG INJECTABLE ; Simple Display Line:   See Instructions, USE FOUR TIMES A DAY, 100 unknown  unit, 3 Refill(s) ; Ordering Provider:   Samanta Maza MD; Catalog Code:   Miscellaneous Prescription ; Order Dt/Tm:   4/23/2020 3:35:28 PM CDT          Miscellaneous Rx Supply  :   Miscellaneous Rx Supply ; Status:   Prescribed ; Ordered As Mnemonic:   Incontience diapers ; Simple Display Line:   See Instructions, use 1 diaper per night, 30 EA, 11 Refill(s) ; Ordering Provider:   Samanta Maza MD; Catalog Code:   Miscellaneous Rx Supply ; Order Dt/Tm:   3/11/2020 10:27:58 AM CDT ; Comment:   Faxed to [employer] @ [phone]          Miscellaneous Rx Supply  :   Miscellaneous Rx Supply ; Status:   Prescribed ; Ordered As Mnemonic:   Lachydrin ; Simple Display Line:   See Instructions, Lachydrin or similar product- apply to feet QHS, PRN: dry skin, 1 EA, 1 Refill(s) ; Ordering Provider:   Samanta Maza MD; Catalog Code:   Miscellaneous Rx Supply ; Order Dt/Tm:   1/10/2020 10:31:46 AM CST          nitroglycerin  :   nitroglycerin ; Status:   Prescribed ; Ordered As Mnemonic:   nitroglycerin 0.4 mg sublingual tablet ; Simple Display Line:   See Instructions, 1 tab under tongue at chest pain.  May repeat x 3 q 5 min.  Call 911 after first dose if pain persists., PRN: for chest pain, 25 tab(s), 3 Refill(s) ; Ordering Provider:   Samanta Maza MD; Catalog Code:   nitroglycerin ; Order Dt/Tm:   12/20/2019 11:50:23 AM CST          pantoprazole  :   pantoprazole ; Status:   Prescribed ; Ordered As Mnemonic:   pantoprazole 20 mg oral delayed release tablet ; Simple Display Line:   20 mg, 1 tab(s), Oral, bid, 180 tab(s), 3 Refill(s) ; Ordering Provider:   Samanta Maza MD; Catalog Code:   pantoprazole ; Order Dt/Tm:   10/1/2020 1:31:04 PM CDT          rosuvastatin  :   rosuvastatin ; Status:   Prescribed ; Ordered As Mnemonic:   rosuvastatin 20 mg oral tablet ; Simple Display Line:   20 mg, 1 tab(s), Oral, qhs, 90 tab(s), 3 Refill(s) ; Ordering Provider:   Samanta Maza MD; Catalog Code:    rosuvastatin ; Order Dt/Tm:   10/20/2020 12:27:10 PM CDT            Home Meds    calcium carbonate  :   calcium carbonate ; Status:   Documented ; Ordered As Mnemonic:   Tums 500 mg oral tablet, chewable ; Simple Display Line:   500 mg, 1 tab(s), Chewed, bid, as needed for heartburn, stomach pain, acid reflux, 0 Refill(s) ; Catalog Code:   calcium carbonate ; Order Dt/Tm:   9/17/2020 9:47:04 PM CDT          loratadine  :   loratadine ; Status:   Documented ; Ordered As Mnemonic:   loratadine 10 mg oral capsule ; Simple Display Line:   10 mg, 1 cap(s), Oral, daily, 0 Refill(s) ; Catalog Code:   loratadine ; Order Dt/Tm:   12/6/2019 9:58:20 AM CST          Miscellaneous Prescription  :   Miscellaneous Prescription ; Status:   Documented ; Ordered As Mnemonic:   Misc Prescription ; Simple Display Line:   Eye allergy relief  (pt does not know drug ingredient). Using as needed for eye allergy symptoms., 0 Refill(s) ; Catalog Code:   Miscellaneous Prescription ; Order Dt/Tm:   9/17/2020 9:49:08 PM CDT          multivitamin with minerals  :   multivitamin with minerals ; Status:   Documented ; Ordered As Mnemonic:   multivitamin with minerals (w/ Iron) ; Simple Display Line:   1 tablet, Oral, daily, 0 Refill(s) ; Catalog Code:   multivitamin with minerals ; Order Dt/Tm:   12/20/2019 4:20:37 PM CST            ID Risk Screen   Recent Travel History :   No recent travel   Family Member Travel History :   No recent travel   Other Exposure to Infectious Disease :   Unknown   Ruth Ann Adams - 1/12/2021 2:26 PM CST   Social History   Social History   (As Of: 1/12/2021 2:35:25 PM CST)   Alcohol:        Never   (Last Updated: 12/10/2019 9:46:11 AM CST by Ludy Arriola)          Tobacco:  Past      Former smoker, quit more than 30 days ago, Cigarettes   (Last Updated: 1/12/2021 2:27:15 PM CST by Ruth Ann Adams)          Electronic Cigarette/Vaping:        Electronic Cigarette Use: Never.   (Last Updated: 1/12/2021 2:27:20 PM CST by  Ruth Ann Adams)          Substance Abuse:        Never   (Last Updated: 12/10/2019 9:46:22 AM CST by Ludy Arriola)          Home/Environment:        Marital status: Single.  1 children.  Living situation: Home/Independent.  Injuries/Abuse/Neglect in household: No.  Feels unsafe at home: No.  Family/Friends available for support: Yes.   (Last Updated: 12/10/2019 9:46:51 AM CST by Ludy Arriola)          Nutrition/Health:        Type of diet: Regular.  Wants to lose weight: Yes.  Sleeping concerns: Yes.  Feels highly stressed: Yes.   (Last Updated: 12/10/2019 9:47:05 AM CST by Ludy Arriola)          Exercise:        Exercise frequency: 1-2 times/week.  Exercise type: Walking.   (Last Updated: 12/10/2019 9:47:18 AM CST by Ludy Arriola)          Sexual:        Sexually active: No.  Identifies as female, Sexual orientation: Straight or heterosexual.  History of STD: No.  Contraceptive Use Details: Abstinence.  History of sexual abuse: No.   (Last Updated: 12/10/2019 9:48:22 AM CST by Ludy Arriola)

## 2022-02-16 NOTE — PROGRESS NOTES
Patient:   GARRICK RAMIREZ            MRN: [id number]            FIN: [id number]               Age:   66 years     Sex:  Female     :  1955   Associated Diagnoses:   Type 2 diabetes mellitus without complications   Author:   Avril Martinez      Visit Information   Visit type:  Diabetes Self Management Education .    Accompanied by:  Family member, adult daughter.    Referral source:  Samanta Maza MD.       Chief Complaint   Type II diabetes, Hyperlipidemia, HTN       History of Present Illness   2021 -  Freestyle Dm 2 instruction and ongoing diabetes education     2021 - Follow up diabetes education and download Freestyle Dm 2.    Patient continues to meet criteria for CGM coverage;   - patient has type two diabetes mellitus; and  - patient has been using CGM daily; and  - patient is insulin treated with multiple daily injections of three or more times per day; and  - patient is frequently adjusting insulin on the basis of CGM readings  - within six months prior to ordering the CGM, the patient has had an in-person visit with the practitioner; and determined that criteria (1-4) above are met; and   - every six months following the initial prescription of the CGM, the treating practitioner has an in-person visit with the patient to assess adherence to their CGM regimen and diabetes treatment plan    Nutrition: Pt and her daughter are working on eating healthier and going grocery shopping together.  They live 5 minutes apart. Daughter would like her family to start eating healthier also.     Insulin: Tresiba 15u BID  Humalog 11 units   4u prior to snacks - does not take this in the evening/ HS and can visualize notable spike in HS glucose trend (avg of 211mg/dL     Metformin XR 500mg ii tabs BID  Jardiance 25 mg daily   Taking medications as directed.        Exercise: none, went through cardiac rehab and has not been doing the recommended exercises   Uses food stamps and trying to purchase  fruits and vegetables   Eye exam due  Skin - no areas of concern  Immunizations - UTD    A1C  8.7% = 203 mg/dL estimated Average Glucose (eAG)    Average sensor glucose 158 mg/dL 90 day   Average sensor glucose 158 mg/dL 28 day would anticipate the next a1c to be much improved     28 day report    Time in target range 70 - 180  71%  Very High >250   8%  High >180     21%  Low <70     0%  Very Low <54   0%  CGM active     90%         Health Status   Allergies:    Allergic Reactions (Selected)  Severity Not Documented  Cats (Runny nose)  Lipitor (Diarrhea, nausea, vomiting)  Mirtazapine (Nightmare and suicidal ideation)  Sodium Hypochlorite (Runny nose)  Nonallergic Reactions (Selected)  Low  TraZODone (Nightmares)   Medications:  (Selected)   Prescriptions  Prescribed  Basaglar KwikPen 100 units/mL subcutaneous solution: ( 17 units ), Subcutaneous, bid, # 15 mL, 1 Refill(s), Type: Maintenance, Pharmacy: [facility], dose change as of 2/5/2021, 62, in, 03/13/20 9:35:00 CDT, Height Measured, 171.4, lb, 10/01/20 12:54:00 CDT, Weight Measured  FLUoxetine 20 mg oral capsule: = 1 cap(s), Oral, qam, # 90 cap(s), 3 Refill(s), Type: Maintenance, Pharmacy: [facility], 1 cap(s) Oral qam, 62, in, 02/11/21 16:18:00 CST, Height Measured, 163.3, lb, 04/30/21 10:08:00 CDT, Weight Measured  Freestyle Dm 2 14 day reader: Freestyle Dm 2 14 day reader, See Instructions, Instructions: Use to test blood sugars 4 times daily per , Supply, # 1 EA, 0 Refill(s), Type: Maintenance, faxed to pharmacy (Rx)  Freestyle Dm 2 14 day sensor: Freestyle Dm 2 14 day sensor, See Instructions, Instructions: Use to test blood sugars 4 times daily. Change sensor every 14 days., Supply, # 6 EA, 3 Refill(s), Type: Maintenance, faxed to pharmacy (Rx)  HumaLOG KwikPen 100 units/mL injectable solution: See Instructions, Instructions: 11u qac and 4u q snacks.+SSI, for -199 +2U, 200-249 +4U, 250-349+8u, 350-399 +10U, >399 +10u and  call MD   HOLD if pre-meal bg is <90 mg/dL., # 45 mL, 3 Refill(s), Type: Maintenance, Pharmacy: [facility], dose c...  Incontience diapers: Incontience diapers, See Instructions, Instructions: use 1 diaper per night, Supply, # 30 EA, 11 Refill(s), Type: Maintenance  Jardiance 25 mg oral tablet: = 1 tab(s) ( 25 mg ), Oral, qam, # 90 tab(s), 2 Refill(s), Type: Maintenance, Pharmacy: [facility], dose increase; please d/c the 10 mg tab so it does not get accidentally refilled. Pt would like delivered., 1 tab(s) Oral qam, 62, in, 03/13/20 9:35:...  Lachydrin: Lachydrin, See Instructions, Instructions: Lachydrin or similar product- apply to feet QHS, Supply, PRN: dry skin, # 1 EA, 1 Refill(s), Type: Maintenance, Pharmacy: [facility], Lachydrin or similar product- apply to feet QHS,PRN:dry skin  MetFORMIN (Eqv-Glucophage XR) 500 mg oral tablet, extended release: = 2 tab(s) ( 1,000 mg ), Oral, bid, # 360 tab(s), 3 Refill(s), Type: Maintenance, Pharmacy: [facility], 2 tab(s) Oral bid, 62, in, 02/11/21 16:18:00 CST, Height Measured, 163.3, lb, 04/30/21 10:08:00 CDT, Weight Measured  NITROGLYCERIN 0.4MG TAB SUBLINGUAL: NITROGLYCERIN 0.4MG TAB SUBLINGUAL, See Instructions, Instructions: DISSOLVE ONE TABLET UNDER TONGUE EVERY FIVE MIN FOR CHEST PAIN AS NEEDED UP TO THREE DOSES CALL IF SYMPTOMS PERSIST 5 MIN AFTER 1ST DOSE CALL 911 AFTER 1ST DOSE IF PAIN PERSISTS, Supp...  One-A-Day Women 50 Plus oral tablet: See Instructions, Instructions: may substiute with multivitamin with minerals preferred by insurance company, # 90 EA, 3 Refill(s), Type: Maintenance, Pharmacy: [facility], may substiute with multivitamin with minerals preferred by insurance company...  ULTICARE MICRO PEN TIP 32G 4MM 50CT MG INJECTABLE: ULTICARE MICRO PEN TIP 32G 4MM 50CT MG INJECTABLE, See Instructions, Instructions: USE FOUR TIMES A DAY, Supply, # 100 unknown unit, 3 Refill(s), Type: Maintenance, Pharmacy: [facility], USE FOUR TIMES A  DAY  acetaminophen 500 mg oral tablet: = 2 tab(s) ( 1,000 mg ), Oral, q6 hrs, Instructions: not to exceed 4000 mg/day, PRN: for pain, # 120 tab(s), 0 Refill(s), Type: Maintenance, Pharmacy: [facility], 2 tab(s) Oral q6 hrs,PRN:for pain,Instr:not to exceed 4000 mg/day, 62, in, 02/11/21 16...  aspirin 81 mg oral delayed release tablet: = 1 tab(s) ( 81 mg ), Oral, daily, # 90 tab(s), 3 Refill(s), Type: Maintenance, Pharmacy: [facility], 1 tab(s) Oral daily, 62, in, 02/11/21 16:18:00 CST, Height Measured, 163.3, lb, 04/30/21 10:08:00 CDT, Weight Measured  carvedilol 6.25 mg oral tablet: = 1 tab(s), Oral, bid, # 180 tab(s), 1 Refill(s), Type: Maintenance, Pharmacy: [facility], 1 tab(s) Oral bid, 61.75, in, 08/19/21 12:36:00 CDT, Height Measured, 154.9, lb, 08/19/21 12:36:00 CDT, Weight Measured  clopidogrel 75 mg oral tablet: = 1 tab(s) ( 75 mg ), Oral, daily, Instructions: in the AM, # 90 tab(s), 1 Refill(s), Type: Maintenance, Pharmacy: [facility], pt req 90 day supply, 1 tab(s) Oral daily,Instr:in the AM, 62, in, 03/13/20 9:35:00 CDT, Height Measured, 171.4, lb, 10/01...  glucose 4 g oral tablet, chewable: = 4 tab(s) ( 16 gm ), Chewed, once, Instructions: Use if BG <70 mg/dL,  check BG 15.  Repeat if BG remains less than 80 mg/dL., # 4 tab(s), 0 Refill(s), Type: Soft Stop, Pharmacy: [facility], 4 tab(s) Chewed once,Instr:Use if BG <70 mg/dL,  check BG...  lancet bowman: lancet bowman, See Instructions, Instructions: use as directed, Supply, # 1 EA, 0 Refill(s), Type: Maintenance, Pharmacy: [facility], use as directed  lisinopril 5 mg oral tablet: = 1 tab(s) ( 5 mg ), Oral, daily, # 90 tab(s), 3 Refill(s), Type: Maintenance, Pharmacy: [facility], 1 tab(s) Oral daily, 62, in, 02/11/21 16:18:00 CST, Height Measured, 163.3, lb, 04/30/21 10:08:00 CDT, Weight Measured  loratadine 10 mg oral tablet: = 1 tab(s) ( 10 mg ), Oral, qhs, # 90 tab(s), 3 Refill(s), Type: Maintenance, Pharmacy: [facility], 1 tab(s)  Oral qhs, 62, in, 02/11/21 16:18:00 CST, Height Measured, 163.3, lb, 04/30/21 10:08:00 CDT, Weight Measured  pantoprazole 20 mg oral delayed release tablet: = 1 tab(s) ( 20 mg ), Oral, bid, # 180 tab(s), 3 Refill(s), Type: Maintenance, Pharmacy: [facility], please cancel the 40 mg 1 po qday if this is covered by her insurance, thanks, 1 tab(s) Oral bid, 62, in, 02/11/21 16:18:00 CST, Height Measured, 16...  rosuvastatin 20 mg oral tablet: = 1 tab(s) ( 20 mg ), Oral, qhs, # 90 tab(s), 3 Refill(s), Type: Maintenance, Pharmacy: [facility], Pt req 90 day supply, 1 tab(s) Oral qhs, 62, in, 02/11/21 16:18:00 CST, Height Measured, 163.3, lb, 04/30/21 10:08:00 CDT, Weight Measured  torsemide 20 mg oral tablet: = 1 tab(s) ( 20 mg ), Oral, daily, # 30 tab(s), 0 Refill(s), Type: Maintenance, Pharmacy: [facility], 1 tab(s) Oral daily, 62, in, 02/11/21 16:18:00 CST, Height Measured, 163.3, lb, 04/30/21 10:08:00 CDT, Weight Measured  true metrix glucometer test strips: true metrix glucometer test strips, See Instructions, Instructions: check QID: fasting BG, 2 hours after meals and before bed ., Supply, # 100 EA, 11 Refill(s), Type: Maintenance, Pharmacy: [facility], check QID: fasting BG, 2 hours after meals and...  Documented Medications  Documented  Insulin Lispro KwikPen 100 units/mL injectable solution: 0 Refill(s), Type: Soft Stop  Misc Prescription: Instructions: Eye allergy relief  (pt does not know drug ingredient). Using as needed for eye allergy symptoms., 0 Refill(s), Type: Maintenance  Tresiba FlexTouch 100 units/mL subcutaneous solution: 0 Refill(s), Type: Soft Stop  Tums 500 mg oral tablet, chewable: = 1 tab(s) ( 500 mg ), Chewed, bid, Instructions: as needed for heartburn, stomach pain, acid reflux, 0 Refill(s), Type: Maintenance,    Medications          *denotes recorded medication          FLUoxetine 20 mg oral capsule: 1 cap(s), Oral, qam, 90 cap(s), 3 Refill(s).          lancet bowman: See  Instructions, use as directed, 1 EA, 0 Refill(s).          true metrix glucometer test strips: See Instructions, check QID: fasting BG, 2 hours after meals and before bed ., 100 EA, 11 Refill(s).          ULTICARE MICRO PEN TIP 32G 4MM 50CT MG INJECTABLE: See Instructions, USE FOUR TIMES A DAY, 100 unknown unit, 3 Refill(s).          *Misc Prescription: Eye allergy relief  (pt does not know drug ingredient). Using as needed for eye allergy symptoms., 0 Refill(s).          Freestyle Dm 2 14 day sensor: See Instructions, Use to test blood sugars 4 times daily. Change sensor every 14 days., 6 EA, 3 Refill(s).          Freestyle Dm 2 14 day reader: See Instructions, Use to test blood sugars 4 times daily per , 1 EA, 0 Refill(s).          NITROGLYCERIN 0.4MG TAB SUBLINGUAL: See Instructions, DISSOLVE ONE TABLET UNDER TONGUE EVERY FIVE MIN FOR CHEST PAIN AS NEEDED UP TO THREE DOSES CALL IF SYMPTOMS PERSIST 5 MIN AFTER 1ST DOSE CALL 911 AFTER 1ST DOSE IF PAIN PERSISTS, 25 EA, 3 Refill(s).          Lachydrin: See Instructions, Lachydrin or similar product- apply to feet QHS, PRN: dry skin, 1 EA, 1 Refill(s).          Incontience diapers: See Instructions, use 1 diaper per night, 30 EA, 11 Refill(s).          acetaminophen 500 mg oral tablet: 1,000 mg, 2 tab(s), Oral, q6 hrs, not to exceed 4000 mg/day, PRN: for pain, 120 tab(s), 0 Refill(s).          aspirin 81 mg oral delayed release tablet: 81 mg, 1 tab(s), Oral, daily, 90 tab(s), 3 Refill(s).          *Tums 500 mg oral tablet, chewable: 500 mg, 1 tab(s), Chewed, bid, as needed for heartburn, stomach pain, acid reflux, 0 Refill(s).          carvedilol 6.25 mg oral tablet: 1 tab(s), Oral, bid, 180 tab(s), 1 Refill(s).          clopidogrel 75 mg oral tablet: 75 mg, 1 tab(s), Oral, daily, in the AM, 90 tab(s), 1 Refill(s).          Jardiance 25 mg oral tablet: 25 mg, 1 tab(s), Oral, qam, 90 tab(s), 2 Refill(s).          glucose 4 g oral tablet, chewable: 16  gm, 4 tab(s), Chewed, once, Use if BG <70 mg/dL,  check BG 15.  Repeat if BG remains less than 80 mg/dL., 4 tab(s), 0 Refill(s).          *Tresiba FlexTouch 100 units/mL subcutaneous solution: 0 Refill(s).          Basaglar KwikPen 100 units/mL subcutaneous solution: 17 units, Subcutaneous, bid, 15 mL, 1 Refill(s).          HumaLOG KwikPen 100 units/mL injectable solution: See Instructions, 11u qac and 4u q snacks.+SSI, for -199 +2U, 200-249 +4U, 250-349+8u, 350-399 +10U, >399 +10u and call MD   HOLD if pre-meal bg is <90 mg/dL., 45 mL, 3 Refill(s).          *Insulin Lispro KwikPen 100 units/mL injectable solution: 0 Refill(s).          lisinopril 5 mg oral tablet: 5 mg, 1 tab(s), Oral, daily, 90 tab(s), 3 Refill(s).          loratadine 10 mg oral tablet: 10 mg, 1 tab(s), Oral, qhs, 90 tab(s), 3 Refill(s).          MetFORMIN (Eqv-Glucophage XR) 500 mg oral tablet, extended release: 1,000 mg, 2 tab(s), Oral, bid, 360 tab(s), 3 Refill(s).          One-A-Day Women 50 Plus oral tablet: See Instructions, may substiute with multivitamin with minerals preferred by insurance company, 90 EA, 3 Refill(s).          pantoprazole 20 mg oral delayed release tablet: 20 mg, 1 tab(s), Oral, bid, 180 tab(s), 3 Refill(s).          rosuvastatin 20 mg oral tablet: 20 mg, 1 tab(s), Oral, qhs, 90 tab(s), 3 Refill(s).          torsemide 20 mg oral tablet: 20 mg, 1 tab(s), Oral, daily, 30 tab(s), 0 Refill(s).       Problem list:    All Problems  Unstable angina / SNOMED CT 1512140 / Confirmed  Unspecified open wound, left foot, initial encounter / SNOMED CT 876495988 / Confirmed  Unspecified hearing loss, bilateral / SNOMED CT 38139547 / Confirmed  Type 2 diabetes mellitus without complications / SNOMED CT 047909570 / Confirmed  Stress incontinence (female) (male) / SNOMED CT 046260755 / Confirmed  Stable angina / SNOMED CT 257884226 / Confirmed  Pure hypercholesterolemia, unspecified / SNOMED CT 606502107 / Confirmed  Polypharmacy /  SNOMED CT 137937702 / Confirmed  Obstructive sleep apnea (adult) (pediatric) / SNOMED CT 307739502 / Confirmed  Myopia, bilateral / SNOMED CT 37853433 / Confirmed  Major depressive disorder, single episode, unspecified / SNOMED CT 28610451 / Confirmed  Insomnia, unspecified / SNOMED CT 401842274 / Confirmed  Infectious gastroenteritis and colitis, unspecified / SNOMED CT 99172535 / Confirmed  Hx of CABG / SNOMED CT 6892885604 / Confirmed  History of MI (myocardial infarction) / SNOMED CT 8567697347 / Confirmed  Generalized anxiety disorder / SNOMED CT 52414191 / Confirmed  Essential (primary) hypertension / SNOMED CT 78784854 / Confirmed  Combined forms of age-related cataract, right eye / SNOMED CT 19766015 / Confirmed  Combined forms of age-related cataract, left eye / SNOMED CT 87128757 / Confirmed  CAD in native artery / SNOMED CT 7858824370 / Confirmed  Atherosclerotic heart disease of native coronary artery without angina pectoris / SNOMED CT 0723620236 / Confirmed  Acute myocardial infarction, unspecified / SNOMED CT 69818112 / Confirmed  Resolved: Pregnancy / SNOMED CT 919356895      Histories   Past Medical History:    Active  Acute myocardial infarction, unspecified (81435148): Onset in the month of 7/2011 at 55 years  Major depressive disorder, single episode, unspecified (21155524)  Type 2 diabetes mellitus without complications (397951787)  Obstructive sleep apnea (adult) (pediatric) (525289878)  Unspecified hearing loss, bilateral (81140734)  Myopia, bilateral (10254415)  Unspecified open wound, left foot, initial encounter (336188651)  Atherosclerotic heart disease of native coronary artery without angina pectoris (7450396474)  Combined forms of age-related cataract, right eye (19766015)  Generalized anxiety disorder (59900564)  Essential (primary) hypertension (79500373)  Pure hypercholesterolemia, unspecified (340933951)  Insomnia, unspecified (884645074)  Unstable angina (3762184)  Stress  incontinence (female) (male) (997057798)  Infectious gastroenteritis and colitis, unspecified (69857641)  Resolved  Pregnancy (857549485):  Resolved in 1976 at 20 years.   Family History:    Diabetes mellitus  Mother  Father  Arthritis  Grandmother (M)  CVA - Cerebrovascular accident  Mother  Father  Glaucoma  Sister     Procedure history:    Esophagogastroduodenoscopy (SNOMED CT 680650156) on 9/27/2019 at 64 Years.  Comments:  11/22/2019 1:48 PM Ana Langley  Indication: Chronic heartburn.  IOL - Cataract extraction and insertion of intraocular lens (SNOMED CT 3633109388) performed by Jovanni Harris MD on 5/21/2019 at 63 Years.  Comments:  11/22/2019 1:50 PM Ana Langley  Right.  IOL - Cataract extraction and insertion of intraocular lens (SNOMED CT 8881309361) performed by Jovanni Harris MD on 5/7/2019 at 63 Years.  Comments:  11/22/2019 1:50 PM Ana Langley  Left.  Coronary artery stent x 3 (SNOMED CT 6993186758) in the month of 7/2011 at 56 Years.  Angioplasty (SNOMED CT 0208806435) in 2011 at 56 Years.  Tubal ligation (SNOMED CT 677020844) in 1979 at 24 Years.   Social History:        Electronic Cigarette/Vaping Assessment            Electronic Cigarette Use: Never.      Alcohol Assessment            Never      Tobacco Assessment: Past            Former smoker, quit more than 30 days ago, Cigarettes      Substance Abuse Assessment            Never      Home and Environment Assessment            Marital status: Single.  1 children.  Living situation: Home/Independent.  Injuries/Abuse/Neglect in               household: No.  Feels unsafe at home: No.  Family/Friends available for support: Yes.      Nutrition and Health Assessment            Type of diet: Regular.  Wants to lose weight: Yes.  Sleeping concerns: Yes.  Feels highly stressed: Yes.      Exercise and Physical Activity Assessment            Exercise frequency: 1-2 times/week.  Exercise type: Walking.      Sexual Assessment             Sexually active: No.  Identifies as female, Sexual orientation: Straight or heterosexual.  History of STD:               No.  Contraceptive Use Details: Abstinence.  History of sexual abuse: No.        Physical Examination   Measurements from flowsheet : Measurements   9/27/2021 3:09 PM CDT Height Measured - Standard 61.75 in    Weight Measured - Standard 152.2 lb    BSA 1.73 m2    Body Mass Index 28.06 kg/m2  HI         Review / Management   Results review:  Lab results   8/19/2021 1:40 PM CDT Sodium Level 143 mmol/L    Potassium Level 3.7 mmol/L    Chloride Level 109 mmol/L    CO2 Level 25 mmol/L    Glucose Level 125 mg/dL  HI    BUN 9 mg/dL    Creatinine 0.63 mg/dL    BUN/Creat Ratio NOT APPLICABLE    eGFR 93 mL/min/1.73m2    eGFR African American 108 mL/min/1.73m2    Calcium Level 9.2 mg/dL    Hgb A1c 8.7  HI    Cholesterol 167 mg/dL    Non-    HDL 51 mg/dL    Chol/HDL Ratio 3.3    LDL 88    Triglyceride 185 mg/dL  HI    U Creatinine 38 mg/dL    U Microalbumin 0.3 mg/dL    Ur Microalb/Creat Ratio 8   .       Impression and Plan   Diagnosis     Type 2 diabetes mellitus without complications (IVV05-TD E11.9).         Counseled: Patient, [id number] Self Care Behaviors     Healthy Eating - Focused on eating the same/ steady amount of carbohydrates at meals for safety and stability of glucose as pt is on set dosing.  Reviewed what foods are primary sources of carbohydrates and how intake affects BG readings, discussed foods pt enjoys and how to incorporate them into her meals.  Added additional foods to list of low cost healthy options for meals and snacks.  Pt is to incorporate more tuna, canned chicken, beans, eggs, and cottage cheese for lower cost protein sources and fruit cups in natural juices, frozen or fresh fruits, frozen vegetables or rinse canned if necessary.    Education on choosing heart healthy foods and ways of preparing meals to help prevent heart disease.   Discussed well balanced meals, diabetic  plate method as a general rule.  Patient is provided with numerous ideas to incorporate dietary recommendations.    Being Active - Education on how exercise helps with : education on simple easy ways to move more during the day and exercises that wouldn't require going outside.  Sit to stand x 10, wall push up, chair leg lifts etc...    - lowering BG by increasing the muscles ability to take up and use glucose   - weight loss   - healthier heart (improve lipid profile)   - improve sleep, mood, energy   - decrease stress      Monitoring - started Freestyle roula 2 and will always have BG meter as a back up   Taking Medication - Continue with insulin as directed, education on insulin action time, encouraged taking rapid acting 15 min prior to meals and really focus on taking the 4u prior to HS snack   Problem Solving - medical support team assistance, resources provided (written and apps, internet); good control of stress  Healthy Coping - support of friends, family, and medical support team   Reducing Risks - Detailed education on potential complications associated with uncontrolled diabetes and prevention recommendations.  Recommendations include: annual eye exam, good oral cares with brushing BID and flossing daily, routine dental appointments, good foot care tips and shoe wear - discussed monofilament test yearly.  Importance of adequate sleep and good control of BG to prevent developing complications related to uncontrolled DM including nephropathy, neuropathy, retinopathy, and cardiovascular disease.  Weight loss goal of 7 - 10% of current body weight pounds as a reasonable goal to help increase insulin sensitivity.    Goals:   1. BG goals 80 - 130 and post-prandial less than 180 mg/dL; time in target 70%+  2.  A1c goal <7%   3.  Walk/ be active 15 min 5 or more days/ week   4.  Use the diabetic plate method as a reference, consistent carbohydrate intake due to set insulin dosing       Professional Services    Time spent with pt 60 min   cc Dr. Maza

## 2022-02-16 NOTE — TELEPHONE ENCOUNTER
---------------------  From: Rossana Harrington CMA   To: Kushal Pisano Kaity; Avril Martinez;     Cc: [employer] Message Pool ([address]_[place]);      Sent: 10/21/2020 5:25:45 PM CDT  Subject: General Message     Fax rec'd from [facility]:  [facility] -  stating they are out of network for pt's insurance (not covered here)    Add'l message:  DME; CGM not covered at pharmacy; MD call [facility] [phone] for Dexcom and Dm; [employer] [phone] for Dm only---------------------  From: Ambika Young CMA ([employer] Message Pool ([id number]_[facility]))   To: Samanta Maza MD;     Sent: 10/22/2020 9:00:44 AM CDT  Subject: FW: General Message---------------------  From: Kushal Pisano Kaity   To: Avril Martinez; [employer] Message Pool ([id number]_[place]);     Sent: 10/22/2020 4:23:32 PM CDT  Subject: Freestyle Dm 2     Sent Rx to [facility] pharmacy as they can process CGMs.     [employer] pool - please contact pt and inform her to expect a call from [facility] requesting her insurance information and mailing address.    KB---------------------  From: Samanta Maza MD   To: Phone Messages Pool ([id number]_[place] [employer] [place]);     Sent: 10/22/2020 5:46:43 PM CDT  Subject: RE: General Message     css pls contact pt,  im out until next week [facility].---------------------  From: Kushal Pisano Kaity   To: Ambika Young CMA;     Sent: 10/23/2020 9:52:10 AM CDT  Subject: RE: General Message     Ambika - can you fax the CMN form and GRETTA's last note to  mailorder pharmacy? They will need this to process her CGM through medicare.  I have a copy of the CMN form and can email it to you to print and complete.  What is your email?  KB---------------------  From: Ambika Young CMA ([facility] Message Pool ([id number]_[facility]))   To: Kushal Pisano D , [facility];     Sent: 10/23/2020 10:21:48 AM CDT  Subject: RE: Freestyle Dm 2      [email or web address]---------------------  From: Kushal Pisano Kaity   To: [facility] Anergis Pool ([id number]_[place]);     Sent: 10/23/2020 12:34:10 PM CDT  Subject: RE: Freestyle Dm 2     Thanks! I just email you the form. It does need to be signed by [facility] so we may not be able to complete until she is back in clinic.  This is [facility] mailorder fax: [facility] mailorder pharmacy fax: [phone]  KB---------------------  From: Ambika Young CMA ([employer] Message Pool ([id number]_[facility]))   To: Samanta Maza MD;     Sent: 10/23/2020 1:18:03 PM CDT  Subject: FW: Freestyle Dm 2     [facility]- I have completed this form and placed in your box for signature.Form faxed with [facility] note and signature at [phone]- confirmation received.

## 2022-02-16 NOTE — LETTER
(Inserted Image. Unable to display)         February 17, 2021        GARRICK ASHLEY  [address] [id number]        Dear GARRICK,     Thank you for selecting [facility] for your healthcare needs. Below you will find the results of your recent test(s) done at our clinic.      Your diabetes is not yet as good as we would like it, but you are heading in the right direction!      Result Name Current Result Previous Result Reference Range   Sodium Level (mmol/L)  139 2/11/2021  137 10/22/2020 135 - 146   Potassium Level (mmol/L)  4.2 2/11/2021  3.8 10/22/2020 3.5 - 5.3   Chloride Level (mmol/L)  101 2/11/2021  100 10/22/2020 98 - 110   CO2 Level (mmol/L)  28 2/11/2021  24 10/22/2020 20 - 32   Glucose Level (mg/dL) ((H)) 105 2/11/2021 ((H)) 239 10/22/2020 65 - 99   BUN (mg/dL)  17 2/11/2021  19 10/22/2020 7 - 25   Creatinine Level (mg/dL)  0.68 2/11/2021  0.73 10/22/2020 0.50 - 0.99   BUN/Creat Ratio  NOT APPLICABLE 2/11/2021  NOT APPLICABLE 10/22/2020 6 - 22   eGFR (mL/min/1.73m2)  92 2/11/2021  86 10/22/2020 > OR = 60 -    eGFR  (mL/min/1.73m2)  106 2/11/2021  100 10/22/2020 > OR = 60 -    Calcium Level (mg/dL)  9.4 2/11/2021  9.7 10/22/2020 8.6 - 10.4   Hgb A1c ((H)) 8.6 2/11/2021 ((H)) 11.4 10/22/2020  - <5.7   U Creatinine (mg/dL)  60 2/11/2021  20 - 275   U Microalbumin (mg/dL)  0.4 2/11/2021  0.3 3/13/2020 See Note: -    Ur Microalbumin/Creatinine Ratio  7 2/11/2021  6 3/13/2020  - <30     Please contact my practice at [phone] if you have any questions or concerns.     Sincerely,        Samanta Maza MD      What do your labs mean?  Below is a glossary of commonly ordered labs:  LDL   Bad Cholesterol   HDL   Good Cholesterol  AST/ALT   Liver Function   Cr/Creatinine   Kidney Function  Microalbumin   Kidney Function  BUN   Kidney Function  PSA   Prostate    TSH   Thyroid Hormone  HgbA1c   Diabetes Test   Hgb (Hemoglobin)   Red Blood Cells

## 2022-02-16 NOTE — NURSING NOTE
Comprehensive Intake Entered On:  3/13/2020 9:40 AM CDT    Performed On:  3/13/2020 9:35 AM CDT by Judith Zavala CMA               Summary   Chief Complaint :   Follow up mood/medications   Weight Measured :   163 lb(Converted to: 163 lb 0 oz, 73.94 kg)    Height Measured :   62 in(Converted to: 5 ft 2 in, 157.48 cm)    Body Mass Index :   29.81 kg/m2 (HI)    Body Surface Area :   1.8 m2   Systolic Blood Pressure :   130 mmHg   Diastolic Blood Pressure :   70 mmHg   Mean Arterial Pressure :   90 mmHg   Peripheral Pulse Rate :   64 bpm   BP Site :   Right arm   Pulse Site :   Radial artery   BP Method :   Manual   HR Method :   Manual   Temperature Tympanic :   97.4 DegF(Converted to: 36.3 DegC)  (LOW)    Judith Zavala CMA - 3/13/2020 9:35 AM CDT   Health Status   Allergies Verified? :   Yes   Medication History Verified? :   Yes   Medical History Verified? :   No   Pre-Visit Planning Status :   Completed   Tobacco Use? :   Never smoker   Judith Zavala CMA - 3/13/2020 9:35 AM CDT   Consents   Consent for Immunization Exchange :   Consent Granted   Consent for Immunizations to Providers :   Consent Granted   Judith Zavala CMA - 3/13/2020 9:35 AM CDT   Meds / Allergies   (As Of: 3/13/2020 9:40:45 AM CDT)   Allergies (Active)   Cats  Estimated Onset Date:   Unspecified ; Reactions:   Runny nose ; Created By:   Ana Reese; Reaction Status:   Active ; Category:   Drug ; Substance:   Cats ; Type:   Allergy ; Updated By:   Aan Reese; Reviewed Date:   3/13/2020 9:38 AM CDT      Lipitor  Estimated Onset Date:   Unspecified ; Reactions:   Diarrhea, nausea, vomiting ; Created By:   Ana Reese; Reaction Status:   Active ; Category:   Drug ; Substance:   Lipitor ; Type:   Allergy ; Updated By:   Ana Reese; Reviewed Date:   3/13/2020 9:38 AM CDT      mirtazapine  Estimated Onset Date:   Unspecified ; Reactions:   Nightmare, Suicidal ideation ; Comments:     Comment 1: intolerance   ; Created By:   Kushal Pisano  Carly; Reaction Status:   Active ; Category:   Drug ; Substance:   mirtazapine ; Type:   Allergy ; Updated By:   Kushal Pisano Kaity; Source:   Patient ; Reviewed Date:   3/13/2020 9:38 AM CDT      Sodium Hypochlorite  Estimated Onset Date:   Unspecified ; Reactions:   Runny nose ; Created By:   Ana Reese; Reaction Status:   Active ; Category:   Drug ; Substance:   Sodium Hypochlorite ; Type:   Allergy ; Updated By:   Ana Reese; Reviewed Date:   3/13/2020 9:38 AM CDT      traZODone  Estimated Onset Date:   Unspecified ; Reactions:   Nightmares ; Created By:   Kushal Pisano Kaity; Reaction Status:   Active ; Category:   Drug ; Substance:   traZODone ; Type:   Side Effect ; Severity:   Low ; Updated By:   Kushal Pisano Kaity; Source:   Patient ; Reviewed Date:   3/13/2020 9:38 AM CDT        Medication List   (As Of: 3/13/2020 9:40:45 AM CDT)   Prescription/Discharge Order    empagliflozin  :   empagliflozin ; Status:   Prescribed ; Ordered As Mnemonic:   Jardiance 10 mg oral tablet ; Simple Display Line:   10 mg, 1 tab(s), Oral, qam, 30 tab(s), 3 Refill(s) ; Ordering Provider:   Samanta Maza MD; Catalog Code:   empagliflozin ; Order Dt/Tm:   12/20/2019 11:50:23 AM CST          escitalopram  :   escitalopram ; Status:   Prescribed ; Ordered As Mnemonic:   Lexapro 10 mg oral tablet ; Simple Display Line:   See Instructions, 1 tab PO daily, 30 tab(s), 1 Refill(s) ; Ordering Provider:   Samanta Maza MD; Catalog Code:   escitalopram ; Order Dt/Tm:   1/10/2020 10:27:52 AM CST          glucose  :   glucose ; Status:   Prescribed ; Ordered As Mnemonic:   glucose 4 g oral tablet, chewable ; Simple Display Line:   16 gm, 4 tab(s), Chewed, once, Use if BG <70 mg/dL,  check BG 15.  Repeat if BG remains less than 80 mg/dL., 4 tab(s), 0 Refill(s) ; Ordering Provider:   Samanta Maza MD; Catalog Code:   glucose ; Order Dt/Tm:   12/20/2019 11:50:23 AM CST          insulin aspart  :   insulin aspart ;  Status:   Prescribed ; Ordered As Mnemonic:   NovoLOG FlexPen 100 units/mL injectable solution ; Simple Display Line:   See Instructions, inject under the skin: 18 units before meals and 6 units before snacks., 30 mL, 1 Refill(s) ; Ordering Provider:   Samanta Maza MD; Catalog Code:   insulin aspart ; Order Dt/Tm:   1/3/2020 11:07:19 AM CST          insulin glargine  :   insulin glargine ; Status:   Prescribed ; Ordered As Mnemonic:   Basaglar KwikPen 100 units/mL subcutaneous solution ; Simple Display Line:   26 unit(s), Subcutaneous, bid, 15 mL, 1 Refill(s) ; Ordering Provider:   Samanta Maza MD; Catalog Code:   insulin glargine ; Order Dt/Tm:   1/3/2020 11:07:19 AM CST          lisinopril  :   lisinopril ; Status:   Prescribed ; Ordered As Mnemonic:   lisinopril 40 mg oral tablet ; Simple Display Line:   40 mg, 1 tab(s), Oral, daily, 30 tab(s), 3 Refill(s) ; Ordering Provider:   Samanta Maza MD; Catalog Code:   lisinopril ; Order Dt/Tm:   12/20/2019 11:50:23 AM CST          metFORMIN  :   metFORMIN ; Status:   Prescribed ; Ordered As Mnemonic:   metFORMIN 500 mg oral tablet, extended release ; Simple Display Line:   1,000 mg, 2 tab(s), Oral, bid, take with food, 120 tab(s), 3 Refill(s) ; Ordering Provider:   Samanta Maza MD; Catalog Code:   metFORMIN ; Order Dt/Tm:   12/20/2019 11:50:23 AM CST          metoprolol  :   metoprolol ; Status:   Prescribed ; Ordered As Mnemonic:   metoprolol tartrate 25 mg oral tablet ; Simple Display Line:   25 mg, 1 tab(s), Oral, bid, 60 tab(s), 3 Refill(s) ; Ordering Provider:   Samanta Maza MD; Catalog Code:   metoprolol ; Order Dt/Tm:   12/20/2019 11:50:23 AM CST          Miscellaneous Prescription  :   Miscellaneous Prescription ; Status:   Prescribed ; Ordered As Mnemonic:   lancet bowman ; Simple Display Line:   See Instructions, use as directed, 1 EA, 0 Refill(s) ; Ordering Provider:   Samanta Maza MD; Catalog Code:   Miscellaneous Prescription ;  Order Dt/Tm:   12/6/2019 10:58:20 AM CST          Miscellaneous Prescription  :   Miscellaneous Prescription ; Status:   Prescribed ; Ordered As Mnemonic:   true metrix glucometer test strips ; Simple Display Line:   See Instructions, check QID: fasting BG, 2 hours after meals and before bed ., 100 EA, 11 Refill(s) ; Ordering Provider:   Samanta Maza MD; Catalog Code:   Miscellaneous Prescription ; Order Dt/Tm:   2/28/2020 11:37:26 AM CST          Miscellaneous Rx Supply  :   Miscellaneous Rx Supply ; Status:   Prescribed ; Ordered As Mnemonic:   Incontience diapers ; Simple Display Line:   See Instructions, use 1 diaper per night, 30 EA, 11 Refill(s) ; Ordering Provider:   Samanta Maza MD; Catalog Code:   Miscellaneous Rx Supply ; Order Dt/Tm:   3/11/2020 10:27:58 AM CDT ; Comment:   Faxed to [email or web address] @ [phone]          Miscellaneous Rx Supply  :   Miscellaneous Rx Supply ; Status:   Prescribed ; Ordered As Mnemonic:   Lachydrin ; Simple Display Line:   See Instructions, Lachydrin or similar product- apply to feet QHS, PRN: dry skin, 1 EA, 1 Refill(s) ; Ordering Provider:   Samanta Maza MD; Catalog Code:   Miscellaneous Rx Supply ; Order Dt/Tm:   1/10/2020 10:31:46 AM CST          nitroglycerin  :   nitroglycerin ; Status:   Prescribed ; Ordered As Mnemonic:   nitroglycerin 0.4 mg sublingual tablet ; Simple Display Line:   See Instructions, 1 tab under tongue at chest pain.  May repeat x 3 q 5 min.  Call 911 after first dose if pain persists., PRN: for chest pain, 25 tab(s), 3 Refill(s) ; Ordering Provider:   Samanta Maza MD; Catalog Code:   nitroglycerin ; Order Dt/Tm:   12/20/2019 11:50:23 AM CST          pantoprazole  :   pantoprazole ; Status:   Prescribed ; Ordered As Mnemonic:   pantoprazole 40 mg oral delayed release tablet ; Simple Display Line:   40 mg, 1 tab(s), Oral, daily, 90 tab(s), 3 Refill(s) ; Ordering Provider:   Samanta Maza MD; Catalog Code:   pantoprazole ;  Order Dt/Tm:   1/10/2020 10:27:07 AM CST            Home Meds    clopidogrel  :   clopidogrel ; Status:   Documented ; Ordered As Mnemonic:   clopidogrel 75 mg oral tablet ; Simple Display Line:   75 mg, 1 tab(s), Oral, daily, 0 Refill(s) ; Catalog Code:   clopidogrel ; Order Dt/Tm:   12/6/2019 9:57:03 AM CST          loratadine  :   loratadine ; Status:   Documented ; Ordered As Mnemonic:   loratadine 10 mg oral capsule ; Simple Display Line:   10 mg, 1 cap(s), Oral, daily, 0 Refill(s) ; Catalog Code:   loratadine ; Order Dt/Tm:   12/6/2019 9:58:20 AM CST          multivitamin with minerals  :   multivitamin with minerals ; Status:   Documented ; Ordered As Mnemonic:   multivitamin with minerals (w/ Iron) ; Simple Display Line:   1 tablet, Oral, daily, 0 Refill(s) ; Catalog Code:   multivitamin with minerals ; Order Dt/Tm:   12/20/2019 4:20:37 PM CST          raNITIdine  :   raNITIdine ; Status:   Documented ; Ordered As Mnemonic:   raNITIdine 150 mg oral capsule ; Simple Display Line:   150 mg, 1 cap(s), Oral, bid, PRN: for heartburn, 0 Refill(s) ; Catalog Code:   raNITIdine ; Order Dt/Tm:   12/6/2019 9:56:29 AM CST          rosuvastatin  :   rosuvastatin ; Status:   Documented ; Ordered As Mnemonic:   rosuvastatin 20 mg oral tablet ; Simple Display Line:   20 mg, 1 tab(s), Oral, daily, 0 Refill(s) ; Catalog Code:   rosuvastatin ; Order Dt/Tm:   12/6/2019 9:56:47 AM CST

## 2022-02-16 NOTE — TELEPHONE ENCOUNTER
"---------------------  From: Avril Martinez   To: Amparo Valdez CMA; Kushal Pisano Kaity; [employer] Message Pool ([id number]_[facility]); Samanta Maza MD;     Sent: 1/28/2021 8:55:14 AM CST  Subject: RE: General Message     Hi Dr. Maza,   I talked with Lenin.  You have to addend your note with the following information   How many times she injects insulin and how she is adjusting her insulin ie correction scale or insulin to carb ratio (pt does not have).  Medicare does not accept set doses of insulin for approval of the roula.  So she will need a correction scale added with next visit and then MD notes need to be updated and faxed to Lenin.      Thanks,   Shell       ---------------------  From: Samanta Maza MD   To: Amparo Valdez CMA; [employer] Message Pool ([id number]_[facility]); Kushal Pisano Kaity; Avril Martinez;     Sent: 1/27/2021 10:37:41 PM CST  Subject: General Message     Hi everyone, where are we at with this?---------------------  From: Ruth Ann Adams ([employer] Message Pool ([id number]_[facility]))   To: Samanta Maza MD;     Sent: 1/28/2021 9:30:45 AM CST  Subject: FW: General Message     See form on your desk for sig and date.I just spoke with [employer] representative. Sounds like there was a misunderstanding. The representative first told me the pt needs \"sliding scale\" and then I asked her to clarify if it actually has to be sliding scale. And she said oh sorry if I used the wrong term - is it called basal/bolus? We just need documentation of her current insulin units for each dosing time.   There is no requirement for sliding scale insulin.    Lenin is still waiting on the detailed written order (also called CMN form) and clinical notes from Dr. SINGER with this information.  Pt's current regimen:   basaglar 13 units twice daily  humalog 9 units three times daily before meals---------------------  From: Samanta Maza MD   To: Avril Martinez;     Sent: 1/28/2021 " 11:33:47 AM CST  Subject: RE: General Message     Amparo has the form, I think she is going to bring it over to talk with you about it, thanks---------------------  From: Amparo Valdez CMA   To: Aliya, Pharm D , Carly; Samanta Maza MD; [employer] Message Pool ([id number]_[facility]) (Ruth Ann Adams); Avril Martinez;     Sent: 1/28/2021 2:03:37 PM CST  Subject: RE: General Message     Shell-I keep trying to find you but unsuccessful-I am going to put the form in your chart bowman outside your office if you want to take a peek at it once you have time.---------------------  From: Samanta Maza MD   To: Amparo Valdez CMA;     Sent: 1/28/2021 3:37:46 PM CST  Subject: RE: General Message     Can she have an appointment with Shell so you/she can figure out exactly what I need to type to get her the machine?  I can ask CSS to contact pt to schedule a follow up with you.---------------------  From: Amparo Valdez CMA   To: Avril Martinez;     Sent: 1/28/2021 3:40:36 PM CST  Subject: RE: General Message---------------------  From: Avril Martinez   To: Amparo Valdez CMA;     Sent: 1/28/2021 3:46:52 PM CST  Subject: RE: General Message     I think we have it squared away.  Hold on the appointment for now.        I was at the [employer] doing assessments today ~

## 2022-02-16 NOTE — LETTER
(Inserted Image. Unable to display)            August 27, 2021        GARRICK RAMIREZ  [address] [phone]        Dear GARRICK,     Thank you for selecting [facility] for your healthcare needs. Below you will find the results of your recent test(s) done at our clinic.      This is a copy for you.      Result Name Current Result Previous Result Reference Range   Sodium Level (mmol/L)  143 8/19/2021  139 2/11/2021 135 - 146   Potassium Level (mmol/L)  3.7 8/19/2021  4.2 2/11/2021 3.5 - 5.3   Chloride Level (mmol/L)  109 8/19/2021  101 2/11/2021 98 - 110   CO2 Level (mmol/L)  25 8/19/2021  28 2/11/2021 20 - 32   Glucose Level (mg/dL) ((H)) 125 8/19/2021 ((H)) 105 2/11/2021 65 - 99   BUN (mg/dL)  9 8/19/2021  17 2/11/2021 7 - 25   Creatinine Level (mg/dL)  0.63 8/19/2021  0.68 2/11/2021 0.50 - 0.99   BUN/Creat Ratio  NOT APPLICABLE 8/19/2021  NOT APPLICABLE 2/11/2021 6 - 22   eGFR (mL/min/1.73m2)  93 8/19/2021  92 2/11/2021 > OR = 60 -    eGFR  (mL/min/1.73m2)  108 8/19/2021  106 2/11/2021 > OR = 60 -    Calcium Level (mg/dL)  9.2 8/19/2021  9.4 2/11/2021 8.6 - 10.4   Hgb A1c ((H)) 8.7 8/19/2021 ((H)) 8.6 2/11/2021  - <5.7   Cholesterol (mg/dL)  167 8/19/2021  167 3/13/2020  - <200   Non-HDL Cholesterol  116 8/19/2021  107 3/13/2020  - <130   HDL (mg/dL)  51 8/19/2021  60 3/13/2020 > OR = 50 -    Cholesterol/HDL Ratio  3.3 8/19/2021  2.8 3/13/2020  - <5.0   LDL  88 8/19/2021  71 3/13/2020    Triglyceride (mg/dL) ((H)) 185 8/19/2021 ((H)) 276 3/13/2020  - <150   U Creatinine (mg/dL)  38 8/19/2021  60 2/11/2021 20 - 275   U Microalbumin (mg/dL)  0.3 8/19/2021  0.4 2/11/2021 See Note: -    Ur Microalbumin/Creatinine Ratio  8 8/19/2021  7 2/11/2021  - <30     Please contact my practice at [phone] if you have any questions or concerns.     Sincerely,        Samanta Maza MD      What do your labs mean?  Below is a glossary of commonly ordered labs:  LDL    Bad Cholesterol   HDL   Good Cholesterol  AST/ALT   Liver Function   Cr/Creatinine   Kidney Function  Microalbumin   Kidney Function  BUN   Kidney Function  PSA   Prostate    TSH   Thyroid Hormone  HgbA1c   Diabetes Test   Hgb (Hemoglobin)   Red Blood Cells

## 2022-02-16 NOTE — NURSING NOTE
Depression Screening Entered On:  12/10/2019 9:57 AM CST    Performed On:  12/6/2019 9:55 AM CST by Ludy Arriola               Depression Screening   Little Interest - Pleasure in Activities :   Several days   Feeling Down, Depressed, Hopeless :   More than half the days   Initial Depression Screen Score :   3    Trouble Falling or Staying Asleep :   Nearly every day   Feeling Tired or Little Energy :   More than half the days   Poor Appetite or Overeating :   More than half the days   Feeling Bad About Yourself :   Not at all   Trouble Concentrating :   Several days   Moving or Speaking Slowly :   Several days   Thoughts Better Off Dead or Hurting Self :   Not at all   Detailed Depression Screen Score :   9    Total Depression Screen Score :   12    ALEJANDRA Difficulty with Work, Home, Others :   Somewhat difficult   Ludy Arriola - 12/10/2019 9:55 AM CST

## 2022-02-16 NOTE — PROGRESS NOTES
Chief Complaint    WTM  History of Present Illness      The patient presents today for general exam.  ADL's and safety were reviewed.      1.  Hearing Impairment (hearing aids, symptoms, history):  No problems noted.      2.  Activities of Daily Living (hygiene, eating, cooking, cleaning, shopping, errands): No problems noted.      3.  Falls Risk (walking, transferring, cane, walker, aids):  No problems noted.      4.  Home Safety (surfaces, steps):  No problems noted.         Patient's PHQ9 and CAGE questionnaire reviewed and discussed with patient.             The patient answers questions regarding year, date, and city she lives in appropriately.  No evidence of cognitive impairment is noted.        I have reviewed the completed Health Risk Assessment and Health History forms with the patient and positives are noted.  We have agreed on the plan below for identified risk factors.  Please see copy of scanned forms.      Review of systems is negative except as per HPI including no fevers, chills, sore throat, runny nose, nausea, vomiting, constipation, diarrhea, rash or new skin lesions, chest pain, palpitations, slurred speech, new paresthesia, shortness of breath or wheeze.             She has DM      Exam:      see vitals listed below      General: alert and oriented ×3 no acute distress.      HEENT: Normocephalic and atraumatic.       Eyes pupils are equal round and reactive to light extraocular motion is intact. normal conjunctiva      Hearing is grossly normal and there is no otorrhea. Tympanic membranes are pearly grey with a normal light reflex.      Nares are patent there is no rhinorrhea.       Mucous membranes are moist and pink.      Chest: has bilateral rise with no increased work of breathing. clear to auscultation without wheezes, rhonchi, or rales.      Cardiovascular: normal perfusion and brisk capillary refill. S1S2 with regular rate and rhythm and no murmurs, gallops or rubs.      Musculoskeletal:  no gross focal abnormalities and normal gait.      Neuro: no gross focal abnormalities and memory seems intact.  CN 2-12 are grossly intact.      Psychiatric: speech is clear and coherent and fluent. Patient dressed appropriately for the weather. Mood is appropriate and affect is full.      Abd: no rebound or guarding, normal BS      Skin: no rash or lesion identified      Discussed:      using sunscreen, protecting from sunburn,      taking folic acid 400 mcg daily      refer to usdachoosemyplate.gov, AHA and ADA for diet and exercise recommendations      consume 9399-8485 mg calcium daily      regular self skin checks      get 150min /week of aerobic exercise  Review of Systems      reviewed with pt  Physical Exam   Vitals & Measurements    HR: 76 (Peripheral)  RR: 16  BP: 120/60  SpO2: 98%     HT: 61.75 in  WT: 154.9 lb  BMI: 28.56   Assessment/Plan       CAD in native artery (I25.10)        f/u with cards         Ordered:          Basic Metabolic Panel* ([facility]), Specimen Type: Serum, Collection Date: 08/19/21 13:20:00 CDT          Hemoglobin A1c* ([facility]), Specimen Type: Blood, Collection Date: 08/19/21 13:20:00 CDT          Lipid panel with reflex to direct ldl* ([facility]), Specimen Type: Serum, Collection Date: 08/19/21 13:20:00 CDT                Generalized anxiety disorder (F41.1)        establish with counselor         Ordered:          Referral (Request), 08/19/21 13:21:00 CDT, Referred to: Behavioral Health, Referred to: Lexi Kovacs, Generalized anxiety disorder  Major depressive disorder, single episode, unspecified                Major depressive disorder, single episode, unspecified (F32.1)        establish with counselor         Ordered:          Referral (Request), 08/19/21 13:21:00 CDT, Referred to: Behavioral Health, Referred to: Lxei Kovacs, Generalized anxiety disorder  Major depressive disorder, single episode, unspecified                Osteoporosis screening (Z13.820)          Ordered:          DEXA Scan (Request), Osteoporosis screening                Screen for colon cancer (Z12.11)         Ordered:          Return to Clinic (Request), RFV: FOB                Type 2 diabetes mellitus without complications (E11.9)         Ordered:          Basic Metabolic Panel* ([facility]), Specimen Type: Serum, Collection Date: 08/19/21 13:20:00 CDT          Hemoglobin A1c* ([facility]), Specimen Type: Blood, Collection Date: 08/19/21 13:20:00 CDT          Lipid panel with reflex to direct ldl* ([facility]), Specimen Type: Serum, Collection Date: 08/19/21 13:20:00 CDT          Microalbumin, random urine (w/creatinine)* ([facility]), Specimen Type: Urine, Collection Date: 08/19/21 13:22:00 CDT                Well adult exam (Z00.00)         Ordered:          Basic Metabolic Panel* ([facility]), Specimen Type: Serum, Collection Date: 08/19/21 13:20:00 CDT          Hemoglobin A1c* ([facility]), Specimen Type: Blood, Collection Date: 08/19/21 13:20:00 CDT          Lipid panel with reflex to direct ldl* ([facility]), Specimen Type: Serum, Collection Date: 08/19/21 13:20:00 CDT           Patient Information     Name:GARRICK RAMIREZ      Address:      [address] [place] [id number]     Sex:Female     YOB: 1955     [phone]     Emergency Contact:KADE MURRELL     MRN:[id number]     FIN:[id number]     Location:[facility]     Date of Service:08/19/2021      Primary Care Physician:       Samanta Maza MD, [phone]      Attending Physician:       Samanta Maza MD, [phone]  Problem List/Past Medical History    Ongoing     Acute myocardial infarction, unspecified     Atherosclerotic heart disease of native coronary artery without angina pectoris     CAD in native artery     Combined forms of age-related cataract, left eye     Combined forms of age-related cataract, right eye     Essential (primary) hypertension     Generalized anxiety disorder     History of MI (myocardial  infarction)     Hx of CABG     Infectious gastroenteritis and colitis, unspecified     Insomnia, unspecified     Major depressive disorder, single episode, unspecified     Myopia, bilateral     Obstructive sleep apnea (adult) (pediatric)     Polypharmacy     Pure hypercholesterolemia, unspecified     Stable angina     Stress incontinence (female) (male)     Type 2 diabetes mellitus without complications     Unspecified hearing loss, bilateral     Unspecified open wound, left foot, initial encounter     Unstable angina    Historical     Pregnancy  Procedure/Surgical History     Esophagogastroduodenoscopy (09/27/2019)            Comments: Indication: Chronic heartburn..     IOL - Cataract extraction and insertion of intraocular lens (05/21/2019)            Comments: Right..     IOL - Cataract extraction and insertion of intraocular lens (05/07/2019)            Comments: Left..     Coronary artery stent x 3 (07/2011)           Angioplasty (2011)           Tubal ligation (1979)        Medications    acetaminophen 500 mg oral tablet, 1000 mg= 2 tab(s), Oral, q6 hrs, PRN    aspirin 81 mg oral delayed release tablet, 81 mg= 1 tab(s), Oral, daily, 3 refills    Basaglar KwikPen 100 units/mL subcutaneous solution, 17 units, Subcutaneous, bid, 1 refills    carvedilol 6.25 mg oral tablet, 6.25 mg= 1 tab(s), Oral, bid    clopidogrel 75 mg oral tablet, 75 mg= 1 tab(s), Oral, daily, 1 refills    FLUoxetine 20 mg oral capsule, 1 cap(s), Oral, qam, 3 refills    Freestyle Dm 2 14 day reader, See Instructions    Freestyle Dm 2 14 day sensor, See Instructions, 3 refills    glucose 4 g oral tablet, chewable, 16 gm= 4 tab(s), Chewed, once    HumaLOG KwikPen 100 units/mL injectable solution, See Instructions, 3 refills    Incontience diapers, See Instructions, 11 refills    Insulin Lispro KwikPen 100 units/mL injectable solution    Jardiance 25 mg oral tablet, 25 mg= 1 tab(s), Oral, qam, 2 refills    Lachydrin, See Instructions,  PRN, 1 refills    lancet bowman, See Instructions    lisinopril 5 mg oral tablet, 5 mg= 1 tab(s), Oral, daily, 3 refills    loratadine 10 mg oral tablet, 10 mg= 1 tab(s), Oral, qhs, 3 refills    MetFORMIN (Eqv-Glucophage XR) 500 mg oral tablet, extended release, 1000 mg= 2 tab(s), Oral, bid, 3 refills    Misc Prescription    NITROGLYCERIN 0.4MG TAB SUBLINGUAL, See Instructions, 3 refills    One-A-Day Women 50 Plus oral tablet, See Instructions, 3 refills    pantoprazole 20 mg oral delayed release tablet, 20 mg= 1 tab(s), Oral, bid, 3 refills    rosuvastatin 20 mg oral tablet, 20 mg= 1 tab(s), Oral, qhs, 3 refills    torsemide 20 mg oral tablet, 20 mg= 1 tab(s), Oral, daily    Tresiba FlexTouch 100 units/mL subcutaneous solution    true metrix glucometer test strips, See Instructions, 11 refills    Tums 500 mg oral tablet, chewable, 500 mg= 1 tab(s), Chewed, bid    ULTICARE MICRO PEN TIP 32G 4MM 50CT MG INJECTABLE, See Instructions, 3 refills  Allergies    traZODone (Nightmares)    Cats (Runny nose)    Lipitor (Diarrhea, nausea, vomiting)    Sodium Hypochlorite (Runny nose)    mirtazapine (Nightmare, Suicidal ideation)  Social History    Smoking Status     Former smoker     Alcohol      Never     Electronic Cigarette/Vaping      Electronic Cigarette Use: Never.     Exercise      Exercise frequency: 1-2 times/week. Exercise type: Walking.     Home/Environment      Marital status: Single. 1 children. Living situation: Home/Independent. Injuries/Abuse/Neglect in household: No. Feels unsafe at home: No. Family/Friends available for support: Yes.     Nutrition/Health      Type of diet: Regular. Wants to lose weight: Yes. Sleeping concerns: Yes. Feels highly stressed: Yes.     Sexual      Sexually active: No. Identifies as female, Sexual orientation: Straight or heterosexual. History of STD: No. Contraceptive Use Details: Abstinence. History of sexual abuse: No.     Substance Abuse      Never     Tobacco - Past      Former  smoker, quit more than 30 days ago, Cigarettes  Family History    Arthritis: Grandmother (M).    CVA - Cerebrovascular accident: Mother and Father.    Diabetes mellitus: Mother and Father.    Glaucoma: Sister.  Immunizations       Scheduled Immunizations       Dose Date(s)       influenza virus vaccine, inactivated       09/21/2009, 12/18/2013, 10/20/2015, 12/16/2016       pneumococcal (PCV13)       12/16/2016       pneumococcal (PPSV23)       10/20/2015       Other Immunizations               influenza virus vaccine, inactivated       10/02/2018, 12/06/2019, 10/01/2020       pneumococcal (PPSV23)       10/01/2020       Td       03/23/2018       tetanus/diphth/pertuss (Tdap) adult/adol       03/23/2018

## 2022-02-16 NOTE — NURSING NOTE
Quick Intake Entered On:  12/6/2021 2:21 PM CST    Performed On:  12/6/2021 2:21 PM CST by Avril Martinez               Summary   Advance Directive :   No   Weight Measured :   155.1 lb(Converted to: 155 lb 2 oz, 70.352 kg)    Height Measured :   61.75 in(Converted to: 5 ft 2 in, 156.84 cm)    Body Mass Index :   28.6 kg/m2 (HI)    Body Surface Area :   1.75 m2   Avril Martinez - 12/6/2021 2:21 PM CST

## 2022-02-16 NOTE — TELEPHONE ENCOUNTER
---------------------  From: Ayla Godoy   To: [facility] ([id number] - [place]);     Sent: 4/14/2021 9:10:53 AM CDT  Subject: General Message     Patient called to say she is at [facility] and is scheduled for bypass surgery today, will call back to reschedule appts when she donefyi---------------------  From: Ruth Ann Adams ([facility] ([id number]))   To: Samanta Maza MD;     Sent: 4/14/2021 10:39:41 AM CDT  Subject: FW: General Message---------------------  From: Samanta Maza MD   To: [facility] Message Pool ([id number]);     Sent: 4/14/2021 8:51:10 PM CDT  Subject: RE: General Message     thanks, I sent her a letter yesterday

## 2022-02-16 NOTE — TELEPHONE ENCOUNTER
---------------------  From: Samanta Maza MD   To: [facility] Message [facility] ([id number] - [place]);     Sent: 11/30/2021 2:37:28 PM CST  Subject: General Message     pls fax referral to [facility] thanksFaxed to [facility] [phone]

## 2022-02-16 NOTE — NURSING NOTE
Depression Screening Entered On:  3/17/2020 3:00 PM CDT    Performed On:  3/12/2020 3:00 PM CDT by Ishan Soni CMA               Depression Screening   Little Interest - Pleasure in Activities :   Several days   Feeling Down, Depressed, Hopeless :   More than half the days   Initial Depression Screen Score :   3    Trouble Falling or Staying Asleep :   Several days   Feeling Tired or Little Energy :   More than half the days   Poor Appetite or Overeating :   Nearly every day   Feeling Bad About Yourself :   Not at all   Trouble Concentrating :   More than half the days   Moving or Speaking Slowly :   Not at all   Thoughts Better Off Dead or Hurting Self :   Not at all   Detailed Depression Screen Score :   8    Total Depression Screen Score :   11    ALEJANDRA Difficulty with Work, Home, Others :   Not difficult at all   Ishan Soni CMA - 3/17/2020 3:00 PM CDT

## 2022-02-16 NOTE — TELEPHONE ENCOUNTER
Entered by Ishan Soni CMA on March 26, 2021 10:18:22 AM CDT  ---------------------  From: Ishan Soni CMA   To: [employer]    Sent: 3/26/2021 10:18:22 AM CDT  Subject: Medication Management     ** Not Approved: Patient has requested refill too soon, Pt given #180 and 1 refill 12/10/20 **  metoprolol (METOPROLOL TARTRATE 25MG TABLET)  TAKE ONE TABLET BY MOUTH TWICE DAILY  Qty:  180 EA        Days Supply:  90        Refills:  1          Substitutions Allowed     Route To Pharmacy - [facility]   Signed by Ishan Soni CMA            ------------------------------------------  From: [employer]  To: Samanta Maza MD  Sent: March 26, 2021 10:11:22 AM CDT  Subject: Medication Management  Due: March 24, 2021 8:29:58 PM CDT     ** On Hold Pending Signature **     Drug: metoprolol (Metoprolol Tartrate 25 mg oral tablet), TAKE ONE TABLET BY MOUTH TWICE DAILY  Quantity: 180 EA  Days Supply: 90  Refills: 1  Substitutions Allowed  Notes from Pharmacy:     Dispensed Drug: metoprolol (Metoprolol Tartrate 25 mg oral tablet), TAKE ONE TABLET BY MOUTH TWICE DAILY  Quantity: 180 EA  Days Supply: 90  Refills: 1  Substitutions Allowed  Notes from Pharmacy:  ------------------------------------------

## 2022-02-16 NOTE — TELEPHONE ENCOUNTER
---------------------  From: Dieudonne Spain   To: Samanta Maza MD;     Sent: 1/14/2021 11:08:38 AM CST  Subject: Covid results     Called pt about her POSITIVE covid results. Pt still feels tired but otherwise okay. Told pt to stay quarantined until she hears from [facility] regarding further quarantine instructions. Pt understood and had no questions or concerns at this time.---------------------  From: Samanta Maza MD   To: [facility] Message Pool ([id number] - [place]);     Sent: 1/14/2021 12:14:39 PM CST  Subject: FW: Covid results     please give pt a call and see if she needs a telemed appt, thanksPt states she will call if she needs a telephone visit with you. Right now she states she just feels tired. Our call got disconnected. I called back and left her a message to let us know if she needs anything.

## 2022-02-16 NOTE — LETTER
(Inserted Image. Unable to display)       March 18, 2020      GARRICK RAMIREZ  [address] [phone]        Dear GARRICK,     Thank you for selecting [facility] for your healthcare needs. Below you will find the results of your recent test(s) done at our clinic.      I am so proud of you!  Your HgBA1c looks amazing!  Good job!!      Result Name Current Result Previous Result Reference Range   Sodium Level (mmol/L)  141 3/13/2020  136 12/6/2019 135 - 146   Potassium Level (mmol/L)  4.1 3/13/2020  4.2 12/6/2019 3.5 - 5.3   Chloride Level (mmol/L)  106 3/13/2020  98 12/6/2019 98 - 110   CO2 Level (mmol/L)  28 3/13/2020  26 12/6/2019 20 - 32   Glucose Level (mg/dL)  70 3/13/2020 ((H)) 329 12/6/2019 65 - 99   BUN (mg/dL)  22 3/13/2020  12 12/6/2019 7 - 25   Creatinine Level (mg/dL)  0.70 3/13/2020  0.59 12/6/2019 0.50 - 0.99   BUN/Creat Ratio  NOT APPLICABLE 3/13/2020  NOT APPLICABLE 12/6/2019 6 - 22   eGFR (mL/min/1.73m2)  92 3/13/2020  97 12/6/2019 > OR = 60 -    eGFR  (mL/min/1.73m2)  106 3/13/2020  112 12/6/2019 > OR = 60 -    Calcium Level (mg/dL)  9.2 3/13/2020  9.7 12/6/2019 8.6 - 10.4   Hgb A1c ((H)) 6.8 3/13/2020 ((H)) 11.7 12/6/2019  - <5.7   Cholesterol (mg/dL)  167 3/13/2020   - <200   Non-HDL Cholesterol  107 3/13/2020   - <130   HDL (mg/dL)  60 3/13/2020  > OR = 50 -    Cholesterol/HDL Ratio  2.8 3/13/2020   - <5.0   LDL  71 3/13/2020     Triglyceride (mg/dL) ((H)) 276 3/13/2020   - <150   U Microalbumin (mg/dL)  0.3 3/13/2020  2.6 12/6/2019 See Note: -    Ur Creatinine (mg/dL)  52 3/13/2020  76 12/6/2019 20 - 275   Ur Microalbumin/Creatinine Ratio  6 3/13/2020 ((H)) 34 12/6/2019  - <30   WBC  8.4 3/13/2020  5.2 12/6/2019 3.8 - 10.8   RBC  4.60 3/13/2020  5.04 12/6/2019 3.80 - 5.10   Hgb (gm/dL)  13.5 3/13/2020  14.7 12/6/2019 11.7 - 15.5   Hct (%)  39.5 3/13/2020  43.6 12/6/2019 35.0 - 45.0   MCV (fL)  85.9 3/13/2020  86.5 12/6/2019 80.0 - 100.0   MCH  (pg)  29.3 3/13/2020  29.2 12/6/2019 27.0 - 33.0   MCHC (gm/dL)  34.2 3/13/2020  33.7 12/6/2019 32.0 - 36.0   RDW (%)  12.6 3/13/2020  13.1 12/6/2019 11.0 - 15.0   Platelet  298 3/13/2020  268 12/6/2019 140 - 400   MPV (fL)  10.2 3/13/2020  10.7 12/6/2019 7.5 - 12.5     Please contact my practice at [phone] if you have any questions or concerns.     Sincerely,        Samanta Maza MD      What do your labs mean?  Below is a glossary of commonly ordered labs:  LDL   Bad Cholesterol   HDL   Good Cholesterol  AST/ALT   Liver Function   Cr/Creatinine   Kidney Function  Microalbumin   Kidney Function  BUN   Kidney Function  PSA   Prostate    TSH   Thyroid Hormone  HgbA1c   Diabetes Test   Hgb (Hemoglobin)   Red Blood Cells

## 2022-02-16 NOTE — PROGRESS NOTES
Chief Complaint    DM check.  History of Present Illness      patient present to clinic today for follow up      Patient is here today for follow-up of her diabetes.  She has been doing great with a team approach.  She is following [facility] pharmacy.  As well as with our dietitian.  Unfortunately she has type 2 diabetes and has to perform glucometer  testing 4 or more times per day.  Usually gets about 6 times per day.  She is on insulin treated with multiple daily injections usually about 6 injections/day.  She also requires   frequent insulin adjustments on the basis of blood glucose readings. We have met within six months of ordering the continuous glucose monitor in order to determine that criteria one through four above are met and we shall continue to meet every six months following the initial prescription of the continuous glucose monitor to assess adherence of the continuous glucose monitor.  history of hypertension. Patient is a little more upset today than she usually is. Would like to just monitor this for now.      She has a history of coronary artery disease. She denies any chest paim Or increased shortness of breath with exertion.       Her generalized anxiety disorder Reports that she continues to work with Jennifer Yusuf here and thinks things are going pretty well right now      has been under better control both with the help of medications and with the help of her daughters with medication management.  She comes into clinic today with all of her medications and her med box.  She reports that she is unable to afford [facility] setting up her medications for her but is proud of herself for being able to stay on top of her medications with her med box.  Frequent insulin adjustments on the basis of her blood glucose readings.  We do plan to follow-up regularly to monitor her diabetes care.      She also has a history of coronary artery disease, no chest pain no shortness of breath.  She has a history of  essential hypertension.  Currently tolerating her current medications      Review of systems 12 point review of systems negative except as per HPI      Exam:      General: alert and oriented ×3 no acute distress.      HEENT: pupils are equal round and reactive to light extraocular motion is intact. Normocephalic and atraumatic.       Hearing is grossly normal and there is no otorrhea. TM pearly grey with normal likght reflex      Nares are patent there is no rhinorrhea.       Mucous membranes are moist and pink.      Chest: has bilateral rise with no increased work of breathing.  ctab no wheezes,  rhonchi or rales      Cardiovascular: normal perfusion and brisk capillary refill.  nml s1s2, no m/g/r      Musculoskeletal: no gross focal abnormalities and normal gait.  Foot exam reveals normal sensation and normal dorsal pedis and posterior tibialis pulses by fine monofilament exam and palpation      Neuro: no gross focal abnormalities and memory seems intact.      Psychiatric: speech is clear and coherent and fluent. Patient dressed appropriately for the weather. Mood is appropriate and affect is full.                     Discussed with patient to return to clinic if symptoms worsen or do not improve  Physical Exam   Vitals & Measurements    T: 97.2  F (Tympanic)  HR: 82 (Peripheral)  BP: 140/80  SpO2: 96%     WT: 171.4 lb   Assessment/Plan       CAD in native artery (I25.10)         Ordered:          96943 office outpatient visit 40 minutes (Charge), Quantity: 1, Type 2 diabetes mellitus without complications  CAD in native artery  Essential (primary) hypertension  Generalized anxiety disorder                Encounter for immunization (Z23)         Ordered:          influenza virus vaccine, inactivated, 0.5 mL, IM, once, (Completed)          pneumococcal 23-polyvalent vaccine, 0.5 mL, IM, once, (Completed)          86338 imadm prq id subq/im njxs 1 vaccine (Charge), Quantity: 1, Encounter for immunization           49205 imadm prq id subq/im njxs 1 vaccine (Charge), Quantity: 1, Encounter for immunization          28051 Influenza virus vaccine, quadrivalent, riv4 (Charge), Quantity: 1, Encounter for immunization          33023 pneumococcal polysac vaccine 23-v 2 />yr subq/im (Charge), Quantity: 1, Encounter for immunization                Essential (primary) hypertension (I10)         Ordered:          29352 office outpatient visit 40 minutes (Charge), Quantity: 1, Type 2 diabetes mellitus without complications  CAD in native artery  Essential (primary) hypertension  Generalized anxiety disorder                Generalized anxiety disorder (F41.1)         Ordered:          25233 office outpatient visit 40 minutes (Charge), Quantity: 1, Type 2 diabetes mellitus without complications  CAD in native artery  Essential (primary) hypertension  Generalized anxiety disorder                Type 2 diabetes mellitus without complications (E11.9)         Ordered:          30597 office outpatient visit 40 minutes (Charge), Quantity: 1, Type 2 diabetes mellitus without complications  CAD in native artery  Essential (primary) hypertension  Generalized anxiety disorder                Orders:         lisinopril, = 1 tab(s) ( 40 mg ), Oral, daily, # 30 tab(s), 0 Refill(s), Type: Maintenance, Pharmacy: [facility], DUE FOR APPT PRIOR TO NEXT REFILL, 62, in, 03/13/20 9:35:00 CDT, Height Measured, 163, lb, 03/13/20 9:35:00 CDT, Weight Measured, (Completed)         pantoprazole, = 1 tab(s) ( 20 mg ), Oral, bid, # 180 tab(s), 3 Refill(s), Type: Maintenance, Pharmacy: [facility], please cancel the 40 mg 1 po qday if this is covered by her insurance, thanks, 1 tab(s) Oral bid, 62, in, 03/13/20 9:35:00 CDT, Height Measured, 171..., (Ordered)         Return to Clinic (Request), RFV: Diabetic check. Labs 1 wk prior, Return in 6 months      patient s blood sugars have been much more controlled since she s been able to work with our team. I  think the continuous glucose monitor will add an additional level of ease for her to continue to be successful. She was unable to afford to get her medication set up through [facility] but does plan to continue to use her pill set up. Hypertension should continue to work with our pharmacies. coronary artery disease will continue to work on her diabetes and blood pressure control.      she takes 26 units of long acting insulin BID, 18 units novalog before meals tid and 6 units before snacks bid  Patient Information     Name:GARRICK RAMIREZ      Address:      [address] [place] [id number]     Sex:Female     YOB: 1955     [phone]     Emergency Contact:KADE MURRELL     MRN:[id number]     FIN:[id number]     Location:[facility]     Date of Service:10/01/2020      Primary Care Physician:       Samanta Maza MD, [phone]      Attending Physician:       Samanta Maza MD, [phone]  Problem List/Past Medical History    Ongoing     Acute myocardial infarction, unspecified     Atherosclerotic heart disease of native coronary artery without angina pectoris     CAD in native artery     Combined forms of age-related cataract, left eye     Combined forms of age-related cataract, right eye     Essential (primary) hypertension     Generalized anxiety disorder     History of MI (myocardial infarction)     Infectious gastroenteritis and colitis, unspecified     Insomnia, unspecified     Major depressive disorder, single episode, unspecified     Myopia, bilateral     Obstructive sleep apnea (adult) (pediatric)     Polypharmacy     Pure hypercholesterolemia, unspecified     Stable angina     Stress incontinence (female) (male)     Type 2 diabetes mellitus without complications     Unspecified hearing loss, bilateral     Unspecified open wound, left foot, initial encounter     Unstable angina    Historical     Pregnancy  Procedure/Surgical History      Esophagogastroduodenoscopy (09/27/2019)      Comments: Indication: Chronic heartburn..     IOL - Cataract extraction and insertion of intraocular lens (05/21/2019)      Comments: Right..     IOL - Cataract extraction and insertion of intraocular lens (05/07/2019)      Comments: Left..     Coronary artery stent x 3 (07/2011)     Angioplasty (2011)     Tubal ligation (1979)  Medications    clopidogrel 75 mg oral tablet, 75 mg= 1 tab(s), Oral, daily, 3 refills    escitalopram 10 mg oral tablet, 10 mg= 1 tab(s), Oral, daily, 3 refills    glucose 4 g oral tablet, chewable, 16 gm= 4 tab(s), Chewed, once    HumaLOG KwikPen 100 units/mL injectable solution, See Instructions, 1 refills    Incontience diapers, See Instructions, 11 refills    Jardiance 10 mg oral tablet, 10 mg= 1 tab(s), Oral, qam, 1 refills    Lachydrin, See Instructions, PRN, 1 refills    lancet bowman, See Instructions    loratadine 10 mg oral capsule, 10 mg= 1 cap(s), Oral, daily    MetFORMIN (Eqv-Glucophage XR) 500 mg oral tablet, extended release, 1000 mg= 2 tab(s), Oral, bid, 3 refills    Metoprolol Tartrate 25 mg oral tablet, 25 mg= 1 tab(s), Oral, bid, 1 refills    Misc Prescription    multivitamin with minerals (w/ Iron), 1 tablet, Oral, daily    nitroglycerin 0.4 mg sublingual tablet, See Instructions, PRN, 3 refills    pantoprazole 20 mg oral delayed release tablet, 20 mg= 1 tab(s), Oral, bid, 3 refills    pantoprazole 40 mg oral delayed release tablet, 40 mg= 1 tab(s), Oral, daily, 3 refills    rosuvastatin 20 mg oral tablet, 20 mg= 1 tab(s), Oral, qhs, 11 refills    Tresiba FlexTouch 100 units/mL subcutaneous solution, See Instructions, 1 refills    true metrix glucometer test strips, See Instructions, 11 refills    Tums 500 mg oral tablet, chewable, 500 mg= 1 tab(s), Chewed, bid    ULTICARE MICRO PEN TIP 32G 4MM 50CT MG INJECTABLE, See Instructions, 3 refills  Allergies    traZODone (Nightmares)    Cats (Runny nose)    Lipitor (Diarrhea, nausea,  vomiting)    Sodium Hypochlorite (Runny nose)    mirtazapine (Nightmare, Suicidal ideation)  Social History    Smoking Status     Never smoker     Alcohol      Never     Exercise      Exercise frequency: 1-2 times/week. Exercise type: Walking.     Home/Environment      Marital status: Single. 1 children. Living situation: Home/Independent. Injuries/Abuse/Neglect in household: No. Feels unsafe at home: No. Family/Friends available for support: Yes.     Nutrition/Health      Type of diet: Regular. Wants to lose weight: Yes. Sleeping concerns: Yes. Feels highly stressed: Yes.     Sexual      Sexually active: No. Identifies as female, Sexual orientation: Straight or heterosexual. History of STD: No. Contraceptive Use Details: Abstinence. History of sexual abuse: No.     Substance Abuse      Never     Tobacco - Past      Quit in 1999, Cigarettes  Family History    Arthritis: Grandmother (M).    CVA - Cerebrovascular accident: Mother and Father.    Diabetes mellitus: Mother and Father.    Glaucoma: Sister.  Immunizations      Vaccine Date Status          influenza virus vaccine, inactivated 10/01/2020 Given          pneumococcal (PPSV23) 10/01/2020 Given          influenza virus vaccine, inactivated 12/06/2019 Given          influenza virus vaccine, inactivated 10/02/2018 Recorded          Td 03/23/2018 Recorded          tetanus/diphth/pertuss (Tdap) adult/adol 03/23/2018 Recorded          influenza virus vaccine, inactivated 12/16/2016 Recorded          pneumococcal (PCV13) 12/16/2016 Recorded          pneumococcal (PPSV23) 10/20/2015 Recorded          influenza virus vaccine, inactivated 10/20/2015 Recorded          influenza virus vaccine, inactivated 12/18/2013 Recorded          influenza virus vaccine, inactivated 09/21/2009 Recorded  Patient is on insulin regimen that requires frequent adjustments by the patient based on their blood glucose readings.  She is now getting her long-acting insulin 13 units twice daily.   She has had recent episodes of hypoglycemia so her current insulin regimen includes 9 units of NovoLog before meals, no insulin before snacks and if her blood sugar is less than 90 premeal then she is not to take any of the short acting insulin.

## 2022-02-16 NOTE — LETTER
(Inserted Image. Unable to display)     December 23, 2019      GARRICK RAMIREZ  [address]  [place], [place] [id number]          Dear GARRICK,      Thank you for selecting [facility] (previously [facility]) for your healthcare needs.      Our records indicate you are due for the following services:     Follow-up office visit with Dr. Shai Taylor or Dr. Jonathan Parekh to discuss sleep.      To schedule an appointment or if you have further questions, please contact your primary clinic:   [facility]       [phone]   [facility]       [phone]              [facility]     [phone]      Powered by [employer] and [employer]    Sincerely,    Samanta Maza MD

## 2022-02-16 NOTE — NURSING NOTE
Generalized Anxiety Disorder Screening Entered On:  3/17/2020 3:01 PM CDT    Performed On:  3/17/2020 3:00 PM CDT by Ishan Soni CMA               Generalized Anxiety Disorder Screening   ALEJANDRA Nervous, Anxious On Edge :   More than half the days   ALEJANDRA Control Worrying B :   Several days   ALEJANDRA Worrying Too Much :   More than half the days   ALEJANDRA Trouble Relaxing :   Several days   ALEJANDRA Restless :   Several days   ALEJANDRA Easily Annoyed/Irritable :   Several days   ALEJANDRA Afraid :   More than half the days   ALEJANDRA Total Screening Score :   10    ALEJANDRA Difficulty with Work, Home, Others :   Not difficult at all   Ishan Soni CMA - 3/17/2020 3:00 PM CDT

## 2022-02-16 NOTE — PROGRESS NOTES
Seen for COVID testing at [facility] per Dr. Maza    O2 Sat = 98%  (Children under 12 do not require O2 sat)    Specimen sent to:  [facility]    PUI form faxed to: [facility].

## 2022-02-16 NOTE — TELEPHONE ENCOUNTER
---------------------  From: Samanta Maza MD   To: [facility] Message Pool ([id number]_[place] - [place]);     Sent: 1/12/2021 3:09:31 PM CST  Subject: General Message     please give pt about 10 minutes before calling her to set up a covid curbside test, she is arranging transportation, if she can't come today it could be done tomorrowPt transferred to scheduling.

## 2022-02-16 NOTE — NURSING NOTE
CAGE Assessment Entered On:  3/17/2020 3:00 PM CDT    Performed On:  3/13/2020 3:00 PM CDT by Ishan Soni CMA               Assessment   Have you ever felt you should cut down on your drinking :   No   Have people annoyed you by criticizing your drinking :   No   Have you ever felt bad or guilty about your drinking :   No   Have you ever taken a drink first thing in the morning to steady your nerves or get rid of a hangover (Eye-opener) :   No   CAGE Score :   0    Ishan Soni CMA - 3/17/2020 3:00 PM CDT

## 2022-02-16 NOTE — TELEPHONE ENCOUNTER
---------------------  From: Kushal Pisano Kaity   To: [facility] Message Pool ([id number] - [place]);     Sent: 2020 4:32:04 PM CDT  Subject: Message to [facility] about med packing     Please fax the following message to [facility] pharmacy:  -----------------------------------------    Hello [facility] staff:    Follow-up to my phone call this afternoon -   Please contact pt and describe bubble packing service. Pt has financial constraints so as little # cards as possible would be best - perhaps 1 card AM and 1 card PM.  Pt will be expecting your call.    Here is Chanelle's medication routine:  Mornin metformin  1 jardiance  1 clopidogrel  1 lisinpril  1 metoprolol   1 multivitamin  1 pantoprazole  Evenin metformin  1 lortadine  1 escitalopram  1 metoprolol  1 rosuvastatin    Thanks you and let me know if any questions.  [facility].

## 2022-02-16 NOTE — NURSING NOTE
Quick Intake Entered On:  9/27/2021 3:10 PM CDT    Performed On:  9/27/2021 3:09 PM CDT by Avril Martinez               Summary   Advance Directive :   No   Weight Measured :   152.2 lb(Converted to: 152 lb 3 oz, 69.037 kg)    Height Measured :   61.75 in(Converted to: 5 ft 2 in, 156.84 cm)    Body Mass Index :   28.06 kg/m2 (HI)    Body Surface Area :   1.73 m2   Avril Martinez - 9/27/2021 3:09 PM CDT

## 2022-02-16 NOTE — LETTER
(Inserted Image. Unable to display)                                                                                                [address] [address]                                                December 09, 2019Re: GARRICK RAMIREZ1955Todragan Long [facility] [address]  [address] [place] [id number]: Dr. oLngThe following patient has been referred to your office/practice:  GARRICK RAMIREZ Appointment:  12/18/2019 @ 1:50pmLocation:  [place]Please refer to the attached  clinical documentation for a summary of GARRICK's care.  Please do not hesitate to contact our office if any additional clinical questions arise.  All relevant records and transition of care documents should be mailed or faxed.Your assistance in providing continuity of care is appreciated. Sincerely, [facility] [place] [place] [address](P) [phone](F) [phone]

## 2022-02-16 NOTE — PROGRESS NOTES
Patient:   GARRICK RAMIREZ            MRN: [id number]            FIN: [id number]               Age:   65 years     Sex:  Female     :  1955   Associated Diagnoses:   None   Author:   Avril Martinez      Doctor: Shaunna  Patient Information  (Medicare and address information is on file)  Medicare HICN#: _  Address:_ City:_ State:_ Zip:_  Home Phone:_ Work Phone:_ Other Contact Phone:_    Diabetes self-management training (DSMT) and medical nutrition therapy (MNT) are individual and complementary services to improve diabetes care. For Medicare beneficiaries, both services can be ordered in the same year. Research indicates MNT combined with DSMT improves outcomes.    Diabetes Self-Managment Training (DSMT)  Medicare: 10 hours initial DSMT in 12 month period, plus 2 hours follow-up annually  *Check type of training services and number of hours requested:  _ Initial DSMT:  10 hours or _ no. hrs. requested  X Follow-up DSMT: X 2 hours or _ no. hrs. requested  _ Additional insulin training: _ no. hrs. requested    *Patients with special needs requiring individual DSMT  Check all special needs that apply:  _ Vision _ Hearing  _ Physical  _Cognitive Impairment  _ Language limitations X Other  No classes offered / pandemic precautions     *DSMT Content  X All ten content areas, as appropriate  _ Monitoring diabetes    _ Diabetes as disease process  _ Psychological adjustment _ Physical activity  _ Nutritional management  _ Goal setting, problem solving  _ Medications       _ Prevent, detect and treat acute complications  _ Preconception/pregnancy _ Prevent, detect and teat chronic complications     management or gestational     diabetes management    Medical Nutrition Therapy (MNT)  Medicare: 3 hours initial MNT in the first calendar year, plus two hours follow-up MNT annually. Additional MNT hours available for change in medical condition, treament and/or diagnosis.  *Check the type of MNT and/or number of additional  hours requested:  _ Initial MNT:   X Annual follow-up MNT  _ Additional MNT services in the same calendar year, per RD recommendations  _ number of additional hours requested    Please specify change in medical condition, treatment and/or diagnosis      *Diagnosis  Please send recent labs for patient eligibility & outcomes monitoring  _ Type 1 uncontrolled  _ Type 1 controlled  X Type 2 uncontrolled  _ Type 2 controlled  _ Gestational diabetes _ Other _    Complications/Comorbidities  Check all that apply:  X Hypertension   X Dyslipidemia _ Stroke      _ Nephropathy  _ Neuropathy    _ Retinopathy  _ Renal disease   _ CHD  _ PVD       _ Pregnancy  _ Non-healing wound _ Obesity    _ Mental/affective disorder     _ Other _    Current Diabetes Medications  Specify type, dose and frequency  Oral: Jardiance 25mg, Metformin XR 500mg ii tabs BID   Insulin: Basaglar 15u BID, Humalog 11u before meals and 4u before snacks   Patient now uses: X Pen _ Needle _ Pump    Patient Behavior Goals/Plan of Care    A1c <7%,  Minimize the risk for hypoglycemia and reduce risks of developing complications related to uncontrolled diabetes.    Signature and UPIN #: (UPIN and NPI are on file)  Group/Practice name, address and phone: [facility], [facility] [facility]  [address] [phone]

## 2022-02-16 NOTE — TELEPHONE ENCOUNTER
Entered by Ambika Young CMA on April 22, 2020 9:41:43 AM CDT  ---------------------  From: Ambika Young CMA   To: William Mast    Sent: 4/22/2020 9:41:43 AM CDT  Subject: Medication Management     ** Submitted: **  Order:lisinopril (lisinopril 40 mg oral tablet)  1 tab(s)  Oral  daily  Qty:  90 tab(s)        Refills:  0          Substitutions Allowed     Route To Pharmacy - [facility]    Signed by Ambika Young CMA  4/22/2020 2:41:00 PM    ** Submitted: **  Complete:lisinopril (lisinopril 40 mg oral tablet)   Signed by Ambika Young CMA  4/22/2020 2:41:00 PM    ** Not Approved:  **  lisinopril (LISINOPRIL   TAB 40MG TABLET)  TAKE 1 TABLET BY MOUTH ONCE DAILY  Qty:  30 unknown unit        Days Supply:  0        Refills:  0          Substitutions Allowed     Route To Pharmacy - [facility]   Note from Pharmacy:  90 DAY FILL PLEASE?  Signed by Ambika Young CMA            ------------------------------------------  From: William Mast  To: Samanta Maza MD  Sent: April 21, 2020 9:59:24 AM CDT  Subject: Medication Management  Due: April 22, 2020 9:59:24 AM CDT    ** On Hold Pending Signature **  Drug: lisinopril (lisinopril 40 mg oral tablet)  TAKE 1 TABLET BY MOUTH ONCE DAILY  Quantity: 30 unknown unit  Days Supply: 0  Refills: 0  Substitutions Allowed  Notes from Pharmacy: 90 DAY FILL PLEASE?    Dispensed Drug: lisinopril (lisinopril 40 mg oral tablet)  TAKE 1 TABLET BY MOUTH ONCE DAILY  Quantity: 30 unknown unit  Days Supply: 0  Refills: 0  Substitutions Allowed  Notes from Pharmacy: 90 DAY FILL PLEASE?  ------------------------------------------Date of last office visit and reason:  3/13/2020 med check      Date of last Med Check / Px:   3/13/2020  Date of last labs pertaining to med:  3/13/2020    RTC order in chart:  yes, MTM check 1 week    For Protocol refill, has patient been contacted:  n/a

## 2022-02-16 NOTE — TELEPHONE ENCOUNTER
Entered by Natalia Tierney LPN on October 27, 2021 9:43:24 AM CDT  ---------------------  From: Natalia Tierney LPN   To: [facility]    Sent: 10/27/2021 9:43:23 AM CDT  Subject: Medication Management     ** Submitted: **  Order:Miscellaneous Prescription (ULTICARE MICRO PEN NEEDLES/32G X 4MM [id number] MISC)  See Instructions  USE FOUR TIMES A DAY  Qty:  100 EA        Days Supply:  25        Refills:  3          Substitutions Allowed     Route To Pharmacy - [facility]    Signed by Natalia Tierney LPN  10/27/2021 2:42:00 PM [facility]    ** Not Approved:  **  Miscellaneous Prescription (ULTICARE MICRO PEN NEEDLES/32G X 4MM [id number] MISC)  USE FOUR TIMES A DAY  Qty:  100 EA        Days Supply:  25        Refills:  3          Substitutions Allowed     Route To Pharmacy - [facility]   Signed by Natalia Tierney LPN          Med Refill      Date of last office visit and reason:  _8/19/21      Date of last Med Check / Px:   8/19/21  Date of last labs pertaining to med:  _    RTC order in chart:  yes- placed 8/19/21 to  6 months for DM check and labs    For Protocol refill, has patient been contacted:  no        ** Patient matched by Natalia Tierney LPN on 10/27/2021 9:35:25 AM CDT **      ------------------------------------------  From: [employer]  To: Samanta Maza MD  Sent: October 25, 2021 9:35:44 AM CDT  Subject: Medication Management  Due: October 21, 2021 8:18:04 PM CDT     ** On Hold Pending Signature **     Drug: ULTICARE MICRO PEN NEEDLES/32G X 4MM [id number] MISC, USE FOUR TIMES A DAY  Quantity: 100 EA  Days Supply: 25  Refills: 3  Substitutions Allowed  Notes from Pharmacy:     Dispensed Drug: ULTICARE MICRO PEN NEEDLES/32G X 4MM [id number] MISC, USE FOUR TIMES A DAY  Quantity: 100 EA  Days Supply: 25  Refills: 3  Substitutions Allowed  Notes from Pharmacy:  ------------------------------------------

## 2022-02-16 NOTE — TELEPHONE ENCOUNTER
"---------------------  From: Kushal Pisano , Carly   To: [employer] Message Pool ([id number]_[facility]);     Cc: Samanta Maza MD;      Sent: 1/21/2021 11:01:40 AM CST !  Subject: Freestyle Dm 2 CGM through [employer]     Patient has been waiting 3 months for this CGM    Called to [facility] today.     They informed me that the prescriptions for Chanelle's Freestyle dm 2 and the detailed written order are not valid as they do not specify # of insulin injections per day and # of times to test per day.     Please fax the following to [facility]:  1. New Rx for Freestyle dm 2 reader: test blood sugar 4 times daily per   2. New Rx for Freestyle dm 2 sensor: test blood sugar 4 times daily  3. Detailed written order: please ensure you have the ppk for the Freestyle Dm 2 detailed written order.  The file from 10/27/2020 will not be valid as we need to make edits and per Medicare there cannot be scribbles on the form. So we need a new form. Let me know if you need me to email this to you.   -Prescribed # of glucose tests per day: must say \"4 times daily\"  - Current insulin regimen: check the box that says multiple daily injections-number per day: must say 5 per day.  Must be signed by [facility].    Thank you for your attention to this.  [facility] contact info:  (Inserted Image. Unable to display)---------------------  From: Rtuh Ann Adams ([employer] Message Pool ([id number]_[facility]))   To: Samanta Maza MD;     Sent: 1/21/2021 11:42:11 AM CST  Subject: FW: Freestyle Dm 2 CGM through [employer]---------------------  From: Samanta Maza MD   To: [employer] Pool ([id number]_[facility]);     Cc: Avril Martinez;      Sent: 1/21/2021 2:31:33 PM CST  Subject: RE: Freestyle Dm 2 CGM through [employer]     Omero Vallecillo, do you think you can do this?  Or maybe Shell Martinez could help.  I will includer her on this message too.Recieved a call from [employer] at 3:15pm they are wondering on the status of this " order.    They gave me a fax number of [phone]  contact #: [phone]    Account #: [id number]---------------------  From: Ruth Ann Adams ([email or web address] Message Pool ([id number]_[facility]))   To: Avril Martinez;     Sent: 1/21/2021 4:40:02 PM CST  Subject: FW: Freestyle Dm 2 CGM through [facility]---------------------  From: Avril Martinez   To: [email or web address] Message Pool ([id number]_[place] [facility] [place]);     Sent: 1/21/2021 5:59:15 PM CST  Subject: RE: Freestyle Dm 2 CGM through [facility]     I will need to look into this further week of 1/25---------------------  From: Ruth Ann Adams ([email or web address] Message Pool ([id number]_[facility]))   To: Amparo Valdez CMA;     Sent: 1/22/2021 1:55:41 PM CST  Subject: FW: Freestyle Dm 2 CGM through [facility]

## 2022-02-16 NOTE — PROGRESS NOTES
Chief Complaint    2 week f/u on [facility] pharm. Discuss Mirtazapine.  History of Present Illness      patient present to clinic today for follow up.  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       is getting help from her daughter to get her meds straightened out and liks working with [facility], offered to have pt cont to work with [facility] for mood but she would like to cont to f/u with both of us.  she feels supported and likes her care team.      she has established with cardiology      she has been seen by Dr. Harris with Optho in 11/2019 so is up to date on vision exam, has an appt coming up with him soon for follow up.      she has hx of decreased sensaitonin her left heal,      she reports she has a hx ofmaking poor choices for relationship partners and is not planning to be deloris relationship but expresses sexual frustration from reading romance novels or from not acheiving climax with mastubtion      Review of systems is negative except as per HPI including:  no fevers, chills, sore throat, runny nose, nausea, vomiting, constipation, diarrhea, rash or new skin lesions, chest pain, palpitations, slurred speech, new paresthesia, shortness of breath or wheeze.      Exam:      General: alert and oriented ×3 no acute distress.      HEENT: pupils are equal round and reactive to light extraocular motion is intact. Normocephalic and atraumatic.       Hearing is grossly normal and there is no otorrhea.       Nares are patent there is no rhinorrhea.       Mucous membranes are moist and pink.      Chest: has bilateral rise with no increased work of breathing.      Cardiovascular: normal perfusion and brisk capillary refill.      Musculoskeletal: no gross focal abnormalities and normal gait.      Neuro:  no gross focal abnormalities and memory seems intact.  suspect borderline intelligence vs possible beginning ementia      Psychiatric: speech is clear and coherent and fluent. Patient dressed appropriately for the weather. Mood is appropriate and affect is full.  inight is fair, judgment is fair,       d                     Discussed with patient to return to clinic if symptoms worsen or do not improve  Physical Exam   Vitals & Measurements    T: 98.2   F (Tympanic)  HR: 82(Peripheral)  BP: 130/60  SpO2: 99%     WT: 151.2 lb   Assessment/Plan       Atherosclerotic heart disease of native coronary artery without angina pectoris (I25.10)         Ordered:          99162 office outpatient visit 40 minutes (Charge), Quantity: 1, Atherosclerotic heart disease of native coronary artery without angina pectoris  Essential (primary) hypertension  Generalized anxiety disorder  Type 2 diabetes mellitus without complications                Diabetic neuropathy (E11.40)         Ordered:          Referral (Request), 01/10/20 10:29:00 CST, Referred to: Podiatry, Reason for referral: diabetic neuropathy, Diabetic neuropathy                Essential (primary) hypertension (I10)         Ordered:          36422 office outpatient visit 40 minutes (Charge), Quantity: 1, Atherosclerotic heart disease of native coronary artery without angina pectoris  Essential (primary) hypertension  Generalized anxiety disorder  Type 2 diabetes mellitus without complications                Generalized anxiety disorder (F41.1)         Ordered:          50901 office outpatient visit 40 minutes (Charge), Quantity: 1, Atherosclerotic heart disease of native coronary artery without angina pectoris  Essential (primary) hypertension  Generalized anxiety disorder  Type 2 diabetes mellitus without complications                Type 2 diabetes mellitus without complications (E11.9)         Ordered:          66836 office outpatient visit 40 minutes (Charge),  Quantity: 1, Atherosclerotic heart disease of native coronary artery without angina pectoris  Essential (primary) hypertension  Generalized anxiety disorder  Type 2 diabetes mellitus without complications                Orders:         escitalopram, See Instructions, Instructions: 1/2 tab po qday x1 week then up to 1 tab po qday, # 30 tab(s), 1 Refill(s), Type: Maintenance, Pharmacy: [facility], 1/2 tab po qday x1 week then up to 1 tab po qday, (Ordered)         mirtazapine, = 1 tab(s) ( 15 mg ), Oral, hs, # 30 tab(s), 1 Refill(s), Type: Maintenance, Pharmacy: [facility], please discontinue citalopram 40 mg. Please  to take mirtaz in the evening., 1 tab(s) Oral hs, (Completed)         Miscellaneous Rx Supply, Lachydrin, See Instructions, Instructions: Lachydrin or similar product- apply to feet QHS, Supply, PRN: dry skin, # 1 EA, 1 Refill(s), Type: Maintenance, Pharmacy: [facility], Lachydrin or similar product- apply to feet QHS,PRN:dry skin, (Ordered)         pantoprazole, = 1 tab(s) ( 40 mg ), Oral, daily, # 90 tab(s), 3 Refill(s), Type: Maintenance, Pharmacy: [facility], 1 tab(s) Oral daily, (Ordered)         Return to Clinic (Request), RFV: f/u med and mood, Return in 1 month      45 minutes spent with patient in direct face to face contact, > 50% of time spent counseling and coordinating care.   Patient Information     Name:GARRICK RAMIREZ      Address:      [address] [place] [id number]     Sex:Female     YOB: 1955     [phone]     Emergency Contact:KADE MURRELL     MRN:[id number]     FIN:[id number]     Location:[facility] [place]     Date of Service:01/10/2020      Primary Care Physician:       Samanta Maza MD, [phone]      Attending Physician:       Samanta Maza MD, [phone]  Problem List/Past Medical History    Ongoing     Acute myocardial infarction, unspecified     Atherosclerotic heart disease of native  coronary artery without angina pectoris     CAD in native artery     Combined forms of age-related cataract, left eye     Combined forms of age-related cataract, right eye     Essential (primary) hypertension     Generalized anxiety disorder     History of MI (myocardial infarction)     Infectious gastroenteritis and colitis, unspecified     Insomnia, unspecified     Major depressive disorder, single episode, unspecified     Myopia, bilateral     Obstructive sleep apnea (adult) (pediatric)     Polypharmacy     Pure hypercholesterolemia, unspecified     Stable angina     Stress incontinence (female) (male)     Type 2 diabetes mellitus without complications     Unspecified hearing loss, bilateral     Unspecified open wound, left foot, initial encounter     Unstable angina    Historical     Pregnancy  Procedure/Surgical History     Esophagogastroduodenoscopy (09/27/2019)            Comments: Indication: Chronic heartburn..     IOL - Cataract extraction and insertion of intraocular lens (05/21/2019)            Comments: Right..     IOL - Cataract extraction and insertion of intraocular lens (05/07/2019)            Comments: Left..     Coronary artery stent x 3 (07.2011)           Angioplasty (2011)           Tubal ligation (1979)        Medications    Basaglar KwikPen 100 units/mL subcutaneous solution, 24 unit(s), Subcutaneous, bid, 1 refills    clopidogrel 75 mg oral tablet, 75 mg= 1 tab(s), Oral, daily    glucose 4 g oral tablet, chewable, 16 gm= 4 tab(s), Chewed, once    Jardiance 10 mg oral tablet, 10 mg= 1 tab(s), Oral, qam, 3 refills    Lachydrin, See Instructions, PRN, 1 refills    lancet bowman, See Instructions    Lexapro 10 mg oral tablet, See Instructions, 1 refills    lisinopril 40 mg oral tablet, 40 mg= 1 tab(s), Oral, daily, 3 refills    loratadine 10 mg oral capsule, 10 mg= 1 cap(s), Oral, daily    metFORMIN 500 mg oral tablet, extended release, 1000 mg= 2 tab(s), Oral, bid, 3 refills    metoprolol  tartrate 25 mg oral tablet, 25 mg= 1 tab(s), Oral, bid, 3 refills    multivitamin with minerals (w/ Iron), 1 tablet, Oral, daily    nitroglycerin 0.4 mg sublingual tablet, See Instructions, PRN, 3 refills    NovoLOG FlexPen 100 units/mL injectable solution, See Instructions, 1 refills    pantoprazole 40 mg oral delayed release tablet, 40 mg= 1 tab(s), Oral, daily, 3 refills    raNITIdine 150 mg oral capsule, 150 mg= 1 cap(s), Oral, bid, PRN    rosuvastatin 20 mg oral tablet, 20 mg= 1 tab(s), Oral, daily    true metrix glucometer test strips, See Instructions, 11 refills  Allergies    traZODone (Nightmares)    Cats (Runny nose)    Lipitor (Diarrhea, nausea, vomiting)    Sodium Hypochlorite (Runny nose)    mirtazapine (Suicidal ideation)  Social History    Smoking Status - 01/10/2020     Never smoker     Alcohol      Never, 12/10/2019     Exercise      Exercise frequency: 1-2 times/week. Exercise type: Walking., 12/10/2019     Home/Environment      Marital status: Single. 1 children. Living situation: Home/Independent. Injuries/Abuse/Neglect in household: No. Feels unsafe at home: No. Family/Friends available for support: Yes., 12/10/2019     Nutrition/Health      Type of diet: Regular. Wants to lose weight: Yes. Sleeping concerns: Yes. Feels highly stressed: Yes., 12/10/2019     Sexual      Sexually active: No. Identifies as female, Sexual orientation: Straight or heterosexual. History of STD: No. Contraceptive Use Details: Abstinence. History of sexual abuse: No., 12/10/2019     Substance Abuse      Never, 12/10/2019     Tobacco - Past, 11/22/2019      Quit in 1999, Cigarettes, 11/22/2019  Family History    Arthritis: Grandmother (M).    CVA - Cerebrovascular accident: Mother and Father.    Diabetes mellitus: Mother and Father.    Glaucoma: Sister.  Immunizations      Vaccine Date Status          influenza virus vaccine, inactivated 12/06/2019 Given          influenza virus vaccine, inactivated 10/02/2018 Recorded           Td 03/23/2018 Recorded          tetanus/diphth/pertuss (Tdap) adult/adol 03/23/2018 Recorded          influenza virus vaccine, inactivated 12/16/2016 Recorded          pneumococcal (PCV13) 12/16/2016 Recorded          pneumococcal (PPSV23) 10/20/2015 Recorded          influenza virus vaccine, inactivated 10/20/2015 Recorded          influenza virus vaccine, inactivated 12/18/2013 Recorded          influenza virus vaccine, inactivated 09/21/2009 Recorded  Lab Results       Lab Results (Last 4 results within 90 days)        Sodium Level: 136 mmol/L [135 mmol/L - 146 mmol/L] (12/06/19 11:22:00)       Potassium Level: 4.2 mmol/L [3.5 mmol/L - 5.3 mmol/L] (12/06/19 11:22:00)       Chloride Level: 98 mmol/L [98 mmol/L - 110 mmol/L] (12/06/19 11:22:00)       CO2 Level: 26 mmol/L [20 mmol/L - 32 mmol/L] (12/06/19 11:22:00)       Glucose Level: 329 mg/dL High [65 mg/dL - 99 mg/dL] (12/06/19 11:22:00)       BUN: 12 mg/dL [7 mg/dL - 25 mg/dL] (12/06/19 11:22:00)       Creatinine Level: 0.59 mg/dL [0.5 mg/dL - 0.99 mg/dL] (12/06/19 11:22:00)       BUN/Creat Ratio: NOT APPLICABLE [6  - 22] (12/06/19 11:22:00)       eGFR: 97 mL/min/1.73m2 (12/06/19 11:22:00)       eGFR : 112 mL/min/1.73m2 (12/06/19 11:22:00)       Calcium Level: 9.7 mg/dL [8.6 mg/dL - 10.4 mg/dL] (12/06/19 11:22:00)       Hgb A1c: 11.7 High (12/06/19 11:22:00)       LDL Direct: 185 mg/dL High (12/06/19 11:22:00)       B-Natriuretic Peptide (BNP): 44 pg/mL (12/06/19 11:22:00)       U Microalbumin: 2.6 mg/dL (12/06/19 11:22:00)       Ur Creatinine: 76 mg/dL [20 mg/dL - 275 mg/dL] (12/06/19 11:22:00)       Ur Microalbumin/Creatinine Ratio: 34 High (12/06/19 11:22:00)       WBC: 5.2 [3.8  - 10.8] (12/06/19 11:22:00)       RBC: 5.04 [3.8  - 5.1] (12/06/19 11:22:00)       Hgb: 14.7 gm/dL [11.7 gm/dL - 15.5 gm/dL] (12/06/19 11:22:00)       Hct: 43.6 % [35 % - 45 %] (12/06/19 11:22:00)       MCV: 86.5 fL [80 fL - 100 fL] (12/06/19 11:22:00)        MCH: 29.2 pg [27 pg - 33 pg] (12/06/19 11:22:00)       MCHC: 33.7 gm/dL [32 gm/dL - 36 gm/dL] (12/06/19 11:22:00)       RDW: 13.1 % [11 % - 15 %] (12/06/19 11:22:00)       Platelet: 268 [140  - 400] (12/06/19 11:22:00)       MPV: 10.7 fL [7.5 fL - 12.5 fL] (12/06/19 11:22:00)  Foot exam patient has normal dorsal pedis and posterior tibialis pulses bilaterally.  She is brisk capillary refill.  She has no skin breakdown but does have some scaling diffusely as well as some thickened skin on her feet.  She has normal sensation by monofilament exam except at her left heel.

## 2022-02-16 NOTE — TELEPHONE ENCOUNTER
---------------------  From: Rivka Bales CMA ([facility] Pool ([id number]_[facility]))   To: [facility] Message Pool ([id number]_[place] [facility] [place]);     Sent: 3/17/2021 1:29:53 PM CDT  Subject: FW: Medication Management   Due Date/Time: 3/17/2021 8:56:00 AM CDT     last filled 12/20/19 #25, 3 refills  10/1/20 DM f/u        ** Submitted: **  Order:FLUoxetine (FLUoxetine 20 mg oral capsule)  1 cap(s)  Oral  qam  Qty:  30 cap(s)        Refills:  0          Substitutions Allowed     Route To Pharmacy - [email or web address]    Signed by Rivka Bales CMA  3/17/2021 6:28:00 PM UT    ** Submitted: **  Complete:FLUoxetine (FLUoxetine 20 mg oral capsule)   Signed by Rivka Bales CMA  3/17/2021 6:29:00 PM UTC    ** Not Approved:  **  FLUoxetine (FLUOXETINE HCL 20MG CAPSULE)  TAKE 1 CAPSULE BY MOUTH AM- STOP ESCITALOPRAM  Qty:  30 EA        Days Supply:  30        Refills:  2          Substitutions Allowed     Route To Pharmacy - [email or web address]   Signed by Rivka Bales CMA            ------------------------------------------  From: [employer]  To: Samanta Maza MD  Sent: March 16, 2021 8:56:55 AM CDT  Subject: Medication Management  Due: March 16, 2021 4:14:17 PM CDT     ** On Hold Pending Signature **     Drug: FLUoxetine (FLUoxetine 20 mg oral capsule), TAKE 1 CAPSULE BY MOUTH AM- STOP ESCITALOPRAM  Quantity: 30 EA  Days Supply: 30  Refills: 2  Substitutions Allowed  Notes from Pharmacy:     Dispensed Drug: FLUoxetine (FLUoxetine 20 mg oral capsule), TAKE 1 CAPSULE BY MOUTH AM- STOP ESCITALOPRAM  Quantity: 30 EA  Days Supply: 30  Refills: 2  Substitutions Allowed  Notes from Pharmacy:     ** On Hold Pending Signature **     Drug: NITROGLYCERIN 0.4MG TAB SUBLINGUAL, DISSOLVE ONE TABLET UNDER TONGUE EVERY FIVE MIN FOR CHEST PAIN AS NEEDED UP TO THREE DOSES CALL IF SYMPTOMS PERSIST 5 MIN AFTER 1ST DOSE CALL 911 AFTER 1ST DOSE IF PAIN PERSISTS  Quantity: 25 EA  Days Supply: 25  Refills: 3  Substitutions Allowed  Notes from Pharmacy:      Dispensed Drug: NITROGLYCERIN 0.4MG TAB SUBLINGUAL, DISSOLVE ONE TABLET UNDER TONGUE EVERY FIVE MIN FOR CHEST PAIN AS NEEDED UP TO THREE DOSES CALL IF SYMPTOMS PERSIST 5 MIN AFTER 1ST DOSE CALL 911 AFTER 1ST DOSE IF PAIN PERSISTS  Quantity: 25 EA  Days Supply: 25  Refills: 3  Substitutions Allowed  Notes from Pharmacy:  ---------------------------------------------------------------  From: Ruth Ann Adams ([employer] Message Pool ([id number]_[facility]))   To: Samanta Maza MD;     Sent: 3/18/2021 4:59:50 PM CDT  Subject: FW: Medication Management   Due Date/Time: 3/17/2021 8:56:00 AM CDT---------------------  From: Samanta Maza MD   To: [facility]    Sent: 3/25/2021 12:50:16 PM CDT  Subject: FW: Medication Management     ** Approved with modifications: **  Miscellaneous Prescription (NITROGLYCERIN 0.4MG TAB SUBLINGUAL)  DISSOLVE ONE TABLET UNDER TONGUE EVERY FIVE MIN FOR CHEST PAIN AS NEEDED UP TO THREE DOSES CALL IF SYMPTOMS PERSIST 5 MIN AFTER 1ST DOSE CALL 911 AFTER 1ST DOSE IF PAIN PERSISTS  Qty:  25 EA        Days Supply:  25        Refills:  3          Substitutions Allowed     Route To Pharmacy - [facility]

## 2022-02-16 NOTE — NURSING NOTE
Quick Intake Entered On:  11/8/2021 2:11 PM CST    Performed On:  11/8/2021 2:11 PM CST by Avril Martinez               Summary   Advance Directive :   No   Weight Measured :   154.9 lb(Converted to: 154 lb 14 oz, 70.261 kg)    Height Measured :   61.75 in(Converted to: 5 ft 2 in, 156.84 cm)    Body Mass Index :   28.56 kg/m2 (HI)    Body Surface Area :   1.75 m2   Avril Martinez - 11/8/2021 2:11 PM CST

## 2022-02-16 NOTE — NURSING NOTE
Quick Intake Entered On:  2/11/2021 4:19 PM CST    Performed On:  2/11/2021 4:18 PM CST by Avril Martinez               Summary   Weight Measured :   162.2 lb(Converted to: 162 lb 3 oz, 73.573 kg)    Height Measured :   62 in(Converted to: 5 ft 2 in, 157.48 cm)    Body Mass Index :   29.66 kg/m2 (HI)    Body Surface Area :   1.79 m2   Avril Martinez - 2/11/2021 4:18 PM CST

## 2022-02-16 NOTE — LETTER
(Inserted Image. Unable to display)         October 23, 2020        GARRICK RAMIREZ  [address] [phone]        Dear GARRICK,     Thank you for selecting [facility] for your healthcare needs. Below you will find the results of your recent test(s) done at our clinic.     H   Please continue to work with  and Shell Martinez.  Your HgBA1C goal is less than 8.  You had been doing great!!!      Result Name Current Result Previous Result Reference Range   Sodium Level (mmol/L)  137 10/22/2020  141 3/13/2020 135 - 146   Potassium Level (mmol/L)  3.8 10/22/2020  4.1 3/13/2020 3.5 - 5.3   Chloride Level (mmol/L)  100 10/22/2020  106 3/13/2020 98 - 110   CO2 Level (mmol/L)  24 10/22/2020  28 3/13/2020 20 - 32   Glucose Level (mg/dL) ((H)) 239 10/22/2020  70 3/13/2020 65 - 99   BUN (mg/dL)  19 10/22/2020  22 3/13/2020 7 - 25   Creatinine Level (mg/dL)  0.73 10/22/2020  0.70 3/13/2020 0.50 - 0.99   BUN/Creat Ratio  NOT APPLICABLE 10/22/2020  NOT APPLICABLE 3/13/2020 6 - 22   eGFR (mL/min/1.73m2)  86 10/22/2020  92 3/13/2020 > OR = 60 -    eGFR  (mL/min/1.73m2)  100 10/22/2020  106 3/13/2020 > OR = 60 -    Calcium Level (mg/dL)  9.7 10/22/2020  9.2 3/13/2020 8.6 - 10.4   Hgb A1c ((H)) 11.4 10/22/2020 ((H)) 6.8 3/13/2020  - <5.7     Please contact my practice at [phone] if you have any questions or concerns.     Sincerely,        Samanta Maza MD      What do your labs mean?  Below is a glossary of commonly ordered labs:  LDL   Bad Cholesterol   HDL   Good Cholesterol  AST/ALT   Liver Function   Cr/Creatinine   Kidney Function  Microalbumin   Kidney Function  BUN   Kidney Function  PSA   Prostate    TSH   Thyroid Hormone  HgbA1c   Diabetes Test   Hgb (Hemoglobin)   Red Blood Cells

## 2022-02-16 NOTE — TELEPHONE ENCOUNTER
"---------------------  From: Trevor/Alison MARLOW (Phone Messages Pool ([id number]))   To: [facility] Message Pool ([address] [place] [address]);     Sent: 10/28/2020 1:41:36 PM CDT  Subject: General Message     Phone Message    PCP: GRETTA    Time of Call: [id number]    Phone Number: [phone]    Returned call at: n/a    Note: Received message from Uday from [facility] about the Freestyle Dm 2 and a certificate of medical necessity and chart notes. He stated that the notes need to meet medicare guidelines and is wondering if the notes can be fixed and faxed back at [phone].---------------------  From: Ambika Young CMA ([facility] Message Pool ([facility]))   To: Bob Maza MDica;     Cc: Kushal Pisano , Carly;      Sent: 10/28/2020 1:53:52 PM CDT  Subject: FW: General Message     Dr. Pisano,    Do you know what ENMANUEL needs to do to her note to make this meet medicare guidelines?    Ambika Perez---------------------  From: Kushal Pisano Kaity   To: [facility] Message Pool ([facility]);     Sent: 10/28/2020 3:36:32 PM CDT  Subject: RE: General Message     KALEY Apple connected with Uday via [employer].    The CMN form is the one I emailed you. Uday says there is an error on that form.  When it asks number of glucose tests it says 98 hours. This needs to be corrected for medicare to cover it. Needs to say testing at least 4 times daily.    As far as the note - They need more specific details about her insulin regimen to submit to medicare.     [facility] - can you add this to your note:  \"insulin regimen:long-acting insulin: inject 26 units twice daily; Novolog insulin: inject 18 units BEFORE meals three times daily and 6 units BEFORE snacks.\"    If there are further questions or you can call Uday Merlos directly at [phone]    KB---------------------  From: Kushal Pisano , Carly   To: Shaunna SANTANA Samanta;     Sent: 10/28/2020 3:41:28 PM CDT  Subject: FW: General " "Message---------------------  From: Samanta Maza MD   To: Kushal Pisano Kaity;     Cc: IMN Message Pool ([id number]_[place]);      Sent: 10/28/2020 8:14:58 PM CDT  Subject: RE: General Message     thanks, note has been revised, does it have to be faxed to the pharmacy?Re-faxed.---------------------  From: Kushal Pisano Kaity   To: Samanta Maza MD;     Cc: IMN Message Pool ([id number]_[facility]) ([place] Ambika GROVER);      Sent: 10/29/2020 9:02:53 AM CDT  Subject: RE: General Message     Dr. Maza,     Did you update the note from 10/1? Ambika - which note did you fax over?  Uday (pharmacy) said he got the updated note, but it doesn't look like anything was updated?  I checked the note from 10/1 and I don't see the bit about the insulin regimen in that note.. maybe I'm missing it?    [facility] faxed 10/1 note...[facility] please advise on update.---------------------  From: Kushal Pisano Kaity   To: IMN Message Pool ([id number]_[place]);     Sent: 10/29/2020 2:13:43 PM CDT  Subject: RE: General Message     Ambika - did you refax the CMN form? We need to change the \"98 hours\" which I suspect is meant to say q8 hours :) to \"4 times daily.\" Medicare will not accept every 8 hours.    And Yes, we still need [facility] to update the insulin regimen on the 10/1 note or notify where this revision was made.    [facility] CMN form was changed.noted, thanks!---------------------  From: Kushal Pisano Kaity   To: Samanta Maza MD;     Sent: 10/29/2020 4:04:17 PM CDT  Subject: FW: General Message     DANAYP - It looks like you updated the note from 3/2020, not 10/2020.   Can you please add this:   \"insulin regimen:long-acting insulin: inject 26 units twice daily; Novolog insulin: inject 18 units BEFORE meals three times daily and 6 units BEFORE snacks\"  to the note from 10/2020? Thanks!  KB---------------------  From: Shaunna SANTANA, Samanta   To: Aliya, Pharm D , [facility];     Cc: GRETTA Message Pool ([id number]_[facility]);   "    Sent: 10/29/2020 5:34:20 PM CDT  Subject: RE: General Message     note updated, let's give it a try!Faxed.---------------------  From: Kushal Pisano , Carly   To: [facility] Message Pool ([id number]_[place]);     Sent: 11/3/2020 10:23:48 PM CST  Subject: RE: General Message     Received message from [facility] - all documentation is complete - thanks!  CSS - can you contact pt and ask her to call  pharmacy [facility] mailorder pharmacy phone: [phone] to arrange delivery of medication?  KBPt notified at 1030- she states she will call [facility] mail order today to set up delivery.

## 2022-02-16 NOTE — TELEPHONE ENCOUNTER
---------------------  From: Natalia Tierney LPN (Phone Messages Pool ([id number]_[facility]))   To: [facility] Message Pool ([id number]_[place]);     Sent: 12/2/2020 3:43:39 PM CST  Subject: PA fax     Phone Message    PCP:   GRETTA      Time of Call:  2:42pm       Person Calling:  [facility]  Phone number:  [phone]    Note:   Pharmacy LM stating they had sent faxed for a [place] Medicaid PA on 11/24 and 11/30 to be filled out. pharmacy asking that these be completed and sent back. Fax [phone].    Call if not received and they will refax.    Last office visit and reason:  _---------------------  From: Cathi Arenas CMA ([facility] Message Pool ([id number]_[facility]))   To: Samanta Maza MD;     Sent: 12/2/2020 3:52:53 PM CST  Subject: FW: PA fax     Have you filled out a PA for pt recently?---------------------  From: Samanta Maza MD   To: [facility] Message Pool ([id number]_[place]);     Sent: 12/3/2020 4:58:37 PM CST  Subject: RE: PA fax     no, not recenlty, I can check my box next weekI had a visit with the pt today; she has not yet recieved the CGM From [facility]. We called [facility] today and they are waiting on [place] medicaid PA forms to be completed by ENMANUEL and faxed back.  TERRY

## 2022-02-16 NOTE — NURSING NOTE
Comprehensive Intake Entered On:  10/1/2020 1:03 PM CDT    Performed On:  10/1/2020 12:54 PM CDT by Ambika Young CMA               Summary   Chief Complaint :   DM check.    Weight Measured :   171.4 lb(Converted to: 171 lb 6 oz, 77.746 kg)    Systolic Blood Pressure :   140 mmHg (HI)    Diastolic Blood Pressure :   80 mmHg   Mean Arterial Pressure :   100 mmHg   Peripheral Pulse Rate :   82 bpm   BP Site :   Right arm   BP Method :   Manual   HR Method :   Electronic   Temperature Tympanic :   97.2 DegF(Converted to: 36.2 DegC)  (LOW)    Oxygen Saturation :   96 %   Ambika Young CMA - 10/1/2020 12:54 PM CDT   Health Status   Allergies Verified? :   Yes   Medication History Verified? :   Yes   Pre-Visit Planning Status :   N/A   Tobacco Use? :   Never smoker   Ambika Young CMA - 10/1/2020 12:54 PM CDT   Consents   Consent for Immunization Exchange :   Consent Granted   Consent for Immunizations to Providers :   Consent Granted   Ambika Young CMA 10/1/2020 12:54 PM CDT   Meds / Allergies   (As Of: 10/1/2020 1:03:16 PM CDT)   Allergies (Active)   Cats  Estimated Onset Date:   Unspecified ; Reactions:   Runny nose ; Created By:   Ana Reese; Reaction Status:   Active ; Category:   Drug ; Substance:   Cats ; Type:   Allergy ; Updated By:   Ana Reese; Reviewed Date:   3/13/2020 9:38 AM CDT      Lipitor  Estimated Onset Date:   Unspecified ; Reactions:   Diarrhea, nausea, vomiting ; Created By:   Ana Reese; Reaction Status:   Active ; Category:   Drug ; Substance:   Lipitor ; Type:   Allergy ; Updated By:   Ana Reese; Reviewed Date:   3/13/2020 9:38 AM CDT      mirtazapine  Estimated Onset Date:   Unspecified ; Reactions:   Nightmare, Suicidal ideation ; Comments:     Comment 1: intolerance   ; Created By:   Kushal Pisano Kaity; Reaction Status:   Active ; Category:   Drug ; Substance:   mirtazapine ; Type:   Allergy ; Updated By:   Kushal Pisano Kaity; Source:   Patient ; Reviewed Date:    3/13/2020 9:38 AM CDT      Sodium Hypochlorite  Estimated Onset Date:   Unspecified ; Reactions:   Runny nose ; Created By:   Ana Reese; Reaction Status:   Active ; Category:   Drug ; Substance:   Sodium Hypochlorite ; Type:   Allergy ; Updated By:   Ana Reese; Reviewed Date:   3/13/2020 9:38 AM CDT      traZODone  Estimated Onset Date:   Unspecified ; Reactions:   Nightmares ; Created By:   Kushal Pisano Kaity; Reaction Status:   Active ; Category:   Drug ; Substance:   traZODone ; Type:   Side Effect ; Severity:   Low ; Updated By:   Kushal Pisano Kaity; Source:   Patient ; Reviewed Date:   3/13/2020 9:38 AM CDT        Medication List   (As Of: 10/1/2020 1:03:16 PM CDT)   Prescription/Discharge Order    clopidogrel  :   clopidogrel ; Status:   Prescribed ; Ordered As Mnemonic:   clopidogrel 75 mg oral tablet ; Simple Display Line:   75 mg, 1 tab(s), Oral, daily, in the AM, 30 tab(s), 3 Refill(s) ; Ordering Provider:   Samanta Maza MD; Catalog Code:   clopidogrel ; Order Dt/Tm:   9/18/2020 4:26:12 PM CDT          empagliflozin  :   empagliflozin ; Status:   Prescribed ; Ordered As Mnemonic:   Jardiance 10 mg oral tablet ; Simple Display Line:   10 mg, 1 tab(s), Oral, qam, 90 tab(s), 1 Refill(s) ; Ordering Provider:   Samanta Maza MD; Catalog Code:   empagliflozin ; Order Dt/Tm:   4/23/2020 3:33:57 PM CDT          escitalopram  :   escitalopram ; Status:   Prescribed ; Ordered As Mnemonic:   escitalopram 10 mg oral tablet ; Simple Display Line:   10 mg, 1 tab(s), Oral, daily, qhs, 90 tab(s), 3 Refill(s) ; Ordering Provider:   Samanta Maza MD; Catalog Code:   escitalopram ; Order Dt/Tm:   3/13/2020 10:37:26 AM CDT          glucose  :   glucose ; Status:   Prescribed ; Ordered As Mnemonic:   glucose 4 g oral tablet, chewable ; Simple Display Line:   16 gm, 4 tab(s), Chewed, once, Use if BG <70 mg/dL,  check BG 15.  Repeat if BG remains less than 80 mg/dL., 4 tab(s), 0 Refill(s) ; Ordering  Provider:   Samanta Maza MD; Catalog Code:   glucose ; Order Dt/Tm:   12/20/2019 11:50:23 AM CST          insulin degludec  :   insulin degludec ; Status:   Prescribed ; Ordered As Mnemonic:   Tresiba FlexTouch 100 units/mL subcutaneous solution ; Simple Display Line:   See Instructions, 26u twice a day, 60 mL, 1 Refill(s) ; Ordering Provider:   Samanta Maza MD; Catalog Code:   insulin degludec ; Order Dt/Tm:   4/8/2020 8:49:57 AM CDT          insulin lispro  :   insulin lispro ; Status:   Prescribed ; Ordered As Mnemonic:   HumaLOG KwikPen 100 units/mL injectable solution ; Simple Display Line:   See Instructions, 18u before each meal   6u before snacks  take 15 min prior to eating, 90 mL, 1 Refill(s) ; Ordering Provider:   Samanta Maza MD; Catalog Code:   insulin lispro ; Order Dt/Tm:   4/8/2020 8:48:12 AM CDT          metFORMIN  :   metFORMIN ; Status:   Prescribed ; Ordered As Mnemonic:   MetFORMIN (Eqv-Glucophage XR) 500 mg oral tablet, extended release ; Simple Display Line:   1,000 mg, 2 tab(s), Oral, bid, 360 tab(s), 3 Refill(s) ; Ordering Provider:   Samanta Maza MD; Catalog Code:   metFORMIN ; Order Dt/Tm:   9/17/2020 9:37:33 AM CDT          metoprolol  :   metoprolol ; Status:   Prescribed ; Ordered As Mnemonic:   Metoprolol Tartrate 25 mg oral tablet ; Simple Display Line:   25 mg, 1 tab(s), Oral, bid, 180 tab(s), 1 Refill(s) ; Ordering Provider:   Samanta Maza MD; Catalog Code:   metoprolol ; Order Dt/Tm:   4/23/2020 3:36:37 PM CDT          Miscellaneous Prescription  :   Miscellaneous Prescription ; Status:   Prescribed ; Ordered As Mnemonic:   lancet bowman ; Simple Display Line:   See Instructions, use as directed, 1 EA, 0 Refill(s) ; Ordering Provider:   Samanta Maza MD; Catalog Code:   Miscellaneous Prescription ; Order Dt/Tm:   12/6/2019 10:58:20 AM CST          Miscellaneous Prescription  :   Miscellaneous Prescription ; Status:   Prescribed ; Ordered As Mnemonic:    true metrix glucometer test strips ; Simple Display Line:   See Instructions, check QID: fasting BG, 2 hours after meals and before bed ., 100 EA, 11 Refill(s) ; Ordering Provider:   Samanta Maza MD; Catalog Code:   Miscellaneous Prescription ; Order Dt/Tm:   2/28/2020 11:37:26 AM CST          Miscellaneous Prescription  :   Miscellaneous Prescription ; Status:   Prescribed ; Ordered As Mnemonic:   ULTICARE MICRO PEN TIP 32G 4MM 50CT MG INJECTABLE ; Simple Display Line:   See Instructions, USE FOUR TIMES A DAY, 100 unknown unit, 3 Refill(s) ; Ordering Provider:   Samanta Maza MD; Catalog Code:   Miscellaneous Prescription ; Order Dt/Tm:   4/23/2020 3:35:28 PM CDT          Miscellaneous Rx Supply  :   Miscellaneous Rx Supply ; Status:   Prescribed ; Ordered As Mnemonic:   Incontience diapers ; Simple Display Line:   See Instructions, use 1 diaper per night, 30 EA, 11 Refill(s) ; Ordering Provider:   Samanta Maza MD; Catalog Code:   Miscellaneous Rx Supply ; Order Dt/Tm:   3/11/2020 10:27:58 AM CDT ; Comment:   Faxed to [employer] @ [phone]          Miscellaneous Rx Supply  :   Miscellaneous Rx Supply ; Status:   Prescribed ; Ordered As Mnemonic:   Lachydrin ; Simple Display Line:   See Instructions, Lachydrin or similar product- apply to feet QHS, PRN: dry skin, 1 EA, 1 Refill(s) ; Ordering Provider:   Samanta Maza MD; Catalog Code:   Miscellaneous Rx Supply ; Order Dt/Tm:   1/10/2020 10:31:46 AM CST          nitroglycerin  :   nitroglycerin ; Status:   Prescribed ; Ordered As Mnemonic:   nitroglycerin 0.4 mg sublingual tablet ; Simple Display Line:   See Instructions, 1 tab under tongue at chest pain.  May repeat x 3 q 5 min.  Call 911 after first dose if pain persists., PRN: for chest pain, 25 tab(s), 3 Refill(s) ; Ordering Provider:   Samanta Maza MD; Catalog Code:   nitroglycerin ; Order Dt/Tm:   12/20/2019 11:50:23 AM CST          pantoprazole  :   pantoprazole ; Status:    Prescribed ; Ordered As Mnemonic:   pantoprazole 40 mg oral delayed release tablet ; Simple Display Line:   40 mg, 1 tab(s), Oral, daily, 90 tab(s), 3 Refill(s) ; Ordering Provider:   Samanta Maza MD; Catalog Code:   pantoprazole ; Order Dt/Tm:   1/10/2020 10:27:07 AM CST          rosuvastatin  :   rosuvastatin ; Status:   Prescribed ; Ordered As Mnemonic:   rosuvastatin 20 mg oral tablet ; Simple Display Line:   20 mg, 1 tab(s), Oral, qhs, 30 tab(s), 11 Refill(s) ; Ordering Provider:   Samanta Maza MD; Catalog Code:   rosuvastatin ; Order Dt/Tm:   9/17/2020 8:26:57 PM CDT            Home Meds    calcium carbonate  :   calcium carbonate ; Status:   Documented ; Ordered As Mnemonic:   Tums 500 mg oral tablet, chewable ; Simple Display Line:   500 mg, 1 tab(s), Chewed, bid, as needed for heartburn, stomach pain, acid reflux, 0 Refill(s) ; Catalog Code:   calcium carbonate ; Order Dt/Tm:   9/17/2020 9:47:04 PM CDT          loratadine  :   loratadine ; Status:   Documented ; Ordered As Mnemonic:   loratadine 10 mg oral capsule ; Simple Display Line:   10 mg, 1 cap(s), Oral, daily, 0 Refill(s) ; Catalog Code:   loratadine ; Order Dt/Tm:   12/6/2019 9:58:20 AM CST          Miscellaneous Prescription  :   Miscellaneous Prescription ; Status:   Documented ; Ordered As Mnemonic:   Misc Prescription ; Simple Display Line:   Eye allergy relief  (pt does not know drug ingredient). Using as needed for eye allergy symptoms., 0 Refill(s) ; Catalog Code:   Miscellaneous Prescription ; Order Dt/Tm:   9/17/2020 9:49:08 PM CDT          multivitamin with minerals  :   multivitamin with minerals ; Status:   Documented ; Ordered As Mnemonic:   multivitamin with minerals (w/ Iron) ; Simple Display Line:   1 tablet, Oral, daily, 0 Refill(s) ; Catalog Code:   multivitamin with minerals ; Order Dt/Tm:   12/20/2019 4:20:37 PM CST            ID Risk Screen   Recent Travel History :   No recent travel   Family Member Travel History :    No recent travel   Other Exposure to Infectious Disease :   Unknown   Hannah GROVER, Ambika - 10/1/2020 12:54 PM CDT

## 2022-02-16 NOTE — TELEPHONE ENCOUNTER
Entered by Fahad Herrera on October 09, 2020 9:31:53 AM CDT  ---------------------  From: Fahad Herrera   To: [facility]    Sent: 10/9/2020 9:31:53 AM CDT  Subject: Medication Management     ** Submitted: **  Order:lisinopril (lisinopril 40 mg oral tablet)  See Instructions  TAKE 1 TABLET BY MOUTH ONCE DAILY -DUE FOR APPT PRIOR TO NEXT REFILL  Qty:  14 tab(s)        Refills:  0          Substitutions Allowed     Route To Pharmacy - [facility]    Signed by Fahad Herrera  10/9/2020 2:31:00 PM [facility]    ** Not Approved:  **  lisinopril (LISINOPRIL   TAB 40MG TABLET)  TAKE 1 TABLET BY MOUTH ONCE DAILY -DUE FOR APPT PRIOR TO NEXT REFILL  Qty:  30 unknown unit        Days Supply:  0        Refills:  0          Substitutions Allowed     Route To Pharmacy - [facility]   Signed by Fahad Herrera            ** Patient matched by Fahad Herrera on 10/9/2020 9:30:38 AM CDT **      ------------------------------------------  From: [employer]  To: Samanta Maza MD  Sent: October 8, 2020 12:30:28 PM CDT  Subject: Medication Management  Due: October 8, 2020 2:03:37 PM CDT     ** On Hold Pending Signature **     Dispensed Drug: lisinopril (lisinopril 40 mg oral tablet), TAKE 1 TABLET BY MOUTH ONCE DAILY -DUE FOR APPT PRIOR TO NEXT REFILL  Quantity: 30 unknown unit  Days Supply: 0  Refills: 0  Substitutions Allowed  Notes from Pharmacy:  ------------------------------------------Send 2 week supply. Pt has appt on 10/20

## 2022-02-16 NOTE — TELEPHONE ENCOUNTER
---------------------  From: Samanta Maza MD   To: Care Coordinators Pool ([facility]);     Sent: 12/6/2019 10:35:57 AM CST  Subject: General Message     please enroll pt in care coordination, alex f/u next week when GRETTA note donePt is to see MJP back in 4 weeks to review-she had several referrals placed-will make sure those appointments completed and we receive notes. I think it would be best to meet with pt and family face to face to discuss CC (sound like pt has some memory issues).     I will make sure that notes are received from speciality appointments and that appt gets schedule w/ GRETTA.     Ambika-tiffany grab Rosalie or I when she comes in next so we can discuss CC w/ her.---------------------  From: Francisca August CMA (Care Coordinators Pool ([facility]))   To: [facility] Message Pool ([id number]_[place]);     Cc: Rosalie Suarez CMA;      Sent: 12/12/2019 1:48:54 PM CST  Subject: RE: General Messagejudge.meo appt completed and note printed/sent back to [place] to be scanned in pt's chart. No podiatry appt completed yet.

## 2022-02-16 NOTE — TELEPHONE ENCOUNTER
---------------------  From: Kushal Pisano , Carly   To: Samanta Maza MD; Avril Martinez;     Cc: [facility] Message Pool ([id number]_[place]);      Sent: 9/22/2020 7:26:31 AM CDT  Subject: RE: MTM update     [facility] pool - please contact pt to discuss scheduling visit with Dr. Maza in order to get her started on the CGM. (also due for labs).  See below for details.  KB  ---------------------  From: Avril Matrinez   To: Samanta Maza MD; Kushal Pisano , Carly;     Sent: 9/21/2020 7:59:59 AM CDT  Subject: RE: MTM update     Hi  The freestyle roula 2 will work great for her.  Pt will need to see Dr. Maza before orders are written since it has been more than 6 months and the previous visit was not for diabetes.       Dr. Maza please include this in your note when you see her.      Patient has met criteria for CGM coverage;   - patient has type two diabetes mellitus; and  - patient has been using a blood glucose monitor and performing four or more blood glucose test per day; and  - patient is insulin treated with multiple daily injections of three or more times per day; and  - patient is requiring frequent insulin adjustments on the basis of blood glucose readings  - within six months prior to ordering the CGM, the patient has had an in-person visit with the practitioner; and determined that criteria (1-4) above are met; and   - every six months following the initial prescription of the CGM, the treating practitioner has an in-person visit with the patient to assess adherence to their CGM regimen and diabetes treatment plan       Please let me know after you see her.    Thank you,   Avril       ---------------------  From: Kushal Pisano Kaity   To: Samanta Maza MD; Avril Martinez;     Sent: 9/17/2020 10:04:04 PM CDT  Subject: MTM update     Rody -   I spoke with Chanelle today for MTM.     Here is the plan (see below).    You'll see my recommendation for a CGM. We've previously  discussed and pt is on board now. I recommend the Dm 2 - as this device is small in size, easy to self-administer (key for this pt) and does allow for low and high alarms.  Shell /Samanta - do you have a preference on which CGM we pursue for her?  Shell are you able to help with paperwork or we can have Samanta's CSS staff help, too.  I would recommend  mailorder pharmacy since pt now has medicare.  I was hoping that after she receives the CGM, she could come to clinic for teaching and A1c lab.    1. Due for A1c lab. You can complete this when you come to clinic for your next appointment with Avril Martinez RD, CD, VIKTORES.  2. Start writing down your blood sugars again. See the following pages for a log.  3. Call Sada to schedule a counseling appointment  Sada Aguero - Licensed Professional Counselor  [phone]  4. Start taking the rosuvastatin 20 mg tablet: 1 tablet by mouth once daily in the evening.  5. We will try to get you a continuous glucose monitor.  Once you get this, make an appointment with Avril Martinez RD, CD, CDCES to teach you how to use it.  6. I recommend that you start using bubble packing at [facility] so that you do not have to worry about setting up your pill boxes or missing medications.  7. See the attached updated medication list.Patient notified- transferred to scheduling.

## 2022-02-16 NOTE — PROGRESS NOTES
"   Patient:   GARRICK RAMIREZ            MRN: [id number]            FIN: [id number]               Age:   64 years     Sex:  Female     :  1955   Associated Diagnoses:   Type 2 diabetes mellitus without complications   Author:   Avril Martinez      Visit Information   Visit type:  Diabetes Self Management Education visit was conducted via telephone due to the COVID-19 pandemic. Patient consent, as follows, for telephone visit was obtained.  \"You have been scheduled for a telephone visit, which is a billable service.  This is a replacement for a face-to-face visit that is being recommended at this time to help keep our patients safe.  If during your phone visit you need a face - to - face visit, your phone visit will be cancelled and you will be rescheduled to come in to the clinic.  Are you agreeable with proceeding with a telephone visit?\".    Referral source:  Shaunna SANTANA, Samanta.       Chief Complaint   Type II diabetes, Hyperlipidemia, HTN       History of Present Illness   Pt transferred cares 2019 from [facility].  During her transition and moving pt did not take insulin as directed which resulted in a high a1c of 11.7% = 289mg/dL eAG 19 up from 8.5% 19.  Pt had maintained a1c's in the 8-9% range.  Since pt started working with [facility] and [facility] pt's a1c improved to 6.8% on 3/13/2020!   Pt has worked with [facility]TM to simply insulin regimen which has been working well for pt.  Pt was able to correctly recite insulin recommendation and has been testing 3 - 4x/ day.    BG Testing:    range 79 - 150's readings prior to meals   7 day avg 123 mg/dL  14 day avg 127 mg/dL  30 day avg 126 mg/dL       Insulin: Basaglar 26u BID  Novolog 18 units working on taking this prior to meals (dosing has decreased with improved nutritional choices)   6u prior to snacks  Pt states she is doing much better with insulin adherence   Metformin XR 500mg ii tabs BID  Jardiance 10 mg daily     Pt also brings today a " weekly pill box with am and pm dosing.  Pt is very proud to have this organized and taking as directed.      Nutrition: Pt states she is given $16 in food stamps and her daughter will purchase food, pt is now reading labels and has decreased sweets and decreased breads, Increased nonstarchy vegetables.    Eye exam 4/12/18 Dilated Retinal Eye Exam Communication Form provided during previous visit   Skin - no areas of concern  Immunizations -       Health Status   Allergies:    Allergic Reactions (Selected)  Severity Not Documented  Cats (Runny nose)  Lipitor (Diarrhea, nausea, vomiting)  Mirtazapine (Nightmare and suicidal ideation)  Sodium Hypochlorite (Runny nose)  Nonallergic Reactions (Selected)  Low  TraZODone (Nightmares)   Medications:  (Selected)   Prescriptions  Prescribed  HumaLOG KwikPen 100 units/mL injectable solution: See Instructions, Instructions: 24u before each meal   8u before snacks  take 15 min prior to eating, # 90 mL, 1 Refill(s), Type: Maintenance, Pharmacy: [facility], 24u before each meal ; 8u before snacks; take 15 min prior to eating  Incontience diapers: Incontience diapers, See Instructions, Instructions: use 1 diaper per night, Supply, # 30 EA, 11 Refill(s), Type: Maintenance  Jardiance 10 mg oral tablet: = 1 tab(s) ( 10 mg ), Oral, qam, # 90 tab(s), 1 Refill(s), Type: Maintenance, Pharmacy: [facility]  Lachydrin: Lachydrin, See Instructions, Instructions: Lachydrin or similar product- apply to feet QHS, Supply, PRN: dry skin, # 1 EA, 1 Refill(s), Type: Maintenance, Pharmacy: [facility], Lachydrin or similar product- apply to feet QHS,PRN:dry skin  Lexapro 20 mg oral tablet: = 1 tab(s) ( 20 mg ), Oral, daily, # 30 tab(s), 1 Refill(s), Type: Maintenance, Pharmacy: [facility], 1 tab(s) Oral daily  MetFORMIN (Eqv-Glucophage XR) 500 mg oral tablet, extended release: = 2 tab(s) ( 1,000 mg ), Oral, bid, # 360 tab(s), 1 Refill(s), Type: Maintenance, Pharmacy: [facility]  Metoprolol  Tartrate 25 mg oral tablet: = 1 tab(s) ( 25 mg ), Oral, bid, # 180 tab(s), 1 Refill(s), Type: Maintenance, Pharmacy: [facility]  Tresiba FlexTouch 100 units/mL subcutaneous solution: See Instructions, Instructions: 26u twice a day, # 60 mL, 1 Refill(s), Type: Maintenance, Pharmacy: [facility], 26u twice a day  ULTICARE MICRO PEN TIP 32G 4MM 50CT MG INJECTABLE: ULTICARE MICRO PEN TIP 32G 4MM 50CT MG INJECTABLE, See Instructions, Instructions: USE FOUR TIMES A DAY, Supply, # 100 unknown unit, 3 Refill(s), Type: Maintenance, Pharmacy: [facility], USE FOUR TIMES A DAY  escitalopram 10 mg oral tablet: = 1 tab(s) ( 10 mg ), Oral, daily, # 90 tab(s), 3 Refill(s), Type: Maintenance, Pharmacy: [facility], to replace previous Rx for 20 mg, 1 tab(s) Oral daily  glucose 4 g oral tablet, chewable: = 4 tab(s) ( 16 gm ), Chewed, once, Instructions: Use if BG <70 mg/dL,  check BG 15.  Repeat if BG remains less than 80 mg/dL., # 4 tab(s), 0 Refill(s), Type: Soft Stop, Pharmacy: [facility], 4 tab(s) Chewed once,Instr:Use if BG <70 mg/dL,  check BG...  lancet bowman: lancet bowman, See Instructions, Instructions: use as directed, Supply, # 1 EA, 0 Refill(s), Type: Maintenance, Pharmacy: [facility], use as directed  lisinopril 40 mg oral tablet: = 1 tab(s), Oral, daily, # 90 tab(s), 0 Refill(s), Type: Maintenance, Pharmacy: [facility]  nitroglycerin 0.4 mg sublingual tablet: See Instructions, Instructions: 1 tab under tongue at chest pain.  May repeat x 3 q 5 min.  Call 911 after first dose if pain persists., PRN: for chest pain, # 25 tab(s), 3 Refill(s), Type: Maintenance, Pharmacy: [facility], 1 tab under tongue at ch...  pantoprazole 40 mg oral delayed release tablet: = 1 tab(s) ( 40 mg ), Oral, daily, # 90 tab(s), 3 Refill(s), Type: Maintenance, Pharmacy: [facility], 1 tab(s) Oral daily  true metrix glucometer test strips: true metrix glucometer test strips, See Instructions, Instructions: check QID: fasting BG, 2  hours after meals and before bed ., Supply, # 100 EA, 11 Refill(s), Type: Maintenance, Pharmacy: [facility], check QID: fasting BG, 2 hours after meals and...  Documented Medications  Documented  clopidogrel 75 mg oral tablet: = 1 tab(s) ( 75 mg ), Oral, daily, 0 Refill(s), Type: Maintenance  loratadine 10 mg oral capsule: = 1 cap(s) ( 10 mg ), Oral, daily, 0 Refill(s), Type: Maintenance  multivitamin with minerals (w/ Iron): 1 tablet, Oral, daily, 0 Refill(s), Type: Maintenance  raNITIdine 150 mg oral capsule: = 1 cap(s) ( 150 mg ), Oral, bid, PRN: for heartburn, 0 Refill(s), Type: Maintenance  rosuvastatin 20 mg oral tablet: = 1 tab(s) ( 20 mg ), Oral, daily, 0 Refill(s), Type: Maintenance,    Medications          *denotes recorded medication          lancet bowman: See Instructions, use as directed, 1 EA, 0 Refill(s).          true metrix glucometer test strips: See Instructions, check QID: fasting BG, 2 hours after meals and before bed ., 100 EA, 11 Refill(s).          ULTICARE MICRO PEN TIP 32G 4MM 50CT MG INJECTABLE: See Instructions, USE FOUR TIMES A DAY, 100 unknown unit, 3 Refill(s).          Lachydrin: See Instructions, Lachydrin or similar product- apply to feet QHS, PRN: dry skin, 1 EA, 1 Refill(s).          Incontience diapers: See Instructions, use 1 diaper per night, 30 EA, 11 Refill(s).          *clopidogrel 75 mg oral tablet: 75 mg, 1 tab(s), Oral, daily, 0 Refill(s).          Jardiance 10 mg oral tablet: 10 mg, 1 tab(s), Oral, qam, 90 tab(s), 1 Refill(s).          Lexapro 20 mg oral tablet: 20 mg, 1 tab(s), Oral, daily, 30 tab(s), 1 Refill(s).          escitalopram 10 mg oral tablet: 10 mg, 1 tab(s), Oral, daily, 90 tab(s), 3 Refill(s).          glucose 4 g oral tablet, chewable: 16 gm, 4 tab(s), Chewed, once, Use if BG <70 mg/dL,  check BG 15.  Repeat if BG remains less than 80 mg/dL., 4 tab(s), 0 Refill(s).          Tresiba FlexTouch 100 units/mL subcutaneous solution: See Instructions, 26u  twice a day, 60 mL, 1 Refill(s).          HumaLOG KwikPen 100 units/mL injectable solution: See Instructions, 24u before each meal   8u before snacks  take 15 min prior to eating, 90 mL, 1 Refill(s).          lisinopril 40 mg oral tablet: 1 tab(s), Oral, daily, 90 tab(s), 0 Refill(s).          *loratadine 10 mg oral capsule: 10 mg, 1 cap(s), Oral, daily, 0 Refill(s).          MetFORMIN (Eqv-Glucophage XR) 500 mg oral tablet, extended release: 1,000 mg, 2 tab(s), Oral, bid, 360 tab(s), 1 Refill(s).          Metoprolol Tartrate 25 mg oral tablet: 25 mg, 1 tab(s), Oral, bid, 180 tab(s), 1 Refill(s).          *multivitamin with minerals (w/ Iron): 1 tablet, Oral, daily, 0 Refill(s).          nitroglycerin 0.4 mg sublingual tablet: See Instructions, 1 tab under tongue at chest pain.  May repeat x 3 q 5 min.  Call 911 after first dose if pain persists., PRN: for chest pain, 25 tab(s), 3 Refill(s).          pantoprazole 40 mg oral delayed release tablet: 40 mg, 1 tab(s), Oral, daily, 90 tab(s), 3 Refill(s).          *raNITIdine 150 mg oral capsule: 150 mg, 1 cap(s), Oral, bid, PRN: for heartburn, 0 Refill(s).          *rosuvastatin 20 mg oral tablet: 20 mg, 1 tab(s), Oral, daily, 0 Refill(s).       Problem list:    All Problems  Acute myocardial infarction, unspecified / SNOMED CT 62172604 / Confirmed  Combined forms of age-related cataract, left eye / SNOMED CT 46578506 / Confirmed  Combined forms of age-related cataract, right eye / SNOMED CT 20991450 / Confirmed  CAD in native artery / SNOMED CT 0973152779 / Confirmed  Atherosclerotic heart disease of native coronary artery without angina pectoris / SNOMED CT 9387489720 / Confirmed  Essential (primary) hypertension / SNOMED CT 04501887 / Confirmed  Stress incontinence (female) (male) / SNOMED CT 340932960 / Confirmed  Generalized anxiety disorder / SNOMED CT 98717839 / Confirmed  Unspecified hearing loss, bilateral / SNOMED CT 78418678 / Confirmed  History of MI  (myocardial infarction) / SNOMED CT 7655410231 / Confirmed  Infectious gastroenteritis and colitis, unspecified / SNOMED CT 14285861 / Confirmed  Insomnia, unspecified / SNOMED CT 717707692 / Confirmed  Major depressive disorder, single episode, unspecified / SNOMED CT 60054914 / Confirmed  Myopia, bilateral / SNOMED CT 02389037 / Confirmed  Obstructive sleep apnea (adult) (pediatric) / SNOMED CT 057301393 / Confirmed  Unspecified open wound, left foot, initial encounter / SNOMED CT 934088163 / Confirmed  Polypharmacy / SNOMED CT 479839900 / Confirmed  Unstable angina / SNOMED CT 0123237 / Confirmed  Pure hypercholesterolemia, unspecified / SNOMED CT 291472325 / Confirmed  Stable angina / SNOMED CT 635601558 / Confirmed  Type 2 diabetes mellitus without complications / SNOMED CT 554066081 / Confirmed  Resolved: Pregnancy / SNOMED CT 397496830      Histories   Past Medical History:    Active  Acute myocardial infarction, unspecified (81734819): Onset in the month of 7/2011 at 55 years  Major depressive disorder, single episode, unspecified (33544033)  Type 2 diabetes mellitus without complications (390728248)  Obstructive sleep apnea (adult) (pediatric) (523201701)  Unspecified hearing loss, bilateral (29811998)  Myopia, bilateral (53655655)  Unspecified open wound, left foot, initial encounter (818395322)  Atherosclerotic heart disease of native coronary artery without angina pectoris (5959655284)  Combined forms of age-related cataract, right eye (97711268)  Generalized anxiety disorder (27636182)  Essential (primary) hypertension (40680294)  Pure hypercholesterolemia, unspecified (077767031)  Insomnia, unspecified (241567442)  Unstable angina (3341774)  Stress incontinence (female) (male) (533595636)  Infectious gastroenteritis and colitis, unspecified (79813886)  Resolved  Pregnancy (537179191):  Resolved in 1976 at 20 years.   Family History:    Diabetes mellitus  Mother  Father  Arthritis  Grandmother (M)  CVA  - Cerebrovascular accident  Mother  Father  Glaucoma  Sister     Procedure history:    Esophagogastroduodenoscopy (SNOMED CT 807026115) on 9/27/2019 at 64 Years.  Comments:  11/22/2019 1:48 PM Ana Langley  Indication: Chronic heartburn.  IOL - Cataract extraction and insertion of intraocular lens (SNOMED CT 1193468317) performed by Jovanni Harris MD on 5/21/2019 at 63 Years.  Comments:  11/22/2019 1:50 PM Ana Langley  Right.  IOL - Cataract extraction and insertion of intraocular lens (SNOMED CT 8517784557) performed by Jovanni Harris MD on 5/7/2019 at 63 Years.  Comments:  11/22/2019 1:50 PM Ana Langley  Left.  Coronary artery stent x 3 (SNOMED CT 7784727697) in the month of 7/2011 at 56 Years.  Angioplasty (SNOMED CT 7142119890) in 2011 at 56 Years.  Tubal ligation (SNOMED CT 725538629) in 1979 at 24 Years.   Social History:        Alcohol Assessment            Never      Tobacco Assessment: Past            Quit in 1999, Cigarettes      Substance Abuse Assessment            Never      Home and Environment Assessment            Marital status: Single.  1 children.  Living situation: Home/Independent.  Injuries/Abuse/Neglect in               household: No.  Feels unsafe at home: No.  Family/Friends available for support: Yes.      Nutrition and Health Assessment            Type of diet: Regular.  Wants to lose weight: Yes.  Sleeping concerns: Yes.  Feels highly stressed: Yes.      Exercise and Physical Activity Assessment            Exercise frequency: 1-2 times/week.  Exercise type: Walking.      Sexual Assessment            Sexually active: No.  Identifies as female, Sexual orientation: Straight or heterosexual.  History of STD:               No.  Contraceptive Use Details: Abstinence.  History of sexual abuse: No.        Review / Management   Results review:  Lab results   3/13/2020 10:41 AM CDT Sodium Level 141 mmol/L    Potassium Level 4.1 mmol/L    Chloride Level 106 mmol/L    CO2 Level  28 mmol/L    Glucose Level 70 mg/dL    BUN 22 mg/dL    Creatinine 0.70 mg/dL    BUN/Creat Ratio NOT APPLICABLE    eGFR 92 mL/min/1.73m2    eGFR African American 106 mL/min/1.73m2    Calcium Level 9.2 mg/dL    Hgb A1c 6.8  HI    Cholesterol 167 mg/dL    Non-    HDL 60 mg/dL    Chol/HDL Ratio 2.8    LDL 71    Triglyceride 276 mg/dL  HI    U Microalbumin 0.3 mg/dL    Ur Creatinine 52 mg/dL    Ur Microalb/Creat Ratio 6    WBC 8.4    RBC 4.60    Hgb 13.5 gm/dL    Hct 39.5 %    MCV 85.9 fL    MCH 29.3 pg    MCHC 34.2 gm/dL    RDW 12.6 %    Platelet 298    MPV 10.2 fL   .       Impression and Plan   Diagnosis     Type 2 diabetes mellitus without complications ([id number] E11.9).       Counseled: Patient, TIEN Self Care Behaviors   Pt states she is a work in progress - continuing to make steps for improved diabetes control.      Healthy Eating - Reviewed previous education and focused on looking at carbohydrates on a label instead of sugar which is a difficult concept for pt to grasp.  Education on what foods are primary sources of carbohydrates and how intake affects BG readings, discussed foods pt enjoys and how to incorporate them into her meals.  Added additional foods to list of low cost healthy options for meals and snacks.  Pt is to incorporate more tuna, canned chicken, beans, eggs, and cottage cheese for lower cost protein sources and fruit cups in natural juices, frozen or fresh fruits, frozen vegetables or rinse canned if necessary.    Because pt is taking set doses of insulin encouraged pt to have a steady amount of carbohydrates at each meal for safety and stability of glucose.  Pt does have sporadic low glucose readings likely due to variability of intake.  Education on choosing heart healthy foods and ways of preparing meals to help prevent heart disease.   Discussed well balanced meals, diabetic plate method as a general rule.  Patient is provided with numerous ideas to incorporate dietary  recommendations.    Being Active - Education on how exercise helps with :    - lowering BG by increasing the muscles ability to take up and use glucose   - weight loss   - healthier heart (improve lipid profile)   - improve sleep, mood, energy   - decrease stress      Monitoring - QID ac, pt would benefit from CGMS and this was briefly discussed, pt will likely want to move forward with this as she feels more settled.     Taking Medication - Continue with insulin as directed, education on insulin action time, encouraged taking Novolog 15 min prior to meals and snacks   Problem Solving - medical support team assistance, resources provided (written and apps, internet); good control of stress  Healthy Coping - support of friends, family, and medical support team   Reducing Risks - Detailed education on potential complications associated with uncontrolled diabetes and prevention recommendations.  Recommendations include: annual eye exam, good oral cares with brushing BID and flossing daily, routine dental appointments, good foot care tips and shoe wear - discussed monofilament test yearly.  Importance of adequate sleep and good control of BG to prevent developing complications related to uncontrolled DM including nephropathy, neuropathy, retinopathy, and cardiovascular disease.  Weight loss goal of 7 - 10% of current body weight pounds as a reasonable goal to help increase insulin sensitivity.    Goals:   1. BG goals 80 - 130 and post-prandial less than 180 mg/dL, Test QID  2.  A1c goal <7% by 8/2020 - goal met!   3.  Walk 15 min 5 or more days/ week   4.  Use the diabetic plate method as a reference, consistent carbohydrate intake due to set insulin dosing       Professional [employer]   Call start time 10:32  Call end time 11:09  Total time on call 37 minutes  cc Dr. Maza

## 2022-02-16 NOTE — TELEPHONE ENCOUNTER
Entered by Skye Lynn on April 13, 2021 10:09:54 AM CDT  Done.      ---------------------  From: Kushal Pisano D , Carly   To: [facility] Pool ([id number]);     Sent: 4/13/2021 9:14:11 AM CDT  Subject: please unarrive and cancel today's MTM appt     Please unarrive and cancel today's MTM appt - pt is admitted to hospital at this time.  KB

## 2022-02-16 NOTE — TELEPHONE ENCOUNTER
Entered by Rivka Bales CMA on March 18, 2021 1:47:54 PM CDT  ---------------------  From: Rivka Bales CMA   To: [facility]    Sent: 3/18/2021 1:47:54 PM CDT  Subject: Medication Management     ** Not Approved: request sent to provider to review yesterday **  Miscellaneous Prescription (NITROGLYCERIN 0.4MG TAB SUBLINGUAL)  DISSOLVE ONE TABLET UNDER TONGUE EVERY FIVE MIN FOR CHEST PAIN AS NEEDED UP TO THREE DOSES CALL IF SYMPTOMS PERSIST 5 MIN AFTER 1ST DOSE CALL 911 AFTER 1ST DOSE IF PAIN PERSISTS  Qty:  25 EA        Days Supply:  25        Refills:  3          Substitutions Allowed     Route To Pharmacy - [facility]   Note from Pharmacy:  2ND REQUEST  Signed by Rivka Bales CMA            ------------------------------------------  From: [employer]  To: Samanta Maza MD  Sent: March 17, 2021 4:44:09 PM CDT  Subject: Medication Management  Due: March 18, 2021 11:57:49 AM CDT     ** On Hold Pending Signature **     Drug: NITROGLYCERIN 0.4MG TAB SUBLINGUAL, DISSOLVE ONE TABLET UNDER TONGUE EVERY FIVE MIN FOR CHEST PAIN AS NEEDED UP TO THREE DOSES CALL IF SYMPTOMS PERSIST 5 MIN AFTER 1ST DOSE CALL 911 AFTER 1ST DOSE IF PAIN PERSISTS  Quantity: 25 EA  Days Supply: 25  Refills: 3  Substitutions Allowed  Notes from Pharmacy: 2ND REQUEST     Dispensed Drug: NITROGLYCERIN 0.4MG TAB SUBLINGUAL, DISSOLVE ONE TABLET UNDER TONGUE EVERY FIVE MIN FOR CHEST PAIN AS NEEDED UP TO THREE DOSES CALL IF SYMPTOMS PERSIST 5 MIN AFTER 1ST DOSE CALL 911 AFTER 1ST DOSE IF PAIN PERSISTS  Quantity: 25 EA  Days Supply: 25  Refills: 3  Substitutions Allowed  Notes from Pharmacy: 2ND REQUEST  ------------------------------------------

## 2022-02-16 NOTE — LETTER
(Inserted Image. Unable to display)     March 12, 2021      GARRICK RAMIREZ  [address]  [place], [place] [id number]          Dear GARRICK,      Thank you for selecting [facility] (previously [facility]) for your healthcare needs.    Your Healthcare Provider has recommended that you schedule a diabetes management appointment with our Certified Diabetes Educator, Avril Martinez RD, CD, CDE.     Please bring your glucose meter and your blood glucose diary to your appointment.    Available services include:  - Education about diabetes with guidance and support   - Education on the 7 Self Care Behaviors for Diabetes Management:     Healthy Eating   portion control, label readings, carbohydrate recommendations, heart healthy guidelines, meal planning    Being Active - Physical activity guidelines     Monitoring   blood glucose targets, evaluation of blood glucose readings and lab results    Taking Medication   medication management    Problem Solving   discuss any concerns/ questions     Healthy Coping   disease progression and management    Reducing Risks   prevention strategies to help avoid potential complications related to uncontrolled diabetes  - Weight loss strategies      (FYI   Regarding office visit: In some instances, a video visit may be offered as an option.)        To schedule an appointment or if you have further questions, please contact your clinic at [phone].      Powered by [employer]    Sincerely,    Avril Martinez RD, CD, CDE

## 2022-02-16 NOTE — TELEPHONE ENCOUNTER
---------------------  From: Samanta Maza MD   To: [facility] Message Pool ([id number] - [place]);     Sent: 12/1/2021 4:08:22 PM CST  Subject: appointment     please let pt know her labs do suggest she has heart failure.  I'm sending in a prescription for furosemide.  It can wash away potassium so I'm sending in a potassium supplement also.  I's like her to check her weight every day and come into an appointment next week.        Results:  Date Result Name Ind Value Ref Range   11/30/2021 3:03 PM Hgb A1c ((H)) 8.1 ( - <5.7)   11/30/2021 3:03 PM B-Natriuretic Peptide (BNP) ((H)) 564 pg/mL ( - <100)   11/30/2021 3:03 PM Glucose Level ((H)) 106 mg/dL (65 - 99)   11/30/2021 3:03 PM BUN  16 mg/dL (7 - 25)   11/30/2021 3:03 PM Creatinine Level  0.65 mg/dL (0.50 - 0.99)   11/30/2021 3:03 PM eGFR  93 mL/min/1.73m2 (> OR = 60 - )   11/30/2021 3:03 PM eGFR African American  107 mL/min/1.73m2 (> OR = 60 - )   11/30/2021 3:03 PM BUN/Creat Ratio  NOT APPLICABLE (6 - 22)   11/30/2021 3:03 PM Sodium Level  139 mmol/L (135 - 146)   11/30/2021 3:03 PM Potassium Level  4.0 mmol/L (3.5 - 5.3)   11/30/2021 3:03 PM Chloride Level  106 mmol/L (98 - 110)   11/30/2021 3:03 PM CO2 Level  26 mmol/L (20 - 32)   11/30/2021 3:03 PM Calcium Level  8.6 mg/dL (8.6 - 10.4)   11/30/2021 3:03 PM WBC  8.2 (3.8 - 10.8)   11/30/2021 3:03 PM RBC  4.83 (3.80 - 5.10)   11/30/2021 3:03 PM Hgb ((L)) 11.2 gm/dL (11.7 - 15.5)   11/30/2021 3:03 PM Hct  36.3 % (35.0 - 45.0)   11/30/2021 3:03 PM MCV ((L)) 75.2 fL (80.0 - 100.0)   11/30/2021 3:03 PM MCH ((L)) 23.2 pg (27.0 - 33.0)   11/30/2021 3:03 PM MCHC ((L)) 30.9 gm/dL (32.0 - 36.0)   11/30/2021 3:03 PM RDW ((H)) 15.6 % (11.0 - 15.0)   11/30/2021 3:03 PM Platelet  311 (140 - 400)   11/30/2021 3:03 PM MPV  9.7 fL (7.5 - 12.5)called pt and let her know to  the new prescriptions, monitor daily weights, rtc later this week or early next week, make appt with cardiology, and go to ED if Sx worsen

## 2022-02-16 NOTE — LETTER
(Inserted Image. Unable to display)       August 24, 2021        GARRICK ASHLEY  [address]  [place] [address]        Dear GARRICK,      Thank you for selecting [facility] for your healthcare needs.      Your bone density results indicate:     Osteopenia in hip/spine (mild bone thinning   not osteoporosis)    Our recommendation is:      _   Calcium    Recommended daily amounts for men and women are:  o Men age 50 - 69  1000 mg  o Men age 70+  1200 mg  o Women age 50+  1200 mg  Vitamin D    800 - 1000 IU daily  Weight bearing Exercise    30 minutes or more several times a week  Avoid excess alcohol and smoking  Prevent falling    Avoid excessive sedation or hypotension (low blood pressure)    Improve strength    Decrease or remove environmental hazards      Repeat bone density in 2 year(s)                Please contact my practice at [phone] if you have any questions or concerns.     Sincerely,        Samanta Maza MD

## 2022-02-16 NOTE — TELEPHONE ENCOUNTER
Orders brought to [facility], patient is aware they will contact her to schedule.Clarify Echo vs stress test

## 2022-02-16 NOTE — NURSING NOTE
Comprehensive Intake Entered On:  1/10/2020 9:53 AM CST    Performed On:  1/10/2020 9:47 AM CST by Ambika Han CMA               Summary   Chief Complaint :   2 week f/u on [facility]TM pharm. Discuss Mirtazapine.    Weight Measured :   151.2 lb(Converted to: 151 lb 3 oz, 68.58 kg)    Systolic Blood Pressure :   130 mmHg   Diastolic Blood Pressure :   60 mmHg   Mean Arterial Pressure :   83 mmHg   Peripheral Pulse Rate :   82 bpm   BP Site :   Right arm   BP Method :   Manual   HR Method :   Electronic   Temperature Tympanic :   98.2 DegF(Converted to: 36.8 DegC)    Oxygen Saturation :   99 %   Ambika Han CMA - 1/10/2020 9:47 AM CST   Health Status   Allergies Verified? :   Yes   Medication History Verified? :   Yes   Pre-Visit Planning Status :   Completed   Tobacco Use? :   Never smoker   Ambika Han CMA - 1/10/2020 9:47 AM CST   Consents   Consent for Immunization Exchange :   Consent Granted   Consent for Immunizations to Providers :   Consent Granted   Ambika Han CMA - 1/10/2020 9:47 AM CST   Meds / Allergies   (As Of: 1/10/2020 9:53:10 AM CST)   Allergies (Active)   Cats  Estimated Onset Date:   Unspecified ; Reactions:   Runny nose ; Created By:   Ana Reese; Reaction Status:   Active ; Category:   Drug ; Substance:   Cats ; Type:   Allergy ; Updated By:   Ana Reese; Reviewed Date:   1/3/2020 3:53 PM CST      Lipitor  Estimated Onset Date:   Unspecified ; Reactions:   Diarrhea, nausea, vomiting ; Created By:   Ana Reese; Reaction Status:   Active ; Category:   Drug ; Substance:   Lipitor ; Type:   Allergy ; Updated By:   Ana Reese; Reviewed Date:   1/3/2020 3:53 PM CST      Sodium Hypochlorite  Estimated Onset Date:   Unspecified ; Reactions:   Runny nose ; Created By:   Ana Reese; Reaction Status:   Active ; Category:   Drug ; Substance:   Sodium Hypochlorite ; Type:   Allergy ; Updated By:   Ana Reese; Reviewed Date:   1/3/2020 3:53 PM CST      traZODone  Estimated Onset Date:    Unspecified ; Reactions:   Nightmares ; Created By:   Kushal Pisano Kaity; Reaction Status:   Active ; Category:   Drug ; Substance:   traZODone ; Type:   Side Effect ; Severity:   Low ; Updated By:   Kushal Pisano Kaity; Source:   Patient ; Reviewed Date:   1/3/2020 3:53 PM CST        Medication List   (As Of: 1/10/2020 9:53:10 AM CST)   Prescription/Discharge Order    empagliflozin  :   empagliflozin ; Status:   Prescribed ; Ordered As Mnemonic:   Jardiance 10 mg oral tablet ; Simple Display Line:   10 mg, 1 tab(s), Oral, qam, 30 tab(s), 3 Refill(s) ; Ordering Provider:   Samanta Maza MD; Catalog Code:   empagliflozin ; Order Dt/Tm:   12/20/2019 11:50:23 AM CST          glucose  :   glucose ; Status:   Prescribed ; Ordered As Mnemonic:   glucose 4 g oral tablet, chewable ; Simple Display Line:   16 gm, 4 tab(s), Chewed, once, Use if BG <70 mg/dL,  check BG 15.  Repeat if BG remains less than 80 mg/dL., 4 tab(s), 0 Refill(s) ; Ordering Provider:   Samanta Maza MD; Catalog Code:   glucose ; Order Dt/Tm:   12/20/2019 11:50:23 AM CST          insulin aspart  :   insulin aspart ; Status:   Prescribed ; Ordered As Mnemonic:   NovoLOG FlexPen 100 units/mL injectable solution ; Simple Display Line:   See Instructions, inject under the skin:  24 units before meals and 8 units before snacks., 30 mL, 1 Refill(s) ; Ordering Provider:   Samanta Maza MD; Catalog Code:   insulin aspart ; Order Dt/Tm:   1/3/2020 11:07:19 AM CST          insulin glargine  :   insulin glargine ; Status:   Prescribed ; Ordered As Mnemonic:   Basaglar KwikPen 100 units/mL subcutaneous solution ; Simple Display Line:   24 unit(s), Subcutaneous, bid, 15 mL, 1 Refill(s) ; Ordering Provider:   Samanta Maza MD; Catalog Code:   insulin glargine ; Order Dt/Tm:   1/3/2020 11:07:19 AM CST          lisinopril  :   lisinopril ; Status:   Prescribed ; Ordered As Mnemonic:   lisinopril 40 mg oral tablet ; Simple Display Line:   40 mg, 1  tab(s), Oral, daily, 30 tab(s), 3 Refill(s) ; Ordering Provider:   Samanta Maza MD; Catalog Code:   lisinopril ; Order Dt/Tm:   12/20/2019 11:50:23 AM CST          metFORMIN  :   metFORMIN ; Status:   Prescribed ; Ordered As Mnemonic:   metFORMIN 500 mg oral tablet, extended release ; Simple Display Line:   1,000 mg, 2 tab(s), Oral, bid, take with food, 120 tab(s), 3 Refill(s) ; Ordering Provider:   Samanta Maza MD; Catalog Code:   metFORMIN ; Order Dt/Tm:   12/20/2019 11:50:23 AM CST          metoprolol  :   metoprolol ; Status:   Prescribed ; Ordered As Mnemonic:   metoprolol tartrate 25 mg oral tablet ; Simple Display Line:   25 mg, 1 tab(s), Oral, bid, 60 tab(s), 3 Refill(s) ; Ordering Provider:   Samanta Maza MD; Catalog Code:   metoprolol ; Order Dt/Tm:   12/20/2019 11:50:23 AM CST          mirtazapine  :   mirtazapine ; Status:   Prescribed ; Ordered As Mnemonic:   mirtazapine 15 mg oral tablet ; Simple Display Line:   15 mg, 1 tab(s), Oral, hs, 30 tab(s), 1 Refill(s) ; Ordering Provider:   Samanta Maza MD; Catalog Code:   mirtazapine ; Order Dt/Tm:   1/3/2020 11:04:47 AM CST          Miscellaneous Prescription  :   Miscellaneous Prescription ; Status:   Prescribed ; Ordered As Mnemonic:   lancet bowman ; Simple Display Line:   See Instructions, use as directed, 1 EA, 0 Refill(s) ; Ordering Provider:   Samanta Maza MD; Catalog Code:   Miscellaneous Prescription ; Order Dt/Tm:   12/6/2019 10:58:20 AM CST          Miscellaneous Prescription  :   Miscellaneous Prescription ; Status:   Prescribed ; Ordered As Mnemonic:   true metrix glucometer test strips ; Simple Display Line:   See Instructions, check blood sugar before each meal and at bedtime, 1 box(es), 11 Refill(s) ; Ordering Provider:   Samanta Maza MD; Catalog Code:   Miscellaneous Prescription ; Order Dt/Tm:   12/6/2019 10:31:02 AM CST          nitroglycerin  :   nitroglycerin ; Status:   Prescribed ; Ordered As Mnemonic:    nitroglycerin 0.4 mg sublingual tablet ; Simple Display Line:   See Instructions, 1 tab under tongue at chest pain.  May repeat x 3 q 5 min.  Call 911 after first dose if pain persists., PRN: for chest pain, 25 tab(s), 3 Refill(s) ; Ordering Provider:   Samanta Maza MD; Catalog Code:   nitroglycerin ; Order Dt/Tm:   12/20/2019 11:50:23 AM CST            Home Meds    clopidogrel  :   clopidogrel ; Status:   Documented ; Ordered As Mnemonic:   clopidogrel 75 mg oral tablet ; Simple Display Line:   75 mg, 1 tab(s), Oral, daily, 0 Refill(s) ; Catalog Code:   clopidogrel ; Order Dt/Tm:   12/6/2019 9:57:03 AM CST          loratadine  :   loratadine ; Status:   Documented ; Ordered As Mnemonic:   loratadine 10 mg oral capsule ; Simple Display Line:   10 mg, 1 cap(s), Oral, daily, 0 Refill(s) ; Catalog Code:   loratadine ; Order Dt/Tm:   12/6/2019 9:58:20 AM CST          multivitamin with minerals  :   multivitamin with minerals ; Status:   Documented ; Ordered As Mnemonic:   multivitamin with minerals (w/ Iron) ; Simple Display Line:   1 tablet, Oral, daily, 0 Refill(s) ; Catalog Code:   multivitamin with minerals ; Order Dt/Tm:   12/20/2019 4:20:37 PM CST          raNITIdine  :   raNITIdine ; Status:   Documented ; Ordered As Mnemonic:   raNITIdine 150 mg oral capsule ; Simple Display Line:   150 mg, 1 cap(s), Oral, bid, PRN: for heartburn, 0 Refill(s) ; Catalog Code:   raNITIdine ; Order Dt/Tm:   12/6/2019 9:56:29 AM CST          rosuvastatin  :   rosuvastatin ; Status:   Documented ; Ordered As Mnemonic:   rosuvastatin 20 mg oral tablet ; Simple Display Line:   20 mg, 1 tab(s), Oral, daily, 0 Refill(s) ; Catalog Code:   rosuvastatin ; Order Dt/Tm:   12/6/2019 9:56:47 AM CST

## 2022-02-16 NOTE — NURSING NOTE
Depression Screening Entered On:  2/28/2020 11:23 AM CST    Performed On:  2/28/2020 11:23 AM CST by Ishan Soni CMA               Depression Screening   Little Interest - Pleasure in Activities :   Several days   Feeling Down, Depressed, Hopeless :   Several days   Initial Depression Screen Score :   2    Trouble Falling or Staying Asleep :   More than half the days   Feeling Tired or Little Energy :   Several days   Poor Appetite or Overeating :   More than half the days   Feeling Bad About Yourself :   Not at all   Trouble Concentrating :   Several days   Moving or Speaking Slowly :   Several days   Thoughts Better Off Dead or Hurting Self :   Not at all   Detailed Depression Screen Score :   7    Total Depression Screen Score :   9    ALEJANDRA Difficulty with Work, Home, Others :   Somewhat difficult   Ishan Soni CMA - 2/28/2020 11:23 AM CST

## 2022-02-16 NOTE — PROGRESS NOTES
Chief Complaint    Follow up mood/medications  History of Present Illness      patient present to clinic today for follow up      mood is improved but pt still anxious and having insomnia due to racing thoughts and worries, tolerating lexapro and less sleepy taking it at night      hx of type 2 diabetes mellitus, working with dietitian, evening BS have been upper 100's ams look good, premeals look good, says she has been snacking at night.       working with dietitian and pharmacist to manage dm and chronic conditions, here with her daughter and family friend,      feels like she is doing good, better now that she is living with her daughter.  likes her counselor.      hx of cad, has met her new cardiologist and likes him      she is having some paresthesias in her hands and feet,      she checks her blood sugars qid, she takes 26 units if her long acting insulin bid, 18 units novalog TID before meals and  6 units with snacks usually bid      Review of systems 12 point review of systems negative except as per HPI      Exam:      General: alert and oriented ×3 no acute distress.      HEENT: pupils are equal round and reactive to light extraocular motion is intact. Normocephalic and atraumatic.       Hearing is grossly normal and there is no otorrhea. TM pearly grey with normal likght reflex      Nares are patent there is no rhinorrhea.       Mucous membranes are moist and pink.      Chest: has bilateral rise with no increased work of breathing.  ctab no wheezes,  rhonchi or rales      Cardiovascular: normal perfusion and brisk capillary refill.  nml s1s2, no m/g/r      Musculoskeletal: no gross focal abnormalities and normal gait.      Neuro: no gross focal abnormalities and memory seems intact.      Psychiatric: speech is clear and coherent and fluent. Patient dressed appropriately for the weather. Mood is anxious and affect is anxious. judgement is fair, insight is fair,                     Discussed with patient  to return to clinic if symptoms worsen or do not improve  Physical Exam   Vitals & Measurements    T: 97.4   F (Tympanic)  HR: 64(Peripheral)  BP: 130/70     HT: 62 in  WT: 163 lb  BMI: 29.81   Assessment/Plan       Atherosclerotic heart disease of native coronary artery without angina pectoris (I25.10)         Ordered:          [id number] office outpatient visit 40 minutes (Charge), Quantity: 1, Type 2 diabetes mellitus without complications  Atherosclerotic heart disease of native coronary artery without angina pectoris  Essential (primary) hypertension  Major depressive disorder, single episode, unspecified          Basic Metabolic Panel* ([facility]), Specimen Type: Serum, Collection Date: 03/13/20 10:02:00 CDT          CBC (h/h, RBC, indices, WBC, Plt)* ([facility]), Specimen Type: Blood, Collection Date: 03/13/20 10:02:00 CDT          Hemoglobin A1c* ([facility]), Specimen Type: Blood, Collection Date: 03/13/20 10:02:00 CDT          Lipid panel with reflex to direct ldl* ([facility]), Specimen Type: Serum, Collection Date: 03/13/20 10:02:00 CDT          Microalbumin, random urine (w/creatinine)* ([facility]), Specimen Type: Urine, Collection Date: 03/13/20 10:02:00 CDT                Essential (primary) hypertension (I10)         Ordered:          [id number] office outpatient visit 40 minutes (Charge), Quantity: 1, Type 2 diabetes mellitus without complications  Atherosclerotic heart disease of native coronary artery without angina pectoris  Essential (primary) hypertension  Major depressive disorder, single episode, unspecified          Basic Metabolic Panel* ([facility]), Specimen Type: Serum, Collection Date: 03/13/20 10:02:00 CDT          CBC (h/h, RBC, indices, WBC, Plt)* ([facility]), Specimen Type: Blood, Collection Date: 03/13/20 10:02:00 CDT          Hemoglobin A1c* ([facility]), Specimen Type: Blood, Collection Date: 03/13/20 10:02:00 CDT          Lipid panel with reflex to direct ldl* ([facility]), Specimen Type: Serum, Collection Date: 03/13/20  10:02:00 CDT          Microalbumin, random urine (w/creatinine)* ([facility]), Specimen Type: Urine, Collection Date: 03/13/20 10:02:00 CDT                Insomnia, unspecified (G47.00)         will see if we can find a med to help with this that does not increase risk of qt prolongation                Major depressive disorder, single episode, unspecified (F32.1)         mood is not yet well controlled but is improved         Ordered:          [id number] office outpatient visit 40 minutes (Charge), Quantity: 1, Type 2 diabetes mellitus without complications  Atherosclerotic heart disease of native coronary artery without angina pectoris  Essential (primary) hypertension  Major depressive disorder, single episode, unspecified          Basic Metabolic Panel* ([facility]), Specimen Type: Serum, Collection Date: 03/13/20 10:02:00 CDT          CBC (h/h, RBC, indices, WBC, Plt)* ([facility]), Specimen Type: Blood, Collection Date: 03/13/20 10:02:00 CDT          Hemoglobin A1c* ([facility]), Specimen Type: Blood, Collection Date: 03/13/20 10:02:00 CDT          Lipid panel with reflex to direct ldl* ([facility]), Specimen Type: Serum, Collection Date: 03/13/20 10:02:00 CDT          Microalbumin, random urine (w/creatinine)* ([facility]), Specimen Type: Urine, Collection Date: 03/13/20 10:02:00 CDT                Paresthesia (R20.2)         suspect diabetic neuropathy, but possible carpal tunnel will refer to neurology to help get daignosis         Ordered:          Referral (Request), 03/13/20 10:08:00 CDT, Referred to: Neurology, Paresthesia                Type 2 diabetes mellitus without complications (E11.9)         cont to work with dietitian nad pharm and recheck [id number], expect this to be improved.         Ordered:          [id number] office outpatient visit 40 minutes (Charge), Quantity: 1, Type 2 diabetes mellitus without complications  Atherosclerotic heart disease of native coronary artery without angina pectoris  Essential (primary) hypertension   Major depressive disorder, single episode, unspecified          Basic Metabolic Panel* ([facility]), Specimen Type: Serum, Collection Date: 03/13/20 10:02:00 CDT          CBC (h/h, RBC, indices, WBC, Plt)* ([facility]), Specimen Type: Blood, Collection Date: 03/13/20 10:02:00 CDT          Hemoglobin A1c* ([facility]), Specimen Type: Blood, Collection Date: 03/13/20 10:02:00 CDT          Lipid panel with reflex to direct ldl* ([facility]), Specimen Type: Serum, Collection Date: 03/13/20 10:02:00 CDT          Microalbumin, random urine (w/creatinine)* ([facility]), Specimen Type: Urine, Collection Date: 03/13/20 10:02:00 CDT                Orders:         escitalopram, See Instructions, Instructions: 1 tab PO daily, # 30 tab(s), 1 Refill(s), Type: Hard Stop, Pharmacy: [facility], (Completed)         escitalopram, = 1 tab(s) ( 20 mg ), Oral, daily, # 30 tab(s), 1 Refill(s), Type: Maintenance, Pharmacy: [facility], 1 tab(s) Oral daily, (Ordered)         Miscellaneous Rx Supply, Incontience diapers, See Instructions, Instructions: use 1 diaper per night, Supply, # 30 EA, 11 Refill(s), Type: Maintenance, (Ordered)      45 minutes spent with patient in direct face to face contact, > 50% of time spent counseling and coordinating care as noted above.   Patient Information     Name:GARRICK RAMIREZ      Address:      [address] [place] [id number]     Sex:Female     YOB: 1955     [phone]     Emergency Contact:KADE MURRELL     MRN:[id number]     FIN:[id number]     Location:[facility]     Date of Service:03/13/2020      Primary Care Physician:       Samanta Maza MD, [phone]      Attending Physician:       Samanta Maza MD, [phone]  Problem List/Past Medical History    Ongoing     Acute myocardial infarction, unspecified     Atherosclerotic heart disease of native coronary artery without angina pectoris     CAD in native artery     Combined forms of age-related  cataract, left eye     Combined forms of age-related cataract, right eye     Essential (primary) hypertension     Generalized anxiety disorder     History of MI (myocardial infarction)     Infectious gastroenteritis and colitis, unspecified     Insomnia, unspecified     Major depressive disorder, single episode, unspecified     Myopia, bilateral     Obstructive sleep apnea (adult) (pediatric)     Polypharmacy     Pure hypercholesterolemia, unspecified     Stable angina     Stress incontinence (female) (male)     Type 2 diabetes mellitus without complications     Unspecified hearing loss, bilateral     Unspecified open wound, left foot, initial encounter     Unstable angina    Historical     Pregnancy  Procedure/Surgical History     Esophagogastroduodenoscopy (09/27/2019)      Comments: Indication: Chronic heartburn..     IOL - Cataract extraction and insertion of intraocular lens (05/21/2019)      Comments: Right..     IOL - Cataract extraction and insertion of intraocular lens (05/07/2019)      Comments: Left..     Coronary artery stent x 3 (07.2011)     Angioplasty (2011)     Tubal ligation (1979)  Medications    Basaglar KwikPen 100 units/mL subcutaneous solution, 26 unit(s), Subcutaneous, bid, 1 refills    clopidogrel 75 mg oral tablet, 75 mg= 1 tab(s), Oral, daily    glucose 4 g oral tablet, chewable, 16 gm= 4 tab(s), Chewed, once    Incontience diapers, See Instructions, 11 refills    Jardiance 10 mg oral tablet, 10 mg= 1 tab(s), Oral, qam, 3 refills    Lachydrin, See Instructions, PRN, 1 refills    lancet bowman, See Instructions    Lexapro 20 mg oral tablet, 20 mg= 1 tab(s), Oral, daily, 1 refills    lisinopril 40 mg oral tablet, 40 mg= 1 tab(s), Oral, daily, 3 refills    loratadine 10 mg oral capsule, 10 mg= 1 cap(s), Oral, daily    metFORMIN 500 mg oral tablet, extended release, 1000 mg= 2 tab(s), Oral, bid, 3 refills    metoprolol tartrate 25 mg oral tablet, 25 mg= 1 tab(s), Oral, bid, 3 refills     multivitamin with minerals (w/ Iron), 1 tablet, Oral, daily    nitroglycerin 0.4 mg sublingual tablet, See Instructions, PRN, 3 refills    NovoLOG FlexPen 100 units/mL injectable solution, See Instructions, 1 refills    pantoprazole 40 mg oral delayed release tablet, 40 mg= 1 tab(s), Oral, daily, 3 refills    raNITIdine 150 mg oral capsule, 150 mg= 1 cap(s), Oral, bid, PRN    rosuvastatin 20 mg oral tablet, 20 mg= 1 tab(s), Oral, daily    true metrix glucometer test strips, See Instructions, 11 refills  Allergies    traZODone (Nightmares)    Cats (Runny nose)    Lipitor (Diarrhea, nausea, vomiting)    Sodium Hypochlorite (Runny nose)    mirtazapine (Nightmare, Suicidal ideation)  Social History    Smoking Status - 03/13/2020     Never smoker     Alcohol      Never, 12/10/2019     Exercise      Exercise frequency: 1-2 times/week. Exercise type: Walking., 12/10/2019     Home/Environment      Marital status: Single. 1 children. Living situation: Home/Independent. Injuries/Abuse/Neglect in household: No. Feels unsafe at home: No. Family/Friends available for support: Yes., 12/10/2019     Nutrition/Health      Type of diet: Regular. Wants to lose weight: Yes. Sleeping concerns: Yes. Feels highly stressed: Yes., 12/10/2019     Sexual      Sexually active: No. Identifies as female, Sexual orientation: Straight or heterosexual. History of STD: No. Contraceptive Use Details: Abstinence. History of sexual abuse: No., 12/10/2019     Substance Abuse      Never, 12/10/2019     Tobacco - Past, 11/22/2019      Quit in 1999, Cigarettes, 11/22/2019  Family History    Arthritis: Grandmother (M).    CVA - Cerebrovascular accident: Mother and Father.    Diabetes mellitus: Mother and Father.    Glaucoma: Sister.  Immunizations      Vaccine Date Status          influenza virus vaccine, inactivated 12/06/2019 Given          influenza virus vaccine, inactivated 10/02/2018 Recorded          Td 03/23/2018 Recorded           tetanus/diphth/pertuss (Tdap) adult/adol 03/23/2018 Recorded          influenza virus vaccine, inactivated 12/16/2016 Recorded          pneumococcal (PCV13) 12/16/2016 Recorded          pneumococcal (PPSV23) 10/20/2015 Recorded          influenza virus vaccine, inactivated 10/20/2015 Recorded          influenza virus vaccine, inactivated 12/18/2013 Recorded          influenza virus vaccine, inactivated 09/21/2009 Recorded

## 2022-02-16 NOTE — NURSING NOTE
Comprehensive Intake Entered On:  11/30/2021 1:58 PM CST    Performed On:  11/30/2021 1:50 PM CST by Ruth Ann Adams               Summary   Chief Complaint :   Pt c/o retaining water bilateral legs, she thinks she needs a diuretic.  She is also feeling sob and coughing a little when walking up a flight of stairs or walking long distant.    Advance Directive :   No   Weight Measured :   159.5 lb(Converted to: 159 lb 8 oz, 72.348 kg)    Systolic Blood Pressure :   120 mmHg   Diastolic Blood Pressure :   60 mmHg   Mean Arterial Pressure :   80 mmHg   Peripheral Pulse Rate :   71 bpm   BP Site :   Right arm   BP Method :   Manual   Oxygen Saturation :   98 %   Ruth Ann Adams - 11/30/2021 1:50 PM CST   Health Status   Allergies Verified? :   Yes   Medication History Verified? :   Yes   Tobacco Use? :   Former smoker   Ruth Ann Adams - 11/30/2021 1:50 PM CST   Consents   Consent for Immunization Exchange :   Consent Granted   Consent for Immunizations to Providers :   Consent Granted   Ruth Ann Adams - 11/30/2021 1:50 PM CST   Meds / Allergies   (As Of: 11/30/2021 1:58:33 PM CST)   Allergies (Active)   Cats  Estimated Onset Date:   Unspecified ; Reactions:   Runny nose ; Created By:   Ana Reese; Reaction Status:   Active ; Category:   Drug ; Substance:   Cats ; Type:   Allergy ; Updated By:   Ana Reese; Reviewed Date:   11/30/2021 1:55 PM CST      Lipitor  Estimated Onset Date:   Unspecified ; Reactions:   Diarrhea, nausea, vomiting ; Created By:   Ana Reese; Reaction Status:   Active ; Category:   Drug ; Substance:   Lipitor ; Type:   Allergy ; Updated By:   Ana Reese; Reviewed Date:   11/30/2021 1:55 PM CST      mirtazapine  Estimated Onset Date:   Unspecified ; Reactions:   Nightmare, Suicidal ideation ; Comments:     Comment 1: intolerance   ; Created By:   Kushal Pisano , Carly; Reaction Status:   Active ; Category:   Drug ; Substance:   mirtazapine ; Type:   Allergy ; Updated By:   Kushal Pisano  Carly CASTELAN; Source:   Patient ; Reviewed Date:   11/30/2021 1:55 PM CST      Sodium Hypochlorite  Estimated Onset Date:   Unspecified ; Reactions:   Runny nose ; Created By:   Ana Reese; Reaction Status:   Active ; Category:   Drug ; Substance:   Sodium Hypochlorite ; Type:   Allergy ; Updated By:   Ana Reese; Reviewed Date:   11/30/2021 1:55 PM CST      traZODone  Estimated Onset Date:   Unspecified ; Reactions:   Nightmares ; Created By:   Kushal Pisano Kaity; Reaction Status:   Active ; Category:   Drug ; Substance:   traZODone ; Type:   Side Effect ; Severity:   Low ; Updated By:   Kushal Pisano Kaity; Source:   Patient ; Reviewed Date:   11/30/2021 1:55 PM CST        Medication List   (As Of: 11/30/2021 1:58:33 PM CST)   Prescription/Discharge Order    acetaminophen  :   acetaminophen ; Status:   Prescribed ; Ordered As Mnemonic:   acetaminophen 500 mg oral tablet ; Simple Display Line:   1,000 mg, 2 tab(s), Oral, q6 hrs, not to exceed 4000 mg/day, PRN: for pain, 120 tab(s), 0 Refill(s) ; Ordering Provider:   Samanta Maza MD; Catalog Code:   acetaminophen ; Order Dt/Tm:   4/30/2021 10:59:48 AM CDT          aspirin  :   aspirin ; Status:   Prescribed ; Ordered As Mnemonic:   aspirin 81 mg oral delayed release tablet ; Simple Display Line:   81 mg, 1 tab(s), Oral, daily, 90 tab(s), 3 Refill(s) ; Ordering Provider:   Samanta Maza MD; Catalog Code:   aspirin ; Order Dt/Tm:   4/30/2021 11:00:03 AM CDT          carvedilol  :   carvedilol ; Status:   Prescribed ; Ordered As Mnemonic:   carvedilol 6.25 mg oral tablet ; Simple Display Line:   1 tab(s), Oral, bid, 180 tab(s), 1 Refill(s) ; Ordering Provider:   Samanta Maza MD; Catalog Code:   carvedilol ; Order Dt/Tm:   9/16/2021 12:02:57 PM CDT          clopidogrel  :   clopidogrel ; Status:   Prescribed ; Ordered As Mnemonic:   clopidogrel 75 mg oral tablet ; Simple Display Line:   75 mg, 1 tab(s), Oral, daily, in the AM, 90 tab(s), 1  Refill(s) ; Ordering Provider:   Samanta Maza MD; Catalog Code:   clopidogrel ; Order Dt/Tm:   10/20/2020 12:28:42 PM CDT          empagliflozin  :   empagliflozin ; Status:   Prescribed ; Ordered As Mnemonic:   Jardiance 25 mg oral tablet ; Simple Display Line:   25 mg, 1 tab(s), Oral, qam, 90 tab(s), 2 Refill(s) ; Ordering Provider:   Samanta Maza MD; Catalog Code:   empagliflozin ; Order Dt/Tm:   12/18/2020 10:32:55 AM CST          FLUoxetine  :   FLUoxetine ; Status:   Prescribed ; Ordered As Mnemonic:   FLUoxetine 20 mg oral capsule ; Simple Display Line:   1 cap(s), Oral, qam, 90 cap(s), 3 Refill(s) ; Ordering Provider:   Samanta Maza MD; Catalog Code:   FLUoxetine ; Order Dt/Tm:   4/30/2021 11:12:05 AM CDT          glucose  :   glucose ; Status:   Prescribed ; Ordered As Mnemonic:   glucose 4 g oral tablet, chewable ; Simple Display Line:   16 gm, 4 tab(s), Chewed, once, Use if BG <70 mg/dL,  check BG 15.  Repeat if BG remains less than 80 mg/dL., 4 tab(s), 0 Refill(s) ; Ordering Provider:   Samanta Maza MD; Catalog Code:   glucose ; Order Dt/Tm:   12/20/2019 11:50:23 AM CST          insulin glargine  :   insulin glargine ; Status:   Prescribed ; Ordered As Mnemonic:   Basaglar KwikPen 100 units/mL subcutaneous solution ; Simple Display Line:   17 units, Subcutaneous, bid, 15 mL, 1 Refill(s) ; Ordering Provider:   Samanta Maza MD; Catalog Code:   insulin glargine ; Order Dt/Tm:   1/21/2021 11:28:37 AM CST          insulin lispro  :   insulin lispro ; Status:   Prescribed ; Ordered As Mnemonic:   HumaLOG KwikPen 100 units/mL injectable solution ; Simple Display Line:   See Instructions, 11u qac and 4u q snacks.+SSI, for -199 +2U, 200-249 +4U, 250-349+8u, 350-399 +10U, >399 +10u and call MD   HOLD if pre-meal bg is <90 mg/dL., 45 mL, 3 Refill(s) ; Ordering Provider:   Samanta Maza MD; Catalog Code:   insulin lispro ; Order Dt/Tm:   4/30/2021 11:04:41 AM CDT           lisinopril  :   lisinopril ; Status:   Prescribed ; Ordered As Mnemonic:   lisinopril 5 mg oral tablet ; Simple Display Line:   5 mg, 1 tab(s), Oral, daily, 90 tab(s), 3 Refill(s) ; Ordering Provider:   Samanta Maza MD; Catalog Code:   lisinopril ; Order Dt/Tm:   4/30/2021 11:04:15 AM CDT          loratadine  :   loratadine ; Status:   Prescribed ; Ordered As Mnemonic:   loratadine 10 mg oral tablet ; Simple Display Line:   10 mg, 1 tab(s), Oral, qhs, 90 tab(s), 3 Refill(s) ; Ordering Provider:   Samanta Maza MD; Catalog Code:   loratadine ; Order Dt/Tm:   4/30/2021 11:12:38 AM CDT          metFORMIN  :   metFORMIN ; Status:   Prescribed ; Ordered As Mnemonic:   MetFORMIN (Eqv-Glucophage XR) 500 mg oral tablet, extended release ; Simple Display Line:   1,000 mg, 2 tab(s), Oral, bid, 360 tab(s), 3 Refill(s) ; Ordering Provider:   Samanta Maza MD; Catalog Code:   metFORMIN ; Order Dt/Tm:   4/30/2021 11:12:55 AM CDT          Miscellaneous Prescription  :   Miscellaneous Prescription ; Status:   Prescribed ; Ordered As Mnemonic:   Freestyle Dm 2 14 day reader ; Simple Display Line:   See Instructions, Use to test blood sugars 4 times daily per , 1 EA, 0 Refill(s) ; Ordering Provider:   Samanta Maza MD; Catalog Code:   Miscellaneous Prescription ; Order Dt/Tm:   1/21/2021 11:02:18 AM CST          Miscellaneous Prescription  :   Miscellaneous Prescription ; Status:   Prescribed ; Ordered As Mnemonic:   Freestyle Dm 2 14 day sensor ; Simple Display Line:   See Instructions, Use to test blood sugars 4 times daily. Change sensor every 14 days., 6 EA, 3 Refill(s) ; Ordering Provider:   Samanta Maza MD; Catalog Code:   Miscellaneous Prescription ; Order Dt/Tm:   1/21/2021 11:02:26 AM CST          Miscellaneous Prescription  :   Miscellaneous Prescription ; Status:   Prescribed ; Ordered As Mnemonic:   lancet bowman ; Simple Display Line:   See Instructions, use as directed, 1 EA, 0  Refill(s) ; Ordering Provider:   Samanta Maza MD; Catalog Code:   Miscellaneous Prescription ; Order Dt/Tm:   12/6/2019 10:58:20 AM CST          Miscellaneous Prescription  :   Miscellaneous Prescription ; Status:   Prescribed ; Ordered As Mnemonic:   NITROGLYCERIN 0.4MG TAB SUBLINGUAL ; Simple Display Line:   See Instructions, DISSOLVE ONE TABLET UNDER TONGUE EVERY FIVE MIN FOR CHEST PAIN AS NEEDED UP TO THREE DOSES CALL IF SYMPTOMS PERSIST 5 MIN AFTER 1ST DOSE CALL 911 AFTER 1ST DOSE IF PAIN PERSISTS, 25 EA, 3 Refill(s) ; Ordering Provider:   Samanta Maza MD; Catalog Code:   Miscellaneous Prescription ; Order Dt/Tm:   4/30/2021 11:13:23 AM CDT          Miscellaneous Prescription  :   Miscellaneous Prescription ; Status:   Prescribed ; Ordered As Mnemonic:   true metrix glucometer test strips ; Simple Display Line:   See Instructions, check QID: fasting BG, 2 hours after meals and before bed ., 100 EA, 11 Refill(s) ; Ordering Provider:   Samanta Maza MD; Catalog Code:   Miscellaneous Prescription ; Order Dt/Tm:   2/28/2020 11:37:26 AM CST          Miscellaneous Prescription  :   Miscellaneous Prescription ; Status:   Prescribed ; Ordered As Mnemonic:   ULTICARE MICRO PEN NEEDLES/32G X 4MM 15OS3RW MISC ; Simple Display Line:   See Instructions, USE FOUR TIMES A DAY, 100 EA, 3 Refill(s) ; Ordering Provider:   Samanta Maza MD; Catalog Code:   Miscellaneous Prescription ; Order Dt/Tm:   10/27/2021 9:42:36 AM CDT          Miscellaneous Prescription  :   Miscellaneous Prescription ; Status:   Prescribed ; Ordered As Mnemonic:   ULTICARE MICRO PEN TIP 32G 4MM 50CT MG INJECTABLE ; Simple Display Line:   See Instructions, USE FOUR TIMES A DAY, 100 unknown unit, 3 Refill(s) ; Ordering Provider:   Samanta Maza MD; Catalog Code:   Miscellaneous Prescription ; Order Dt/Tm:   4/23/2020 3:35:28 PM CDT          Miscellaneous Rx Supply  :   Miscellaneous Rx Supply ; Status:   Prescribed ; Ordered As  Mnemonic:   Incontience diapers ; Simple Display Line:   See Instructions, use 1 diaper per night, 30 EA, 11 Refill(s) ; Ordering Provider:   Samanta Maza MD; Catalog Code:   Miscellaneous Rx Supply ; Order Dt/Tm:   3/11/2020 10:27:58 AM CDT ; Comment:   Faxed to [employer] @ [phone]          Miscellaneous Rx Supply  :   Miscellaneous Rx Supply ; Status:   Prescribed ; Ordered As Mnemonic:   Lachydrin ; Simple Display Line:   See Instructions, Lachydrin or similar product- apply to feet QHS, PRN: dry skin, 1 EA, 1 Refill(s) ; Ordering Provider:   Samanta Maza MD; Catalog Code:   Miscellaneous Rx Supply ; Order Dt/Tm:   1/10/2020 10:31:46 AM CST          multivitamin with minerals  :   multivitamin with minerals ; Status:   Prescribed ; Ordered As Mnemonic:   One-A-Day Women 50 Plus oral tablet ; Simple Display Line:   See Instructions, may substiute with multivitamin with minerals preferred by insurance company, 90 EA, 3 Refill(s) ; Ordering Provider:   Samanta Maza MD; Catalog Code:   multivitamin with minerals ; Order Dt/Tm:   4/30/2021 11:15:35 AM CDT          pantoprazole  :   pantoprazole ; Status:   Prescribed ; Ordered As Mnemonic:   pantoprazole 20 mg oral delayed release tablet ; Simple Display Line:   20 mg, 1 tab(s), Oral, bid, 180 tab(s), 3 Refill(s) ; Ordering Provider:   Samatna Maza MD; Catalog Code:   pantoprazole ; Order Dt/Tm:   4/30/2021 11:17:30 AM CDT          rosuvastatin  :   rosuvastatin ; Status:   Prescribed ; Ordered As Mnemonic:   rosuvastatin 20 mg oral tablet ; Simple Display Line:   20 mg, 1 tab(s), Oral, qhs, 90 tab(s), 3 Refill(s) ; Ordering Provider:   Samanta Maza MD; Catalog Code:   rosuvastatin ; Order Dt/Tm:   4/30/2021 11:17:38 AM CDT          torsemide  :   torsemide ; Status:   Prescribed ; Ordered As Mnemonic:   torsemide 20 mg oral tablet ; Simple Display Line:   20 mg, 1 tab(s), Oral, daily, 30 tab(s), 0 Refill(s) ; Ordering Provider:    Shaunna SANTANA Samanta; Catalog Code:   torsemide ; Order Dt/Tm:   4/30/2021 11:02:30 AM CDT            Home Meds    calcium carbonate  :   calcium carbonate ; Status:   Documented ; Ordered As Mnemonic:   Tums 500 mg oral tablet, chewable ; Simple Display Line:   500 mg, 1 tab(s), Chewed, bid, as needed for heartburn, stomach pain, acid reflux, 0 Refill(s) ; Catalog Code:   calcium carbonate ; Order Dt/Tm:   9/17/2020 9:47:04 PM CDT          insulin degludec  :   insulin degludec ; Status:   Documented ; Ordered As Mnemonic:   Tresiba FlexTouch 100 units/mL subcutaneous solution ; Simple Display Line:   0 Refill(s) ; Catalog Code:   insulin degludec ; Order Dt/Tm:   8/19/2021 1:15:52 PM CDT ; Comment:   Responsible Provider: ELSA CISSE          insulin lispro  :   insulin lispro ; Status:   Documented ; Ordered As Mnemonic:   Insulin Lispro KwikPen 100 units/mL injectable solution ; Simple Display Line:   0 Refill(s) ; Catalog Code:   insulin lispro ; Order Dt/Tm:   8/19/2021 1:16:49 PM CDT ; Comment:   Responsible Provider: ELSA CISSE          [place] Prescription  :   Miscellaneous Prescription ; Status:   Documented ; Ordered As Mnemonic:   Misc Prescription ; Simple Display Line:   Eye allergy relief  (pt does not know drug ingredient). Using as needed for eye allergy symptoms., 0 Refill(s) ; Catalog Code:   Miscellaneous Prescription ; Order Dt/Tm:   9/17/2020 9:49:08 PM CDT            Social History   Social History   (As Of: 11/30/2021 1:58:33 PM CST)   Alcohol:        Never   (Last Updated: 12/10/2019 9:46:11 AM CST by Ludy Arriola)          Tobacco:  Past      Former smoker, quit more than 30 days ago, Cigarettes   (Last Updated: 1/12/2021 2:27:15 PM CST by Ruth Ann Adams)          Electronic Cigarette/Vaping:        Electronic Cigarette Use: Never.   (Last Updated: 1/12/2021 2:27:20 PM CST by Ruth Ann Adams)          Substance Abuse:        Never   (Last Updated: 12/10/2019 9:46:22 AM CST by  Ludy Arriola)          Home/Environment:        Marital status: Single.  1 children.  Living situation: Home/Independent.  Injuries/Abuse/Neglect in household: No.  Feels unsafe at home: No.  Family/Friends available for support: Yes.   (Last Updated: 12/10/2019 9:46:51 AM CST by Ludy Arriola)          Nutrition/Health:        Type of diet: Regular.  Wants to lose weight: Yes.  Sleeping concerns: Yes.  Feels highly stressed: Yes.   (Last Updated: 12/10/2019 9:47:05 AM CST by Ludy Arriola)          Exercise:        Exercise frequency: 1-2 times/week.  Exercise type: Walking.   (Last Updated: 12/10/2019 9:47:18 AM CST by Ludy Arriola)          Sexual:        Sexually active: No.  Identifies as female, Sexual orientation: Straight or heterosexual.  History of STD: No.  Contraceptive Use Details: Abstinence.  History of sexual abuse: No.   (Last Updated: 12/10/2019 9:48:22 AM CST by Ludy Arriola)

## 2022-02-16 NOTE — PROGRESS NOTES
Patient:   GARRICK RAMIREZ            MRN: [id number]            FIN: [id number]               Age:   64 years     Sex:  Female     :  1955   Associated Diagnoses:   None   Author:   Avril Martinez      Doctor: Shaunna  Patient Information  (Medicare and address information is on file)  Medicare HICN#: _  Address:_ City:_ State:_ Zip:_  Home Phone:_ Work Phone:_ Other Contact Phone:_    Diabetes self-management training (DSMT) and medical nutrition therapy (MNT) are individual and complementary services to improve diabetes care. For Medicare beneficiaries, both services can be ordered in the same year. Research indicates MNT combined with DSMT improves outcomes.    Diabetes Self-Managment Training (DSMT)  Medicare: 10 hours initial DSMT in 12 month period, plus 2 hours follow-up annually  *Check type of training services and number of hours requested:  _ Initial DSMT:  10 hours or _ no. hrs. requested  X Follow-up DSMT: X 2 hours or _ no. hrs. requested  _ Additional insulin training: _ no. hrs. requested    *Patients with special needs requiring individual DSMT  Check all special needs that apply:  _ Vision _ Hearing  _ Physical  _Cognitive Impairment  _ Language limitations X Other  No classes offered    *DSMT Content  X All ten content areas, as appropriate  _ Monitoring diabetes    _ Diabetes as disease process  _ Psychological adjustment _ Physical activity  _ Nutritional management  _ Goal setting, problem solving  _ Medications       _ Prevent, detect and treat acute complications  _ Preconception/pregnancy _ Prevent, detect and teat chronic complications     management or gestational     diabetes management    Medical Nutrition Therapy (MNT)  Medicare: 3 hours initial MNT in the first calendar year, plus two hours follow-up MNT annually. Additional MNT hours available for change in medical condition, treament and/or diagnosis.  *Check the type of MNT and/or number of additional hours requested:  _  Initial MNT:   X Annual follow-up MNT  _ Additional MNT services in the same calendar year, per RD recommendations  _ number of additional hours requested    Please specify change in medical condition, treatment and/or diagnosis      *Diagnosis  Please send recent labs for patient eligibility & outcomes monitoring  _ Type 1 uncontrolled  _ Type 1 controlled  X Type 2 uncontrolled  _ Type 2 controlled  _ Gestational diabetes _ Other _    Complications/Comorbidities  Check all that apply:  X Hypertension   X Dyslipidemia _ Stroke      _ Nephropathy  _ Neuropathy    _ Retinopathy  _ Renal disease   _ CHD  _ PVD       _ Pregnancy  _ Non-healing wound _ Obesity    _ Mental/affective disorder     _ Other _    Current Diabetes Medications  Specify type, dose and frequency  Oral: Jardiance 10mg, Metformin XR 500mg ii tabs BID   Insulin: Basaglar 26u BID, Novolog 24u before meals and 8u before snacks   Patient now uses: X Pen _ Needle _ Pump    Patient Behavior Goals/Plan of Care    A1c <7%,  Minimize the risk for hypoglycemia and reduce risks of developing complications related to uncontrolled diabetes.    Signature and UPIN #: (UPIN and NPI are on file)  Group/Practice name, address and phone: [facility], [facility] [place]  [id number] [phone]

## 2022-02-16 NOTE — NURSING NOTE
Comprehensive Intake Entered On:  12/10/2021 1:26 PM CST    Performed On:  12/10/2021 1:21 PM CST by Judith Zavala CMA               Summary   Chief Complaint :   Follow up SOB/leg swelling, gets headaches once in a while     Advance Directive :   No   Weight Measured :   152.1 lb(Converted to: 152 lb 2 oz, 68.991 kg)    Height Measured :   61.75 in(Converted to: 5 ft 2 in, 156.84 cm)    Body Mass Index :   28.04 kg/m2 (HI)    Body Surface Area :   1.73 m2   Systolic Blood Pressure :   120 mmHg   Diastolic Blood Pressure :   64 mmHg   Mean Arterial Pressure :   83 mmHg   Peripheral Pulse Rate :   80 bpm   BP Site :   Right arm   Pulse Site :   Radial artery   BP Method :   Manual   HR Method :   Electronic   Temperature Tympanic :   98.5 DegF(Converted to: 36.9 DegC)    Oxygen Saturation :   96 %   Judith Zavala CMA - 12/10/2021 1:21 PM CST   Health Status   Allergies Verified? :   Yes   Medication History Verified? :   Yes   Medical History Verified? :   No   Pre-Visit Planning Status :   Not completed   Tobacco Use? :   Former smoker   Judith Zavala CMA - 12/10/2021 1:21 PM CST   Meds / Allergies   (As Of: 12/10/2021 1:26:18 PM CST)   Allergies (Active)   Cats  Estimated Onset Date:   Unspecified ; Reactions:   Runny nose ; Created By:   Ana Reese; Reaction Status:   Active ; Category:   Drug ; Substance:   Cats ; Type:   Allergy ; Updated By:   Ana Reese; Reviewed Date:   12/10/2021 1:21 PM CST      Lipitor  Estimated Onset Date:   Unspecified ; Reactions:   Diarrhea, nausea, vomiting ; Created By:   Ana Reese; Reaction Status:   Active ; Category:   Drug ; Substance:   Lipitor ; Type:   Allergy ; Updated By:   Ana Reese; Reviewed Date:   12/10/2021 1:21 PM CST      mirtazapine  Estimated Onset Date:   Unspecified ; Reactions:   Nightmare, Suicidal ideation ; Comments:     Comment 1: intolerance   ; Created By:   Kushal Pisano Kaity; Reaction Status:   Active ; Category:   Drug ; Substance:    mirtazapine ; Type:   Allergy ; Updated By:   Kushal Pisano Kaity; Source:   Patient ; Reviewed Date:   12/10/2021 1:21 PM CST      Sodium Hypochlorite  Estimated Onset Date:   Unspecified ; Reactions:   Runny nose ; Created By:   Ana Reese; Reaction Status:   Active ; Category:   Drug ; Substance:   Sodium Hypochlorite ; Type:   Allergy ; Updated By:   Ana Reese; Reviewed Date:   12/10/2021 1:21 PM CST      traZODone  Estimated Onset Date:   Unspecified ; Reactions:   Nightmares ; Created By:   Kushal Pisano Kaity; Reaction Status:   Active ; Category:   Drug ; Substance:   traZODone ; Type:   Side Effect ; Severity:   Low ; Updated By:   Kushal Pisano Kaity; Source:   Patient ; Reviewed Date:   12/10/2021 1:21 PM CST        Medication List   (As Of: 12/10/2021 1:26:18 PM CST)   Prescription/Discharge Order    acetaminophen  :   acetaminophen ; Status:   Prescribed ; Ordered As Mnemonic:   acetaminophen 500 mg oral tablet ; Simple Display Line:   1,000 mg, 2 tab(s), Oral, q6 hrs, not to exceed 4000 mg/day, PRN: for pain, 120 tab(s), 0 Refill(s) ; Ordering Provider:   Samanta Maza MD; Catalog Code:   acetaminophen ; Order Dt/Tm:   4/30/2021 10:59:48 AM CDT          aspirin  :   aspirin ; Status:   Prescribed ; Ordered As Mnemonic:   aspirin 81 mg oral delayed release tablet ; Simple Display Line:   81 mg, 1 tab(s), Oral, daily, 90 tab(s), 3 Refill(s) ; Ordering Provider:   Samanta Maza MD; Catalog Code:   aspirin ; Order Dt/Tm:   4/30/2021 11:00:03 AM CDT          carvedilol  :   carvedilol ; Status:   Prescribed ; Ordered As Mnemonic:   carvedilol 6.25 mg oral tablet ; Simple Display Line:   1 tab(s), Oral, bid, 180 tab(s), 1 Refill(s) ; Ordering Provider:   Samanta Maza MD; Catalog Code:   carvedilol ; Order Dt/Tm:   9/16/2021 12:02:57 PM CDT          clopidogrel  :   clopidogrel ; Status:   Prescribed ; Ordered As Mnemonic:   clopidogrel 75 mg oral tablet ; Simple Display Line:    75 mg, 1 tab(s), Oral, daily, in the AM, 90 tab(s), 1 Refill(s) ; Ordering Provider:   Samanta Maza MD; Catalog Code:   clopidogrel ; Order Dt/Tm:   10/20/2020 12:28:42 PM CDT          empagliflozin  :   empagliflozin ; Status:   Prescribed ; Ordered As Mnemonic:   Jardiance 25 mg oral tablet ; Simple Display Line:   25 mg, 1 tab(s), Oral, qam, 90 tab(s), 2 Refill(s) ; Ordering Provider:   Samanta Maza MD; Catalog Code:   empagliflozin ; Order Dt/Tm:   12/18/2020 10:32:55 AM CST          FLUoxetine  :   FLUoxetine ; Status:   Prescribed ; Ordered As Mnemonic:   FLUoxetine 20 mg oral capsule ; Simple Display Line:   1 cap(s), Oral, qam, 90 cap(s), 3 Refill(s) ; Ordering Provider:   Samanta Maza MD; Catalog Code:   FLUoxetine ; Order Dt/Tm:   4/30/2021 11:12:05 AM CDT          furosemide  :   furosemide ; Status:   Prescribed ; Ordered As Mnemonic:   furosemide 20 mg oral tablet ; Simple Display Line:   20 mg, 1 tab(s), Oral, daily, 30 tab(s), 0 Refill(s) ; Ordering Provider:   Samanta Maza MD; Catalog Code:   furosemide ; Order Dt/Tm:   12/1/2021 4:08:42 PM CST          glucose  :   glucose ; Status:   Prescribed ; Ordered As Mnemonic:   glucose 4 g oral tablet, chewable ; Simple Display Line:   16 gm, 4 tab(s), Chewed, once, Use if BG <70 mg/dL,  check BG 15.  Repeat if BG remains less than 80 mg/dL., 4 tab(s), 0 Refill(s) ; Ordering Provider:   Samanta Maza MD; Catalog Code:   glucose ; Order Dt/Tm:   12/20/2019 11:50:23 AM CST          insulin glargine  :   insulin glargine ; Status:   Prescribed ; Ordered As Mnemonic:   Basaglar KwikPen 100 units/mL subcutaneous solution ; Simple Display Line:   17 units, Subcutaneous, bid, 15 mL, 1 Refill(s) ; Ordering Provider:   Samanta Maza MD; Catalog Code:   insulin glargine ; Order Dt/Tm:   1/21/2021 11:28:37 AM CST          insulin lispro  :   insulin lispro ; Status:   Prescribed ; Ordered As Mnemonic:   HumaLOG KwikPen 100 units/mL  injectable solution ; Simple Display Line:   See Instructions, 11u qac and 4u q snacks.+SSI, for -199 +2U, 200-249 +4U, 250-349+8u, 350-399 +10U, >399 +10u and call MD   HOLD if pre-meal bg is <90 mg/dL., 45 mL, 3 Refill(s) ; Ordering Provider:   Samanta Maza MD; Catalog Code:   insulin lispro ; Order Dt/Tm:   4/30/2021 11:04:41 AM CDT          lisinopril  :   lisinopril ; Status:   Prescribed ; Ordered As Mnemonic:   lisinopril 5 mg oral tablet ; Simple Display Line:   5 mg, 1 tab(s), Oral, daily, 90 tab(s), 3 Refill(s) ; Ordering Provider:   Samanta Maza MD; Catalog Code:   lisinopril ; Order Dt/Tm:   4/30/2021 11:04:15 AM CDT          loratadine  :   loratadine ; Status:   Prescribed ; Ordered As Mnemonic:   loratadine 10 mg oral tablet ; Simple Display Line:   10 mg, 1 tab(s), Oral, qhs, 90 tab(s), 3 Refill(s) ; Ordering Provider:   Samanta Maza MD; Catalog Code:   loratadine ; Order Dt/Tm:   4/30/2021 11:12:38 AM CDT          metFORMIN  :   metFORMIN ; Status:   Prescribed ; Ordered As Mnemonic:   MetFORMIN (Eqv-Glucophage XR) 500 mg oral tablet, extended release ; Simple Display Line:   1,000 mg, 2 tab(s), Oral, bid, 360 tab(s), 3 Refill(s) ; Ordering Provider:   Samanta Maza MD; Catalog Code:   metFORMIN ; Order Dt/Tm:   4/30/2021 11:12:55 AM CDT          Miscellaneous Prescription  :   Miscellaneous Prescription ; Status:   Prescribed ; Ordered As Mnemonic:   Freestyle Dm 2 14 day reader ; Simple Display Line:   See Instructions, Use to test blood sugars 4 times daily per , 1 EA, 0 Refill(s) ; Ordering Provider:   Samanta Mzaa MD; Catalog Code:   Miscellaneous Prescription ; Order Dt/Tm:   1/21/2021 11:02:18 AM CST          Miscellaneous Prescription  :   Miscellaneous Prescription ; Status:   Prescribed ; Ordered As Mnemonic:   Freestyle Dm 2 14 day sensor ; Simple Display Line:   See Instructions, Use to test blood sugars 4 times daily. Change sensor every  14 days., 6 EA, 3 Refill(s) ; Ordering Provider:   Samanta Maza MD; Catalog Code:   Miscellaneous Prescription ; Order Dt/Tm:   1/21/2021 11:02:26 AM CST          Miscellaneous Prescription  :   Miscellaneous Prescription ; Status:   Prescribed ; Ordered As Mnemonic:   lancet bowman ; Simple Display Line:   See Instructions, use as directed, 1 EA, 0 Refill(s) ; Ordering Provider:   Samanta Maza MD; Catalog Code:   Miscellaneous Prescription ; Order Dt/Tm:   12/6/2019 10:58:20 AM CST          Miscellaneous Prescription  :   Miscellaneous Prescription ; Status:   Prescribed ; Ordered As Mnemonic:   NITROGLYCERIN 0.4MG TAB SUBLINGUAL ; Simple Display Line:   See Instructions, DISSOLVE ONE TABLET UNDER TONGUE EVERY FIVE MIN FOR CHEST PAIN AS NEEDED UP TO THREE DOSES CALL IF SYMPTOMS PERSIST 5 MIN AFTER 1ST DOSE CALL 911 AFTER 1ST DOSE IF PAIN PERSISTS, 25 EA, 3 Refill(s) ; Ordering Provider:   Samanta Maza MD; Catalog Code:   Miscellaneous Prescription ; Order Dt/Tm:   4/30/2021 11:13:23 AM CDT          Miscellaneous Prescription  :   Miscellaneous Prescription ; Status:   Prescribed ; Ordered As Mnemonic:   true metrix glucometer test strips ; Simple Display Line:   See Instructions, check QID: fasting BG, 2 hours after meals and before bed ., 100 EA, 11 Refill(s) ; Ordering Provider:   Samanta Maza MD; Catalog Code:   Miscellaneous Prescription ; Order Dt/Tm:   2/28/2020 11:37:26 AM CST          Miscellaneous Prescription  :   Miscellaneous Prescription ; Status:   Prescribed ; Ordered As Mnemonic:   ULTICARE MICRO PEN NEEDLES/32G X 4MM [id number] MISC ; Simple Display Line:   See Instructions, USE FOUR TIMES A DAY, 100 EA, 3 Refill(s) ; Ordering Provider:   Samanta Maza MD; Catalog Code:   Miscellaneous Prescription ; Order Dt/Tm:   10/27/2021 9:42:36 AM CDT          Miscellaneous Prescription  :   Miscellaneous Prescription ; Status:   Prescribed ; Ordered As Mnemonic:   ULTICARE MICRO PEN TIP  32G 4MM 50CT MG INJECTABLE ; Simple Display Line:   See Instructions, USE FOUR TIMES A DAY, 100 unknown unit, 3 Refill(s) ; Ordering Provider:   Samanta Maza MD; Catalog Code:   Miscellaneous Prescription ; Order Dt/Tm:   4/23/2020 3:35:28 PM CDT          Miscellaneous Rx Supply  :   Miscellaneous Rx Supply ; Status:   Prescribed ; Ordered As Mnemonic:   Incontience diapers ; Simple Display Line:   See Instructions, use 1 diaper per night, 30 EA, 11 Refill(s) ; Ordering Provider:   Samanta Maza MD; Catalog Code:   Miscellaneous Rx Supply ; Order Dt/Tm:   3/11/2020 10:27:58 AM CDT ; Comment:   Faxed to [employer] @ [phone]          Miscellaneous Rx Supply  :   Miscellaneous Rx Supply ; Status:   Prescribed ; Ordered As Mnemonic:   Lachydrin ; Simple Display Line:   See Instructions, Lachydrin or similar product- apply to feet QHS, PRN: dry skin, 1 EA, 1 Refill(s) ; Ordering Provider:   Samanta Maza MD; Catalog Code:   Miscellaneous Rx Supply ; Order Dt/Tm:   1/10/2020 10:31:46 AM CST          multivitamin with minerals  :   multivitamin with minerals ; Status:   Prescribed ; Ordered As Mnemonic:   One-A-Day Women 50 Plus oral tablet ; Simple Display Line:   See Instructions, may substiute with multivitamin with minerals preferred by insurance company, 90 EA, 3 Refill(s) ; Ordering Provider:   Samanta Maza MD; Catalog Code:   multivitamin with minerals ; Order Dt/Tm:   4/30/2021 11:15:35 AM CDT          pantoprazole  :   pantoprazole ; Status:   Prescribed ; Ordered As Mnemonic:   pantoprazole 20 mg oral delayed release tablet ; Simple Display Line:   20 mg, 1 tab(s), Oral, bid, 180 tab(s), 3 Refill(s) ; Ordering Provider:   Samanta Maza MD; Catalog Code:   pantoprazole ; Order Dt/Tm:   4/30/2021 11:17:30 AM CDT          potassium chloride  :   potassium chloride ; Status:   Prescribed ; Ordered As Mnemonic:   potassium chloride 10 mEq oral tablet, extended release ; Simple Display  Line:   10 mEq, 1 tab(s), Oral, daily, 30 tab(s), 0 Refill(s) ; Ordering Provider:   Samanta Maza MD; Catalog Code:   potassium chloride ; Order Dt/Tm:   12/1/2021 4:08:58 PM CST          rosuvastatin  :   rosuvastatin ; Status:   Prescribed ; Ordered As Mnemonic:   rosuvastatin 20 mg oral tablet ; Simple Display Line:   20 mg, 1 tab(s), Oral, qhs, 90 tab(s), 3 Refill(s) ; Ordering Provider:   Samanta Maza MD; Catalog Code:   rosuvastatin ; Order Dt/Tm:   4/30/2021 11:17:38 AM CDT          torsemide  :   torsemide ; Status:   Prescribed ; Ordered As Mnemonic:   torsemide 20 mg oral tablet ; Simple Display Line:   20 mg, 1 tab(s), Oral, daily, 30 tab(s), 0 Refill(s) ; Ordering Provider:   Samanta Maza MD; Catalog Code:   torsemide ; Order Dt/Tm:   4/30/2021 11:02:30 AM CDT            Home Meds    calcium carbonate  :   calcium carbonate ; Status:   Documented ; Ordered As Mnemonic:   Tums 500 mg oral tablet, chewable ; Simple Display Line:   500 mg, 1 tab(s), Chewed, bid, as needed for heartburn, stomach pain, acid reflux, 0 Refill(s) ; Catalog Code:   calcium carbonate ; Order Dt/Tm:   9/17/2020 9:47:04 PM CDT          insulin degludec  :   insulin degludec ; Status:   Documented ; Ordered As Mnemonic:   Tresiba FlexTouch 100 units/mL subcutaneous solution ; Simple Display Line:   0 Refill(s) ; Catalog Code:   insulin degludec ; Order Dt/Tm:   8/19/2021 1:15:52 PM CDT ; Comment:   Responsible Provider: ELSA CISSE          insulin lispro  :   insulin lispro ; Status:   Documented ; Ordered As Mnemonic:   Insulin Lispro KwikPen 100 units/mL injectable solution ; Simple Display Line:   0 Refill(s) ; Catalog Code:   insulin lispro ; Order Dt/Tm:   8/19/2021 1:16:49 PM CDT ; Comment:   Responsible Provider: ELSA CISSE          Miscellaneous Prescription  :   Miscellaneous Prescription ; Status:   Documented ; Ordered As Mnemonic:   Misc Prescription ; Simple Display Line:   Eye allergy relief  (pt does  not know drug ingredient). Using as needed for eye allergy symptoms., 0 Refill(s) ; Catalog Code:   Miscellaneous Prescription ; Order Dt/Tm:   9/17/2020 9:49:08 PM CDT            Social History   Social History   (As Of: 12/10/2021 1:26:18 PM CST)   Alcohol:        Never   (Last Updated: 12/10/2019 9:46:11 AM CST by Ludy Arriola)          Tobacco:  Past      Former smoker, quit more than 30 days ago, Cigarettes   (Last Updated: 1/12/2021 2:27:15 PM CST by Ruth Ann Adams)          Electronic Cigarette/Vaping:        Electronic Cigarette Use: Never.   (Last Updated: 1/12/2021 2:27:20 PM CST by Ruth Ann Adams)          Substance Abuse:        Never   (Last Updated: 12/10/2019 9:46:22 AM CST by Ludy Arriola)          Home/Environment:        Marital status: Single.  1 children.  Living situation: Home/Independent.  Injuries/Abuse/Neglect in household: No.  Feels unsafe at home: No.  Family/Friends available for support: Yes.   (Last Updated: 12/10/2019 9:46:51 AM CST by Ludy Arriola)          Nutrition/Health:        Type of diet: Regular.  Wants to lose weight: Yes.  Sleeping concerns: Yes.  Feels highly stressed: Yes.   (Last Updated: 12/10/2019 9:47:05 AM CST by Ludy Arriola)          Exercise:        Exercise frequency: 1-2 times/week.  Exercise type: Walking.   (Last Updated: 12/10/2019 9:47:18 AM CST by Ludy Arriola)          Sexual:        Sexually active: No.  Identifies as female, Sexual orientation: Straight or heterosexual.  History of STD: No.  Contraceptive Use Details: Abstinence.  History of sexual abuse: No.   (Last Updated: 12/10/2019 9:48:22 AM CST by Ludy Arriola)

## 2022-02-16 NOTE — CARE COORDINATION
Dilated Retinal Eye Exam Communication Form provided at pt's visit with DM educator on 02/03/2020  Rosalie Suarez CMA.

## 2022-02-16 NOTE — TELEPHONE ENCOUNTER
---------------------  From: Meche Mak CMA   To: Samanta Maza MD;     Sent: 10/22/2020 2:36:11 PM CDT  Subject: BP check     Patient presented for BP check at 1425 today.    Waited 5 minutes resting BP electronic reading 109/70 HR 92.---------------------  From: Samanta Maza MD   To: Meche Mak CMA;     Sent: 10/22/2020 5:49:35 PM CDT  Subject: RE: BP check     thanks   pls chart in her vitals if not yet doneDone.

## 2022-02-16 NOTE — LETTER
(Inserted Image. Unable to display)   June 23, 2020        GARRICK RAMIREZ  [address] [id number]        Dear GARRICK,      Thank you for selecting [facility] for your healthcare needs.    Our records indicate you are due for the following services:     Medication Therapy Management Consultation ~ Our records indicate that your Healthcare Provider has recommended that you meet with our specially trained pharmacist here in the clinic to review how your medications are working and discuss any questions that you may have.     The service is called Medication Therapy Management or MTM and the goal is to make sure that you are getting that most benefit from your medications.    Our PharmD, Carly Pisano, will meet with you here at the clinic one-on-one to review all of your medications, both prescriptions and over the counter products.  If the pharmacist has any recommendations for changing your medications, they will work with your provider here in the clinic to do that for you.  The pharmacist can also follow-up with you over time to make sure things are going well.    Appointments are available on Tuesdays and Thursdays and Fridays.     To schedule an appointment or if you have further questions, please contact your primary clinic:   [facility]       [phone]   [facility]       [phone]              [facility]     [phone]      Powered by [employer]    Sincerely,    Carly Pisano, Pharm D

## 2022-02-16 NOTE — TELEPHONE ENCOUNTER
Entered by Francisca August CMA on January 03, 2020 10:09:58 AM CST  Gathered resources for [facility] and [facility] Transportation. Gave to Carly to share with pt OR advised I would be happy to meet face to face w/ pt if she would like to explain resources. I told her I would also be able to schedule rides for pt if she lets me know time/date of appt.       ---------------------  From: Kushal Pisano D , Carly   To: Francisca August CMA;     Sent: 1/3/2020 9:18:24 AM CST  Subject: Transportation resource     Omero Espinosa,   Here is the patient.  I'm meeting with her again today and hoping to connect her with behavioral health/social work - but I'm concerned about transportation.  Thanks for your help.  KBI met with pt and daughter today and provided transportation resources in addition I wrote out a list of the appointments MJP wanted her to make. Advised to schedule these when she was able to avoid getting overwhelmed with appts. My card given w/ my direct # to call w/ question or concerns.

## 2022-02-16 NOTE — TELEPHONE ENCOUNTER
---------------------  From: Samanta Maza MD   To: [facility]);     Sent: 3/13/2020 10:04:06 AM CDT  Subject: General Message     please help patient get her stress test ordered and wondering about incontinence padsare you the ordering provider for the stress test or is it coming from [facility]? If its from you, can you place the order?     I have talked to Chanelle about the incontinence pads on several occasions. I have advised Medicare will not cover BUT [employer] MAY. I have faxed orders to [facility] already to review coverage and I told her that they would reach out to her if they can supply. Otherwise, I told her she would need to purchase out of pocket.---------------------  From: Francisca August CMA ([facility]))   To: Samanta Maza MD;     Sent: 3/16/2020 3:26:01 PM CDT  Subject: RE: General Message?   ?   ---------------------  From: Samanta Maza  To: Francisca August CMA ([facility]))  Sent: March 16, 2020 7:29 PM CDT  Subject: RE: General Message  ???  Her cardiologist had ordered it.? I believe she saw Dr. Long.---------------------  From: Francisca August CMA ([facility]))   To: Alison Hill;     Sent: 3/17/2020 2:01:56 PM CDT  Subject: FW: General MessageCalled Dr. Long's office. They stated that they had tried to call pt in January to get this set up but she did not answer. They stated that they would try to call pt again to get this scheduled.---------------------  From: Alison Hill   To: Samanta Maza MD;     Sent: 3/17/2020 3:08:56 PM CDT  Subject: FW: General Message

## 2022-02-16 NOTE — PROGRESS NOTES
Patient:   GARRICK RAMIREZ            MRN: [id number]            FIN: [id number]               Age:   66 years     Sex:  Female     :  1955   Associated Diagnoses:   Type 2 diabetes mellitus without complications   Author:   Avril Martinez      Visit Information   Visit type:  Medical Nutrition Therapy for diabetes management.    Accompanied by:  Family member, adult daughter.    Referral source:  Samanta Maza MD.       Chief Complaint   Type II diabetes, Hyperlipidemia, HTN       History of Present Illness   2021 -  Freestyle Dm 2 instruction and ongoing diabetes education     2021 - Follow up diabetes education and download Freestyle Dm 2.      2021 - Follow up education, heart healthy education, download Freestyle Dm 2    Patient continues to meet criteria for CGM coverage;   - patient has type two diabetes mellitus; and  - patient has been using CGM daily; and  - patient is insulin treated with multiple daily injections of three or more times per day; and  - patient is frequently adjusting insulin on the basis of CGM readings  - within six months prior to ordering the CGM, the patient has had an in-person visit with the practitioner; and determined that criteria (1-4) above are met; and   - every six months following the initial prescription of the CGM, the treating practitioner has an in-person visit with the patient to assess adherence to their CGM regimen and diabetes treatment plan    Nutrition: Pt and her daughter are working on eating healthier and going grocery shopping together.  They live 5 minutes apart. Daughter would like her family to start eating healthier also.     Insulin: Tresiba 15u BID BUT has been forgetting to take am dose 3/7 days.  Will change to q evening dose at 30u   Humalog 11 units afternoon and evening, does not typically eat breakfast   4u prior to snacks - does not take this in the evening/ HS     Metformin XR 500mg ii tabs BID  Jardiance 25 mg  daily   Taking medications as directed.        Exercise: none, went through cardiac rehab and has not been doing the recommended exercises   Uses food stamps and trying to purchase fruits and vegetables   Eye exam due  Skin - no areas of concern  Immunizations - UTD    A1C  8.7% = 203 mg/dL estimated Average Glucose (eAG)    Average sensor glucose 158 mg/dL 90 day   Average sensor glucose 158 mg/dL 28 day would anticipate the next a1c to be much improved     28 day report    Time in target range 70 - 180  57%  lower was 71%  Very High >250   17%  High >180     25%  Low <70     1%  Very Low <54   0%  CGM active     80%         Health Status   Allergies:    Allergic Reactions (Selected)  Severity Not Documented  Cats (Runny nose)  Lipitor (Diarrhea, nausea, vomiting)  Mirtazapine (Nightmare and suicidal ideation)  Sodium Hypochlorite (Runny nose)  Nonallergic Reactions (Selected)  Low  TraZODone (Nightmares)   Medications:  (Selected)   Prescriptions  Prescribed  Basaglar KwikPen 100 units/mL subcutaneous solution: ( 17 units ), Subcutaneous, bid, # 15 mL, 1 Refill(s), Type: Maintenance, Pharmacy: [facility], dose change as of 2/5/2021, 62, in, 03/13/20 9:35:00 CDT, Height Measured, 171.4, lb, 10/01/20 12:54:00 CDT, Weight Measured  FLUoxetine 20 mg oral capsule: = 1 cap(s), Oral, qam, # 90 cap(s), 3 Refill(s), Type: Maintenance, Pharmacy: [facility], 1 cap(s) Oral qam, 62, in, 02/11/21 16:18:00 CST, Height Measured, 163.3, lb, 04/30/21 10:08:00 CDT, Weight Measured  Freestyle Dm 2 14 day reader: Freestyle Dm 2 14 day reader, See Instructions, Instructions: Use to test blood sugars 4 times daily per , Supply, # 1 EA, 0 Refill(s), Type: Maintenance, faxed to pharmacy (Rx)  Freestyle Dm 2 14 day sensor: Freestyle Dm 2 14 day sensor, See Instructions, Instructions: Use to test blood sugars 4 times daily. Change sensor every 14 days., Supply, # 6 EA, 3 Refill(s), Type: Maintenance, faxed to  pharmacy (Rx)  HumaLOG KwikPen 100 units/mL injectable solution: See Instructions, Instructions: 11u qac and 4u q snacks.+SSI, for -199 +2U, 200-249 +4U, 250-349+8u, 350-399 +10U, >399 +10u and call MD   HOLD if pre-meal bg is <90 mg/dL., # 45 mL, 3 Refill(s), Type: Maintenance, Pharmacy: [facility], dose c...  Incontience diapers: Incontience diapers, See Instructions, Instructions: use 1 diaper per night, Supply, # 30 EA, 11 Refill(s), Type: Maintenance  Jardiance 25 mg oral tablet: = 1 tab(s) ( 25 mg ), Oral, qam, # 90 tab(s), 2 Refill(s), Type: Maintenance, Pharmacy: [facility], dose increase; please d/c the 10 mg tab so it does not get accidentally refilled. Pt would like delivered., 1 tab(s) Oral qam, 62, in, 03/13/20 9:35:...  Lachydrin: Lachydrin, See Instructions, Instructions: Lachydrin or similar product- apply to feet QHS, Supply, PRN: dry skin, # 1 EA, 1 Refill(s), Type: Maintenance, Pharmacy: [facility], Lachydrin or similar product- apply to feet QHS,PRN:dry skin  MetFORMIN (Eqv-Glucophage XR) 500 mg oral tablet, extended release: = 2 tab(s) ( 1,000 mg ), Oral, bid, # 360 tab(s), 3 Refill(s), Type: Maintenance, Pharmacy: [facility], 2 tab(s) Oral bid, 62, in, 02/11/21 16:18:00 CST, Height Measured, 163.3, lb, 04/30/21 10:08:00 CDT, Weight Measured  NITROGLYCERIN 0.4MG TAB SUBLINGUAL: NITROGLYCERIN 0.4MG TAB SUBLINGUAL, See Instructions, Instructions: DISSOLVE ONE TABLET UNDER TONGUE EVERY FIVE MIN FOR CHEST PAIN AS NEEDED UP TO THREE DOSES CALL IF SYMPTOMS PERSIST 5 MIN AFTER 1ST DOSE CALL 911 AFTER 1ST DOSE IF PAIN PERSISTS, Supp...  One-A-Day Women 50 Plus oral tablet: See Instructions, Instructions: may substiute with multivitamin with minerals preferred by insurance company, # 90 EA, 3 Refill(s), Type: Maintenance, Pharmacy: [facility], may substiute with multivitamin with minerals preferred by insurance company...  ULTICARE MICRO PEN NEEDLES/32G X 4MM [id number] MISC: ULTICARE MICRO  PEN NEEDLES/32G X 4MM [id number] MISC, See Instructions, Instructions: USE FOUR TIMES A DAY, Supply, # 100 EA, 3 Refill(s), Type: Maintenance, Pharmacy: [facility], USE FOUR TIMES A DAY, 61.75, in, 09/27/21 15:09:00 CDT, Height Measured,...  ULTICARE MICRO PEN TIP 32G 4MM 50CT MG INJECTABLE: ULTICARE MICRO PEN TIP 32G 4MM 50CT MG INJECTABLE, See Instructions, Instructions: USE FOUR TIMES A DAY, Supply, # 100 unknown unit, 3 Refill(s), Type: Maintenance, Pharmacy: [facility], USE FOUR TIMES A DAY  acetaminophen 500 mg oral tablet: = 2 tab(s) ( 1,000 mg ), Oral, q6 hrs, Instructions: not to exceed 4000 mg/day, PRN: for pain, # 120 tab(s), 0 Refill(s), Type: Maintenance, Pharmacy: [facility], 2 tab(s) Oral q6 hrs,PRN:for pain,Instr:not to exceed 4000 mg/day, 62, in, 02/11/21 16...  aspirin 81 mg oral delayed release tablet: = 1 tab(s) ( 81 mg ), Oral, daily, # 90 tab(s), 3 Refill(s), Type: Maintenance, Pharmacy: [facility], 1 tab(s) Oral daily, 62, in, 02/11/21 16:18:00 CST, Height Measured, 163.3, lb, 04/30/21 10:08:00 CDT, Weight Measured  carvedilol 6.25 mg oral tablet: = 1 tab(s), Oral, bid, # 180 tab(s), 1 Refill(s), Type: Maintenance, Pharmacy: [facility], 1 tab(s) Oral bid, 61.75, in, 08/19/21 12:36:00 CDT, Height Measured, 154.9, lb, 08/19/21 12:36:00 CDT, Weight Measured  clopidogrel 75 mg oral tablet: = 1 tab(s) ( 75 mg ), Oral, daily, Instructions: in the AM, # 90 tab(s), 1 Refill(s), Type: Maintenance, Pharmacy: [facility], pt req 90 day supply, 1 tab(s) Oral daily,Instr:in the AM, 62, in, 03/13/20 9:35:00 CDT, Height Measured, 171.4, lb, 10/01...  glucose 4 g oral tablet, chewable: = 4 tab(s) ( 16 gm ), Chewed, once, Instructions: Use if BG <70 mg/dL,  check BG 15.  Repeat if BG remains less than 80 mg/dL., # 4 tab(s), 0 Refill(s), Type: Soft Stop, Pharmacy: [facility], 4 tab(s) Chewed once,Instr:Use if BG <70 mg/dL,  check BG...  lancet bowman: lancet bowman, See Instructions, Instructions: use  as directed, Supply, # 1 EA, 0 Refill(s), Type: Maintenance, Pharmacy: [facility], use as directed  lisinopril 5 mg oral tablet: = 1 tab(s) ( 5 mg ), Oral, daily, # 90 tab(s), 3 Refill(s), Type: Maintenance, Pharmacy: [facility], 1 tab(s) Oral daily, 62, in, 02/11/21 16:18:00 CST, Height Measured, 163.3, lb, 04/30/21 10:08:00 CDT, Weight Measured  loratadine 10 mg oral tablet: = 1 tab(s) ( 10 mg ), Oral, qhs, # 90 tab(s), 3 Refill(s), Type: Maintenance, Pharmacy: [facility], 1 tab(s) Oral qhs, 62, in, 02/11/21 16:18:00 CST, Height Measured, 163.3, lb, 04/30/21 10:08:00 CDT, Weight Measured  pantoprazole 20 mg oral delayed release tablet: = 1 tab(s) ( 20 mg ), Oral, bid, # 180 tab(s), 3 Refill(s), Type: Maintenance, Pharmacy: [facility], please cancel the 40 mg 1 po qday if this is covered by her insurance, thanks, 1 tab(s) Oral bid, 62, in, 02/11/21 16:18:00 CST, Height Measured, 16...  rosuvastatin 20 mg oral tablet: = 1 tab(s) ( 20 mg ), Oral, qhs, # 90 tab(s), 3 Refill(s), Type: Maintenance, Pharmacy: [facility], Pt req 90 day supply, 1 tab(s) Oral qhs, 62, in, 02/11/21 16:18:00 CST, Height Measured, 163.3, lb, 04/30/21 10:08:00 CDT, Weight Measured  torsemide 20 mg oral tablet: = 1 tab(s) ( 20 mg ), Oral, daily, # 30 tab(s), 0 Refill(s), Type: Maintenance, Pharmacy: [facility], 1 tab(s) Oral daily, 62, in, 02/11/21 16:18:00 CST, Height Measured, 163.3, lb, 04/30/21 10:08:00 CDT, Weight Measured  true metrix glucometer test strips: true metrix glucometer test strips, See Instructions, Instructions: check QID: fasting BG, 2 hours after meals and before bed ., Supply, # 100 EA, 11 Refill(s), Type: Maintenance, Pharmacy: [facility], check QID: fasting BG, 2 hours after meals and...  Documented Medications  Documented  Insulin Lispro KwikPen 100 units/mL injectable solution: 0 Refill(s), Type: Soft Stop  Misc Prescription: Instructions: Eye allergy relief  (pt does not know drug ingredient). Using as  needed for eye allergy symptoms., 0 Refill(s), Type: Maintenance  Tresiba FlexTouch 100 units/mL subcutaneous solution: 0 Refill(s), Type: Soft Stop  Tums 500 mg oral tablet, chewable: = 1 tab(s) ( 500 mg ), Chewed, bid, Instructions: as needed for heartburn, stomach pain, acid reflux, 0 Refill(s), Type: Maintenance,    Medications          *denotes recorded medication          FLUoxetine 20 mg oral capsule: 1 cap(s), Oral, qam, 90 cap(s), 3 Refill(s).          lancet bowman: See Instructions, use as directed, 1 EA, 0 Refill(s).          true metrix glucometer test strips: See Instructions, check QID: fasting BG, 2 hours after meals and before bed ., 100 EA, 11 Refill(s).          ULTICARE MICRO PEN TIP 32G 4MM 50CT MG INJECTABLE: See Instructions, USE FOUR TIMES A DAY, 100 unknown unit, 3 Refill(s).          *Misc Prescription: Eye allergy relief  (pt does not know drug ingredient). Using as needed for eye allergy symptoms., 0 Refill(s).          Freestyle Dm 2 14 day sensor: See Instructions, Use to test blood sugars 4 times daily. Change sensor every 14 days., 6 EA, 3 Refill(s).          Freestyle Dm 2 14 day reader: See Instructions, Use to test blood sugars 4 times daily per , 1 EA, 0 Refill(s).          NITROGLYCERIN 0.4MG TAB SUBLINGUAL: See Instructions, DISSOLVE ONE TABLET UNDER TONGUE EVERY FIVE MIN FOR CHEST PAIN AS NEEDED UP TO THREE DOSES CALL IF SYMPTOMS PERSIST 5 MIN AFTER 1ST DOSE CALL 911 AFTER 1ST DOSE IF PAIN PERSISTS, 25 EA, 3 Refill(s).          ULTICARE MICRO PEN NEEDLES/32G X 4MM [id number] MISC: See Instructions, USE FOUR TIMES A DAY, 100 EA, 3 Refill(s).          Lachydrin: See Instructions, Lachydrin or similar product- apply to feet QHS, PRN: dry skin, 1 EA, 1 Refill(s).          Incontience diapers: See Instructions, use 1 diaper per night, 30 EA, 11 Refill(s).          acetaminophen 500 mg oral tablet: 1,000 mg, 2 tab(s), Oral, q6 hrs, not to exceed 4000 mg/day, PRN: for  pain, 120 tab(s), 0 Refill(s).          aspirin 81 mg oral delayed release tablet: 81 mg, 1 tab(s), Oral, daily, 90 tab(s), 3 Refill(s).          *Tums 500 mg oral tablet, chewable: 500 mg, 1 tab(s), Chewed, bid, as needed for heartburn, stomach pain, acid reflux, 0 Refill(s).          carvedilol 6.25 mg oral tablet: 1 tab(s), Oral, bid, 180 tab(s), 1 Refill(s).          clopidogrel 75 mg oral tablet: 75 mg, 1 tab(s), Oral, daily, in the AM, 90 tab(s), 1 Refill(s).          Jardiance 25 mg oral tablet: 25 mg, 1 tab(s), Oral, qam, 90 tab(s), 2 Refill(s).          glucose 4 g oral tablet, chewable: 16 gm, 4 tab(s), Chewed, once, Use if BG <70 mg/dL,  check BG 15.  Repeat if BG remains less than 80 mg/dL., 4 tab(s), 0 Refill(s).          *Tresiba FlexTouch 100 units/mL subcutaneous solution: 0 Refill(s).          Basaglar KwikPen 100 units/mL subcutaneous solution: 17 units, Subcutaneous, bid, 15 mL, 1 Refill(s).          HumaLOG KwikPen 100 units/mL injectable solution: See Instructions, 11u qac and 4u q snacks.+SSI, for -199 +2U, 200-249 +4U, 250-349+8u, 350-399 +10U, >399 +10u and call MD   HOLD if pre-meal bg is <90 mg/dL., 45 mL, 3 Refill(s).          *Insulin Lispro KwikPen 100 units/mL injectable solution: 0 Refill(s).          lisinopril 5 mg oral tablet: 5 mg, 1 tab(s), Oral, daily, 90 tab(s), 3 Refill(s).          loratadine 10 mg oral tablet: 10 mg, 1 tab(s), Oral, qhs, 90 tab(s), 3 Refill(s).          MetFORMIN (Eqv-Glucophage XR) 500 mg oral tablet, extended release: 1,000 mg, 2 tab(s), Oral, bid, 360 tab(s), 3 Refill(s).          One-A-Day Women 50 Plus oral tablet: See Instructions, may substiute with multivitamin with minerals preferred by insurance company, 90 EA, 3 Refill(s).          pantoprazole 20 mg oral delayed release tablet: 20 mg, 1 tab(s), Oral, bid, 180 tab(s), 3 Refill(s).          rosuvastatin 20 mg oral tablet: 20 mg, 1 tab(s), Oral, qhs, 90 tab(s), 3 Refill(s).          torsemide  20 mg oral tablet: 20 mg, 1 tab(s), Oral, daily, 30 tab(s), 0 Refill(s).       Problem list:    All Problems  Type 2 diabetes mellitus without complications / SNOMED CT 648676262 / Confirmed  Stable angina / SNOMED CT 809014812 / Confirmed  Pure hypercholesterolemia, unspecified / SNOMED CT 739801764 / Confirmed  Unstable angina / SNOMED CT 0851107 / Confirmed  Polypharmacy / SNOMED CT 335570799 / Confirmed  Unspecified open wound, left foot, initial encounter / SNOMED CT 309784382 / Confirmed  Obstructive sleep apnea (adult) (pediatric) / SNOMED CT 302577505 / Confirmed  Myopia, bilateral / SNOMED CT 35459036 / Confirmed  Major depressive disorder, single episode, unspecified / SNOMED CT 22644611 / Confirmed  Insomnia, unspecified / SNOMED CT 643204892 / Confirmed  Infectious gastroenteritis and colitis, unspecified / SNOMED CT 16452039 / Confirmed  History of MI (myocardial infarction) / SNOMED CT 7029816597 / Confirmed  Hx of CABG / SNOMED CT 1478570345 / Confirmed  Unspecified hearing loss, bilateral / SNOMED CT 58554249 / Confirmed  Generalized anxiety disorder / SNOMED CT 48470027 / Confirmed  Stress incontinence (female) (male) / SNOMED CT 947278820 / Confirmed  Essential (primary) hypertension / SNOMED CT 28454628 / Confirmed  Atherosclerotic heart disease of native coronary artery without angina pectoris / SNOMED CT 6509937353 / Confirmed  CAD in native artery / SNOMED CT 0888272364 / Confirmed  Combined forms of age-related cataract, left eye / SNOMED CT 16183088 / Confirmed  Combined forms of age-related cataract, right eye / SNOMED CT 52104254 / Confirmed  Acute myocardial infarction, unspecified / SNOMED CT 96763626 / Confirmed  Resolved: Pregnancy / SNOMED CT 059519355      Histories   Past Medical History:    Active  Acute myocardial infarction, unspecified (83493743): Onset in the month of 7/2011 at 55 years  Major depressive disorder, single episode, unspecified (10717125)  Type 2 diabetes mellitus  without complications (219005717)  Obstructive sleep apnea (adult) (pediatric) (958439964)  Unspecified hearing loss, bilateral (64606658)  Myopia, bilateral (71841574)  Unspecified open wound, left foot, initial encounter (533074423)  Atherosclerotic heart disease of native coronary artery without angina pectoris (0790273734)  Combined forms of age-related cataract, right eye (91293441)  Generalized anxiety disorder (75959838)  Essential (primary) hypertension (90179798)  Pure hypercholesterolemia, unspecified (573125688)  Insomnia, unspecified (468950984)  Unstable angina (2353278)  Stress incontinence (female) (male) (053901171)  Infectious gastroenteritis and colitis, unspecified (91571261)  Resolved  Pregnancy (947469896):  Resolved in 1976 at 20 years.   Family History:    Diabetes mellitus  Mother  Father  Arthritis  Grandmother (M)  CVA - Cerebrovascular accident  Mother  Father  Glaucoma  Sister     Procedure history:    Esophagogastroduodenoscopy (SNOMED CT 584735324) on 9/27/2019 at 64 Years.  Comments:  11/22/2019 1:48 PM Ana Langley  Indication: Chronic heartburn.  IOL - Cataract extraction and insertion of intraocular lens (SNOMED CT 6890701142) performed by Jovanni Harris MD on 5/21/2019 at 63 Years.  Comments:  11/22/2019 1:50 PM Ana Langley  Right.  IOL - Cataract extraction and insertion of intraocular lens (SNOMED CT 5933748657) performed by Jovanni Harris MD on 5/7/2019 at 63 Years.  Comments:  11/22/2019 1:50 PM Ana Langley  Left.  Coronary artery stent x 3 (SNOMED CT 1769765320) in the month of 7/2011 at 56 Years.  Angioplasty (SNOMED CT 4400335533) in 2011 at 56 Years.  Tubal ligation (SNOMED CT 162230549) in 1979 at 24 Years.   Social History:        Electronic Cigarette/Vaping Assessment            Electronic Cigarette Use: Never.      Alcohol Assessment            Never      Tobacco Assessment: Past            Former smoker, quit more than 30 days ago, Cigarettes       Substance Abuse Assessment            Never      Home and Environment Assessment            Marital status: Single.  1 children.  Living situation: Home/Independent.  Injuries/Abuse/Neglect in               household: No.  Feels unsafe at home: No.  Family/Friends available for support: Yes.      Nutrition and Health Assessment            Type of diet: Regular.  Wants to lose weight: Yes.  Sleeping concerns: Yes.  Feels highly stressed: Yes.      Exercise and Physical Activity Assessment            Exercise frequency: 1-2 times/week.  Exercise type: Walking.      Sexual Assessment            Sexually active: No.  Identifies as female, Sexual orientation: Straight or heterosexual.  History of STD:               No.  Contraceptive Use Details: Abstinence.  History of sexual abuse: No.        Physical Examination   Measurements from flowsheet : Measurements   11/8/2021 2:11 PM CST Height Measured - Standard 61.75 in    Weight Measured - Standard 154.9 lb    BSA 1.75 m2    Body Mass Index 28.56 kg/m2  HI         Review / Management   Results review:  Lab results   8/19/2021 1:40 PM CDT Sodium Level 143 mmol/L    Potassium Level 3.7 mmol/L    Chloride Level 109 mmol/L    CO2 Level 25 mmol/L    Glucose Level 125 mg/dL  HI    BUN 9 mg/dL    Creatinine 0.63 mg/dL    BUN/Creat Ratio NOT APPLICABLE    eGFR 93 mL/min/1.73m2    eGFR African American 108 mL/min/1.73m2    Calcium Level 9.2 mg/dL    Hgb A1c 8.7  HI    Cholesterol 167 mg/dL    Non-    HDL 51 mg/dL    Chol/HDL Ratio 3.3    LDL 88    Triglyceride 185 mg/dL  HI    U Creatinine 38 mg/dL    U Microalbumin 0.3 mg/dL    Ur Microalb/Creat Ratio 8   .       Impression and Plan   Diagnosis     Type 2 diabetes mellitus without complications (RAM81-NE E11.9).         Counseled: Patient, AADE7 Self Care Behaviors     Healthy Eating - Focused on eating the same/ steady amount of carbohydrates at meals for safety and stability of glucose as pt is on set dosing.  Reviewed  what foods are primary sources of carbohydrates and how intake affects BG readings, discussed foods pt enjoys and how to incorporate them into her meals.  Added additional foods to list of low cost healthy options for meals and snacks.  Pt is to incorporate more tuna, canned chicken, beans, eggs, and cottage cheese for lower cost protein sources and fruit cups in natural juices, frozen or fresh fruits, frozen vegetables or rinse canned if necessary.    Instructed on Therapeutic Lifestyle Changes (TLC) nutrition plan for heart healthy eating.     Low cholesterol (<200mg), low fat, low saturated fat, zero trans fat   Low sodium (2000mg)   High fiber diet (20 - 30gm); Soluble fiber 10+ gm/ day    Eat more omega 3 fats  Vegetarian at least 1 day per week  Discussed well balanced meals, diabetic plate method as a general rule.  Patient is provided with numerous ideas to incorporate dietary recommendations.    Being Active - Education on how exercise helps with : education on simple easy ways to move more during the day and exercises that wouldn't require going outside.  Sit to stand x 10, wall push up, chair leg lifts etc...    - lowering BG by increasing the muscles ability to take up and use glucose   - weight loss   - healthier heart (improve lipid profile)   - improve sleep, mood, energy   - decrease stress      Monitoring -  Freestyle roula 2 and will always have BG meter as a back up   Taking Medication - Will change Tresiba to 30u once a day  rapid acting 11u noon meal and increase to 15u evening meal, 4u snack   education on insulin action time, encouraged taking rapid acting 15 min prior to meals and really focus on taking the 4u prior to HS snack   Problem Solving - medical support team assistance, resources provided (written and apps, internet); good control of stress  Healthy Coping - support of friends, family, and medical support team   Reducing Risks - Detailed education on potential complications  associated with uncontrolled diabetes and prevention recommendations.  Recommendations include: annual eye exam, good oral cares with brushing BID and flossing daily, routine dental appointments, good foot care tips and shoe wear - discussed monofilament test yearly.  Importance of adequate sleep and good control of BG to prevent developing complications related to uncontrolled DM including nephropathy, neuropathy, retinopathy, and cardiovascular disease.  Weight loss goal of 7 - 10% of current body weight pounds as a reasonable goal to help increase insulin sensitivity.    Goals:   1. BG goals 80 - 130 and post-prandial less than 180 mg/dL; time in target 70%+  2.  A1c goal <7%   3.  Walk/ be active 15 min 5 or more days/ week   4.  Use the diabetic plate method as a reference, consistent carbohydrate intake due to set insulin dosing   5.  Take medication as directed  Tresiba to 30u once a day  rapid acting 11u noon meal and increase to 15u evening meal, 4u snack   Metformin XR 500mg ii tabs BID  Jardiance 25 mg daily     Follow up in 4-6 weeks       Professional Services   Time spent with pt 60 min   cc Dr. Maza

## 2022-02-16 NOTE — LETTER
(Inserted Image. Unable to display)       April 13, 2021        GARRICK RAMIREZ  [address] [address]          Dear GARRICK,      Thank you for selecting [facility] for your healthcare needs.      Hi Garrick,    I just learned you are having heart surgery.  I hope thompson are feeling much better by the time this letter reaches you.  Let me know what I can do to help.          Please contact my practice at [phone] if you have any questions or concerns.     Sincerely,        Samanta Maza MD

## 2022-02-16 NOTE — LETTER
(Inserted Image. Unable to display)     January 29, 2021      GARRICK RAMIREZ  [address]  [place], [place] [id number]          Dear GARRICK,      Thank you for selecting [facility] (previously [facility]) for your healthcare needs.    Our records indicate you are due for the following services:    Urine Labs ~ Please be prepared to leave a urine specimen for evaluation.  Non-Fasting Labs.    If you had your labs done at another facility or with Direct Access Lab Testing at [facility], please bring in a copy of the results to your next visit, mail a copy, or drop off a copy of your results to your Healthcare Provider.    (FYI   Regarding office visits: In some instances, a video visit or telephone visit may be offered as an option.)      To schedule an appointment or if you have further questions, please contact your clinic at [phone].      Powered by [employer]    Sincerely,    Samanta Maza MD

## 2022-02-16 NOTE — PROGRESS NOTES
Chief Complaint    Hospital visit f/u , fluoxetine refill request  History of Present Illness       Spoke with patient's caregiver today before her hospital discharge.  They have been having difficulty finding home health care so that patient to be discharged.  Patient and her daughter who is her caregiver are here today for hospital follow-up.  She was admitted to [facility] for an MI and is now status post CABG.  Patient voices concerns that her hospitalization may have been related to polypharmacy.  I have offered reassurance that having the [facility] pharmacist as part of her healthcare team has been an excellent way to reduce risks of overmedication and drug interactions however she is chosen to decline further MTM consultation.  Spoke with [facility] and learned that they charged per card per month to the bubble wrap medications.  Perhaps well patient is living with her daughter and has home health coming in she does not need this but would like to have her consider using this as a way to help reduce medication errors.  I have reconciled her hospital discharge medication instructions with our med list.  Explained the need to do daily weights and to notify our office if she has more than a 6 pound increase in weight from her baseline.  Patient does currently homebound.  She does not have home health set up.  She is unable to drive at this time and requires [employer] for assistance with ambulation.  Review of Systems       Pain is well controlled.  Eating and drinking normal bowel movements no fevers or chills  Physical Exam   Vitals & Measurements    HR: 79 (Peripheral)  RR: 16  BP: 90/60  SpO2: 99%     WT: 163.3 lb        General frail appearance no acute distress       Skin incisions look clean dry and intact.  There is some eschar present consistent with healing but no evidence of any discharge.  No erythema.       Cardiovascular normal perfusion regular rate and rhythm       Chest CTA B  Assessment/Plan        CHF exacerbation (I50.9)         Ordered:          torsemide, = 1 tab(s) ( 20 mg ), Oral, daily, # 30 tab(s), 0 Refill(s), Type: Maintenance, Pharmacy: [facility], 1 tab(s) Oral daily, 62, in, 02/11/21 16:18:00 CST, Height Measured, 163.3, lb, 04/30/21 10:08:00 CDT, Weight Measured, (Ordered)                Cough (R05)                Hx of CABG (Z95.1)                Pain (R52)                Type 2 diabetes mellitus without complications (E11.9)         Ordered:          insulin lispro, See Instructions, Instructions: 11u qac and 4u q snacks.+SSI, for -199 +2U, 200-249 +4U, 250-349+8u, 350-399 +10U, >399 +10u and call MD HOLD if pre-meal bg is <90 mg/dL., # 45 mL, 3 Refill(s), Type: Maintenance, Pharmacy: [facility], dose c..., (Ordered)                Orders:         acetaminophen, = 2 tab(s) ( 1,000 mg ), Oral, q6 hrs, Instructions: not to exceed 4000 mg/day, PRN: for pain, # 120 tab(s), 0 Refill(s), Type: Maintenance, Pharmacy: [facility], 2 tab(s) Oral q6 hrs,PRN:for pain,Instr:not to exceed 4000 mg/day, 62, in, 02/11/21 16..., (Ordered)         aspirin, = 1 tab(s) ( 81 mg ), Oral, daily, # 90 tab(s), 3 Refill(s), Type: Maintenance, Pharmacy: [facility], 1 tab(s) Oral daily, 62, in, 02/11/21 16:18:00 CST, Height Measured, 163.3, lb, 04/30/21 10:08:00 CDT, Weight Measured, (Ordered)         carvedilol, = 1 tab(s) ( 6.25 mg ), Oral, bid, # 180 tab(s), 0 Refill(s), Type: Maintenance, Pharmacy: [facility], 1 tab(s) Oral bid, 62, in, 02/11/21 16:18:00 CST, Height Measured, 163.3, lb, 04/30/21 10:08:00 CDT, Weight Measured, (Ordered)         FLUoxetine, = 1 cap(s), Oral, qam, # 90 cap(s), 3 Refill(s), Type: Maintenance, Pharmacy: [facility], 1 cap(s) Oral qam, 62, in, 02/11/21 16:18:00 CST, Height Measured, 163.3, lb, 04/30/21 10:08:00 CDT, Weight Measured, (Ordered)         lisinopril, = 1 tab(s) ( 5 mg ), Oral, daily, # 90 tab(s), 3 Refill(s), Type: Maintenance, Pharmacy: [facility], 1 tab(s)  Oral daily, 62, in, 02/11/21 16:18:00 CST, Height Measured, 163.3, lb, 04/30/21 10:08:00 CDT, Weight Measured, (Ordered)         loratadine, = 1 tab(s) ( 10 mg ), Oral, qhs, # 90 tab(s), 3 Refill(s), Type: Maintenance, Pharmacy: [facility], 1 tab(s) Oral qhs, 62, in, 02/11/21 16:18:00 CST, Height Measured, 163.3, lb, 04/30/21 10:08:00 CDT, Weight Measured, (Ordered)         metFORMIN, = 2 tab(s) ( 1,000 mg ), Oral, bid, # 360 tab(s), 3 Refill(s), Type: Maintenance, Pharmacy: [facility], 2 tab(s) Oral bid, 62, in, 02/11/21 16:18:00 CST, Height Measured, 163.3, lb, 04/30/21 10:08:00 CDT, Weight Measured, (Ordered)         Miscellaneous Prescription, NITROGLYCERIN 0.4MG TAB SUBLINGUAL, See Instructions, Instructions: DISSOLVE ONE TABLET UNDER TONGUE EVERY FIVE MIN FOR CHEST PAIN AS NEEDED UP TO THREE DOSES CALL IF SYMPTOMS PERSIST 5 MIN AFTER 1ST DOSE CALL 911 AFTER 1ST DOSE IF PAIN PERSISTS, Supp..., (Ordered)         multivitamin with minerals, See Instructions, Instructions: may substiute with multivitamin with minerals preferred by insurance company, # 90 EA, 3 Refill(s), Type: Maintenance, Pharmacy: [facility], may substiute with multivitamin with minerals preferred by insurance company..., (Ordered)         pantoprazole, = 1 tab(s) ( 20 mg ), Oral, bid, # 180 tab(s), 3 Refill(s), Type: Maintenance, Pharmacy: [facility], please cancel the 40 mg 1 po qday if this is covered by her insurance, thanks, 1 tab(s) Oral bid, 62, in, 02/11/21 16:18:00 CST, Height Measured, 16..., (Ordered)         rosuvastatin, = 1 tab(s) ( 20 mg ), Oral, qhs, # 90 tab(s), 3 Refill(s), Type: Maintenance, Pharmacy: [facility], Pt req 90 day supply, 1 tab(s) Oral qhs, 62, in, 02/11/21 16:18:00 CST, Height Measured, 163.3, lb, 04/30/21 10:08:00 CDT, Weight Measured, (Ordered)       Patient now status post CABG.  Healing well.  Have placed referral to cardiology.  It sounds like she has had some CHF teaching while she was in the  hospital but will likely benefit from revisiting this in the near future.  She has follow-up scheduled with cardiothoracic surgery.  As she now declines MTM consultation we will have her make a follow-up appointment with [facility].  Clarified with patient that she should continue to do the 11 units of insulinG with meals and 4 units with snacks in addition to the sliding scale.  Sent new prescription       Continue with daily weights eat heart healthy diet monitor for any signs or symptoms of infection.  Would like patient to follow-up with me in 3 months sooner if needed.      Total time spent reviewing chart and preparing for appointment, with patient for appointment, and time spent charting and coordinating care on the day of the appointment in minutes was: 74  Patient Information     Name:GARRICK RAMIREZ      Address:      [address] [place] [id number]     Sex:Female     YOB: 1955     [phone]     Emergency Contact:KADE MURRELL     MRN:[id number]     FIN:[id number]     Location:[facility]     Date of Service:04/30/2021      Primary Care Physician:       Samanta Maza MD, [phone]      Attending Physician:       Samanta Maza MD, [phone]  Problem List/Past Medical History    Ongoing     Acute myocardial infarction, unspecified     Atherosclerotic heart disease of native coronary artery without angina pectoris     CAD in native artery     Combined forms of age-related cataract, left eye     Combined forms of age-related cataract, right eye     Essential (primary) hypertension     Generalized anxiety disorder     History of MI (myocardial infarction)     Hx of CABG     Infectious gastroenteritis and colitis, unspecified     Insomnia, unspecified     Major depressive disorder, single episode, unspecified     Myopia, bilateral     Obstructive sleep apnea (adult) (pediatric)     Polypharmacy     Pure hypercholesterolemia, unspecified      Stable angina     Stress incontinence (female) (male)     Type 2 diabetes mellitus without complications     Unspecified hearing loss, bilateral     Unspecified open wound, left foot, initial encounter     Unstable angina    Historical     Pregnancy  Procedure/Surgical History     Esophagogastroduodenoscopy (09/27/2019)            Comments: Indication: Chronic heartburn..     IOL - Cataract extraction and insertion of intraocular lens (05/21/2019)            Comments: Right..     IOL - Cataract extraction and insertion of intraocular lens (05/07/2019)            Comments: Left..     Coronary artery stent x 3 (07/2011)           Angioplasty (2011)           Tubal ligation (1979)        Medications    acetaminophen 500 mg oral tablet, 1000 mg= 2 tab(s), Oral, q6 hrs, PRN    aspirin 81 mg oral delayed release tablet, 81 mg= 1 tab(s), Oral, daily, 3 refills    Basaglar KwikPen 100 units/mL subcutaneous solution, 17 units, Subcutaneous, bid, 1 refills    carvedilol 6.25 mg oral tablet, 6.25 mg= 1 tab(s), Oral, bid    clopidogrel 75 mg oral tablet, 75 mg= 1 tab(s), Oral, daily, 1 refills    FLUoxetine 20 mg oral capsule, 1 cap(s), Oral, qam, 3 refills    Freestyle Dm 2 14 day reader, See Instructions    Freestyle Dm 2 14 day sensor, See Instructions, 3 refills    glucose 4 g oral tablet, chewable, 16 gm= 4 tab(s), Chewed, once    HumaLOG KwikPen 100 units/mL injectable solution, See Instructions, 3 refills    Incontience diapers, See Instructions, 11 refills    Jardiance 25 mg oral tablet, 25 mg= 1 tab(s), Oral, qam, 2 refills    Lachydrin, See Instructions, PRN, 1 refills    lancet bowman, See Instructions    lisinopril 5 mg oral tablet, 5 mg= 1 tab(s), Oral, daily, 3 refills    loratadine 10 mg oral tablet, 10 mg= 1 tab(s), Oral, qhs, 3 refills    MetFORMIN (Eqv-Glucophage XR) 500 mg oral tablet, extended release, 1000 mg= 2 tab(s), Oral, bid, 3 refills    Misc Prescription    NITROGLYCERIN 0.4MG TAB SUBLINGUAL,  See Instructions, 3 refills    One-A-Day Women 50 Plus oral tablet, See Instructions, 3 refills    pantoprazole 20 mg oral delayed release tablet, 20 mg= 1 tab(s), Oral, bid, 3 refills    rosuvastatin 20 mg oral tablet, 20 mg= 1 tab(s), Oral, qhs, 3 refills    torsemide 20 mg oral tablet, 20 mg= 1 tab(s), Oral, daily    true metrix glucometer test strips, See Instructions, 11 refills    Tums 500 mg oral tablet, chewable, 500 mg= 1 tab(s), Chewed, bid    ULTICARE MICRO PEN TIP 32G 4MM 50CT MG INJECTABLE, See Instructions, 3 refills  Allergies    traZODone (Nightmares)    Cats (Runny nose)    Lipitor (Diarrhea, nausea, vomiting)    Sodium Hypochlorite (Runny nose)    mirtazapine (Nightmare, Suicidal ideation)  Social History    Smoking Status     Former smoker     Alcohol      Never     Electronic Cigarette/Vaping      Electronic Cigarette Use: Never.     Exercise      Exercise frequency: 1-2 times/week. Exercise type: Walking.     Home/Environment      Marital status: Single. 1 children. Living situation: Home/Independent. Injuries/Abuse/Neglect in household: No. Feels unsafe at home: No. Family/Friends available for support: Yes.     Nutrition/Health      Type of diet: Regular. Wants to lose weight: Yes. Sleeping concerns: Yes. Feels highly stressed: Yes.     Sexual      Sexually active: No. Identifies as female, Sexual orientation: Straight or heterosexual. History of STD: No. Contraceptive Use Details: Abstinence. History of sexual abuse: No.     Substance Abuse      Never     Tobacco - Past      Former smoker, quit more than 30 days ago, Cigarettes  Family History    Arthritis: Grandmother (M).    CVA - Cerebrovascular accident: Mother and Father.    Diabetes mellitus: Mother and Father.    Glaucoma: Sister.  Immunizations      Vaccine Date Status          influenza virus vaccine, inactivated 10/01/2020 Given          pneumococcal (PPSV23) 10/01/2020 Given          influenza virus vaccine, inactivated 12/06/2019  Given          influenza virus vaccine, inactivated 10/02/2018 Recorded          tetanus/diphth/pertuss (Tdap) adult/adol 03/23/2018 Recorded          Td 03/23/2018 Recorded          pneumococcal (PCV13) 12/16/2016 Recorded          influenza virus vaccine, inactivated 12/16/2016 Recorded          pneumococcal (PPSV23) 10/20/2015 Recorded          influenza virus vaccine, inactivated 10/20/2015 Recorded          influenza virus vaccine, inactivated 12/18/2013 Recorded          influenza virus vaccine, inactivated 09/21/2009 Recorded  Lab Results       Lab Results (Last 4 results within 90 days)        Sodium Level: 139 mmol/L [135 mmol/L - 146 mmol/L] (02/11/21 14:25:00)       Potassium Level: 4.2 mmol/L [3.5 mmol/L - 5.3 mmol/L] (02/11/21 14:25:00)       Chloride Level: 101 mmol/L [98 mmol/L - 110 mmol/L] (02/11/21 14:25:00)       CO2 Level: 28 mmol/L [20 mmol/L - 32 mmol/L] (02/11/21 14:25:00)       Glucose Level: 105 mg/dL High [65 mg/dL - 99 mg/dL] (02/11/21 14:25:00)       BUN: 17 mg/dL [7 mg/dL - 25 mg/dL] (02/11/21 14:25:00)       Creatinine Level: 0.68 mg/dL [0.5 mg/dL - 0.99 mg/dL] (02/11/21 14:25:00)       BUN/Creat Ratio: NOT APPLICABLE [6  - 22] (02/11/21 14:25:00)       eGFR: 92 mL/min/1.73m2 (02/11/21 14:25:00)       eGFR : 106 mL/min/1.73m2 (02/11/21 14:25:00)       Calcium Level: 9.4 mg/dL [8.6 mg/dL - 10.4 mg/dL] (02/11/21 14:25:00)       Hgb A1c: 8.6 High (02/11/21 14:25:00)       U Creatinine: 60 mg/dL [20 mg/dL - 275 mg/dL] (02/11/21 14:25:00)       U Microalbumin: 0.4 mg/dL (02/11/21 14:25:00)       Ur Microalbumin/Creatinine Ratio: 7 (02/11/21 14:25:00)

## 2022-02-16 NOTE — LETTER
(Inserted Image. Unable to display)         2020        GARRICK RAMIREZ  [address] [id number]        Dear GARRICK,    Thank you for selecting [facility] for your [facility] needs.     Hello [facility]:    Follow-up to my phone call this afternoon -   Please contact pt and describe bubble packing service. Pt has financial constraints so as little # cards as possible would be best - perhaps 1 card AM and 1 card PM.  Pt will be expecting your call.    Here is Garrick's medication routine:  Mornin metformin  1 jardiance  1 clopidogrel  1 lisinpril  1 metoprolol   1 multivitamin  1 pantoprazole  Evenin metformin  1 lortadine  1 escitalopram  1 metoprolol  1 rosuvastatin    Thanks you and let me know if any questions.  Dr. Carly Pisano - [facility] Pharmacist                    Please contact my practice at [phone] if you have any questions or concerns.     Sincerely,        Samanta Maza MD

## 2022-02-16 NOTE — LETTER
(Inserted Image. Unable to display)            September 01, 2021        GARRICK ASHLEY  [address] [id number]        Dear GARRICK,     Thank you for selecting [facility] for your healthcare needs. Below you will find the results of your recent test(s) done at our clinic.      This looks great!      Result Name Current Result Previous Result Reference Range   U Creatinine (mg/dL)  38 8/19/2021  60 2/11/2021 20 - 275   U Microalbumin (mg/dL)  0.3 8/19/2021  0.4 2/11/2021 See Note: -    Ur Microalbumin/Creatinine Ratio  8 8/19/2021  7 2/11/2021  - <30     Please contact my practice at [phone] if you have any questions or concerns.     Sincerely,        Samanta Maza MD      What do your labs mean?  Below is a glossary of commonly ordered labs:  LDL   Bad Cholesterol   HDL   Good Cholesterol  AST/ALT   Liver Function   Cr/Creatinine   Kidney Function  Microalbumin   Kidney Function  BUN   Kidney Function  PSA   Prostate    TSH   Thyroid Hormone  HgbA1c   Diabetes Test   Hgb (Hemoglobin)   Red Blood Cells

## 2022-02-16 NOTE — PROGRESS NOTES
Patient:   GARRICK RAMIREZ            MRN: [id number]            FIN: [id number]               Age:   65 years     Sex:  Female     :  1955   Associated Diagnoses:   Type 2 diabetes mellitus without complications   Author:   Avril Martinez      Visit Information   Visit type:  Diabetes Self Management Education .    Accompanied by:  Family member, adult daughter.    Referral source:  Samanta Maza MD.       Chief Complaint   Type II diabetes, Hyperlipidemia, HTN       History of Present Illness   Pt is here today with her daughter primarily for Freestyle Dm 2 instruction and ongoing diabetes education   Pt is working closely with MTM on insulin dosing.  Pt was able to recite insulin recommendation and has been testing 4x/ day.    BG Testing: since 2021 range 110 - 349 mg/dL   did not bring her meter but has her log book   pt relates higher readings to lack of portion control and occ sweets    (Inserted Image. Unable to display)       Insulin: Basaglar 13u BID  Humalog 11 units   4u prior to snacks  Pt states she is good with insulin adherence   Metformin XR 500mg ii tabs BID  Jardiance 10 mg daily   Taking medications as directed.      Nutrition: Pt and her daughter are working on eating healthier and going grocery shopping together.  They live 5 minutes apart.    Exercise: none, looking forward to the warmer weather when she can use her cat stroller  Uses food stamps and trying to purchase fruits and vegetables   Eye exam due  Skin - no areas of concern  Immunizations - UTD      Health Status   Allergies:    Allergic Reactions (Selected)  Severity Not Documented  Cats (Runny nose)  Lipitor (Diarrhea, nausea, vomiting)  Mirtazapine (Nightmare and suicidal ideation)  Sodium Hypochlorite (Runny nose)  Nonallergic Reactions (Selected)  Low  TraZODone (Nightmares)   Medications:  (Selected)   Prescriptions  Prescribed  Basaglar KwikPen 100 units/mL subcutaneous solution: ( 15 units ), Subcutaneous, bid, #  15 mL, 1 Refill(s), Type: Maintenance, Pharmacy: [facility], dose change as of 2/5/2021, 62, in, 03/13/20 9:35:00 CDT, Height Measured, 171.4, lb, 10/01/20 12:54:00 CDT, Weight Measured  FLUoxetine 20 mg oral capsule: = 1 cap(s) ( 20 mg ), Oral, daily, Instructions: in the morning, # 30 cap(s), 2 Refill(s), Type: Maintenance, Pharmacy: [facility], stop escitalopram, 1 cap(s) Oral daily,Instr:in the morning, 62, in, 03/13/20 9:35:00 CDT, Height Measured, 171.4, lb...  Freestyle Dm 2 14 day reader: Freestyle Dm 2 14 day reader, See Instructions, Instructions: Use to test blood sugars 4 times daily per , Supply, # 1 EA, 0 Refill(s), Type: Maintenance, faxed to pharmacy (Rx)  Freestyle Dm 2 14 day sensor: Freestyle Dm 2 14 day sensor, See Instructions, Instructions: Use to test blood sugars 4 times daily. Change sensor every 14 days., Supply, # 6 EA, 3 Refill(s), Type: Maintenance, faxed to pharmacy (Rx)  HumaLOG KwikPen 100 units/mL injectable solution: See Instructions, Instructions: inject 11 units under the skin three times daily before meals; 4 units before snacks.  take 15 min prior to eating. HOLD if pre-meal bg is <90 mg/dL., # 45 mL, 3 Refill(s), Type: Maintenance, Pharmacy: [facility], dos...  Incontience diapers: Incontience diapers, See Instructions, Instructions: use 1 diaper per night, Supply, # 30 EA, 11 Refill(s), Type: Maintenance  Jardiance 25 mg oral tablet: = 1 tab(s) ( 25 mg ), Oral, qam, # 90 tab(s), 2 Refill(s), Type: Maintenance, Pharmacy: [facility], dose increase; please d/c the 10 mg tab so it does not get accidentally refilled. Pt would like delivered., 1 tab(s) Oral qam, 62, in, 03/13/20 9:35:...  Lachydrin: Lachydrin, See Instructions, Instructions: Lachydrin or similar product- apply to feet QHS, Supply, PRN: dry skin, # 1 EA, 1 Refill(s), Type: Maintenance, Pharmacy: [facility], Lachydrin or similar product- apply to feet QHS,PRN:dry skin  MetFORMIN  (Eqv-Glucophage XR) 500 mg oral tablet, extended release: = 2 tab(s) ( 1,000 mg ), Oral, bid, # 360 tab(s), 3 Refill(s), Type: Maintenance, Pharmacy: [facility], 2 tab(s) Oral bid, 62, in, 03/13/20 9:35:00 CDT, Height Measured, 163, lb, 03/13/20 9:35:00 CDT, Weight Measured  Metoprolol Tartrate 25 mg oral tablet: See Instructions, Instructions: TAKE ONE TABLET BY MOUTH TWICE DAILY, # 180 EA, 1 Refill(s), Pharmacy: [facility], TAKE ONE TABLET BY MOUTH TWICE DAILY, 62, in, 03/13/20 9:35:00 CDT, Height Measured, 171.4, lb, 10/01/20 12:54:00 CDT, Weight Lydia...  ULTICARE MICRO PEN TIP 32G 4MM 50CT MG INJECTABLE: ULTICARE MICRO PEN TIP 32G 4MM 50CT MG INJECTABLE, See Instructions, Instructions: USE FOUR TIMES A DAY, Supply, # 100 unknown unit, 3 Refill(s), Type: Maintenance, Pharmacy: [facility], USE FOUR TIMES A DAY  clopidogrel 75 mg oral tablet: = 1 tab(s) ( 75 mg ), Oral, daily, Instructions: in the AM, # 90 tab(s), 1 Refill(s), Type: Maintenance, Pharmacy: [facility], pt req 90 day supply, 1 tab(s) Oral daily,Instr:in the AM, 62, in, 03/13/20 9:35:00 CDT, Height Measured, 171.4, lb, 10/01...  glucose 4 g oral tablet, chewable: = 4 tab(s) ( 16 gm ), Chewed, once, Instructions: Use if BG <70 mg/dL,  check BG 15.  Repeat if BG remains less than 80 mg/dL., # 4 tab(s), 0 Refill(s), Type: Soft Stop, Pharmacy: [facility], 4 tab(s) Chewed once,Instr:Use if BG <70 mg/dL,  check BG...  lancet bowman: lancet bowman, See Instructions, Instructions: use as directed, Supply, # 1 EA, 0 Refill(s), Type: Maintenance, Pharmacy: [facility], use as directed  lisinopril 40 mg oral tablet: See Instructions, Instructions: TAKE 1 TABLET BY MOUTH ONCE DAILY, # 90 tab(s), 3 Refill(s), Type: Maintenance, Pharmacy: [facility], TAKE 1 TABLET BY MOUTH ONCE DAILY, 62, in, 03/13/20 9:35:00 CDT, Height Measured, 171.4, lb, 10/01/20 12:54:00 CDT,...  loratadine 10 mg oral tablet: = 1 tab(s) ( 10 mg ), Oral, qhs, # 90 tab(s), 3  Refill(s), Type: Maintenance, Pharmacy: [facility], 1 tab(s) Oral qhs, 62, in, 03/13/20 9:35:00 CDT, Height Measured, 171.4, lb, 10/01/20 12:54:00 CDT, Weight Measured  nitroglycerin 0.4 mg sublingual tablet: See Instructions, Instructions: 1 tab under tongue at chest pain.  May repeat x 3 q 5 min.  Call 911 after first dose if pain persists., PRN: for chest pain, # 25 tab(s), 3 Refill(s), Type: Maintenance, Pharmacy: [facility], 1 tab under tongue at ch...  pantoprazole 20 mg oral delayed release tablet: = 1 tab(s) ( 20 mg ), Oral, bid, # 180 tab(s), 3 Refill(s), Type: Maintenance, Pharmacy: [facility], please cancel the 40 mg 1 po qday if this is covered by her insurance, thanks, 1 tab(s) Oral bid, 62, in, 03/13/20 9:35:00 CDT, Height Measured, 171...  rosuvastatin 20 mg oral tablet: = 1 tab(s) ( 20 mg ), Oral, qhs, # 90 tab(s), 3 Refill(s), Type: Maintenance, Pharmacy: [facility], Pt req 90 day supply, 1 tab(s) Oral qhs, 62, in, 03/13/20 9:35:00 CDT, Height Measured, 171.4, lb, 10/01/20 12:54:00 CDT, Weight Measured  true metrix glucometer test strips: true metrix glucometer test strips, See Instructions, Instructions: check QID: fasting BG, 2 hours after meals and before bed ., Supply, # 100 EA, 11 Refill(s), Type: Maintenance, Pharmacy: [facility], check QID: fasting BG, 2 hours after meals and...  Documented Medications  Documented  Misc Prescription: Instructions: Eye allergy relief  (pt does not know drug ingredient). Using as needed for eye allergy symptoms., 0 Refill(s), Type: Maintenance  Nasacort Allergy 24HR: 2 spray(s), Nasal, daily, Instructions: in each nostril. Pt purchasing OTC, # 1 EA, 0 Refill(s), Type: Maintenance  Tums 500 mg oral tablet, chewable: = 1 tab(s) ( 500 mg ), Chewed, bid, Instructions: as needed for heartburn, stomach pain, acid reflux, 0 Refill(s), Type: Maintenance  multivitamin with minerals (w/ Iron): 1 tablet, Oral, daily, 0 Refill(s), Type: Maintenance,     Medications          *denotes recorded medication          FLUoxetine 20 mg oral capsule: 20 mg, 1 cap(s), Oral, daily, in the morning, 30 cap(s), 2 Refill(s).          lancet bowman: See Instructions, use as directed, 1 EA, 0 Refill(s).          true metrix glucometer test strips: See Instructions, check QID: fasting BG, 2 hours after meals and before bed ., 100 EA, 11 Refill(s).          ULTICARE MICRO PEN TIP 32G 4MM 50CT MG INJECTABLE: See Instructions, USE FOUR TIMES A DAY, 100 unknown unit, 3 Refill(s).          *Misc Prescription: Eye allergy relief  (pt does not know drug ingredient). Using as needed for eye allergy symptoms., 0 Refill(s).          Freestyle Dm 2 14 day reader: See Instructions, Use to test blood sugars 4 times daily per , 1 EA, 0 Refill(s).          Freestyle Dm 2 14 day sensor: See Instructions, Use to test blood sugars 4 times daily. Change sensor every 14 days., 6 EA, 3 Refill(s).          Lachydrin: See Instructions, Lachydrin or similar product- apply to feet QHS, PRN: dry skin, 1 EA, 1 Refill(s).          Incontience diapers: See Instructions, use 1 diaper per night, 30 EA, 11 Refill(s).          *Tums 500 mg oral tablet, chewable: 500 mg, 1 tab(s), Chewed, bid, as needed for heartburn, stomach pain, acid reflux, 0 Refill(s).          clopidogrel 75 mg oral tablet: 75 mg, 1 tab(s), Oral, daily, in the AM, 90 tab(s), 1 Refill(s).          Jardiance 25 mg oral tablet: 25 mg, 1 tab(s), Oral, qam, 90 tab(s), 2 Refill(s).          glucose 4 g oral tablet, chewable: 16 gm, 4 tab(s), Chewed, once, Use if BG <70 mg/dL,  check BG 15.  Repeat if BG remains less than 80 mg/dL., 4 tab(s), 0 Refill(s).          Basaglar KwikPen 100 units/mL subcutaneous solution: 15 units, Subcutaneous, bid, 15 mL, 1 Refill(s).          HumaLOG KwikPen 100 units/mL injectable solution: See Instructions, inject 11 units under the skin three times daily before meals; 4 units before snacks.  take  15 min prior to eating. HOLD if pre-meal bg is <90 mg/dL., 45 mL, 3 Refill(s).          lisinopril 40 mg oral tablet: See Instructions, TAKE 1 TABLET BY MOUTH ONCE DAILY, 90 tab(s), 3 Refill(s).          loratadine 10 mg oral tablet: 10 mg, 1 tab(s), Oral, qhs, 90 tab(s), 3 Refill(s).          MetFORMIN (Eqv-Glucophage XR) 500 mg oral tablet, extended release: 1,000 mg, 2 tab(s), Oral, bid, 360 tab(s), 3 Refill(s).          Metoprolol Tartrate 25 mg oral tablet: See Instructions, TAKE ONE TABLET BY MOUTH TWICE DAILY, 180 EA, 1 Refill(s).          *multivitamin with minerals (w/ Iron): 1 tablet, Oral, daily, 0 Refill(s).          nitroglycerin 0.4 mg sublingual tablet: See Instructions, 1 tab under tongue at chest pain.  May repeat x 3 q 5 min.  Call 911 after first dose if pain persists., PRN: for chest pain, 25 tab(s), 3 Refill(s).          pantoprazole 20 mg oral delayed release tablet: 20 mg, 1 tab(s), Oral, bid, 180 tab(s), 3 Refill(s).          rosuvastatin 20 mg oral tablet: 20 mg, 1 tab(s), Oral, qhs, 90 tab(s), 3 Refill(s).          *Nasacort Allergy 24HR: 2 spray(s), Nasal, daily, in each nostril. Pt purchasing OTC, 1 EA, 0 Refill(s).       Problem list:    All Problems  Acute myocardial infarction, unspecified / SNOMED CT 77241885 / Confirmed  Combined forms of age-related cataract, left eye / SNOMED CT 00098804 / Confirmed  Combined forms of age-related cataract, right eye / SNOMED CT 86065640 / Confirmed  CAD in native artery / SNOMED CT 0920891790 / Confirmed  Atherosclerotic heart disease of native coronary artery without angina pectoris / SNOMED CT 1436331527 / Confirmed  Essential (primary) hypertension / SNOMED CT 98464699 / Confirmed  Stress incontinence (female) (male) / SNOMED CT 582652263 / Confirmed  Generalized anxiety disorder / SNOMED CT 96020141 / Confirmed  Unspecified hearing loss, bilateral / SNOMED CT 96228169 / Confirmed  History of MI (myocardial infarction) / SNOMED CT 6551907113 /  Confirmed  Infectious gastroenteritis and colitis, unspecified / SNOMED CT 31718240 / Confirmed  Insomnia, unspecified / SNOMED CT 197089926 / Confirmed  Major depressive disorder, single episode, unspecified / SNOMED CT 88677075 / Confirmed  Myopia, bilateral / SNOMED CT 69940691 / Confirmed  Obstructive sleep apnea (adult) (pediatric) / SNOMED CT 044175053 / Confirmed  Unspecified open wound, left foot, initial encounter / SNOMED CT 080622067 / Confirmed  Polypharmacy / SNOMED CT 555739850 / Confirmed  Unstable angina / SNOMED CT 2360652 / Confirmed  Pure hypercholesterolemia, unspecified / SNOMED CT 540393008 / Confirmed  Stable angina / SNOMED CT 355239932 / Confirmed  Type 2 diabetes mellitus without complications / SNOMED CT 084893546 / Confirmed  Resolved: Pregnancy / SNOMED CT 328185039      Histories   Past Medical History:    Active  Acute myocardial infarction, unspecified (56156799): Onset in the month of 7/2011 at 55 years  Major depressive disorder, single episode, unspecified (30554146)  Type 2 diabetes mellitus without complications (665389263)  Obstructive sleep apnea (adult) (pediatric) (915326410)  Unspecified hearing loss, bilateral (86589329)  Myopia, bilateral (83087699)  Unspecified open wound, left foot, initial encounter (194151019)  Atherosclerotic heart disease of native coronary artery without angina pectoris (6488003177)  Combined forms of age-related cataract, right eye (33215787)  Generalized anxiety disorder (20508207)  Essential (primary) hypertension (04820877)  Pure hypercholesterolemia, unspecified (734982981)  Insomnia, unspecified (688345978)  Unstable angina (8462314)  Stress incontinence (female) (male) (876761464)  Infectious gastroenteritis and colitis, unspecified (46936847)  Resolved  Pregnancy (640989514):  Resolved in 1976 at 20 years.   Family History:    Diabetes mellitus  Mother  Father  Arthritis  Grandmother (M)  CVA - Cerebrovascular  accident  Mother  Father  Glaucoma  Sister     Procedure history:    Esophagogastroduodenoscopy (SNOMED CT 236533088) on 9/27/2019 at 64 Years.  Comments:  11/22/2019 1:48 PM Ana Langley  Indication: Chronic heartburn.  IOL - Cataract extraction and insertion of intraocular lens (SNOMED CT 8261308176) performed by Jovanni Harris MD on 5/21/2019 at 63 Years.  Comments:  11/22/2019 1:50 PM Ana Langley  Right.  IOL - Cataract extraction and insertion of intraocular lens (SNOMED CT 9942585056) performed by Jovanni Harris MD on 5/7/2019 at 63 Years.  Comments:  11/22/2019 1:50 PM Ana Langley  Left.  Coronary artery stent x 3 (SNOMED CT 0771732790) in the month of 7/2011 at 56 Years.  Angioplasty (SNOMED CT 7663221207) in 2011 at 56 Years.  Tubal ligation (SNOMED CT 829117449) in 1979 at 24 Years.   Social History:        Electronic Cigarette/Vaping Assessment            Electronic Cigarette Use: Never.      Alcohol Assessment            Never      Tobacco Assessment: Past            Former smoker, quit more than 30 days ago, Cigarettes      Substance Abuse Assessment            Never      Home and Environment Assessment            Marital status: Single.  1 children.  Living situation: Home/Independent.  Injuries/Abuse/Neglect in               household: No.  Feels unsafe at home: No.  Family/Friends available for support: Yes.      Nutrition and Health Assessment            Type of diet: Regular.  Wants to lose weight: Yes.  Sleeping concerns: Yes.  Feels highly stressed: Yes.      Exercise and Physical Activity Assessment            Exercise frequency: 1-2 times/week.  Exercise type: Walking.      Sexual Assessment            Sexually active: No.  Identifies as female, Sexual orientation: Straight or heterosexual.  History of STD:               No.  Contraceptive Use Details: Abstinence.  History of sexual abuse: No.        Physical Examination   Measurements from flowsheet : Measurements    2/11/2021 4:18 PM CST Height Measured - Standard 62 in    Weight Measured - Standard 162.2 lb    BSA 1.79 m2    Body Mass Index 29.66 kg/m2  HI         Review / Management   Results review:  Lab results: 1/12/2021 3:55 PM CST    Coronavirus SARS-CoV-2 (COVID-19) TR      Positive  .       Impression and Plan   Diagnosis     Type 2 diabetes mellitus without complications ([id number] E11.9).       Freestyle Dm 2   Pt is shown appropriate use   Detailed startup checklist completed  - Introduction to CGM and components  - Set Up Display  alarms    Low setting 70 and high setting 240mg/dL      - Insert Sensor    Choosing a sensor site - back of upper am     - Start sensor on    Wait 1 hour warmup     - Home Screen Overview   Sensor Glucose Reading   Trend Arrow   Trend Graph   High and low levels  - Treatment Decision   Use meter if symptoms do not match reader  - Sound System On   Volume High   Vibration Off   Touch Sounds Off   - Ending Sensor Session   Remove sensor   - Freestyle Support Team resources     Pt required assistance from her daughter with removing the plastic cover from sensor case due to arthritis and plastic cover is quite hard to remove (formal complaint has been put into Freestyle) Pt was able to successfully insert sensor and start warm up period on reader.    Pt will not take high doses of Vitamin C as it may falsely raise sensor glucose readings    Counseled: Patient, AADE7 Self Care Behaviors     Healthy Eating - Focused on eating the same/ steady amount of carbohydrates at meals for safety and stability of glucose as pt is on set dosing.  Reviewed what foods are primary sources of carbohydrates and how intake affects BG readings, discussed foods pt enjoys and how to incorporate them into her meals.  Added additional foods to list of low cost healthy options for meals and snacks.  Pt is to incorporate more tuna, canned chicken, beans, eggs, and cottage cheese for lower cost  protein sources and fruit cups in natural juices, frozen or fresh fruits, frozen vegetables or rinse canned if necessary.    Education on choosing heart healthy foods and ways of preparing meals to help prevent heart disease.   Discussed well balanced meals, diabetic plate method as a general rule.  Patient is provided with numerous ideas to incorporate dietary recommendations.    Being Active - Education on how exercise helps with : education on simple easy ways to move more during the day and exercises that wouldn't require going outside.  Sit to stand x 10, wall push up, chair leg lifts etc...    - lowering BG by increasing the muscles ability to take up and use glucose   - weight loss   - healthier heart (improve lipid profile)   - improve sleep, mood, energy   - decrease stress      Monitoring - started Freestyle roula 2 and will always have BG meter as a back up   Taking Medication - Continue with insulin as directed, education on insulin action time, encouraged taking rapid acting 15 min prior to meals and snacks   Problem Solving - medical support team assistance, resources provided (written and apps, internet); good control of stress  Healthy Coping - support of friends, family, and medical support team   Reducing Risks - Detailed education on potential complications associated with uncontrolled diabetes and prevention recommendations.  Recommendations include: annual eye exam, good oral cares with brushing BID and flossing daily, routine dental appointments, good foot care tips and shoe wear - discussed monofilament test yearly.  Importance of adequate sleep and good control of BG to prevent developing complications related to uncontrolled DM including nephropathy, neuropathy, retinopathy, and cardiovascular disease.  Weight loss goal of 7 - 10% of current body weight pounds as a reasonable goal to help increase insulin sensitivity.    Goals:   1. BG goals 80 - 130 and post-prandial less than 180 mg/dL  2.   A1c goal <7%   3.  Walk/ be active 15 min 5 or more days/ week   4.  Use the diabetic plate method as a reference, consistent carbohydrate intake due to set insulin dosing       Professional Services   Time spent with pt 60 min   cc Dr. Maza

## 2022-02-16 NOTE — LETTER
(Inserted Image. Unable to display)   2019        GARRICK RAMIREZ  [address] [id number]        Dear GARRICK,      Thank you for selecting [facility] for your healthcare needs.    You previously had an endoscopy at our facility and our records indicate you are now due for a follow up endoscopy.   Although there may not be a problem at this time, it is our medical recommendation you schedule an appointment for an exam with your Healthcare Provider to assess you health history at the exam to determine the proper level of sedation for your procedure.      Your Healthcare Provider will then complete an endoscopy order form, and we encourage you to schedule your appointment that day.  Please note that depending on the type of anesthetic you are to receive your order will  in 30 or 90 days from your visit with your Healthcare Provider.  If you do not set up your endoscopy during this time frame, you will need to have another exam as your health history may have changed.  This exam can be combined with any scheduled appointment with your primary physician (physicals, medication checks, etc.)    If you are seeing another provider for this problem, we would appreciate knowing so we do not send you another reminder.    To schedule an appointment or if you have further questions, please contact your primary clinic:   [facility]       [phone]   [facility]       [phone]              [facility]     [phone]      Powered by [employer]    Sincerely,    Samanta Maza MD

## 2022-02-16 NOTE — TELEPHONE ENCOUNTER
---------------------  From: Cathi Arenas CMA (eRx Pool ([id number]_[facility]))   To: [facility] Message Pool ([id number]_[place] [facility]);     Sent: 4/29/2021 3:16:42 PM CDT  Subject: FW: Medication Management   Due Date/Time: 4/30/2021 2:58:00 PM CDT     Looks like pt has appt with GRETTA tomorrow      ------------------------------------------  From: [employer]  To: Samanta Maza MD  Sent: April 29, 2021 2:58:52 PM CDT  Subject: Medication Management  Due: April 27, 2021 12:41:43 AM CDT     ** On Hold Pending Signature **     Drug: FLUoxetine (FLUoxetine 20 mg oral capsule), TAKE 1 CAPSULE BY MOUTH EACH MORNING  Quantity: 30 EA  Days Supply: 30  Refills: 0  Substitutions Allowed  Notes from Pharmacy:     Dispensed Drug: FLUoxetine (FLUoxetine 20 mg oral capsule), TAKE 1 CAPSULE BY MOUTH EACH MORNING  Quantity: 30 EA  Days Supply: 30  Refills: 0  Substitutions Allowed  Notes from Pharmacy:  ------------------------------------------

## 2022-02-16 NOTE — LETTER
(Inserted Image. Unable to display)       December 13, 2019      GARRICK RAMIREZ  [address] [id number]        Dear GARRICK,     Thank you for selecting [facility] for your healthcare needs. Below you will find the results of your recent test(s) done at our clinic.     Let's get back together to work on some of these problems!  Also please set up an appointment with Shell Martinez to help withyour diabetes.      Result Name Current Result Reference Range   Sodium Level (mmol/L)  136 12/6/2019 135 - 146   Potassium Level (mmol/L)  4.2 12/6/2019 3.5 - 5.3   Chloride Level (mmol/L)  98 12/6/2019 98 - 110   CO2 Level (mmol/L)  26 12/6/2019 20 - 32   Glucose Level (mg/dL) ((H)) 329 12/6/2019 65 - 99   BUN (mg/dL)  12 12/6/2019 7 - 25   Creatinine Level (mg/dL)  0.59 12/6/2019 0.50 - 0.99   BUN/Creat Ratio  NOT APPLICABLE 12/6/2019 6 - 22   eGFR (mL/min/1.73m2)  97 12/6/2019 > OR = 60 -    eGFR  (mL/min/1.73m2)  112 12/6/2019 > OR = 60 -    Calcium Level (mg/dL)  9.7 12/6/2019 8.6 - 10.4   Hgb A1c ((H)) 11.7 12/6/2019  - <5.7   LDL Direct (mg/dL) ((H)) 185 12/6/2019  - <100   B-Natriuretic Peptide (BNP) (pg/mL)  44 12/6/2019  - <100   U Microalbumin (mg/dL)  2.6 12/6/2019 See Note: -    Ur Creatinine (mg/dL)  76 12/6/2019 20 - 275   Ur Microalbumin/Creatinine Ratio ((H)) 34 12/6/2019  - <30   WBC  5.2 12/6/2019 3.8 - 10.8   RBC  5.04 12/6/2019 3.80 - 5.10   Hgb (gm/dL)  14.7 12/6/2019 11.7 - 15.5   Hct (%)  43.6 12/6/2019 35.0 - 45.0   MCV (fL)  86.5 12/6/2019 80.0 - 100.0   MCH (pg)  29.2 12/6/2019 27.0 - 33.0   MCHC (gm/dL)  33.7 12/6/2019 32.0 - 36.0   RDW (%)  13.1 12/6/2019 11.0 - 15.0   Platelet  268 12/6/2019 140 - 400   MPV (fL)  10.7 12/6/2019 7.5 - 12.5     Please contact my practice at [phone] if you have any questions or concerns.     Sincerely,        Samanta Maza MD      What do your labs mean?  Below is a glossary of commonly ordered labs:  LDL    Bad Cholesterol   HDL   Good Cholesterol  AST/ALT   Liver Function   Cr/Creatinine   Kidney Function  Microalbumin   Kidney Function  BUN   Kidney Function  PSA   Prostate    TSH   Thyroid Hormone  HgbA1c   Diabetes Test   Hgb (Hemoglobin)   Red Blood Cells

## 2022-02-16 NOTE — NURSING NOTE
Quick Intake Entered On:  3/1/2020 4:37 PM CST    Performed On:  2/28/2020 10:46 AM CST by Kushal Pisano Kaity               Summary   Systolic Blood Pressure :   132 mmHg (HI)    Diastolic Blood Pressure :   62 mmHg   Mean Arterial Pressure :   85 mmHg   Peripheral Pulse Rate :   65 bpm   BP Site :   Right arm   BP Method :   Manual   Oxygen Saturation :   96 %   Kushal Pisano Kaity - 3/1/2020 4:36 PM CST

## 2022-02-16 NOTE — NURSING NOTE
Depression Screening Entered On:  1/6/2020 8:02 AM CST    Performed On:  1/3/2020 8:01 AM CST by Ludy Arriola               Depression Screening   Little Interest - Pleasure in Activities :   More than half the days   Feeling Down, Depressed, Hopeless :   More than half the days   Initial Depression Screen Score :   4    Trouble Falling or Staying Asleep :   Nearly every day   Feeling Tired or Little Energy :   Several days   Poor Appetite or Overeating :   More than half the days   Feeling Bad About Yourself :   Not at all   Trouble Concentrating :   More than half the days   Moving or Speaking Slowly :   Not at all   Thoughts Better Off Dead or Hurting Self :   Not at all   Detailed Depression Screen Score :   8    Total Depression Screen Score :   12    ALEJANDRA Difficulty with Work, Home, Others :   Somewhat difficult   Ludy Arriola - 1/6/2020 8:01 AM CST

## 2022-02-16 NOTE — TELEPHONE ENCOUNTER
---------------------  From: Samanta Maza MD   To: GRETTA Message Pool ([id number] - [place]);     Sent: 11/30/2021 2:24:21 PM CST  Subject: General Message     pls fax echo to [facility], check teams for the correct numberFaxed to [phone] [facility]

## 2022-02-16 NOTE — NURSING NOTE
Comprehensive Intake Entered On:  4/30/2021 10:20 AM CDT    Performed On:  4/30/2021 10:08 AM CDT by Ruth Ann Adams               Summary   Chief Complaint :   Hospital visit f/u , fluoxetine refill request   Weight Measured :   163.3 lb(Converted to: 163 lb 5 oz, 74.072 kg)    Systolic Blood Pressure :   90 mmHg   Diastolic Blood Pressure :   60 mmHg   Mean Arterial Pressure :   70 mmHg   Peripheral Pulse Rate :   79 bpm   BP Site :   Right arm   BP Method :   Manual   Respiratory Rate :   16 br/min   Oxygen Saturation :   99 %   Ruth Ann Adams - 4/30/2021 10:08 AM CDT   Health Status   Allergies Verified? :   Yes   Medication History Verified? :   Yes   Tobacco Use? :   Former smoker   Ruth Ann Adams - 4/30/2021 10:08 AM CDT   Consents   Consent for Immunization Exchange :   Consent Granted   Consent for Immunizations to Providers :   Consent Granted   Ruth Ann Adams - 4/30/2021 10:08 AM CDT   Meds / Allergies   (As Of: 4/30/2021 10:20:44 AM CDT)   Allergies (Active)   Cats  Estimated Onset Date:   Unspecified ; Reactions:   Runny nose ; Created By:   Ana Reese; Reaction Status:   Active ; Category:   Drug ; Substance:   Cats ; Type:   Allergy ; Updated By:   Ana Reese; Reviewed Date:   3/13/2020 9:38 AM CDT      Lipitor  Estimated Onset Date:   Unspecified ; Reactions:   Diarrhea, nausea, vomiting ; Created By:   Ana Reese; Reaction Status:   Active ; Category:   Drug ; Substance:   Lipitor ; Type:   Allergy ; Updated By:   Ana Reese; Reviewed Date:   3/13/2020 9:38 AM CDT      mirtazapine  Estimated Onset Date:   Unspecified ; Reactions:   Nightmare, Suicidal ideation ; Comments:     Comment 1: intolerance   ; Created By:   Kushal Pisano Kaity; Reaction Status:   Active ; Category:   Drug ; Substance:   mirtazapine ; Type:   Allergy ; Updated By:   Kushal Pisano Kaity; Source:   Patient ; Reviewed Date:   3/13/2020 9:38 AM CDT      Sodium Hypochlorite  Estimated Onset Date:    Unspecified ; Reactions:   Runny nose ; Created By:   Ana Reese; Reaction Status:   Active ; Category:   Drug ; Substance:   Sodium Hypochlorite ; Type:   Allergy ; Updated By:   Ana Reese; Reviewed Date:   3/13/2020 9:38 AM CDT      traZODone  Estimated Onset Date:   Unspecified ; Reactions:   Nightmares ; Created By:   Kushal Pisano Kaity; Reaction Status:   Active ; Category:   Drug ; Substance:   traZODone ; Type:   Side Effect ; Severity:   Low ; Updated By:   Kushal Pisano Kaity; Source:   Patient ; Reviewed Date:   3/13/2020 9:38 AM CDT        Medication List   (As Of: 4/30/2021 10:20:44 AM CDT)   Prescription/Discharge Order    clopidogrel  :   clopidogrel ; Status:   Prescribed ; Ordered As Mnemonic:   clopidogrel 75 mg oral tablet ; Simple Display Line:   75 mg, 1 tab(s), Oral, daily, in the AM, 90 tab(s), 1 Refill(s) ; Ordering Provider:   Samanta Maza MD; Catalog Code:   clopidogrel ; Order Dt/Tm:   10/20/2020 12:28:42 PM CDT          empagliflozin  :   empagliflozin ; Status:   Prescribed ; Ordered As Mnemonic:   Jardiance 25 mg oral tablet ; Simple Display Line:   25 mg, 1 tab(s), Oral, qam, 90 tab(s), 2 Refill(s) ; Ordering Provider:   Samanta Maza MD; Catalog Code:   empagliflozin ; Order Dt/Tm:   12/18/2020 10:32:55 AM CST          FLUoxetine  :   FLUoxetine ; Status:   Prescribed ; Ordered As Mnemonic:   FLUoxetine 20 mg oral capsule ; Simple Display Line:   1 cap(s), Oral, qam, 30 cap(s), 0 Refill(s) ; Ordering Provider:   Samanta Maza MD; Catalog Code:   FLUoxetine ; Order Dt/Tm:   3/17/2021 1:28:52 PM CDT          glucose  :   glucose ; Status:   Prescribed ; Ordered As Mnemonic:   glucose 4 g oral tablet, chewable ; Simple Display Line:   16 gm, 4 tab(s), Chewed, once, Use if BG <70 mg/dL,  check BG 15.  Repeat if BG remains less than 80 mg/dL., 4 tab(s), 0 Refill(s) ; Ordering Provider:   Samanta Maza MD; Catalog Code:   glucose ; Order Dt/Tm:   12/20/2019  11:50:23 AM CST          insulin glargine  :   insulin glargine ; Status:   Prescribed ; Ordered As Mnemonic:   Basaglar KwikPen 100 units/mL subcutaneous solution ; Simple Display Line:   17 units, Subcutaneous, bid, 15 mL, 1 Refill(s) ; Ordering Provider:   Samanta Maza MD; Catalog Code:   insulin glargine ; Order Dt/Tm:   1/21/2021 11:28:37 AM CST          insulin lispro  :   insulin lispro ; Status:   Prescribed ; Ordered As Mnemonic:   HumaLOG KwikPen 100 units/mL injectable solution ; Simple Display Line:   See Instructions, inject 11 units under the skin three times daily before meals; 4 units before snacks.  take 15 min prior to eating. HOLD if pre-meal bg is <90 mg/dL., 45 mL, 3 Refill(s) ; Ordering Provider:   Samanta Maza MD; Catalog Code:   insulin lispro ; Order Dt/Tm:   1/21/2021 11:35:28 AM CST          lisinopril  :   lisinopril ; Status:   Prescribed ; Ordered As Mnemonic:   lisinopril 40 mg oral tablet ; Simple Display Line:   See Instructions, TAKE 1 TABLET BY MOUTH ONCE DAILY, 90 tab(s), 3 Refill(s) ; Ordering Provider:   Samanta Maza MD; Catalog Code:   lisinopril ; Order Dt/Tm:   10/20/2020 12:26:17 PM CDT          loratadine  :   loratadine ; Status:   Prescribed ; Ordered As Mnemonic:   loratadine 10 mg oral tablet ; Simple Display Line:   10 mg, 1 tab(s), Oral, qhs, 90 tab(s), 3 Refill(s) ; Ordering Provider:   Samanta Maza MD; Catalog Code:   loratadine ; Order Dt/Tm:   1/21/2021 10:14:26 AM CST          metFORMIN  :   metFORMIN ; Status:   Prescribed ; Ordered As Mnemonic:   MetFORMIN (Eqv-Glucophage XR) 500 mg oral tablet, extended release ; Simple Display Line:   1,000 mg, 2 tab(s), Oral, bid, 360 tab(s), 3 Refill(s) ; Ordering Provider:   Samanta Maza MD; Catalog Code:   metFORMIN ; Order Dt/Tm:   9/17/2020 9:37:33 AM CDT          metoprolol  :   metoprolol ; Status:   Prescribed ; Ordered As Mnemonic:   Metoprolol Tartrate 25 mg oral tablet ; Simple Display  Line:   See Instructions, TAKE ONE TABLET BY MOUTH TWICE DAILY, 180 EA, 1 Refill(s) ; Ordering Provider:   Samanta Maza MD; Catalog Code:   metoprolol ; Order Dt/Tm:   12/10/2020 11:50:08 AM CST          Miscellaneous Prescription  :   Miscellaneous Prescription ; Status:   Prescribed ; Ordered As Mnemonic:   Freestyle Dm 2 14 day reader ; Simple Display Line:   See Instructions, Use to test blood sugars 4 times daily per , 1 EA, 0 Refill(s) ; Ordering Provider:   Samanta Maza MD; Catalog Code:   Miscellaneous Prescription ; Order Dt/Tm:   1/21/2021 11:02:18 AM CST          Miscellaneous Prescription  :   Miscellaneous Prescription ; Status:   Prescribed ; Ordered As Mnemonic:   Freestyle Dm 2 14 day sensor ; Simple Display Line:   See Instructions, Use to test blood sugars 4 times daily. Change sensor every 14 days., 6 EA, 3 Refill(s) ; Ordering Provider:   Samanta Maza MD; Catalog Code:   Miscellaneous Prescription ; Order Dt/Tm:   1/21/2021 11:02:26 AM CST          Miscellaneous Prescription  :   Miscellaneous Prescription ; Status:   Prescribed ; Ordered As Mnemonic:   lancet bowman ; Simple Display Line:   See Instructions, use as directed, 1 EA, 0 Refill(s) ; Ordering Provider:   Samanta Maza MD; Catalog Code:   Miscellaneous Prescription ; Order Dt/Tm:   12/6/2019 10:58:20 AM CST          Miscellaneous Prescription  :   Miscellaneous Prescription ; Status:   Prescribed ; Ordered As Mnemonic:   NITROGLYCERIN 0.4MG TAB SUBLINGUAL ; Simple Display Line:   See Instructions, DISSOLVE ONE TABLET UNDER TONGUE EVERY FIVE MIN FOR CHEST PAIN AS NEEDED UP TO THREE DOSES CALL IF SYMPTOMS PERSIST 5 MIN AFTER 1ST DOSE CALL 911 AFTER 1ST DOSE IF PAIN PERSISTS, 25 EA, 3 Refill(s) ; Ordering Provider:   Samanta Maza MD; Catalog Code:   Miscellaneous Prescription ; Order Dt/Tm:   3/25/2021 12:50:07 PM CDT          Miscellaneous Prescription  :   Miscellaneous Prescription ; Status:    Prescribed ; Ordered As Mnemonic:   true metrix glucometer test strips ; Simple Display Line:   See Instructions, check QID: fasting BG, 2 hours after meals and before bed ., 100 EA, 11 Refill(s) ; Ordering Provider:   Samanta Maza MD; Catalog Code:   Miscellaneous Prescription ; Order Dt/Tm:   2/28/2020 11:37:26 AM CST          Miscellaneous Prescription  :   Miscellaneous Prescription ; Status:   Prescribed ; Ordered As Mnemonic:   ULTICARE MICRO PEN TIP 32G 4MM 50CT MG INJECTABLE ; Simple Display Line:   See Instructions, USE FOUR TIMES A DAY, 100 unknown unit, 3 Refill(s) ; Ordering Provider:   Samanta Maza MD; Catalog Code:   Miscellaneous Prescription ; Order Dt/Tm:   4/23/2020 3:35:28 PM CDT          Miscellaneous Rx Supply  :   Miscellaneous Rx Supply ; Status:   Prescribed ; Ordered As Mnemonic:   Incontience diapers ; Simple Display Line:   See Instructions, use 1 diaper per night, 30 EA, 11 Refill(s) ; Ordering Provider:   Samanta Maza MD; Catalog Code:   Miscellaneous Rx Supply ; Order Dt/Tm:   3/11/2020 10:27:58 AM CDT ; Comment:   Faxed to [employer] @ [phone]          Miscellaneous Rx Supply  :   Miscellaneous Rx Supply ; Status:   Prescribed ; Ordered As Mnemonic:   Lachydrin ; Simple Display Line:   See Instructions, Lachydrin or similar product- apply to feet QHS, PRN: dry skin, 1 EA, 1 Refill(s) ; Ordering Provider:   Samanta Maza MD; Catalog Code:   Miscellaneous Rx Supply ; Order Dt/Tm:   1/10/2020 10:31:46 AM CST          nitroglycerin  :   nitroglycerin ; Status:   Prescribed ; Ordered As Mnemonic:   nitroglycerin 0.4 mg sublingual tablet ; Simple Display Line:   See Instructions, 1 tab under tongue at chest pain.  May repeat x 3 q 5 min.  Call 911 after first dose if pain persists., PRN: for chest pain, 25 tab(s), 3 Refill(s) ; Ordering Provider:   Samanta Maza MD; Catalog Code:   nitroglycerin ; Order Dt/Tm:   12/20/2019 11:50:23 AM CST          pantoprazole   :   pantoprazole ; Status:   Prescribed ; Ordered As Mnemonic:   pantoprazole 20 mg oral delayed release tablet ; Simple Display Line:   20 mg, 1 tab(s), Oral, bid, 180 tab(s), 3 Refill(s) ; Ordering Provider:   Samanta Maza MD; Catalog Code:   pantoprazole ; Order Dt/Tm:   10/1/2020 1:31:04 PM CDT          rosuvastatin  :   rosuvastatin ; Status:   Prescribed ; Ordered As Mnemonic:   rosuvastatin 20 mg oral tablet ; Simple Display Line:   20 mg, 1 tab(s), Oral, qhs, 90 tab(s), 3 Refill(s) ; Ordering Provider:   Samanta Maza MD; Catalog Code:   rosuvastatin ; Order Dt/Tm:   10/20/2020 12:27:10 PM CDT            Home Meds    calcium carbonate  :   calcium carbonate ; Status:   Documented ; Ordered As Mnemonic:   Tums 500 mg oral tablet, chewable ; Simple Display Line:   500 mg, 1 tab(s), Chewed, bid, as needed for heartburn, stomach pain, acid reflux, 0 Refill(s) ; Catalog Code:   calcium carbonate ; Order Dt/Tm:   9/17/2020 9:47:04 PM CDT          Miscellaneous Prescription  :   Miscellaneous Prescription ; Status:   Documented ; Ordered As Mnemonic:   Misc Prescription ; Simple Display Line:   Eye allergy relief  (pt does not know drug ingredient). Using as needed for eye allergy symptoms., 0 Refill(s) ; Catalog Code:   Miscellaneous Prescription ; Order Dt/Tm:   9/17/2020 9:49:08 PM CDT          multivitamin with minerals  :   multivitamin with minerals ; Status:   Documented ; Ordered As Mnemonic:   multivitamin with minerals (w/ Iron) ; Simple Display Line:   1 tablet, Oral, daily, 0 Refill(s) ; Catalog Code:   multivitamin with minerals ; Order Dt/Tm:   12/20/2019 4:20:37 PM CST          triamcinolone nasal  :   triamcinolone nasal ; Status:   Documented ; Ordered As Mnemonic:   Nasacort Allergy 24HR ; Simple Display Line:   2 spray(s), Nasal, daily, in each nostril. Pt purchasing OTC, 1 EA, 0 Refill(s) ; Catalog Code:   triamcinolone nasal ; Order Dt/Tm:   1/21/2021 11:31:45 AM CST            ID Risk  Screen   Recent Travel History :   No recent travel   Family Member Travel History :   No recent travel   Other Exposure to Infectious Disease :   Unknown   COVID-19 Testing Status :   No COVID-19 test performed   Ruth Ann Adams - 4/30/2021 10:08 AM CDT   Social History   Social History   (As Of: 4/30/2021 10:20:44 AM CDT)   Alcohol:        Never   (Last Updated: 12/10/2019 9:46:11 AM CST by Ludy Arriola)          Tobacco:  Past      Former smoker, quit more than 30 days ago, Cigarettes   (Last Updated: 1/12/2021 2:27:15 PM CST by Ruth Ann Adams)          Electronic Cigarette/Vaping:        Electronic Cigarette Use: Never.   (Last Updated: 1/12/2021 2:27:20 PM CST by Ruth Ann Adams)          Substance Abuse:        Never   (Last Updated: 12/10/2019 9:46:22 AM CST by Ludy Arriola)          Home/Environment:        Marital status: Single.  1 children.  Living situation: Home/Independent.  Injuries/Abuse/Neglect in household: No.  Feels unsafe at home: No.  Family/Friends available for support: Yes.   (Last Updated: 12/10/2019 9:46:51 AM CST by Ludy Arriola)          Nutrition/Health:        Type of diet: Regular.  Wants to lose weight: Yes.  Sleeping concerns: Yes.  Feels highly stressed: Yes.   (Last Updated: 12/10/2019 9:47:05 AM CST by Ludy Arriola)          Exercise:        Exercise frequency: 1-2 times/week.  Exercise type: Walking.   (Last Updated: 12/10/2019 9:47:18 AM CST by Ludy Arriola)          Sexual:        Sexually active: No.  Identifies as female, Sexual orientation: Straight or heterosexual.  History of STD: No.  Contraceptive Use Details: Abstinence.  History of sexual abuse: No.   (Last Updated: 12/10/2019 9:48:22 AM CST by Ludy Arriola)

## 2022-02-16 NOTE — LETTER
(Inserted Image. Unable to display)   September 03, 2021    GARRICK RAMIREZ  [address] [address]            Dear GARRICK,      Thank you for selecting [facility] for your healthcare needs.    Our records indicate you are due for the following services:     Screening Test for Colon Cancer, called InSure[employer] ~ Stool cards were sent home with you within the past 3 months and have not been returned to us for testing.  You may either drop off your stool cards at your clinic, or send them to us in the mail.       (FYI   Regarding office visits: In some instances, a video visit or telephone visit may be offered as an option.)      To schedule an appointment or if you have further questions, please contact your clinic at [phone].      Powered by [employer]    Sincerely,    Samanta Maza MD

## 2022-02-16 NOTE — PROGRESS NOTES
Patient:   GARRICK RAMIREZ            MRN: [id number]            FIN: [id number]               Age:   66 years     Sex:  Female     :  1955   Associated Diagnoses:   None   Author:   Shaunna SANTANA, Samanta      Procedure   EKG procedure   Indication: shortness of breath.     Position: supine.     EKG findings   Interpretation: by primary care provider.     Prior EKG: from  2021.     Rhythm: sinus normal.     Intervals: MA normal, QRS normal, QT normal.     Normal EKG.     P waves: normal.     QRS complex: Q waves present new, QRS width duration  113  ms, R waves prominent.     ST-T-U complex: normal.     Interpretation: myocardial infarction anterolteral and inferior old infarct .     Discussed: with patient.     consistent with Hx of MI in 2021.        Impression and Plan   Orders

## 2022-02-16 NOTE — NURSING NOTE
Quick Intake Entered On:  1/3/2020 11:16 AM CST    Performed On:  1/3/2020 11:15 AM CST by Kushal Pisano Kaity               Summary   Weight Measured :   148.6 lb(Converted to: 148 lb 10 oz, 67.40 kg)    Bader, Pharm D , Carly - 1/3/2020 11:15 AM CST

## 2022-02-16 NOTE — TELEPHONE ENCOUNTER
---------------------  From: Samanta Maza MD   To: Ambika Han CMA;     Sent: 12/6/2019 4:28:18 PM CST  Subject: RE: General Message     regular trans thoracic echo      ---------------------  From: Ambika Han CMA   To: Samanta Maza MD;     Sent: 12/6/2019 3:38:42 PM CST  Subject: FW: General Message           ---------------------  From: Elizabeth Mancia   To: Ambika Han CMA;     Sent: 12/6/2019 12:31:30 PM CST  Subject: General Message     Can you double check if regular echocardiogram is being requested or stress echo test?---------------------  From: Ambika Han CMA   To: Elizabeth Mancia;     Sent: 12/10/2019 8:56:21 AM CST  Subject: FW: General Message

## 2022-02-16 NOTE — TELEPHONE ENCOUNTER
---------------------  From: Kushal Pisano Kaity   To: Samanta Maza MD;     Cc: [facility] Message Pool ([id number]);      Sent: 12/18/2020 11:14:09 AM CST  Subject: [place] for CGM     [phone] to inquire about status of CGM.    They have not received the signed ppk for [place]. This has been faxed to clinic on 11/24 and 11/30 and needs to be signed by [facility] then faxed back to pharmacy at: [phone].  Can you please address this?    (marking urgent as pt has been waiting almost 2 months)    KB---------------------  From: Rossana Harrington CMA ([employer] Message Pool ([id number]_[facility]))   To: Samanta Maza MD;     Sent: 12/18/2020 11:22:37 AM CST  Subject: FW: [place]GM     have you seen this fax?---------------------  From: Samanta Maza MD   To: [employer] Message Pool ([id number]);     Sent: 12/18/2020 1:49:14 PM CST  Subject: RE: [place]GM     no, I have not!---------------------  From: Samanta Maza MD   To: Kushal Pisano Kaity;     Sent: 12/18/2020 1:50:04 PM CST  Subject: RE: [place]GM     I was out during the time it was faxed, so may have been given to CR.  Can they fax it again?---------------------  From: Kushal Pisano Kaity   To: [employer] Message Pool ([id number]);     Sent: 12/18/2020 2:29:15 PM CST  Subject: FW: [place]GM     please call  mailorder and request new forms:  phone: [phone] , they will re-fax forms to [phone] completed sent to [place] to be faxed and scanned.checked with  pharmacy today and they did receive ppk, will continue processing with [place] medicaid.  KBNotice from  pharmacy today:    We are currently unable to fill the Freestyle Dm 2 for the patient due to anna issues with [employer]. Per rejection patient must fill at [email or web address] ([phone]) or [email or web address] ([phone]).           [facility] at [place] [facility]  [phone]    will have CSS fax Rx and info to [facility] pharmacy - see new marvin dated 12/31/2020.  KB

## 2022-02-16 NOTE — PROGRESS NOTES
Chief Complaint    Follow up SOB/leg swelling, gets headaches once in a while  History of Present Illness      recenlty diagnosed with CHF, started on lasix, reports sx have improved, needs labs today      DM poorly controlled      hx of MI  Review of Systems      as above  Physical Exam   Vitals & Measurements    T: 98.5  F (Tympanic)  HR: 80 (Peripheral)  BP: 120/64  SpO2: 96%     HT: 61.75 in  WT: 152.1 lb  BMI: 28.04       Review of systems is negative except as per HPI including:  no fevers, chills, sore throat, runny nose, nausea, vomiting, constipation, diarrhea, rash or new skin lesions, chest pain, palpitations, slurred speech, new paresthesia, shortness of breath or wheeze.      Exam:      General: alert and oriented ×3 no acute distress.      HEENT: pupils are equal round and reactive to light extraocular motion is intact. Normocephalic and atraumatic.       Hearing is grossly normal and there is no otorrhea.       Nares are patent there is no rhinorrhea.       Mucous membranes are moist and pink.      Chest: has bilateral rise with no increased work of breathing.      Cardiovascular: normal perfusion and brisk capillary refill.      Musculoskeletal: no gross focal abnormalities and normal gait.      Neuro: no gross focal abnormalities and memory seems intact.      Psychiatric: speech is clear and coherent and fluent. Patient dressed appropriately for the weather. Mood is appropriate and affect is full.                     Discussed with patient to return to clinic if symptoms worsen or do not improve  Assessment/Plan       CAD in native artery (I25.10)         pt needs echo and f/u with cards         Ordered:          80973 office o/p est mod 30-39 min (Charge), Quantity: 1, Medication monitoring encounter  Congestive heart failure  CAD in native artery  DM type 2 causing vascular disease                Congestive heart failure (I50.9)        recheck bnp and bnp and provided with a handout about  dietary changes needed for CHF, would like her to follow up with her cardiologist and with our dietitian,         Ordered:          30425 office o/p est mod 30-39 min (Charge), Quantity: 1, Medication monitoring encounter  Congestive heart failure  CAD in native artery  DM type 2 causing vascular disease                DM type 2 causing vascular disease (E11.59)         noted and taken into account for medical decision making         Ordered:          17526 office o/p est mod 30-39 min (Charge), Quantity: 1, Medication monitoring encounter  Congestive heart failure  CAD in native artery  DM type 2 causing vascular disease                Medication monitoring encounter (Z51.81)        bnp and bmp         Ordered:          00295 office o/p est mod 30-39 min (Charge), Quantity: 1, Medication monitoring encounter  Congestive heart failure  CAD in native artery  DM type 2 causing vascular disease           Patient Information     Name:GARRICK RAMIREZ      Address:      [address] [place] [id number]     Sex:Female     YOB: 1955     [phone]     Emergency Contact:KADE MURRELL     MRN:[id number]     FIN:[id number]     Location:[facility]     Date of Service:12/10/2021      Primary Care Physician:       Samanta Maza MD, [phone]      Attending Physician:       Samanta Maza MD, [phone]  Problem List/Past Medical History    Ongoing     Acute myocardial infarction, unspecified     Atherosclerotic heart disease of native coronary artery without angina pectoris     CAD in native artery     Combined forms of age-related cataract, left eye     Combined forms of age-related cataract, right eye     DM type 2 causing vascular disease     Essential (primary) hypertension     Generalized anxiety disorder     History of MI (myocardial infarction)     Hx of CABG     Infectious gastroenteritis and colitis, unspecified     Insomnia, unspecified     Major  depressive disorder, single episode, unspecified     Myopia, bilateral     Obstructive sleep apnea (adult) (pediatric)     Polypharmacy     Pure hypercholesterolemia, unspecified     Stable angina     Stress incontinence (female) (male)     Type 2 diabetes mellitus without complications     Unspecified hearing loss, bilateral     Unspecified open wound, left foot, initial encounter     Unstable angina    Historical     Pregnancy  Procedure/Surgical History     Esophagogastroduodenoscopy (09/27/2019)      Comments: Indication: Chronic heartburn..     IOL - Cataract extraction and insertion of intraocular lens (05/21/2019)      Comments: Right..     IOL - Cataract extraction and insertion of intraocular lens (05/07/2019)      Comments: Left..     Coronary artery stent x 3 (07/2011)     Angioplasty (2011)     Tubal ligation (1979)  Medications    acetaminophen 500 mg oral tablet, 1000 mg= 2 tab(s), Oral, q6 hrs, PRN    aspirin 81 mg oral delayed release tablet, 81 mg= 1 tab(s), Oral, daily, 3 refills    Basaglar KwikPen 100 units/mL subcutaneous solution, 17 units, Subcutaneous, bid, 1 refills    carvedilol 6.25 mg oral tablet, 1 tab(s), Oral, bid, 1 refills    clopidogrel 75 mg oral tablet, 75 mg= 1 tab(s), Oral, daily, 1 refills    FLUoxetine 20 mg oral capsule, 1 cap(s), Oral, qam, 3 refills    Freestyle Dm 2 14 day reader, See Instructions    Freestyle Dm 2 14 day sensor, See Instructions, 3 refills    furosemide 20 mg oral tablet, 20 mg= 1 tab(s), Oral, daily    glucose 4 g oral tablet, chewable, 16 gm= 4 tab(s), Chewed, once    HumaLOG KwikPen 100 units/mL injectable solution, See Instructions, 3 refills    Incontience diapers, See Instructions, 11 refills    Insulin Lispro KwikPen 100 units/mL injectable solution    Jardiance 25 mg oral tablet, 25 mg= 1 tab(s), Oral, qam, 2 refills    Lachydrin, See Instructions, PRN, 1 refills    lancet bowman, See Instructions    lisinopril 5 mg oral tablet, 5 mg= 1  tab(s), Oral, daily, 3 refills    loratadine 10 mg oral tablet, 10 mg= 1 tab(s), Oral, qhs, 3 refills    MetFORMIN (Eqv-Glucophage XR) 500 mg oral tablet, extended release, 1000 mg= 2 tab(s), Oral, bid, 3 refills    Misc Prescription    NITROGLYCERIN 0.4MG TAB SUBLINGUAL, See Instructions, 3 refills    One-A-Day Women 50 Plus oral tablet, See Instructions, 3 refills    pantoprazole 20 mg oral delayed release tablet, 20 mg= 1 tab(s), Oral, bid, 3 refills    potassium chloride 10 mEq oral tablet, extended release, 10 mEq= 1 tab(s), Oral, daily    rosuvastatin 20 mg oral tablet, 20 mg= 1 tab(s), Oral, qhs, 3 refills    torsemide 20 mg oral tablet, 20 mg= 1 tab(s), Oral, daily    Tresiba FlexTouch 100 units/mL subcutaneous solution    true metrix glucometer test strips, See Instructions, 11 refills    Tums 500 mg oral tablet, chewable, 500 mg= 1 tab(s), Chewed, bid    ULTICARE MICRO PEN NEEDLES/32G X 4MM [id number] MISC, See Instructions, 3 refills    ULTICARE MICRO PEN TIP 32G 4MM 50CT MG INJECTABLE, See Instructions, 3 refills  Allergies    traZODone (Nightmares)    Cats (Runny nose)    Lipitor (Diarrhea, nausea, vomiting)    Sodium Hypochlorite (Runny nose)    mirtazapine (Nightmare, Suicidal ideation)  Social History    Smoking Status     Former smoker     Alcohol      Never     Electronic Cigarette/Vaping      Electronic Cigarette Use: Never.     Exercise      Exercise frequency: 1-2 times/week. Exercise type: Walking.     Home/Environment      Marital status: Single. 1 children. Living situation: Home/Independent. Injuries/Abuse/Neglect in household: No. Feels unsafe at home: No. Family/Friends available for support: Yes.     Nutrition/Health      Type of diet: Regular. Wants to lose weight: Yes. Sleeping concerns: Yes. Feels highly stressed: Yes.     Sexual      Sexually active: No. Identifies as female, Sexual orientation: Straight or heterosexual. History of STD: No. Contraceptive Use Details: Abstinence. History  of sexual abuse: No.     Substance Abuse      Never     Tobacco - Past      Former smoker, quit more than 30 days ago, Cigarettes  Family History    Arthritis: Grandmother (M).    CVA - Cerebrovascular accident: Mother and Father.    Diabetes mellitus: Mother and Father.    Glaucoma: Sister.  Lab Results       Lab Results (Last 4 results within 90 days)        Sodium Level: 139 mmol/L [135 mmol/L - 146 mmol/L] (11/30/21 15:03:00)       Potassium Level: 4 mmol/L [3.5 mmol/L - 5.3 mmol/L] (11/30/21 15:03:00)       Chloride Level: 106 mmol/L [98 mmol/L - 110 mmol/L] (11/30/21 15:03:00)       CO2 Level: 26 mmol/L [20 mmol/L - 32 mmol/L] (11/30/21 15:03:00)       Glucose Level: 106 mg/dL High [65 mg/dL - 99 mg/dL] (11/30/21 15:03:00)       BUN: 16 mg/dL [7 mg/dL - 25 mg/dL] (11/30/21 15:03:00)       Creatinine Level: 0.65 mg/dL [0.5 mg/dL - 0.99 mg/dL] (11/30/21 15:03:00)       BUN/Creat Ratio: NOT APPLICABLE [6  - 22] (11/30/21 15:03:00)       eGFR: 93 mL/min/1.73m2 (11/30/21 15:03:00)       eGFR : 107 mL/min/1.73m2 (11/30/21 15:03:00)       Calcium Level: 8.6 mg/dL [8.6 mg/dL - 10.4 mg/dL] (11/30/21 15:03:00)       Hgb A1c: 8.1 High (11/30/21 15:03:00)       B-Natriuretic Peptide (BNP): 564 pg/mL High (11/30/21 15:03:00)       WBC: 8.2 [3.8  - 10.8] (11/30/21 15:03:00)       RBC: 4.83 [3.8  - 5.1] (11/30/21 15:03:00)       Hgb: 11.2 gm/dL Low [11.7 gm/dL - 15.5 gm/dL] (11/30/21 15:03:00)       Hct: 36.3 % [35 % - 45 %] (11/30/21 15:03:00)       MCV: 75.2 fL Low [80 fL - 100 fL] (11/30/21 15:03:00)       MCH: 23.2 pg Low [27 pg - 33 pg] (11/30/21 15:03:00)       MCHC: 30.9 gm/dL Low [32 gm/dL - 36 gm/dL] (11/30/21 15:03:00)       RDW: 15.6 % High [11 % - 15 %] (11/30/21 15:03:00)       Platelet: 311 [140  - 400] (11/30/21 15:03:00)       MPV: 9.7 fL [7.5 fL - 12.5 fL] (11/30/21 15:03:00)  Immunizations       Scheduled Immunizations       Dose Date(s)       influenza virus vaccine, inactivated        09/21/2009, 12/18/2013, 10/20/2015, 12/16/2016       pneumococcal (PCV13)       12/16/2016       pneumococcal (PPSV23)       10/20/2015       SARS-CoV-2 (COVID-19) Pfizer-162b2       03/19/2021, 04/09/2021       zoster vaccine, inactivated       08/25/2021       Other Immunizations               influenza virus vaccine, inactivated       10/02/2018, 12/06/2019, 10/01/2020       pneumococcal (PPSV23)       10/01/2020       Td       03/23/2018       tetanus/diphth/pertuss (Tdap) adult/adol       03/23/2018

## 2022-02-16 NOTE — LETTER
(Inserted Image. Unable to display)   January 30, 2020        GARRICK RAMIREZ  [address] [id number]        Dear GARRICK,      Thank you for selecting [facility] for your healthcare needs.    Our records indicate you are due for the following services:     Follow-up office visit to discuss an EGD with Jamal Willingham M.D.    To schedule an appointment or if you have further questions, please contact your primary clinic:   [facility]       [phone]   [facility]       [phone]              [facility]     [phone]      Powered by [employer]    Sincerely,    Samanta Maza MD   conducted a detailed discussion... I had a detailed discussion with the patient and/or guardian regarding the historical points, exam findings, and any diagnostic results supporting the discharge/admit diagnosis.

## 2022-02-16 NOTE — NURSING NOTE
Depression Screening Entered On:  1/13/2020 9:32 AM CST    Performed On:  1/10/2020 9:31 AM CST by Ludy Arriola               Depression Screening   Little Interest - Pleasure in Activities :   More than half the days   Feeling Down, Depressed, Hopeless :   More than half the days   Initial Depression Screen Score :   4    Trouble Falling or Staying Asleep :   More than half the days   Feeling Tired or Little Energy :   Nearly every day   Poor Appetite or Overeating :   More than half the days   Feeling Bad About Yourself :   Several days   Trouble Concentrating :   More than half the days   Moving or Speaking Slowly :   Several days   Thoughts Better Off Dead or Hurting Self :   Not at all   Detailed Depression Screen Score :   11    Total Depression Screen Score :   15    Ludy Arriola - 1/13/2020 9:31 AM CST

## 2022-02-16 NOTE — TELEPHONE ENCOUNTER
Entered by Rivka Bales CMA on March 24, 2020 1:40:40 PM CDT  ---------------------  From: Rivka Bales CMA   To: [facility]    Sent: 3/24/2020 1:40:40 PM CDT  Subject: Medication Management     ** Submitted: **  Order:insulin aspart (NovoLOG FlexPen 100 units/mL injectable solution)  See Instructions  INJECT 24 UNITS BEFORE MEALS AND 8 UNITS BEFORE SNACKS  Qty:  30 mL        Refills:  2          Substitutions Allowed     Route To Pharmacy - [facility]    Signed by Rivka Bales CMA  3/24/2020 6:40:00 PM    ** Submitted: **  Complete:insulin aspart (NovoLOG FlexPen 100 units/mL injectable solution)   Signed by Rivka Bales CMA  3/24/2020 6:40:00 PM    ** Not Approved:  **  insulin aspart (NOVOLOG FLEXPEN 3 ML 100U/ML INJECTABLE)  INJECT 24 UNITS BEFORE MEALS AND 8 UNITS BEFORE SNACKS  Qty:  30 unknown unit        Days Supply:  0        Refills:  0          Substitutions Allowed     Route To Pharmacy - [facility]   Signed by Rivka Bales CMA            ** Submitted: **  Order:insulin glargine (Basaglar KwikPen 100 units/mL subcutaneous solution)  24 unit(s)  Subcutaneous  bid  Qty:  45 mL        Refills:  0          Substitutions Allowed     Route To Pharmacy - [facility]    Signed by Rivka Bales CMA  3/24/2020 6:39:00 PM    ** Submitted: **  Complete:insulin glargine (Basaglar KwikPen 100 units/mL subcutaneous solution)   Signed by Rivka Bales CMA  3/24/2020 6:40:00 PM    ** Not Approved:  **  insulin glargine (BASAGLAR KWIKPEN 100U/ML 5X3ML 100UNIT INJECTABLE)  INJECT 24 UNITS SUBCUTANEOUSLY TWICE DAILY  Qty:  15 unknown unit        Days Supply:  0        Refills:  0          Substitutions Allowed     Route To Pharmacy - [facility]   Signed by Rivka Bales CMA            ** Patient matched by Rivka Bales CMA on 3/24/2020 1:36:56 PM CDT **      ------------------------------------------  From: [facility]  To: Shaunna SANTANA, Samanta  Sent: March 24, 2020 9:59:16 AM CDT  Subject: Medication  Management  Due: March 25, 2020 9:59:16 AM CDT    ** On Hold Pending Signature **  Drug: insulin aspart (NovoLOG FlexPen 100 units/mL injectable solution)  INJECT 24 UNITS BEFORE MEALS AND 8 UNITS BEFORE SNACKS  Quantity: 30 unknown unit  Days Supply: 0  Refills: 0  Substitutions Allowed  Notes from Pharmacy:     Dispensed Drug: insulin aspart (NovoLOG FlexPen 100 units/mL injectable solution)  INJECT 24 UNITS BEFORE MEALS AND 8 UNITS BEFORE SNACKS  Quantity: 30 unknown unit  Days Supply: 0  Refills: 0  Substitutions Allowed  Notes from Pharmacy:     ** On Hold Pending Signature **  Drug: insulin glargine (Basaglar KwikPen 100 units/mL subcutaneous solution)  INJECT 24 UNITS SUBCUTANEOUSLY TWICE DAILY  Quantity: 15 unknown unit  Days Supply: 0  Refills: 0  Substitutions Allowed  Notes from Pharmacy:     Dispensed Drug: insulin glargine (Basaglar KwikPen 100 units/mL subcutaneous solution)  INJECT 24 UNITS SUBCUTANEOUSLY TWICE DAILY  Quantity: 15 unknown unit  Days Supply: 0  Refills: 0  Substitutions Allowed  Notes from Pharmacy:   ------------------------------------------Med Refill      Date of last office visit and reason:  3/13/20; f/u mood / medications      Date of last Med Check / Px:   3/13/20  Date of last labs pertaining to med:  3/13/20    RTC order in chart:  yes; June    For Protocol refill, has patient been contacted:  n/a

## 2022-02-16 NOTE — TELEPHONE ENCOUNTER
---------------------  From: Kushal Pisano Kaity   To: Appointment Pool ([id number]_[place] [facility] [place]);     Sent: 6/19/2020 2:58:47 PM CDT  Subject: call to schedule MTM appt     Please try calling patient again re: MTM appt.  ThanksLVM for patient to call and schedule---------------------  From: Jc , April (Appointment Pool ([id number]))   To: Kushal Pisano Kaity;     Sent: 6/22/2020 12:52:39 PM CDT  Subject: RE: call to schedule MTM appt     2ND ATTEMPT TO REACH PT - DID LEAVE VMSENT BACK TO TEGAN PISANO / 2ND ATTEMPT - DID LEAVE VM---------------------  From: Kushal Pisano Kaity   To: Samanta Maza MD; Appointment Pool ([id number]_[facility]); Cristal Kim;     Sent: 6/22/2020 4:36:00 PM CDT  Subject: FW: call to schedule MTM appt     Thank you appointment staff for trying to contact pt! Much appreciated.    Samanta GRIMALDO I have not been able to connect with Chanelle in several months. I do see that she has been seeing Shell (yay!)    Cristal - can you send a letter notifying pt that she is due for MTM Appt?    Thanks everyone!  KB---------------------  From: Cristal Kim   To: Kushal Pisano Kaity;     Sent: 6/23/2020 11:30:03 AM CDT  Subject: RE: call to schedule MTM appt     Sent letter #1 for MTM visit.

## 2022-02-16 NOTE — TELEPHONE ENCOUNTER
Patient:   GARRICK RAMIREZ            MRN: [id number]            FIN: [id number]               Age:   65 years     Sex:  Female     :  1955   Associated Diagnoses:   None   Author:   Kushal Pisano , Carly Suarez Outbound Call:    Reason for call: due for MTM appt  Call attempt: once  Voicemail left: yes - requested pt call back and schedule MTM Appt.    Carly Pisano PharmD  Medication Therapy Management (MTM) Pharmacist  [facility] ()  Clinic Phone: [phone]  Pager: [phone]

## 2022-02-16 NOTE — TELEPHONE ENCOUNTER
Entered by Kristie Gabriel CMA on July 02, 2020 3:07:33 PM CDT  ---------------------  From: Kristie Gabriel CMA   To: [facility]    Sent: 7/2/2020 3:07:33 PM CDT  Subject: Medication Management     ** Submitted: **  Order:lisinopril (lisinopril 40 mg oral tablet)  1 tab(s)  Oral  daily  Qty:  30 tab(s)        Refills:  0          Substitutions Allowed     Route To Pharmacy - [facility]    Signed by Kristie Gabriel CMA  7/2/2020 8:07:00 PM [facility]    ** Submitted: **  Complete:lisinopril (lisinopril 40 mg oral tablet)   Signed by Kristie Gabriel CMA  7/2/2020 8:07:00 PM [facility]    ** Not Approved:  **  lisinopril (LISINOPRIL   TAB 40MG TABLET)  TAKE 1 TABLET BY MOUTH ONCE DAILY  Qty:  90 unknown unit        Days Supply:  0        Refills:  0          Substitutions Allowed     Route To Pharmacy - [facility]   Signed by Kristie Gabriel CMA            ** Patient matched by Kristie Gabriel CMA on 7/2/2020 3:03:18 PM CDT **      ------------------------------------------  From: [facility]  To: Samanta Maza MD  Sent: July 2, 2020 1:06:02 PM CDT  Subject: Medication Management  Due: June 24, 2020 10:12:23 PM CDT     ** On Hold Pending Signature **     Dispensed Drug: lisinopril (lisinopril 40 mg oral tablet), TAKE 1 TABLET BY MOUTH ONCE DAILY  Quantity: 90 unknown unit  Days Supply: 0  Refills: 0  Substitutions Allowed  Notes from Pharmacy:  ------------------------------------------

## 2022-02-17 ENCOUNTER — COMMUNICATION - RIVER FALLS (OUTPATIENT)
Dept: FAMILY MEDICINE | Facility: CLINIC | Age: 67
End: 2022-02-17
Payer: COMMERCIAL

## 2022-02-22 ENCOUNTER — COMMUNICATION - RIVER FALLS (OUTPATIENT)
Dept: FAMILY MEDICINE | Facility: CLINIC | Age: 67
End: 2022-02-22
Payer: COMMERCIAL

## 2022-03-02 VITALS — BODY MASS INDEX: 28.71 KG/M2 | WEIGHT: 156 LBS | HEIGHT: 62 IN

## 2022-03-02 NOTE — TELEPHONE ENCOUNTER
---------------------  From: Natalia Tierney LPN (eRx Pool ([id number]))   To: [facility] Message Pool ([id number]_[place] [facility] [place]);     Sent: 2/8/2022 8:46:02 AM CST  Subject: FW: Medication Management   Due Date/Time: 2/8/2022 10:50:00 AM CST     Please see message from 1/3/22 and advise on further refills  30 days sent in on 1/4/22 with notes stating DANAY wants pt to follow up with cardio.   Pt has appt scheduled tomorrow 2/9/22        ------------------------------------------  From: [facility]  To: Samanta Maza MD  Sent: February 3, 2022 10:50:59 AM CST  Subject: Medication Management  Due: January 21, 2022 11:05:10 AM CST     ** On Hold Pending Signature **     Drug: potassium chloride (Potassium Chloride (Eqv-K-Tab) 10 mEq oral tablet, extended release), TAKE ONE TABLET BY MOUTH ONCE DAILY  Quantity: 30 tab(s)  Days Supply: 30  Refills: 0  Substitutions Allowed  Notes from Pharmacy:     Dispensed Drug: potassium chloride (Potassium Chloride (Eqv-K-Tab) 10 mEq oral tablet, extended release), TAKE ONE TABLET BY MOUTH ONCE DAILY  Quantity: 30 tab(s)  Days Supply: 30  Refills: 0  Substitutions Allowed  Notes from Pharmacy:     ** On Hold Pending Signature **     Drug: furosemide (furosemide 20 mg oral tablet), TAKE ONE TABLET BY MOUTH ONCE DAILY  Quantity: 30 tab(s)  Days Supply: 30  Refills: 0  Substitutions Allowed  Notes from Pharmacy:     Dispensed Drug: furosemide (furosemide 20 mg oral tablet), TAKE ONE TABLET BY MOUTH ONCE DAILY  Quantity: 30 tab(s)  Days Supply: 30  Refills: 0  Substitutions Allowed  Notes from Pharmacy:  ------------------------------------------

## 2022-03-02 NOTE — NURSING NOTE
Quick Intake Entered On:  2/4/2022 3:18 PM CST    Performed On:  2/1/2022 3:17 PM CST by Avril Martinez               Summary   Advance Directive :   No   Weight Measured :   156 lb(Converted to: 156 lb 0 oz, 70.760 kg)    Height Measured :   61.75 in(Converted to: 5 ft 2 in, 156.84 cm)    Body Mass Index :   28.76 kg/m2 (HI)    Body Surface Area :   1.75 m2   Avril Martinez - 2/4/2022 3:17 PM CST

## 2022-03-02 NOTE — TELEPHONE ENCOUNTER
Entered by Ruth Ann Adams on January 07, 2022 8:28:31 AM CST  Filled for 30 days 1/4/22. Pt due for appointment. Pt aware and is scheduled with Shell Martinez.       ------------------------------------------  From: [facility]  To: Samanta Maza MD  Sent: January 4, 2022 11:51:06 AM CST  Subject: Medication Management  Due: December 15, 2021 8:09:06 PM CST     ** On Hold Pending Signature **     Drug: potassium chloride (Potassium Chloride (Eqv-Klor-Con 10) 10 mEq oral tablet, extended release), TAKE 1 TABLET BY MOUTH ONCE DAILY  Quantity: 30 tab(s)  Days Supply: 30  Refills: 0  Substitutions Allowed  Notes from Pharmacy:     Dispensed Drug: potassium chloride (Potassium Chloride (Eqv-Klor-Con 10) 10 mEq oral tablet, extended release), TAKE 1 TABLET BY MOUTH ONCE DAILY  Quantity: 30 tab(s)  Days Supply: 30  Refills: 0  Substitutions Allowed  Notes from Pharmacy:     ** On Hold Pending Signature **     Drug: furosemide (furosemide 20 mg oral tablet), TAKE 1 TABLET BY MOUTH ONCE DAILY  Quantity: 30 tab(s)  Days Supply: 30  Refills: 0  Substitutions Allowed  Notes from Pharmacy:     Dispensed Drug: furosemide (furosemide 20 mg oral tablet), TAKE 1 TABLET BY MOUTH ONCE DAILY  Quantity: 30 tab(s)  Days Supply: 30  Refills: 0  Substitutions Allowed  Notes from Pharmacy:  ------------------------------------------

## 2022-03-02 NOTE — TELEPHONE ENCOUNTER
---------------------  From: Ashlee Larkin   To: Samanta Maza MD;     Sent: 1/3/2022 4:22:54 PM CST  Subject: General Message     Patient is needing a refill on her Potassium & Water Pill. She is also needing a new Rx for a meter & test strips as back up. Patient uses [facility] as her pharmacy. Patient can be reached at [phone].I filled a 30 day supply on the water pill and potassium, would like her to follow up with cards to discuss this further    will forward message to Shell Martinez to assist with odering glucometer and test strips.---------------------  From: Samanta Maza MD   To: [facility] Message Pool ([id number]_[facility]);     Cc: Avril Martinez;      Sent: 1/4/2022 1:09:59 PM CST  Subject: FW: General Message---------------------  From: Avril Martinez   To: [facility] Message Pool ([id number]_[facility]); Samanta Maza MD;     Sent: 1/4/2022 1:18:27 PM CST  Subject: RE: General Message     refilled test strips to use as a back up to Freestyle Dm sensorLeft detailed message.

## 2022-03-02 NOTE — TELEPHONE ENCOUNTER
---------------------  From: Tonie Deng RN (Phone Messages Pool ([id number]_[facility]))   Sent: 2/17/2022 10:07:08 AM CST  Subject: Refills       Time of Call:  0935       Person Calling:  patient  Phone number:  [phone]    Note:   Requesting refills of potassium, furosemide, Claritin, to [facility]  and Free Style Gisel to [facility] [place]. She has followed up with Dietician and cardiology as directed.    Last office visit and reason:  12/10/21 CDV        Prescription for Furosemide 20 mg daily last written on: 1/4/22   Quantity: 30 Refill(s): 0        Prescription for Pot chloride EKV Klor-Con 10 mEq daily last written on: 1/4/22   Quantity: 30 Refill(s): 0        Prescription for loratadine 10 mg daily last written on: 4/30/21   Quantity: 90 Refill(s): 3      Rx FreeStyle Gisel faxed to [facility] today

## 2022-03-02 NOTE — TELEPHONE ENCOUNTER
---------------------  From: Raquel Alvarez RN (Phone Messages Pool ([id number]))   To: [facility] Message Pool ([address] [place] [address]);     Sent: 2/22/2022 2:26:14 PM CST  Subject: [employer] Dm sensors       PCP:  GRETTA      Time of Call: 2:16pm       Person Calling:  pt  Phone number:  [phone]    Note:   TC received from pt, inquiring if GRETTA has talked with [facility] for her Freestyle Dm sensors and have renewed the prescription. Stated her current sensor is due to be changed in 3 days.     Please advise on rx for Dm sensors.    Last office visit and reason: 12/10/21---------------------  From: Ruth Ann Adams ([facility] Message Pool ([id number]))   To: Samanta Maza MD;     Sent: 2/23/2022 7:27:44 AM CST  Subject: FW: [facility] Dm sensors---------------------  From: Samanta Maza MD   To: [facility] Message Pool ([id number]);     Cc: Avril Martinez;      Sent: 2/23/2022 9:07:26 PM CST  Subject: RE: [facility] Dm sensors     no I have not heard from them, please invite pt to follow up with Shell Martinez, thanksPt notified and transferred to scheduling.---------------------  From: Avril Martinez   To: [facility] Message Pool ([id number]) (Ruth Ann Adams); Samanta Maza MD;     Sent: 2/24/2022 7:41:17 PM CST  Subject: RE: [facility] Dm sensors     Tanner was to be calling pt...---------------------  From: Samanta Maza MD   To: [facility] Message Pool ([id number]);     Cc: Avril Martinez;      Sent: 2/25/2022 10:45:13 AM CST  Subject: RE: [employer] Dm sensors     CSS please let pt know Shell said [employer] was supposed to be contacting patient and I don't have their contact information and will not be back int the office until next Tuesday. If she has more information for on their contact info she is welcome to follow up with me next week or one of my partners sooner than that.Left message to call back.Message printed and on my desk.

## 2022-03-02 NOTE — PROGRESS NOTES
Patient:   GARRICK RAMIREZ            MRN: [id number]            FIN: [id number]               Age:   66 years     Sex:  Female     :  1955   Associated Diagnoses:   None   Author:   Avril Martinez      Doctor: Shaunna  Patient Information  (Medicare and address information is on file)  Medicare HICN#: _  Address:_ City:_ State:_ Zip:_  Home Phone:_ Work Phone:_ Other Contact Phone:_    Diabetes self-management training (DSMT) and medical nutrition therapy (MNT) are individual and complementary services to improve diabetes care. For Medicare beneficiaries, both services can be ordered in the same year. Research indicates MNT combined with DSMT improves outcomes.    Diabetes Self-Managment Training (DSMT)  Medicare: 10 hours initial DSMT in 12 month period, plus 2 hours follow-up annually  *Check type of training services and number of hours requested:  _ Initial DSMT:  10 hours or _ no. hrs. requested  X Follow-up DSMT: X 2 hours or _ no. hrs. requested  _ Additional insulin training: _ no. hrs. requested    *Patients with special needs requiring individual DSMT  Check all special needs that apply:  _ Vision _ Hearing  _ Physical  _Cognitive Impairment  _ Language limitations X Other  No classes offered / pandemic precautions     *DSMT Content  X All ten content areas, as appropriate  _ Monitoring diabetes    _ Diabetes as disease process  _ Psychological adjustment _ Physical activity  _ Nutritional management  _ Goal setting, problem solving  _ Medications       _ Prevent, detect and treat acute complications  _ Preconception/pregnancy _ Prevent, detect and teat chronic complications     management or gestational     diabetes management    Medical Nutrition Therapy (MNT)  Medicare: 3 hours initial MNT in the first calendar year, plus two hours follow-up MNT annually. Additional MNT hours available for change in medical condition, treament and/or diagnosis.  *Check the type of MNT and/or number of additional  hours requested:  _ Initial MNT:   X Annual follow-up MNT  _ Additional MNT services in the same calendar year, per RD recommendations  _ number of additional hours requested    Please specify change in medical condition, treatment and/or diagnosis      *Diagnosis  Please send recent labs for patient eligibility & outcomes monitoring  _ Type 1 uncontrolled  _ Type 1 controlled  X Type 2 uncontrolled  _ Type 2 controlled  _ Gestational diabetes _ Other _    Complications/Comorbidities  Check all that apply:  X Hypertension   X Dyslipidemia _ Stroke      _ Nephropathy  _ Neuropathy    _ Retinopathy  _ Renal disease   _ CHD  _ PVD       _ Pregnancy  _ Non-healing wound _ Obesity    _ Mental/affective disorder     _ Other _    Current Diabetes Medications  Specify type, dose and frequency  Oral: Jardiance 25mg, Metformin XR 500mg ii tabs BID   Insulin: Tresiba 28u, Humalog 15u before meals (BID) and 5u before snacks   Patient now uses: X Pen _ Needle _ Pump    Patient Behavior Goals/Plan of Care    A1c <7%,  Minimize the risk for hypoglycemia and reduce risks of developing complications related to uncontrolled diabetes.    Signature and UPIN #: (UPIN and NPI are on file)  Group/Practice name, address and phone: [facility], [facility]  [id number] [phone]

## 2022-03-02 NOTE — PROGRESS NOTES
Patient:   GARRICK RAMIREZ            MRN: [id number]            FIN: [id number]               Age:   66 years     Sex:  Female     :  1955   Associated Diagnoses:   Type 2 diabetes mellitus without complications   Author:   Avril Martinez      Visit Information   Visit type:  Diabetes Self Management Education .    Accompanied by:  Family member, adult daughter.    Referral source:  Samanta Maza MD.       Chief Complaint   Type II diabetes, Hyperlipidemia, HTN       History of Present Illness   2021 -  Freestyle Dm 2 instruction and ongoing diabetes education   2021 - Follow up diabetes education and download Freestyle Dm 2.    2021 - Follow up education, heart healthy education, download Freestyle Dm 2  2021 - Follow up education, heart healthy meal planning, download Freestyle Dm 2    2022 - Follow up education, pt did not have sensor downloaded today, some came off early and can not refill rx yet, has been using BG testing     Patient continues to meet criteria for CGM coverage;   - patient has type two diabetes mellitus; and  - patient has been using CGM daily; and  - patient is insulin treated with multiple daily injections of three or more times per day; and  - patient is frequently adjusting insulin on the basis of CGM readings  - within six months prior to ordering the CGM, the patient has had an in-person visit with the practitioner; and determined that criteria (1-4) above are met; and   - every six months following the initial prescription of the CGM, the treating practitioner has an in-person visit with the patient to assess adherence to their CGM regimen and diabetes treatment plan    Nutrition: Pt and her daughter are working on eating healthier and going grocery shopping together.  They live 5 minutes apart. Pt recalls microwavable meals or convenience foods when cooking independently, daughter is trying to provide more vegetables and leaner proteins for  pt.       Insulin: evening dose at 28u Basaglar and this is working much better than dividing it as she had been forgetting often  Humalog - improved dosing  15u for noon and evening meal, does not typically eat breakfast   5u prior to snacks - does not take this in the evening/ HS     Metformin XR 500mg ii tabs BID  Jardiance 25 mg daily   Taking medications as directed.        Exercise: none, went through cardiac rehab and has not been doing the recommended exercises   Uses food stamps and trying to purchase fruits and vegetables   Eye exam due  Skin - no areas of concern  Immunizations - UTD    A1C  8.1% = 186 mg/dL estimated Average Glucose (eAG)  improved but still not at goal   BG Testin day avg 142 mg/dL  14 day avg 135 mg/dL  30 day avg 139 mg/dL        Health Status   Allergies:    Allergic Reactions (Selected)  Severity Not Documented  Cats (Runny nose)  Lipitor (Diarrhea, nausea, vomiting)  Mirtazapine (Nightmare and suicidal ideation)  Sodium Hypochlorite (Runny nose)  Nonallergic Reactions (Selected)  Low  TraZODone (Nightmares)   Medications:  (Selected)   Prescriptions  Prescribed  BG meter: BG meter, See Instructions, Instructions: BG testing as needs for a back up to Dm, Supply, # 1 EA, 0 Refill(s), Type: Maintenance, Pharmacy: [facility], BG testing as needs for a back up to Dm, 61.75, in, 12/10/21 13:21:00 CST, Height Measured...  Basaglar KwikPen 100 units/mL subcutaneous solution: ( 17 units ), Subcutaneous, bid, # 15 mL, 1 Refill(s), Type: Maintenance, Pharmacy: [facility], dose change as of 2021, 62, in, 20 9:35:00 CDT, Height Measured, 171.4, lb, 10/01/20 12:54:00 CDT, Weight Measured  FLUoxetine 20 mg oral capsule: = 1 cap(s), Oral, qam, # 90 cap(s), 3 Refill(s), Type: Maintenance, Pharmacy: [facility], 1 cap(s) Oral qam, 62, in, 21 16:18:00 CST, Height Measured, 163.3, lb, 21 10:08:00 CDT, Weight Measured  Freestyle Dm 2 14 day reader: Freestyle  Dm 2 14 day reader, See Instructions, Instructions: Use to test blood sugars 4 times daily per , Supply, # 1 EA, 0 Refill(s), Type: Maintenance, faxed to pharmacy (Rx)  Freestyle Dm 2 14 day sensor: Freestyle Dm 2 14 day sensor, See Instructions, Instructions: Use to test blood sugars 4 times daily. Change sensor every 14 days., Supply, # 6 EA, 3 Refill(s), Type: Maintenance, faxed to pharmacy (Rx)  HumaLOG KwikPen 100 units/mL injectable solution: See Instructions, Instructions: 11u qac and 4u q snacks.+SSI, for -199 +2U, 200-249 +4U, 250-349+8u, 350-399 +10U, >399 +10u and call MD   HOLD if pre-meal bg is <90 mg/dL., # 45 mL, 3 Refill(s), Type: Maintenance, Pharmacy: [facility], dose c...  Incontience diapers: Incontience diapers, See Instructions, Instructions: use 1 diaper per night, Supply, # 30 EA, 11 Refill(s), Type: Maintenance  Jardiance 25 mg oral tablet: = 1 tab(s) ( 25 mg ), Oral, qam, # 90 tab(s), 2 Refill(s), Type: Maintenance, Pharmacy: [facility], dose increase; please d/c the 10 mg tab so it does not get accidentally refilled. Pt would like delivered., 1 tab(s) Oral qam, 62, in, 03/13/20 9:35:...  Lachydrin: Lachydrin, See Instructions, Instructions: Lachydrin or similar product- apply to feet QHS, Supply, PRN: dry skin, # 1 EA, 1 Refill(s), Type: Maintenance, Pharmacy: [facility], Lachydrin or similar product- apply to feet QHS,PRN:dry skin  MetFORMIN (Eqv-Glucophage XR) 500 mg oral tablet, extended release: = 2 tab(s) ( 1,000 mg ), Oral, bid, # 360 tab(s), 3 Refill(s), Type: Maintenance, Pharmacy: [facility], 2 tab(s) Oral bid, 62, in, 02/11/21 16:18:00 CST, Height Measured, 163.3, lb, 04/30/21 10:08:00 CDT, Weight Measured  NITROGLYCERIN 0.4MG TAB SUBLINGUAL: NITROGLYCERIN 0.4MG TAB SUBLINGUAL, See Instructions, Instructions: DISSOLVE ONE TABLET UNDER TONGUE EVERY FIVE MIN FOR CHEST PAIN AS NEEDED UP TO THREE DOSES CALL IF SYMPTOMS PERSIST 5 MIN AFTER 1ST DOSE  CALL 911 AFTER 1ST DOSE IF PAIN PERSISTS, Supp...  One-A-Day Women 50 Plus oral tablet: See Instructions, Instructions: may substiute with multivitamin with minerals preferred by insurance company, # 90 EA, 3 Refill(s), Type: Maintenance, Pharmacy: [facility], may substiute with multivitamin with minerals preferred by insurance company...  Potassium Chloride (Eqv-Klor-Con 10) 10 mEq oral tablet, extended release: See Instructions, Instructions: TAKE 1 TABLET BY MOUTH ONCE DAILY, # 30 tab(s), 0 Refill(s), Pharmacy: [facility], TAKE 1 TABLET BY MOUTH ONCE DAILY, 61.75, in, 12/10/21 13:21:00 CST, Height Measured, 152.1, lb, 12/10/21 13:21:00 CST, Weight Meagan...  ULTICARE MICRO PEN NEEDLES/32G X 4MM [id number] MISC: ULTICARE MICRO PEN NEEDLES/32G X 4MM [id number] MISC, See Instructions, Instructions: USE FOUR TIMES A DAY, Supply, # 100 EA, 3 Refill(s), Type: Maintenance, Pharmacy: [facility], USE FOUR TIMES A DAY, 61.75, in, 09/27/21 15:09:00 CDT, Height Measured,...  ULTICARE MICRO PEN TIP 32G 4MM 50CT MG INJECTABLE: ULTICARE MICRO PEN TIP 32G 4MM 50CT MG INJECTABLE, See Instructions, Instructions: USE FOUR TIMES A DAY, Supply, # 100 unknown unit, 3 Refill(s), Type: Maintenance, Pharmacy: [facility], USE FOUR TIMES A DAY  acetaminophen 500 mg oral tablet: = 2 tab(s) ( 1,000 mg ), Oral, q6 hrs, Instructions: not to exceed 4000 mg/day, PRN: for pain, # 120 tab(s), 0 Refill(s), Type: Maintenance, Pharmacy: [facility], 2 tab(s) Oral q6 hrs,PRN:for pain,Instr:not to exceed 4000 mg/day, 62, in, 02/11/21 16...  aspirin 81 mg oral delayed release tablet: = 1 tab(s) ( 81 mg ), Oral, daily, # 90 tab(s), 3 Refill(s), Type: Maintenance, Pharmacy: [facility], 1 tab(s) Oral daily, 62, in, 02/11/21 16:18:00 CST, Height Measured, 163.3, lb, 04/30/21 10:08:00 CDT, Weight Measured  carvedilol 6.25 mg oral tablet: = 1 tab(s), Oral, bid, # 180 tab(s), 1 Refill(s), Type: Maintenance, Pharmacy: [facility], 1 tab(s) Oral bid,  61.75, in, 08/19/21 12:36:00 CDT, Height Measured, 154.9, lb, 08/19/21 12:36:00 CDT, Weight Measured  clopidogrel 75 mg oral tablet: = 1 tab(s) ( 75 mg ), Oral, daily, Instructions: in the AM, # 90 tab(s), 1 Refill(s), Type: Maintenance, Pharmacy: [facility], pt req 90 day supply, 1 tab(s) Oral daily,Instr:in the AM, 62, in, 03/13/20 9:35:00 CDT, Height Measured, 171.4, lb, 10/01...  furosemide 20 mg oral tablet: = 1 tab(s) ( 20 mg ), Oral, daily, # 30 tab(s), 0 Refill(s), Type: Maintenance, Pharmacy: [facility], 1 tab(s) Oral daily, 61.75, in, 11/08/21 14:11:00 CST, Height Measured, 159.5, lb, 11/30/21 13:50:00 CST, Weight Measured  furosemide 20 mg oral tablet: See Instructions, Instructions: TAKE 1 TABLET BY MOUTH ONCE DAILY, # 30 tab(s), 0 Refill(s), Pharmacy: [facility], TAKE 1 TABLET BY MOUTH ONCE DAILY, 61.75, in, 12/10/21 13:21:00 CST, Height Measured, 152.1, lb, 12/10/21 13:21:00 CST, Weight Meagan...  glucose 4 g oral tablet, chewable: = 4 tab(s) ( 16 gm ), Chewed, once, Instructions: Use if BG <70 mg/dL,  check BG 15.  Repeat if BG remains less than 80 mg/dL., # 4 tab(s), 0 Refill(s), Type: Soft Stop, Pharmacy: [facility], 4 tab(s) Chewed once,Instr:Use if BG <70 mg/dL,  check BG...  lancet bowman: lancet bowman, See Instructions, Instructions: use as directed, Supply, # 1 EA, 0 Refill(s), Type: Maintenance, Pharmacy: [facility], use as directed  lisinopril 5 mg oral tablet: = 1 tab(s) ( 5 mg ), Oral, daily, # 90 tab(s), 3 Refill(s), Type: Maintenance, Pharmacy: [facility], 1 tab(s) Oral daily, 62, in, 02/11/21 16:18:00 CST, Height Measured, 163.3, lb, 04/30/21 10:08:00 CDT, Weight Measured  loratadine 10 mg oral tablet: = 1 tab(s) ( 10 mg ), Oral, qhs, # 90 tab(s), 3 Refill(s), Type: Maintenance, Pharmacy: [facility], 1 tab(s) Oral qhs, 62, in, 02/11/21 16:18:00 CST, Height Measured, 163.3, lb, 04/30/21 10:08:00 CDT, Weight Measured  pantoprazole 20 mg oral delayed release tablet: = 1  tab(s) ( 20 mg ), Oral, bid, # 180 tab(s), 3 Refill(s), Type: Maintenance, Pharmacy: [facility], please cancel the 40 mg 1 po qday if this is covered by her insurance, thanks, 1 tab(s) Oral bid, 62, in, 02/11/21 16:18:00 CST, Height Measured, 16...  potassium chloride 10 mEq oral tablet, extended release: = 1 tab(s) ( 10 mEq ), Oral, daily, # 30 tab(s), 0 Refill(s), Type: Maintenance, Pharmacy: [facility], 1 tab(s) Oral daily, 61.75, in, 11/08/21 14:11:00 CST, Height Measured, 159.5, lb, 11/30/21 13:50:00 CST, Weight Measured  rosuvastatin 20 mg oral tablet: = 1 tab(s) ( 20 mg ), Oral, qhs, # 90 tab(s), 3 Refill(s), Type: Maintenance, Pharmacy: [facility], Pt req 90 day supply, 1 tab(s) Oral qhs, 62, in, 02/11/21 16:18:00 CST, Height Measured, 163.3, lb, 04/30/21 10:08:00 CDT, Weight Measured  torsemide 20 mg oral tablet: = 1 tab(s) ( 20 mg ), Oral, daily, # 30 tab(s), 0 Refill(s), Type: Maintenance, Pharmacy: [facility], 1 tab(s) Oral daily, 62, in, 02/11/21 16:18:00 CST, Height Measured, 163.3, lb, 04/30/21 10:08:00 CDT, Weight Measured  true metrix glucometer test strips: true metrix glucometer test strips, See Instructions, Instructions: use as a back up to roula, Supply, # 50 EA, 11 Refill(s), Type: Maintenance, Pharmacy: [facility], use as a back up to roula, 61.75, in, 12/10/21 13:21:00 CST, Height Measured, 152....  Documented Medications  Documented  Insulin Lispro KwikPen 100 units/mL injectable solution: 0 Refill(s), Type: Soft Stop  Misc Prescription: Instructions: Eye allergy relief  (pt does not know drug ingredient). Using as needed for eye allergy symptoms., 0 Refill(s), Type: Maintenance  Tresiba FlexTouch 100 units/mL subcutaneous solution: 0 Refill(s), Type: Soft Stop  Tums 500 mg oral tablet, chewable: = 1 tab(s) ( 500 mg ), Chewed, bid, Instructions: as needed for heartburn, stomach pain, acid reflux, 0 Refill(s), Type: Maintenance,    Medications          *denotes recorded  medication          FLUoxetine 20 mg oral capsule: 1 cap(s), Oral, qam, 90 cap(s), 3 Refill(s).          lancet bowman: See Instructions, use as directed, 1 EA, 0 Refill(s).          ULTICARE MICRO PEN TIP 32G 4MM 50CT MG INJECTABLE: See Instructions, USE FOUR TIMES A DAY, 100 unknown unit, 3 Refill(s).          *Misc Prescription: Eye allergy relief  (pt does not know drug ingredient). Using as needed for eye allergy symptoms., 0 Refill(s).          Freestyle Dm 2 14 day sensor: See Instructions, Use to test blood sugars 4 times daily. Change sensor every 14 days., 6 EA, 3 Refill(s).          Freestyle Dm 2 14 day reader: See Instructions, Use to test blood sugars 4 times daily per , 1 EA, 0 Refill(s).          NITROGLYCERIN 0.4MG TAB SUBLINGUAL: See Instructions, DISSOLVE ONE TABLET UNDER TONGUE EVERY FIVE MIN FOR CHEST PAIN AS NEEDED UP TO THREE DOSES CALL IF SYMPTOMS PERSIST 5 MIN AFTER 1ST DOSE CALL 911 AFTER 1ST DOSE IF PAIN PERSISTS, 25 EA, 3 Refill(s).          ULTICARE MICRO PEN NEEDLES/32G X 4MM [id number] MISC: See Instructions, USE FOUR TIMES A DAY, 100 EA, 3 Refill(s).          true metrix glucometer test strips: See Instructions, use as a back up to dm, 50 EA, 11 Refill(s).          Lachydrin: See Instructions, Lachydrin or similar product- apply to feet QHS, PRN: dry skin, 1 EA, 1 Refill(s).          Incontience diapers: See Instructions, use 1 diaper per night, 30 EA, 11 Refill(s).          BG meter: See Instructions, BG testing as needs for a back up to Dm, 1 EA, 0 Refill(s).          acetaminophen 500 mg oral tablet: 1,000 mg, 2 tab(s), Oral, q6 hrs, not to exceed 4000 mg/day, PRN: for pain, 120 tab(s), 0 Refill(s).          aspirin 81 mg oral delayed release tablet: 81 mg, 1 tab(s), Oral, daily, 90 tab(s), 3 Refill(s).          *Tums 500 mg oral tablet, chewable: 500 mg, 1 tab(s), Chewed, bid, as needed for heartburn, stomach pain, acid reflux, 0 Refill(s).          carvedilol  6.25 mg oral tablet: 1 tab(s), Oral, bid, 180 tab(s), 1 Refill(s).          clopidogrel 75 mg oral tablet: 75 mg, 1 tab(s), Oral, daily, in the AM, 90 tab(s), 1 Refill(s).          Jardiance 25 mg oral tablet: 25 mg, 1 tab(s), Oral, qam, 90 tab(s), 2 Refill(s).          furosemide 20 mg oral tablet: 20 mg, 1 tab(s), Oral, daily, 30 tab(s), 0 Refill(s).          furosemide 20 mg oral tablet: See Instructions, TAKE 1 TABLET BY MOUTH ONCE DAILY, 30 tab(s), 0 Refill(s).          glucose 4 g oral tablet, chewable: 16 gm, 4 tab(s), Chewed, once, Use if BG <70 mg/dL,  check BG 15.  Repeat if BG remains less than 80 mg/dL., 4 tab(s), 0 Refill(s).          *Tresiba FlexTouch 100 units/mL subcutaneous solution: 0 Refill(s).          Basaglar KwikPen 100 units/mL subcutaneous solution: 17 units, Subcutaneous, bid, 15 mL, 1 Refill(s).          HumaLOG KwikPen 100 units/mL injectable solution: See Instructions, 11u qac and 4u q snacks.+SSI, for -199 +2U, 200-249 +4U, 250-349+8u, 350-399 +10U, >399 +10u and call MD   HOLD if pre-meal bg is <90 mg/dL., 45 mL, 3 Refill(s).          *Insulin Lispro KwikPen 100 units/mL injectable solution: 0 Refill(s).          lisinopril 5 mg oral tablet: 5 mg, 1 tab(s), Oral, daily, 90 tab(s), 3 Refill(s).          loratadine 10 mg oral tablet: 10 mg, 1 tab(s), Oral, qhs, 90 tab(s), 3 Refill(s).          MetFORMIN (Eqv-Glucophage XR) 500 mg oral tablet, extended release: 1,000 mg, 2 tab(s), Oral, bid, 360 tab(s), 3 Refill(s).          One-A-Day Women 50 Plus oral tablet: See Instructions, may substiute with multivitamin with minerals preferred by insurance company, 90 EA, 3 Refill(s).          pantoprazole 20 mg oral delayed release tablet: 20 mg, 1 tab(s), Oral, bid, 180 tab(s), 3 Refill(s).          potassium chloride 10 mEq oral tablet, extended release: 10 mEq, 1 tab(s), Oral, daily, 30 tab(s), 0 Refill(s).          Potassium Chloride (Eqv-Klor-Con 10) 10 mEq oral tablet, extended  release: See Instructions, TAKE 1 TABLET BY MOUTH ONCE DAILY, 30 tab(s), 0 Refill(s).          rosuvastatin 20 mg oral tablet: 20 mg, 1 tab(s), Oral, qhs, 90 tab(s), 3 Refill(s).          torsemide 20 mg oral tablet: 20 mg, 1 tab(s), Oral, daily, 30 tab(s), 0 Refill(s).       Problem list:    All Problems  Type 2 diabetes mellitus without complications / SNOMED CT 030380008 / Confirmed  DM type 2 causing vascular disease / SNOMED CT 219691674 / Confirmed  Stable angina / SNOMED CT 098950568 / Confirmed  Pure hypercholesterolemia, unspecified / SNOMED CT 566276897 / Confirmed  Unstable angina / SNOMED CT 6245867 / Confirmed  Polypharmacy / SNOMED CT 181801503 / Confirmed  Unspecified open wound, left foot, initial encounter / SNOMED CT 728949612 / Confirmed  Obstructive sleep apnea (adult) (pediatric) / SNOMED CT 532077756 / Confirmed  Myopia, bilateral / SNOMED CT 51853393 / Confirmed  Major depressive disorder, single episode, unspecified / SNOMED CT 79112080 / Confirmed  Insomnia, unspecified / SNOMED CT 243737949 / Confirmed  Infectious gastroenteritis and colitis, unspecified / SNOMED CT 68358977 / Confirmed  History of MI (myocardial infarction) / SNOMED CT 7224923320 / Confirmed  Hx of CABG / SNOMED CT 8467730169 / Confirmed  Unspecified hearing loss, bilateral / SNOMED CT 27897166 / Confirmed  Generalized anxiety disorder / SNOMED CT 84147643 / Confirmed  Stress incontinence (female) (male) / SNOMED CT 294590087 / Confirmed  Essential (primary) hypertension / SNOMED CT 01999028 / Confirmed  Atherosclerotic heart disease of native coronary artery without angina pectoris / SNOMED CT 9403995567 / Confirmed  CAD in native artery / SNOMED CT 4470246479 / Confirmed  Combined forms of age-related cataract, left eye / SNOMED CT 10835461 / Confirmed  Combined forms of age-related cataract, right eye / SNOMED CT 85242338 / Confirmed  Acute myocardial infarction, unspecified / SNOMED CT 93210415 / Confirmed  Resolved:  Pregnancy / SNOMED CT 009541292      Histories   Past Medical History:    Active  Acute myocardial infarction, unspecified (40089226): Onset in the month of 7/2011 at 55 years  Major depressive disorder, single episode, unspecified (76414039)  Type 2 diabetes mellitus without complications (473455327)  Obstructive sleep apnea (adult) (pediatric) (574406705)  Unspecified hearing loss, bilateral (74207267)  Myopia, bilateral (12653225)  Unspecified open wound, left foot, initial encounter (373986817)  Atherosclerotic heart disease of native coronary artery without angina pectoris (8571426248)  Combined forms of age-related cataract, right eye (07432282)  Generalized anxiety disorder (34419282)  Essential (primary) hypertension (34312285)  Pure hypercholesterolemia, unspecified (796460871)  Insomnia, unspecified (356839611)  Unstable angina (7853263)  Stress incontinence (female) (male) (047248315)  Infectious gastroenteritis and colitis, unspecified (60990418)  Resolved  Pregnancy (512936757):  Resolved in 1976 at 20 years.   Family History:    Diabetes mellitus  Mother  Father  Arthritis  Grandmother (M)  CVA - Cerebrovascular accident  Mother  Father  Glaucoma  Sister     Procedure history:    Esophagogastroduodenoscopy (SNOMED CT 312734805) on 9/27/2019 at 64 Years.  Comments:  11/22/2019 1:48 PM Ana Langley  Indication: Chronic heartburn.  IOL - Cataract extraction and insertion of intraocular lens (SNOMED CT 8381514714) performed by Jovanni Harris MD on 5/21/2019 at 63 Years.  Comments:  11/22/2019 1:50 PM Ana Langley  Right.  IOL - Cataract extraction and insertion of intraocular lens (SNOMED CT 0691129669) performed by Jovanni Harris MD on 5/7/2019 at 63 Years.  Comments:  11/22/2019 1:50 PM Ana Langley  Left.  Coronary artery stent x 3 (SNOMED CT 7042924582) in the month of 7/2011 at 56 Years.  Angioplasty (SNOMED CT 7758511069) in 2011 at 56 Years.  Tubal ligation (SNOMED CT 545755930)  in 1979 at 24 Years.   Social History:        Electronic Cigarette/Vaping Assessment            Electronic Cigarette Use: Never.      Alcohol Assessment            Never      Tobacco Assessment: Past            Former smoker, quit more than 30 days ago, Cigarettes      Substance Abuse Assessment            Never      Home and Environment Assessment            Marital status: Single.  1 children.  Living situation: Home/Independent.  Injuries/Abuse/Neglect in               household: No.  Feels unsafe at home: No.  Family/Friends available for support: Yes.      Nutrition and Health Assessment            Type of diet: Regular.  Wants to lose weight: Yes.  Sleeping concerns: Yes.  Feels highly stressed: Yes.      Exercise and Physical Activity Assessment            Exercise frequency: 1-2 times/week.  Exercise type: Walking.      Sexual Assessment            Sexually active: No.  Identifies as female, Sexual orientation: Straight or heterosexual.  History of STD:               No.  Contraceptive Use Details: Abstinence.  History of sexual abuse: No.        Physical Examination   VS/Measurements      Review / Management   Results review:  Lab results   12/10/2021 2:20 PM CST Sodium Level 141 mmol/L    Potassium Level 4.7 mmol/L    Chloride Level 106 mmol/L    CO2 Level 27 mmol/L    Glucose Level 112 mg/dL  HI    BUN 21 mg/dL    Creatinine 0.73 mg/dL    BUN/Creat Ratio NOT APPLICABLE    eGFR 86 mL/min/1.73m2    eGFR African American 99 mL/min/1.73m2    Calcium Level 9.7 mg/dL    B-Natriuretic Peptide 559 pg/mL  HI   11/30/2021 3:03 PM CST Sodium Level 139 mmol/L    Potassium Level 4.0 mmol/L    Chloride Level 106 mmol/L    CO2 Level 26 mmol/L    Glucose Level 106 mg/dL  HI    BUN 16 mg/dL    Creatinine 0.65 mg/dL    BUN/Creat Ratio NOT APPLICABLE    eGFR 93 mL/min/1.73m2    eGFR African American 107 mL/min/1.73m2    Calcium Level 8.6 mg/dL    Hgb A1c 8.1  HI    B-Natriuretic Peptide 564 pg/mL  HI    WBC 8.2    RBC 4.83     Hgb 11.2 gm/dL  LOW    Hct 36.3 %    MCV 75.2 fL  LOW    MCH 23.2 pg  LOW    MCHC 30.9 gm/dL  LOW    RDW 15.6 %  HI    Platelet 311    MPV 9.7 fL   .       Impression and Plan   Diagnosis     Type 2 diabetes mellitus without complications ([id number] E11.9).         Counseled: Patient, MAGALI7 Self Care Behaviors     Healthy Eating - Focused on eating the same/ steady amount of carbohydrates at meals for safety and stability of glucose as pt is on set dosing.  Reviewed what foods are primary sources of carbohydrates and how intake affects BG readings, discussed foods pt enjoys and how to incorporate them into her meals.  Added additional foods to list of low cost healthy options for meals and snacks.  Pt is to incorporate more tuna, canned chicken, beans, eggs, and cottage cheese for lower cost protein sources and fruit cups in natural juices, frozen or fresh fruits, frozen vegetables or rinse canned if necessary.    Instructed on Therapeutic Lifestyle Changes (TLC) nutrition plan for heart healthy eating.     Low cholesterol (<200mg), low fat, low saturated fat, zero trans fat   Low sodium (2000mg)   High fiber diet (20 - 30gm); Soluble fiber 10+ gm/ day    Eat more omega 3 fats  Vegetarian at least 1 day per week  Discussed well balanced meals, diabetic plate method as a general rule.  Patient is provided with numerous ideas to incorporate dietary recommendations.    Being Active - Education on how exercise helps with : education on simple easy ways to move more during the day and exercises that wouldn't require going outside.  Sit to stand x 10, wall push up, chair leg lifts etc...    - lowering BG by increasing the muscles ability to take up and use glucose   - weight loss   - healthier heart (improve lipid profile)   - improve sleep, mood, energy   - decrease stress      Monitoring -  [facility] roula 2 and will always have BG meter as a back up   Taking Medication -Continue with Basaglar once a day 28u    encouraged taking rapid acting 15 min prior to meals 15u and really focus on taking the 5u prior to HS snack   Problem Solving - Pt to contact [facility] for replacement sensors.  Medical support team assistance, resources provided (written and apps, internet); good control of stress  Healthy Coping - support of friends, family, and medical support team   Reducing Risks - Detailed education on potential complications associated with uncontrolled diabetes and prevention recommendations.  Recommendations include: annual eye exam, good oral cares with brushing BID and flossing daily, routine dental appointments, good foot care tips and shoe wear - discussed monofilament test yearly.  Importance of adequate sleep and good control of BG to prevent developing complications related to uncontrolled DM including nephropathy, neuropathy, retinopathy, and cardiovascular disease.  Weight loss goal of 7 - 10% of current body weight pounds as a reasonable goal to help increase insulin sensitivity.    Goals:   1. BG goals 80 - 130 and post-prandial less than 180 mg/dL; time in target 70%+  2.  A1c goal <7%   3.  Walk/ be active 15 min 5 or more days/ week   4.  Use the diabetic plate method as a reference, consistent carbohydrate intake due to set insulin dosing   5.  Take medication as directed  Basaglar to 28u once a day  rapid acting 15u noon and evening meal, 5u snack   Metformin XR 500mg ii tabs BID  Jardiance 25 mg daily     Follow up in 3 mo        Professional Services   Time spent with pt 60 min   cc Dr. Maza

## 2022-03-02 NOTE — TELEPHONE ENCOUNTER
---------------------  From: Raquel Alvarez RN (Phone Messages Pool ([id number]_[facility]))   To: Avril Martinez;     Sent: 1/4/2022 2:57:34 PM CST  Subject: glucometer       PCP:   GRETTA      Time of Call:  2:39pm       Person Calling:  pt  Phone number:  [phone]    Note:   Pt returned call as requested.   Chart reviewed; pt informed testing strips were sent in. Pt stated she does not have a glucometer and requested Shell Martinez to order for her.     Please order glucometer.     Last office visit and reason:  12/10/21 sob/edema MJP---------------------  From: Avril Martinez   To: [employer] Pool ([id number]_[place] [facility] [place]);     Sent: 1/4/2022 3:18:32 PM CST  Subject: RE: glucometer     Sent in meter rx, thank youTC made to pt, informed glucometer has been ordered. Pt expressed appreciation.

## 2022-03-02 NOTE — LETTER
(Inserted Image. Unable to display)       January 07, 2022        GARRICK ASHLEY  [address] [place] [address]        Dear GARRICK,      Thank you for selecting [facility] for your healthcare needs.      Your echocardiogram is back.  It has a few changes that I'd like you to review with your Cardiologist.   Please make sure you get an appointment with your Cardiologist to review these results.          Please contact my practice at [phone] if you have any questions or concerns.     Sincerely,        Samanta Maza MD

## 2022-03-02 NOTE — TELEPHONE ENCOUNTER
---------------------  From: Trevor/Alison MARLOW (Phone Messages Pool ([id number]_[facility]))   To: [facility] Message Pool ([id number]_[place]);     Sent: 2/22/2022 2:37:30 PM CST  Subject: General Message     Phone Message    PCP: [facility]    Time of Call: 1431    Phone Number: [phone]    Note: Call from pt requesting a refill of clopidogrel 75mg. Per chart, this has not been filled here since 10/2020. Patient states that Dr. Clark out of [place] use to fill it but pt states she moved and transferred care to [facility].    Please advise on fill    [facility]     Last office visit and reason: 2/1/22 Diabetes     Transferred to[facility]---------------------  From: Ruth Ann Adams ([facility] Message Pool ([id number]_[facility]))   To: Samanta Maza MD;     Sent: 2/23/2022 7:28:12 AM CST  Subject: FW: General Message---------------------  From: Samanta Maza MD   To: [facility] Message Pool ([id number]_[place]);     Sent: 2/23/2022 9:06:35 PM CST  Subject: RE: General Message     pls send in 90 day supply with 4 refills, thanks  ** Submitted: **  Order:clopidogrel (clopidogrel 75 mg oral tablet)  1 tab(s)  Oral  daily  in the AM  Qty:  90 tab(s)        Refills:  4          Substitutions Allowed     Route To Pharmacy - [facility]    Signed by Ishan Soni CMA  2/24/2022 6:22:00 PM [facility]

## 2022-03-02 NOTE — LETTER
(Inserted Image. Unable to display)   February 23, 2022  GARRICK RAMIREZ  [address] [address]        Dear GARRICK,    Thank you for selecting [facility] for your healthcare needs.    Our records indicate you are due for the following services:     Diabetic Exam ~ Please bring your glucose meter and/or your blood glucose diary to your appointment.  Fasting Lab Tests ~ Please do not eat or drink anything 10 hours prior to your scheduled appointment time.  (Water and any medications that you may need are allowed unless directed otherwise.)     If you had your labs done at another facility or with Direct Access Lab Testing at [facility], please bring in a copy of the results to your next visit, mail a copy, or drop off a copy of your results to your Healthcare Provider.     (FYI   Regarding office visits: In some instances, a video visit or telephone visit may be offered as an option.)    To schedule an appointment or if you have further questions, please contact your clinic at [phone].    Powered by [employer]    Sincerely,    Samanta Maza MD

## 2022-03-09 DIAGNOSIS — F41.1 GENERALIZED ANXIETY DISORDER: Primary | ICD-10-CM

## 2022-03-16 ENCOUNTER — OFFICE VISIT (OUTPATIENT)
Dept: CARDIOLOGY | Facility: TELEHEALTH | Age: 67
End: 2022-03-16
Payer: COMMERCIAL

## 2022-03-16 VITALS
WEIGHT: 154.7 LBS | HEART RATE: 64 BPM | HEIGHT: 63 IN | BODY MASS INDEX: 27.41 KG/M2 | DIASTOLIC BLOOD PRESSURE: 58 MMHG | RESPIRATION RATE: 16 BRPM | SYSTOLIC BLOOD PRESSURE: 122 MMHG

## 2022-03-16 DIAGNOSIS — E78.5 HYPERLIPIDEMIA LDL GOAL <70: ICD-10-CM

## 2022-03-16 DIAGNOSIS — R06.09 DYSPNEA ON EXERTION: ICD-10-CM

## 2022-03-16 DIAGNOSIS — I25.119 CORONARY ARTERY DISEASE INVOLVING NATIVE CORONARY ARTERY OF NATIVE HEART WITH ANGINA PECTORIS (H): ICD-10-CM

## 2022-03-16 LAB
ANION GAP SERPL CALCULATED.3IONS-SCNC: 9 MMOL/L (ref 3–14)
BUN SERPL-MCNC: 16 MG/DL (ref 7–30)
CALCIUM SERPL-MCNC: 9 MG/DL (ref 8.5–10.1)
CHLORIDE BLD-SCNC: 107 MMOL/L (ref 94–109)
CO2 SERPL-SCNC: 25 MMOL/L (ref 20–32)
CREAT SERPL-MCNC: 0.76 MG/DL (ref 0.52–1.04)
GFR SERPL CREATININE-BSD FRML MDRD: 86 ML/MIN/1.73M2
GLUCOSE BLD-MCNC: 64 MG/DL (ref 70–99)
POTASSIUM BLD-SCNC: 3.9 MMOL/L (ref 3.4–5.3)
SODIUM SERPL-SCNC: 141 MMOL/L (ref 133–144)

## 2022-03-16 PROCEDURE — 36415 COLL VENOUS BLD VENIPUNCTURE: CPT | Performed by: INTERNAL MEDICINE

## 2022-03-16 PROCEDURE — 80048 BASIC METABOLIC PNL TOTAL CA: CPT | Performed by: INTERNAL MEDICINE

## 2022-03-16 PROCEDURE — 99213 OFFICE O/P EST LOW 20 MIN: CPT | Performed by: INTERNAL MEDICINE

## 2022-03-16 RX ORDER — DOCUSATE SODIUM 100 MG/1
100 CAPSULE, LIQUID FILLED ORAL 2 TIMES DAILY PRN
COMMUNITY
End: 2023-06-20

## 2022-03-16 RX ORDER — LISINOPRIL 5 MG/1
5 TABLET ORAL DAILY
COMMUNITY
End: 2022-06-23

## 2022-03-16 RX ORDER — CLOPIDOGREL BISULFATE 75 MG/1
75 TABLET ORAL EVERY MORNING
Status: CANCELLED | OUTPATIENT
Start: 2022-03-16

## 2022-03-16 RX ORDER — CARVEDILOL 12.5 MG/1
18.75 TABLET ORAL 2 TIMES DAILY WITH MEALS
Qty: 180 TABLET | Refills: 1 | Status: SHIPPED | OUTPATIENT
Start: 2022-03-16 | End: 2022-08-09

## 2022-03-16 NOTE — PROGRESS NOTES
"    [facility]  [phone]          Assessment/Recommendations   Patient with known coronary artery disease, bypass surgery in 2021 with reduced left ventricular systolic function and we have been titrating up on her carvedilol.  She has had no side effects of lightheadedness, dizziness or fatigue.  She denies any chest discomfort.  Her breathing is been stable.    I have increased her carvedilol to 18.75 mg twice daily and will like to see her back in 1 month to see if we can go up to 25 mg twice daily.  If we can go up we will give that another month and then recheck an echocardiogram.    We will check a basic metabolic panel today and call her with results and any further recommendations.    When she completes her Plavix prescription she can discontinue it and stay on a baby aspirin each day.    Thank you for allowing us to participate in her care.       History of Present Illness/Subjective    Ms. Chanelle Billy is a 66 year old female with known coronary artery disease with bypass surgery mid year 2021.  I met her recently and because of reduced left ventricular ejection fraction we bumped up her carvedilol and she is here to see if we can titrate it further today.  She has not had any new symptoms with the increased dose.  She denies change in breathing, and also denies lightheadedness, syncope or near syncopal episodes.       Physical Examination Review of Systems   /58 (BP Location: Right arm, Patient Position: Sitting, Cuff Size: Adult Regular)   Pulse 64   Resp 16   Ht 1.588 m (5' 2.5\")   Wt 70.2 kg (154 lb 11.2 oz)   BMI 27.84 kg/m    Body mass index is 27.84 kg/m .  Wt Readings from Last 3 Encounters:   03/16/22 70.2 kg (154 lb 11.2 oz)   02/09/22 71.7 kg (158 lb 1.6 oz)   12/10/21 69 kg (152 lb 1.6 oz)     General Appearance:   Alert, cooperative and in no acute distress.   ENT/Mouth: Patient wearing a mask.      EYES:  no scleral icterus, normal conjunctivae   Neck: " "JVP normal. No Hepatojugular reflux. Thyroid not visualized.   Chest/Lungs:   Lungs are clear to auscultation, equal chest wall expansion.   Cardiovascular:   S1, S2 without murmur ,clicks or rubs. Brachial, radial  pulses are intact and symetric. No carotid bruits noted   Abdomen:  Nontender. BS+.    Extremities: No cyanosis, clubbing or edema   Skin: no xanthelasma, warm.    Neurologic: normal arm movement bilateral, no tremors     Psychiatric: Appropriate affect.      Enc Vitals  BP: 122/58  Pulse: 64  Resp: 16  Weight: 70.2 kg (154 lb 11.2 oz)  Height: 158.8 cm (5' 2.5\")                                           Medical History  Surgical History Family History Social History   No past medical history on file. No past surgical history on file. No family history on file. Social History     Socioeconomic History     Marital status:      Spouse name: Not on file     Number of children: Not on file     Years of education: Not on file     Highest education level: Not on file   Occupational History     Not on file   Tobacco Use     Smoking status: Former Smoker     Smokeless tobacco: Never Used     Tobacco comment: smoked in high school and a little after her  .   Substance and Sexual Activity     Alcohol use: Yes     Comment: occasionally     Drug use: Not on file     Sexual activity: Not on file   Other Topics Concern     Parent/sibling w/ CABG, MI or angioplasty before 65F 55M? Not Asked   Social History Narrative     Not on file     Social Determinants of Health     Financial Resource Strain: Not on file   Food Insecurity: Not on file   Transportation Needs: Not on file   Physical Activity: Not on file   Stress: Not on file   Social Connections: Not on file   Intimate Partner Violence: Not on file   Housing Stability: Not on file          Medications  Allergies   Current Outpatient Medications   Medication Sig Dispense Refill     aspirin (ASA) 81 MG chewable tablet Take 81 mg by mouth daily       " B-D ULTRA-FINE 33 LANCETS MISC Use to test 3 to 4 times a day as directed.       blood glucose (TRUE METRIX BLOOD GLUCOSE TEST) test strip Use to test 3 to 4 times daily as directed.       calcium carbonate (TUMS) 500 MG chewable tablet Take 1 chew tab by mouth 2 times daily As needed for heartburn, acid reflux       carvedilol (COREG) 12.5 MG tablet Take 1.5 tablets (18.75 mg) by mouth 2 times daily (with meals) 180 tablet 1     Continuous Blood Gluc  (FREESTYLE ROSAURA 2 READER SYSTM) LOUIS        Continuous Blood Gluc Sensor (FREESTYLE ROSAURA 2 SENSOR SYSTM) MISC        Dextrose, Diabetic Use, (GLUCOSE) 1 g CHEW Take 15 g by mouth As needed       docusate sodium (STOOL SOFTENER) 100 MG capsule Take 100 mg by mouth 2 times daily       empagliflozin (JARDIANCE) 25 MG TABS tablet Take 25 mg by mouth daily        FLUoxetine (PROZAC) 20 MG capsule TAKE 1 CAPSULE BY MOUTH EVERY MORNING 90 capsule 0     furosemide (LASIX) 20 MG tablet Take 20 mg by mouth daily       insulin degludec (TRESIBA FLEXTOUCH) 100 UNIT/ML pen 28 Units        insulin lispro (HUMALOG KWIKPEN) 100 UNIT/ML (1 unit dial) KWIKPEN 15 units before breakfast, noon and evening meals. 5 units before snacks. If blood sugar at the beginning of the meal is less than 90 mg/dL - do not take any short-acting (novolog) insulin.       insulin pen needle (ULTICARE MICRO) 32G X 4 MM miscellaneous Use for insulin injection under the skin 4 to 5 times a day as directed       lisinopril (ZESTRIL) 5 MG tablet Take 5 mg by mouth daily       loratadine (CLARITIN) 10 MG tablet Take 10 mg by mouth At Bedtime        melatonin 5 MG tablet Take 5 mg by mouth nightly as needed for sleep       metFORMIN (GLUCOPHAGE-XR) 500 MG 24 hr tablet Take 1,000 mg by mouth 2 times daily (with meals)       multivitamin w/minerals (THERA-VIT-M) tablet Take 1 tablet by mouth daily       nitroGLYcerin (NITROSTAT) 0.4 MG sublingual tablet Place 0.4 mg under the tongue every 5 minutes as  "needed For chest pain place 1 tablet under the tongue every 5 minutes for 3 doses. If symptoms persist 5 minutes after 1st dose call 911.       pantoprazole (PROTONIX) 20 MG EC tablet Take 20 mg by mouth 2 times daily        polyethylene glycol-propylene glycol (SYSTANE ULTRA) 0.4-0.3 % SOLN ophthalmic solution Place 1 drop into both eyes every hour as needed for dry eyes       potassium chloride ER (K-TAB/KLOR-CON) 10 MEQ CR tablet Take 10 mEq by mouth daily       rosuvastatin (CRESTOR) 20 MG tablet Take 20 mg by mouth At Bedtime        Sharps Container (SHARPS-A-GATOR LOCKING BRACKET) MISC Use as directed       Naphazoline-Pheniramine (EQ EYE ALLERGY RELIEF OP) Pt does not know what the active drug is. \"Eye allergy relief\" (Patient not taking: Reported on 3/16/2022)      Allergies   Allergen Reactions     Atorvastatin Nausea and Vomiting and Diarrhea     Pt reported     Mirtazapine      Nightmares and suicidal ideation for 1 day (per Dr. Maza's note)     Sodium Hypochlorite      Bleeding from nose and eyes after using a bleach product, patient/daughter reported     Animal Dander Other (See Comments)     Cats      Runny nose, pt reported.  Takes allergy medication for this and still lives with a cat.     Trazodone      nightmares         Lab Results    Chemistry/lipid CBC Cardiac Enzymes/BNP/TSH/INR   Lab Results   Component Value Date    CHOL 167 03/13/2020    HDL 60 03/13/2020    TRIG 276 03/13/2020    No results found for: WBC, HGB, HCT, MCV, PLT No results found for: CKTOTAL, CKMB, TROPONINI, BNP, TSH, INR                                        "

## 2022-03-16 NOTE — PATIENT INSTRUCTIONS
[facility]  [phone]    Chanelle Billy,    It was a pleasure to see you today at the [facility].     My recommendations after this visit include:    1.  Please increase your carvedilol to 18.75 mg twice daily.  This would be 1-1/2 of your 12.5 mg tablets twice daily, or 3 of the 6.25 mg tablets twice daily.    2.  You can stop your Plavix when you run out of this medication.    3.  We will get some blood work today and call you with the results.    4.  I would like to see you back in 1 month to see if we can increase the carvedilol 1 last time.    Enjoy the warm weather        Lan Long

## 2022-03-29 ENCOUNTER — ALLIED HEALTH/NURSE VISIT (OUTPATIENT)
Dept: EDUCATION SERVICES | Facility: CLINIC | Age: 67
End: 2022-03-29
Payer: COMMERCIAL

## 2022-03-29 VITALS — WEIGHT: 159.7 LBS | HEIGHT: 62 IN | BODY MASS INDEX: 29.39 KG/M2

## 2022-03-29 DIAGNOSIS — Z79.4 TYPE 2 DIABETES MELLITUS WITH CIRCULATORY DISORDER, WITH LONG-TERM CURRENT USE OF INSULIN (H): Primary | ICD-10-CM

## 2022-03-29 DIAGNOSIS — E11.59 TYPE 2 DIABETES MELLITUS WITH CIRCULATORY DISORDER, WITH LONG-TERM CURRENT USE OF INSULIN (H): Primary | ICD-10-CM

## 2022-03-29 PROCEDURE — G0108 DIAB MANAGE TRN  PER INDIV: HCPCS | Mod: AE | Performed by: DIETITIAN, REGISTERED

## 2022-03-29 NOTE — LETTER
"    3/29/2022         RE: Chanelle Billy  [address]  [place] [address]        Dear Colleague,    Thank you for referring your patient, Chanelle Billy, to the [facility]. Please see a copy of my visit note below.    Diabetes Self-Management Education & Support    Presents for:  Ongoing education and evaluation of CGM  Pt is having a difficult time obtaining shipment for her sensors.  Lenin was called today- refilled rx and will need ~ 6 mo clinical notes faxed to     SUBJECTIVE/OBJECTIVE:  Diabetes education in the past 24mo: Yes  Diabetes type: Type 2  Disease course: Improving  How confident are you filling out medical forms by yourself:: Somewhat  Diabetes management related comments/concerns: Just getting my roula test disk delivered to me.  Cultural Influences/Ethnic Background:  Not  or     Diabetes Symptoms & Complications:  Fatigue: Sometimes  Neuropathy: No  Polydipsia: No  Polyphagia: No  Polyuria: Sometimes  Visual change: No  Slow healing wounds: Sometimes  Autonomic neuropathy: No  CVA: No  Heart disease: Yes  Nephropathy: No  Peripheral neuropathy: No  Peripheral Vascular Disease: No  Retinopathy: Other  Sexual dysfunction: No    Patient Problem List and Family Medical History reviewed for relevant medical history, current medical status, and diabetes risk factors.    Vitals:  Ht 1.568 m (5' 1.75\")   Wt 72.4 kg (159 lb 11.2 oz)   BMI 29.45 kg/m    Estimated body mass index is 29.45 kg/m  as calculated from the following:    Height as of this encounter: 1.568 m (5' 1.75\").    Weight as of this encounter: 72.4 kg (159 lb 11.2 oz).   Last 3 BP:   BP Readings from Last 3 Encounters:   22 122/58   22 114/54   12/10/21 120/64       History   Smoking Status     Former Smoker   Smokeless Tobacco     Never Used     Comment: smoked in high school and a little after her  .       Labs:  Lab Results   Component " Value Date    A1C 8.6 02/11/2021     Lab Results   Component Value Date    GLC 64 03/16/2022     02/11/2021     Lab Results   Component Value Date    LDL 71 03/13/2020     HDL Cholesterol   Date Value Ref Range Status   03/13/2020 60 >or=50 mg/dL Final   ]  GFR Estimate   Date Value Ref Range Status   03/16/2022 86 >60 mL/min/1.73m2 Final     Comment:     Effective December 21, 2021 eGFRcr in adults is calculated using the 2021 CKD-EPI creatinine equation which includes age and gender (Marysol et al., NEJ, DOI: 10.1056/KQSChy8505529)   02/11/2021 92 >or=60 ml/min/1.73m2 Final     GFR Estimate If Black   Date Value Ref Range Status   02/11/2021 106 >or=60 ml/min/1.73m2 Final     Lab Results   Component Value Date    CR 0.76 03/16/2022    CR 0.68 02/11/2021     No results found for: MICROALBUMIN    Healthy Eating:  Cultural/Amish diet restrictions?: No  Meal planning/habits: Avoiding sweets, Carb counting, Low salt, Smaller portions, Plate planning method    Being Active:  Days per week of moderate to strenuous exercise (like a brisk walk): (P) 2  On average, minutes per day of exercise at this level: (P) 10  How intense was your typical exercise? : (P) Light (like stretching or slow walking)  Exercise Minutes per Week: (P) 20  Barrier to exercise: Safety, Physical limitation    Monitoring:  Blood Glucose Meter: FreeStyle, TrueResult  Times checking blood sugar at home (number): 3  Times checking blood sugar at home (per): Day  Blood glucose trend: Fluctuating    30 day avg BG 125mg/dL     Taking Medications:  Diabetes Medication(s)     Biguanides       metFORMIN (GLUCOPHAGE-XR) 500 MG 24 hr tablet    Take 1,000 mg by mouth 2 times daily (with meals)    Diabetic Other       Dextrose, Diabetic Use, (GLUCOSE) 1 g CHEW    Take 15 g by mouth As needed    Insulin       insulin degludec (TRESIBA FLEXTOUCH) 100 UNIT/ML pen    28 Units      insulin lispro (HUMALOG KWIKPEN) 100 UNIT/ML (1 unit dial) KWIKPEN    15  units before breakfast, noon and evening meals. 5 units before snacks. If blood sugar at the beginning of the meal is less than 90 mg/dL - do not take any short-acting (novolog) insulin.    Sodium-Glucose Co-Transporter 2 (SGLT2) Inhibitors       empagliflozin (JARDIANCE) 25 MG TABS tablet    Take 25 mg by mouth daily           Current Treatments: Insulin Injections, Oral Medication (taken by mouth)    Problem Solving:                 Reducing Risks:  CAD Risks: Diabetes Mellitus, Dyslipidemia, Family history, Hypertension, Sedentary lifestyle, Stress  Has dilated eye exam at least once a year?: Yes  Sees dentist every 6 months?: No  Feet checked by healthcare provider in the last year?: Yes    Healthy Coping:  Informal Support system:: Jacklyn based, Family, Friends  Patient Activation Measure Survey Score:  No flowsheet data found.    Diabetes knowledge and skills assessment:   Patient is knowledgeable in diabetes management concepts related to: Healthy Eating, Being Active, Monitoring, Taking Medication, Problem Solving, Reducing Risks and Healthy Coping    Patient needs further education on the following diabetes management concepts: Healthy Eating, Being Active, Monitoring, Taking Medication, Problem Solving, Reducing Risks and Healthy Coping    Based on learning assessment above, most appropriate setting for further diabetes education would be: Individual setting.      INTERVENTIONS:    REPORTS:        Education provided today on:  AADE Self-Care Behaviors:  Taking Medication: action of prescribed medication, correction factor   Problem Solving: low blood glucose - causes, signs/symptoms, treatment and prevention and carrying a carbohydrate source at all times  Reducing Risks: major complications of diabetes, A1C - goals, relating to blood glucose levels, how often to check and lipids levels and goals    Pt verbalized understanding of concepts discussed and recommendations provided today.       Education  Materials Provided:  Goals for Your Diabetes Care, Hypoglycemia Signs and Symptoms and My Plate Planner    ASSESSMENT:  Patient continues to meet criteria for CGM coverage;   - patient has type two diabetes mellitus; and  - patient has been using CGM daily; and  - patient is insulin treated with insulin completing multiple bolus s daily 3 or more times/ day   - patient is frequently adjusting insulin based on blood glucose readings  - within six months prior to ordering the CGM, the patient has had an in-person visit with the practitioner; and determined that criteria (1-4) above are met; and   - every six months following the initial prescription of the CGM, the treating practitioner has an in-person visit with the patient to assess adherence to their CGM regimen and diabetes treatment plan    Glucose Patterns & Trends:  Lows occurring primarily overnight     Basaglar decrease to 27u from 28u    Humalog 15u for noon and evening meal   Humalog 5u for snacks   Humalog Correction Factor before meals   Add another 1u if sensor between 150 - 200mg/dL  Add another 2u if sensor between 201 - 250mg/dL     Metformin XR 500mg ii tabs twice a day   Jardiance 25 mg daily       PLAN  See Patient Instructions for co-developed, patient-stated behavior change goals.  AVS printed and provided to patient today. See Follow-Up section for recommended follow-up.      Time Spent: 30 minutes  Encounter Type: Individual    Any diabetes medication dose changes were made via the CDE Protocol and Collaborative Practice Agreement with the patient's referring provider. A copy of this encounter was shared with the provider.

## 2022-03-29 NOTE — PROGRESS NOTES
"Diabetes Self-Management Education & Support    Presents for:  Ongoing education and evaluation of CGM  Pt is having a difficult time obtaining shipment for her sensors.  [facility] was called today- refilled rx and will need ~ 6 mo clinical notes faxed to     SUBJECTIVE/OBJECTIVE:  Diabetes education in the past 24mo: Yes  Diabetes type: Type 2  Disease course: Improving  How confident are you filling out medical forms by yourself:: Somewhat  Diabetes management related comments/concerns: Just getting my roula test disk delivered to me.  Cultural Influences/Ethnic Background:  Not  or     Diabetes Symptoms & Complications:  Fatigue: Sometimes  Neuropathy: No  Polydipsia: No  Polyphagia: No  Polyuria: Sometimes  Visual change: No  Slow healing wounds: Sometimes  Autonomic neuropathy: No  CVA: No  Heart disease: Yes  Nephropathy: No  Peripheral neuropathy: No  Peripheral Vascular Disease: No  Retinopathy: Other  Sexual dysfunction: No    Patient Problem List and Family Medical History reviewed for relevant medical history, current medical status, and diabetes risk factors.    Vitals:  Ht 1.568 m (5' 1.75\")   Wt 72.4 kg (159 lb 11.2 oz)   BMI 29.45 kg/m    Estimated body mass index is 29.45 kg/m  as calculated from the following:    Height as of this encounter: 1.568 m (5' 1.75\").    Weight as of this encounter: 72.4 kg (159 lb 11.2 oz).   Last 3 BP:   BP Readings from Last 3 Encounters:   22 122/58   22 114/54   12/10/21 120/64       History   Smoking Status     Former Smoker   Smokeless Tobacco     Never Used     Comment: smoked in high school and a little after her  .       Labs:  Lab Results   Component Value Date    A1C 8.6 2021     Lab Results   Component Value Date    GLC 64 2022     2021     Lab Results   Component Value Date    LDL 71 2020     HDL Cholesterol   Date Value Ref Range Status   2020 60 >or=50 mg/dL Final "   ]  GFR Estimate   Date Value Ref Range Status   03/16/2022 86 >60 mL/min/1.73m2 Final     Comment:     Effective December 21, 2021 eGFRcr in adults is calculated using the 2021 CKD-EPI creatinine equation which includes age and gender (Marysol kearney al., NEJ, DOI: 10.1056/ZPSDzx6384943)   02/11/2021 92 >or=60 ml/min/1.73m2 Final     GFR Estimate If Black   Date Value Ref Range Status   02/11/2021 106 >or=60 ml/min/1.73m2 Final     Lab Results   Component Value Date    CR 0.76 03/16/2022    CR 0.68 02/11/2021     No results found for: MICROALBUMIN    Healthy Eating:  Cultural/Faith diet restrictions?: No  Meal planning/habits: Avoiding sweets, Carb counting, Low salt, Smaller portions, Plate planning method    Being Active:  Days per week of moderate to strenuous exercise (like a brisk walk): (P) 2  On average, minutes per day of exercise at this level: (P) 10  How intense was your typical exercise? : (P) Light (like stretching or slow walking)  Exercise Minutes per Week: (P) 20  Barrier to exercise: Safety, Physical limitation    Monitoring:  Blood Glucose Meter: FreeStyleGenevieve  Times checking blood sugar at home (number): 3  Times checking blood sugar at home (per): Day  Blood glucose trend: Fluctuating    30 day avg BG 125mg/dL     Taking Medications:  Diabetes Medication(s)     Biguanides       metFORMIN (GLUCOPHAGE-XR) 500 MG 24 hr tablet    Take 1,000 mg by mouth 2 times daily (with meals)    Diabetic Other       Dextrose, Diabetic Use, (GLUCOSE) 1 g CHEW    Take 15 g by mouth As needed    Insulin       insulin degludec (TRESIBA FLEXTOUCH) 100 UNIT/ML pen    28 Units      insulin lispro (HUMALOG KWIKPEN) 100 UNIT/ML (1 unit dial) KWIKPEN    15 units before breakfast, noon and evening meals. 5 units before snacks. If blood sugar at the beginning of the meal is less than 90 mg/dL - do not take any short-acting (novolog) insulin.    Sodium-Glucose Co-Transporter 2 (SGLT2) Inhibitors       empagliflozin  (JARDIANCE) 25 MG TABS tablet    Take 25 mg by mouth daily           Current Treatments: Insulin Injections, Oral Medication (taken by mouth)    Problem Solving:                 Reducing Risks:  CAD Risks: Diabetes Mellitus, Dyslipidemia, Family history, Hypertension, Sedentary lifestyle, Stress  Has dilated eye exam at least once a year?: Yes  Sees dentist every 6 months?: No  Feet checked by healthcare provider in the last year?: Yes    Healthy Coping:  Informal Support system:: Jacklyn based, Family, Friends  Patient Activation Measure Survey Score:  No flowsheet data found.    Diabetes knowledge and skills assessment:   Patient is knowledgeable in diabetes management concepts related to: Healthy Eating, Being Active, Monitoring, Taking Medication, Problem Solving, Reducing Risks and Healthy Coping    Patient needs further education on the following diabetes management concepts: Healthy Eating, Being Active, Monitoring, Taking Medication, Problem Solving, Reducing Risks and Healthy Coping    Based on learning assessment above, most appropriate setting for further diabetes education would be: Individual setting.      INTERVENTIONS:    REPORTS:        Education provided today on:  AADE Self-Care Behaviors:  Taking Medication: action of prescribed medication, correction factor   Problem Solving: low blood glucose - causes, signs/symptoms, treatment and prevention and carrying a carbohydrate source at all times  Reducing Risks: major complications of diabetes, A1C - goals, relating to blood glucose levels, how often to check and lipids levels and goals    Pt verbalized understanding of concepts discussed and recommendations provided today.       Education Materials Provided:  Goals for Your Diabetes Care, Hypoglycemia Signs and Symptoms and My Plate Planner    ASSESSMENT:  Patient continues to meet criteria for CGM coverage;   - patient has type two diabetes mellitus; and  - patient has been using CGM daily; and  -  patient is insulin treated with insulin completing multiple bolus s daily 3 or more times/ day   - patient is frequently adjusting insulin based on blood glucose readings  - within six months prior to ordering the CGM, the patient has had an in-person visit with the practitioner; and determined that criteria (1-4) above are met; and   - every six months following the initial prescription of the CGM, the treating practitioner has an in-person visit with the patient to assess adherence to their CGM regimen and diabetes treatment plan    Glucose Patterns & Trends:  Lows occurring primarily overnight     Basaglar decrease to 27u from 28u    Humalog 15u for noon and evening meal   Humalog 5u for snacks   Humalog Correction Factor before meals   Add another 1u if sensor between 150 - 200mg/dL  Add another 2u if sensor between 201 - 250mg/dL     Metformin XR 500mg ii tabs twice a day   Jardiance 25 mg daily       PLAN  See Patient Instructions for co-developed, patient-stated behavior change goals.  AVS printed and provided to patient today. See Follow-Up section for recommended follow-up.      Time Spent: 30 minutes  Encounter Type: Individual    Any diabetes medication dose changes were made via the CDE Protocol and Collaborative Practice Agreement with the patient's referring provider. A copy of this encounter was shared with the provider.

## 2022-03-29 NOTE — PATIENT INSTRUCTIONS
Goals:  1. Practice healthy stress management and mindful eating - think are you physically hungry or are you bored, stressed, emotional etc, make of list of things to do besides eat.    2. Try to get good quality sleep with a goal of 7-8 hours per night.  3. Stay physically active daily.  Recommend working up to a total of 30 minutes on 5 days/ week.  Recommend a fitness tracker.     4. Eat in a healthy way- eliminate trans fats, limit saturated fats and added sugars; follow the plate method - picture above.  Keep a food record (MyFitnessPal, Loseit).    A meal is 3 or more food groups; make it colorful for better nutrition.    Total Carbohydrates (in grams) = Breakfast  30    Lunch  30    Supper  30    If desired snacks 15                                   Basaglar decrease to 27u from 28u    Humalog 15u for noon and evening meal   Humalog 5u for snacks   Humalog Correction Factor before meals   Add another 1u if sensor between 150 - 200mg/dL  Add another 2u if sensor between 201 - 250mg/dL     Metformin XR 500mg ii tabs twice a day   Jardiance 25 mg daily

## 2022-04-04 ENCOUNTER — MEDICAL CORRESPONDENCE (OUTPATIENT)
Dept: HEALTH INFORMATION MANAGEMENT | Facility: CLINIC | Age: 67
End: 2022-04-04
Payer: COMMERCIAL

## 2022-05-03 ENCOUNTER — TELEPHONE (OUTPATIENT)
Dept: FAMILY MEDICINE | Facility: CLINIC | Age: 67
End: 2022-05-03
Payer: COMMERCIAL

## 2022-05-03 DIAGNOSIS — E11.59 TYPE 2 DIABETES MELLITUS WITH CIRCULATORY DISORDER, WITH LONG-TERM CURRENT USE OF INSULIN (H): Primary | ICD-10-CM

## 2022-05-03 DIAGNOSIS — Z79.4 TYPE 2 DIABETES MELLITUS WITH CIRCULATORY DISORDER, WITH LONG-TERM CURRENT USE OF INSULIN (H): Primary | ICD-10-CM

## 2022-05-03 RX ORDER — INSULIN DEGLUDEC 100 U/ML
28 INJECTION, SOLUTION SUBCUTANEOUS AT BEDTIME
Qty: 30 ML | Refills: 0 | Status: SHIPPED | OUTPATIENT
Start: 2022-05-03 | End: 2022-06-30

## 2022-05-03 RX ORDER — INSULIN LISPRO 100 [IU]/ML
INJECTION, SOLUTION INTRAVENOUS; SUBCUTANEOUS
Qty: 15 ML | Refills: 0 | Status: SHIPPED | OUTPATIENT
Start: 2022-05-03 | End: 2022-06-30

## 2022-05-03 NOTE — TELEPHONE ENCOUNTER
Reason for Call:  Medication or medication refill:    Do you use a [facility]?  Name of the pharmacy and phone number for the current request:  [facility]    Name of the medication requested: Insulin    Other request: no    Can we leave a detailed message on this number? YES    Phone number patient can be reached at: Cell number on file:    Telephone Information:   Mobile [phone]       Best Time: anytime    Call taken on 5/3/2022 at 9:09 AM by Ashlee Larkin

## 2022-05-03 NOTE — TELEPHONE ENCOUNTER
TC with pt, who requested refill of Tresiba and Humalog.     Prescription approved per [facility] Refill Protocol.  Last Written Prescription Date: 4/27/21  Last Fill Quantity: 15,  # refills: 0   Last office visit with prescribing provider: 12/10/21

## 2022-05-11 ENCOUNTER — OFFICE VISIT (OUTPATIENT)
Dept: CARDIOLOGY | Facility: TELEHEALTH | Age: 67
End: 2022-05-11
Payer: COMMERCIAL

## 2022-05-11 VITALS
BODY MASS INDEX: 28.5 KG/M2 | WEIGHT: 154.9 LBS | RESPIRATION RATE: 16 BRPM | HEIGHT: 62 IN | DIASTOLIC BLOOD PRESSURE: 52 MMHG | SYSTOLIC BLOOD PRESSURE: 122 MMHG | HEART RATE: 64 BPM

## 2022-05-11 DIAGNOSIS — I25.5 ISCHEMIC CARDIOMYOPATHY: Primary | ICD-10-CM

## 2022-05-11 DIAGNOSIS — E78.5 HYPERLIPIDEMIA LDL GOAL <70: ICD-10-CM

## 2022-05-11 DIAGNOSIS — I25.119 CORONARY ARTERY DISEASE INVOLVING NATIVE CORONARY ARTERY OF NATIVE HEART WITH ANGINA PECTORIS (H): ICD-10-CM

## 2022-05-11 DIAGNOSIS — R06.09 DYSPNEA ON EXERTION: ICD-10-CM

## 2022-05-11 PROCEDURE — 99213 OFFICE O/P EST LOW 20 MIN: CPT | Performed by: INTERNAL MEDICINE

## 2022-05-11 NOTE — LETTER
5/11/2022    Samanta Maza MD  [address] [address]    RE: Chanelle Billy       Dear Colleague,     I had the pleasure of seeing Chanelle Billy in the [facility].  Patient here for titration of carvedilol.  She is tolerating 18.75 mg without difficulty.  No shortness of breath, dizziness, leg tenderness, or syncopal episodes.  Breathing is comfortable and she feels like he is getting stronger.    Blood pressure is systolic 124 with a heart rate of 64.    Chest is clear to auscultation.    Cardiovascular examination S1-S2 and no murmur clicks or rubs.  No increased jugular venous pressure.    Extremities did not show ventricular edema.    We will go to 25 mg twice a day of carvedilol.  We will get an echocardiogram in 1 month and I will see her in about 6 weeks.    Thank you for allowing me to participate in the care of your patient.      Sincerely,     Lan Long MD     [facility]  cc:   Lan Long MD  [address] [place] [address]

## 2022-05-11 NOTE — PROGRESS NOTES
Patient here for titration of carvedilol.  She is tolerating 18.75 mg without difficulty.  No shortness of breath, dizziness, leg tenderness, or syncopal episodes.  Breathing is comfortable and she feels like he is getting stronger.    Blood pressure is systolic 124 with a heart rate of 64.    Chest is clear to auscultation.    Cardiovascular examination S1-S2 and no murmur clicks or rubs.  No increased jugular venous pressure.    Extremities did not show ventricular edema.    We will go to 25 mg twice a day of carvedilol.  We will get an echocardiogram in 1 month and I will see her in about 6 weeks.

## 2022-05-11 NOTE — PATIENT INSTRUCTIONS
[facility]  [phone]    Chanelle Billy,    It was a pleasure to see you today at the [facility].     My recommendations after this visit include:    1.  Please increase your carvedilol.  You have 12.5 mg tablets.  Please start taking 2 tablets in the morning and 2 tablets in the evening.  You will be taking 25 mg in the morning and 25 mg in the evening.  That is our target dose.    2.  I will order an echocardiogram and I will call you to schedule that in about 1 month.    3.  I would like to see you after the echocardiogram to review the results and see how you are doing.        Lan Long

## 2022-05-19 DIAGNOSIS — Z79.4 TYPE 2 DIABETES MELLITUS WITH CIRCULATORY DISORDER, WITH LONG-TERM CURRENT USE OF INSULIN (H): ICD-10-CM

## 2022-05-19 DIAGNOSIS — E78.00 HYPERCHOLESTEROLEMIA: Primary | ICD-10-CM

## 2022-05-19 DIAGNOSIS — E11.59 TYPE 2 DIABETES MELLITUS WITH CIRCULATORY DISORDER, WITH LONG-TERM CURRENT USE OF INSULIN (H): ICD-10-CM

## 2022-05-19 DIAGNOSIS — K52.9 GASTROENTERITIS: Primary | ICD-10-CM

## 2022-05-19 RX ORDER — ROSUVASTATIN CALCIUM 20 MG/1
TABLET, COATED ORAL
Qty: 90 TABLET | Refills: 0 | Status: SHIPPED | OUTPATIENT
Start: 2022-05-19 | End: 2022-06-27

## 2022-05-19 RX ORDER — PANTOPRAZOLE SODIUM 20 MG/1
TABLET, DELAYED RELEASE ORAL
Qty: 180 TABLET | Refills: 0 | Status: SHIPPED | OUTPATIENT
Start: 2022-05-19 | End: 2022-06-28

## 2022-05-19 RX ORDER — METFORMIN HCL 500 MG
1000 TABLET, EXTENDED RELEASE 24 HR ORAL 2 TIMES DAILY WITH MEALS
Qty: 360 TABLET | Refills: 0 | Status: SHIPPED | OUTPATIENT
Start: 2022-05-19 | End: 2022-06-27

## 2022-05-19 NOTE — TELEPHONE ENCOUNTER
Prescription approved per [facility] Refill Protocol.    Last visit with provider: 12/10/21

## 2022-05-19 NOTE — TELEPHONE ENCOUNTER
Reason for Call:  Medication or medication refill:    Do you use a [facility]?  Name of the pharmacy and phone number for the current request:  [facility]    Name of the medication requested: Metformin    Other request: no    Can we leave a detailed message on this number? YES    Phone number patient can be reached at: Cell number on file:    Telephone Information:   Mobile [phone]       Best Time: anytime    Call taken on 5/19/2022 at 3:29 PM by Ashlee Larkin

## 2022-05-19 NOTE — TELEPHONE ENCOUNTER
Prescription approved per [facility] Refill Protocol.          LDL: 8/19/21  A1C: 11/30/21    Last visit: 12/10/21

## 2022-05-25 DIAGNOSIS — I50.9 CONGESTIVE HEART FAILURE, UNSPECIFIED HF CHRONICITY, UNSPECIFIED HEART FAILURE TYPE (H): Primary | ICD-10-CM

## 2022-05-26 NOTE — TELEPHONE ENCOUNTER
Routing refill request to provider for review/approval because:  Diagnosis for refill:      Lasix: 2/17/22  #30  R-2    Potassium:   2/17/22  #30  R-2    Visit: 12/10/21  Labs: 12/10/21    Future Appointments 5/26/2022 - 11/22/2022      Date Visit Type Length Department Provider     6/21/2022 10:30 AM DIABETIC ED 60 min [facility] DIABETES EDUCATION Avril Martinez RD    Location Instructions:     [address] [place] [id number]

## 2022-05-27 RX ORDER — POTASSIUM CHLORIDE 750 MG/1
TABLET, EXTENDED RELEASE ORAL
Qty: 30 TABLET | Refills: 10 | Status: SHIPPED | OUTPATIENT
Start: 2022-05-27 | End: 2022-12-20

## 2022-05-27 RX ORDER — FUROSEMIDE 20 MG
TABLET ORAL
Qty: 30 TABLET | Refills: 10 | Status: SHIPPED | OUTPATIENT
Start: 2022-05-27 | End: 2022-12-20

## 2022-06-21 ENCOUNTER — ALLIED HEALTH/NURSE VISIT (OUTPATIENT)
Dept: EDUCATION SERVICES | Facility: CLINIC | Age: 67
End: 2022-06-21
Payer: COMMERCIAL

## 2022-06-21 VITALS — BODY MASS INDEX: 30.05 KG/M2 | WEIGHT: 163.3 LBS | HEIGHT: 62 IN

## 2022-06-21 DIAGNOSIS — E11.59 TYPE 2 DIABETES MELLITUS WITH CIRCULATORY DISORDER, WITH LONG-TERM CURRENT USE OF INSULIN (H): Primary | ICD-10-CM

## 2022-06-21 DIAGNOSIS — Z79.4 TYPE 2 DIABETES MELLITUS WITH CIRCULATORY DISORDER, WITH LONG-TERM CURRENT USE OF INSULIN (H): Primary | ICD-10-CM

## 2022-06-21 DIAGNOSIS — I10 ESSENTIAL HYPERTENSION: Primary | ICD-10-CM

## 2022-06-21 PROCEDURE — G0108 DIAB MANAGE TRN  PER INDIV: HCPCS | Mod: AE | Performed by: DIETITIAN, REGISTERED

## 2022-06-21 NOTE — LETTER
"    6/21/2022         RE: Chanelle Billy  [address]  [place] [address]        Dear Colleague,    Thank you for referring your patient, Chanelle Billy, to the [facility]. Please see a copy of my visit note below.    Diabetes Self-Management Education & Support    Presents for: CGM Review    SUBJECTIVE/OBJECTIVE:  Presents for: CGM Review  Accompanied by: Self  Diabetes education in the past 24mo: Yes  Focus of Visit: Healthy Eating, Taking Medication, CGM  Disease course: Worsening  How confident are you filling out medical forms by yourself:: Somewhat  Diabetes management related comments/concerns: gaining weight, wants to lose weight  Transportation concerns: Yes  Difficulty affording diabetes medication?: No  Difficulty affording diabetes testing supplies?: No  Other concerns:: None  Cultural Influences/Ethnic Background:  Not  or       Diabetes Symptoms & Complications:  Fatigue: Sometimes  Neuropathy: No  Polydipsia: No  Polyphagia: No  Polyuria: Sometimes  Visual change: No  Slow healing wounds: Sometimes  Weight trend: Increasing  Autonomic neuropathy: No  CVA: No  Heart disease: Yes  Nephropathy: No  Peripheral neuropathy: No  Peripheral Vascular Disease: No  Retinopathy: Other  Sexual dysfunction: No    Patient Problem List and Family Medical History reviewed for relevant medical history, current medical status, and diabetes risk factors.    Vitals:  Ht 1.575 m (5' 2\")   Wt 74.1 kg (163 lb 4.8 oz)   BMI 29.87 kg/m    Estimated body mass index is 29.87 kg/m  as calculated from the following:    Height as of this encounter: 1.575 m (5' 2\").    Weight as of this encounter: 74.1 kg (163 lb 4.8 oz).   Last 3 BP:   BP Readings from Last 3 Encounters:   05/11/22 122/52   03/16/22 122/58   02/09/22 114/54       History   Smoking Status     Former Smoker   Smokeless Tobacco     Never Used     Comment: smoked in high school and a little after her "  .       Labs:  Lab Results   Component Value Date    A1C 8.1 2021    A1C 8.6 2021     Lab Results   Component Value Date    GLC 64 2022     2021     Lab Results   Component Value Date    LDL 88 2021    LDL 71 2020     HDL Cholesterol   Date Value Ref Range Status   2020 60 >or=50 mg/dL Final     Direct Measure HDL   Date Value Ref Range Status   2021 51 > OR = 50 mg/dL Final     Comment:     Lab test performed by:  Lab Mnemonic:[facility]  [facility]  [address] [place] [address]  Jovanni Montenegro M.D.  [facility] Specimen received date and time: 20-AUG-2021 02:14:00.00     ]  GFR Estimate   Date Value Ref Range Status   2022 86 >60 mL/min/1.73m2 Final     Comment:     Effective 2021 eGFRcr in adults is calculated using the  CKD-EPI creatinine equation which includes age and gender (Marysol kearney al., NEJM, DOI: 10.1056/JOULdc9175633)   2021 92 >or=60 ml/min/1.73m2 Final     GFR Estimate If Black   Date Value Ref Range Status   2021 106 >or=60 ml/min/1.73m2 Final     Lab Results   Component Value Date    CR 0.76 2022    CR 0.68 2021     No results found for: MICROALBUMIN    Healthy Eating:  Cultural/Jehovah's witness diet restrictions?: No  Meal planning/habits: Low salt, Frequent snacking, Plate planning method    Being Active:  Days per week of moderate to strenuous exercise (like a brisk walk): 2  On average, minutes per day of exercise at this level: 10  How intense was your typical exercise? : Light (like stretching or slow walking)  Exercise Minutes per Week: 20  Barrier to exercise: Safety, Physical limitation    Monitoring:  Blood Glucose Meter: FreeStyle, TrueResult  Times checking blood sugar at home (number): 3  Times checking blood sugar at home (per): Day  Blood glucose trend: Fluctuating            Taking Medications:  Diabetes Medication(s)     Biguanides       metFORMIN (GLUCOPHAGE  XR) 500 MG 24 hr tablet    Take 2 tablets (1,000 mg) by mouth 2 times daily (with meals)    Diabetic Other       Dextrose, Diabetic Use, (GLUCOSE) 1 g CHEW    Take 15 g by mouth As needed    Insulin       insulin degludec (TRESIBA FLEXTOUCH) 100 UNIT/ML pen    Inject 28 Units Subcutaneous At Bedtime     insulin degludec (TRESIBA FLEXTOUCH) 100 UNIT/ML pen    Inject 28 Units Subcutaneous daily     insulin lispro (HUMALOG KWIKPEN) 100 UNIT/ML (1 unit dial) KWIKPEN    15 units before breakfast, noon and evening meals. 5 units before snacks. If blood sugar at the beginning of the meal is less than 90 mg/dL - do not take any short-acting (novolog) insulin.    Sodium-Glucose Co-Transporter 2 (SGLT2) Inhibitors       empagliflozin (JARDIANCE) 25 MG TABS tablet    Take 1 tablet (25 mg) by mouth daily          Current Treatments: Insulin Injections, Oral Medication (taken by mouth)    Problem Solving:  Problem Solving Assessed Today: Yes  Is the patient at risk for hypoglycemia?: Yes  Hypoglycemia Frequency: Rarely  Hypoglycemia Treatment: Ludy  Medical ID: No  Does patient have glucagon emergency kit?: No  Is the patient at risk for DKA?: No  Does patient have severe weather/disaster plan for diabetes management?: Yes  Does patient have sick day plan for diabetes management?: Yes    Hypoglycemia symptoms  Confusion: No  Dizziness or Light-Headedness: Yes  Headaches: No  Hunger: No  Mood changes: No  Nervousness/Anxiety: No  Sleepiness: No  Speech difficulty: No  Sweats: No  Feeling shaky: Yes    Hypoglycemia Complications  Blackouts: No  Hospitalization: No  Nocturnal hypoglycemia: No  Required assistance: No  Required glucagon injection: No  Seizures: No    Reducing Risks:  Reducing Risks Assessed Today: Yes  Diabetes Risks: Age over 45 years, Sedentary Lifestyle  CAD Risks: Diabetes Mellitus, Dyslipidemia, Family history, Hypertension, Sedentary lifestyle, Stress  Has dilated eye exam at least once a year?: Yes  Sees  dentist every 6 months?: No  Feet checked by healthcare provider in the last year?: Yes    Healthy Coping:  Healthy Coping Assessed Today: Yes  Emotional response to diabetes: Helplessness, Fear/Anxiety  Informal Support system:: Jacklyn based, Family, Friends  Stage of change: PREPARATION (Decided to change - considering how)  Support resources: Websites, Magazines, In-person Offerings  Patient Activation Measure Survey Score:  No flowsheet data found.    Diabetes knowledge and skills assessment:   Patient is knowledgeable in diabetes management concepts related to: Healthy Eating, Monitoring, Problem Solving, Reducing Risks and Healthy Coping    Patient needs further education on the following diabetes management concepts: Being Active and Taking Medication    Based on learning assessment above, most appropriate setting for further diabetes education would be: Individual setting.      INTERVENTIONS:    Education provided today on:  AADE Self-Care Behaviors:  Healthy Eating: carbohydrate counting, weight reduction, heart healthy diet, portion control and plate planning method  Taking Medication: drawing up, administering and storing injectable diabetes medications, proper site selection and rotation for injections and when to take medications  Problem Solving: low blood glucose - causes, signs/symptoms, treatment and prevention and carrying a carbohydrate source at all times    Opportunities for ongoing education and support in diabetes-self management were discussed.    Pt verbalized understanding of concepts discussed and recommendations provided today.       Education Materials Provided:  Living Healthy with Diabetes, Goals for Your Diabetes Care, Carbohydrate Counting and My Plate Planner    ASSESSMENT:  Elevated evening hyperglycemia.  Encouraged premeal bolus at least 15 min prior to meals.  Overall glucose avg controlled at 147mg/dL.  Reviewed correction factor.      Basaglar decrease to 27u from  28u    Humalog 15u for noon and evening meal  Humalog 5u for snacks    Humalog Correction Factor before meals  Add another 1u if sensor between 150 - 200mg/dL  Add another 2u if sensor between 201 - 250mg/dL    Metformin XR 500mg ii tabs twice a day  Jardiance 25 mg daily     Goals Addressed as of 6/22/2022 at 7:32 PM                 6/21/22      Taking Medication   90%    Added 3/29/22 by Avril Martinez RD     Take diabetes medication as directed              Patient's most recent a1c due   Lab Results   Component Value Date    A1C 8.1 11/30/2021    A1C 8.6 02/11/2021    is not meeting goal of <7.0    PLAN  See Patient Instructions for co-developed, patient-stated behavior change goals.  AVS printed and provided to patient today. See Follow-Up section for recommended follow-up.      Time Spent: 30 minutes  Encounter Type: Individual    Any diabetes medication dose changes were made via the CDE Protocol and Collaborative Practice Agreement with the patient's referring provider. A copy of this encounter was shared with the provider.

## 2022-06-21 NOTE — PATIENT INSTRUCTIONS
Goals:  Practice healthy stress management and mindful eating - think are you physically hungry or are you bored, stressed, emotional etc, make of list of things to do besides eat.    Try to get good quality sleep with a goal of 7-8 hours per night.  Stay physically active daily.  Recommend working up to a total of 30 minutes on 5 days/ week.  Recommend a fitness tracker.     Eat in a healthy way- eliminate trans fats, limit saturated fats and added sugars; follow the plate method - picture above.  Keep a food record (MyFitnessPal, Loseit).    A meal is 3 or more food groups; make it colorful for better nutrition.    Total Carbohydrates (in grams) = Breakfast  30    Lunch  30    Supper  30    If desired snacks 15                  Basaglar decrease to 27u from 28u    Humalog 15u for noon and evening meal  Humalog 5u for snacks    Humalog Correction Factor before meals  Add another 1u if sensor between 150 - 200mg/dL  Add another 2u if sensor between 201 - 250mg/dL    Metformin XR 500mg ii tabs twice a day  Jardiance 25 mg daily

## 2022-06-23 RX ORDER — LISINOPRIL 5 MG/1
TABLET ORAL
Qty: 90 TABLET | Refills: 3 | Status: SHIPPED | OUTPATIENT
Start: 2022-06-23 | End: 2022-09-28

## 2022-06-23 NOTE — TELEPHONE ENCOUNTER
Last Written Prescription Date: 4/30/21  Last Fill Quantity: 90,  # refills: 3   Last office visit:    Future Office Visit:   Next 5 appointments (look out 90 days)    Jun 27, 2022 11:40 AM  (Arrive by 11:20 AM)  Provider Visit with Samanta Maza MD  [facility] ([facility] ) [address] [place] [id number] [phone]

## 2022-06-23 NOTE — PROGRESS NOTES
"Diabetes Self-Management Education & Support    Presents for: CGM Review    SUBJECTIVE/OBJECTIVE:  Presents for: CGM Review  Accompanied by: Self  Diabetes education in the past 24mo: Yes  Focus of Visit: Healthy Eating, Taking Medication, CGM  Disease course: Worsening  How confident are you filling out medical forms by yourself:: Somewhat  Diabetes management related comments/concerns: gaining weight, wants to lose weight  Transportation concerns: Yes  Difficulty affording diabetes medication?: No  Difficulty affording diabetes testing supplies?: No  Other concerns:: None  Cultural Influences/Ethnic Background:  Not  or       Diabetes Symptoms & Complications:  Fatigue: Sometimes  Neuropathy: No  Polydipsia: No  Polyphagia: No  Polyuria: Sometimes  Visual change: No  Slow healing wounds: Sometimes  Weight trend: Increasing  Autonomic neuropathy: No  CVA: No  Heart disease: Yes  Nephropathy: No  Peripheral neuropathy: No  Peripheral Vascular Disease: No  Retinopathy: Other  Sexual dysfunction: No    Patient Problem List and Family Medical History reviewed for relevant medical history, current medical status, and diabetes risk factors.    Vitals:  Ht 1.575 m (5' 2\")   Wt 74.1 kg (163 lb 4.8 oz)   BMI 29.87 kg/m    Estimated body mass index is 29.87 kg/m  as calculated from the following:    Height as of this encounter: 1.575 m (5' 2\").    Weight as of this encounter: 74.1 kg (163 lb 4.8 oz).   Last 3 BP:   BP Readings from Last 3 Encounters:   22 122/52   22 122/58   22 114/54       History   Smoking Status     Former Smoker   Smokeless Tobacco     Never Used     Comment: smoked in high school and a little after her  .       Labs:  Lab Results   Component Value Date    A1C 8.1 2021    A1C 8.6 2021     Lab Results   Component Value Date    GLC 64 2022     2021     Lab Results   Component Value Date    LDL 88 2021    LDL 71 2020 "     HDL Cholesterol   Date Value Ref Range Status   03/13/2020 60 >or=50 mg/dL Final     Direct Measure HDL   Date Value Ref Range Status   08/19/2021 51 > OR = 50 mg/dL Final     Comment:     Lab test performed by:  Lab Mnemonic:TODD  [facility]  [address] [place] [address]  Jovanni Montenegro M.D.  [facility] Specimen received date and time: 20-AUG-2021 02:14:00.00     ]  GFR Estimate   Date Value Ref Range Status   03/16/2022 86 >60 mL/min/1.73m2 Final     Comment:     Effective December 21, 2021 eGFRcr in adults is calculated using the 2021 CKD-EPI creatinine equation which includes age and gender (Marysol et al., [place], DOI: 10.1056/WLAXst3209074)   02/11/2021 92 >or=60 ml/min/1.73m2 Final     GFR Estimate If Black   Date Value Ref Range Status   02/11/2021 106 >or=60 ml/min/1.73m2 Final     Lab Results   Component Value Date    CR 0.76 03/16/2022    CR 0.68 02/11/2021     No results found for: MICROALBUMIN    Healthy Eating:  Cultural/Episcopalian diet restrictions?: No  Meal planning/habits: Low salt, Frequent snacking, Plate planning method    Being Active:  Days per week of moderate to strenuous exercise (like a brisk walk): 2  On average, minutes per day of exercise at this level: 10  How intense was your typical exercise? : Light (like stretching or slow walking)  Exercise Minutes per Week: 20  Barrier to exercise: Safety, Physical limitation    Monitoring:  Blood Glucose Meter: FreeStyle TrueResult  Times checking blood sugar at home (number): 3  Times checking blood sugar at home (per): Day  Blood glucose trend: Fluctuating            Taking Medications:  Diabetes Medication(s)     Biguanides       metFORMIN (GLUCOPHAGE XR) 500 MG 24 hr tablet    Take 2 tablets (1,000 mg) by mouth 2 times daily (with meals)    Diabetic Other       Dextrose, Diabetic Use, (GLUCOSE) 1 g CHEW    Take 15 g by mouth As needed    Insulin       insulin degludec (TRESIBA FLEXTOUCH) 100 UNIT/ML pen    Inject  28 Units Subcutaneous At Bedtime     insulin degludec (TRESIBA FLEXTOUCH) 100 UNIT/ML pen    Inject 28 Units Subcutaneous daily     insulin lispro (HUMALOG KWIKPEN) 100 UNIT/ML (1 unit dial) KWIKPEN    15 units before breakfast, noon and evening meals. 5 units before snacks. If blood sugar at the beginning of the meal is less than 90 mg/dL - do not take any short-acting (novolog) insulin.    Sodium-Glucose Co-Transporter 2 (SGLT2) Inhibitors       empagliflozin (JARDIANCE) 25 MG TABS tablet    Take 1 tablet (25 mg) by mouth daily          Current Treatments: Insulin Injections, Oral Medication (taken by mouth)    Problem Solving:  Problem Solving Assessed Today: Yes  Is the patient at risk for hypoglycemia?: Yes  Hypoglycemia Frequency: Rarely  Hypoglycemia Treatment: Candy  Medical ID: No  Does patient have glucagon emergency kit?: No  Is the patient at risk for DKA?: No  Does patient have severe weather/disaster plan for diabetes management?: Yes  Does patient have sick day plan for diabetes management?: Yes    Hypoglycemia symptoms  Confusion: No  Dizziness or Light-Headedness: Yes  Headaches: No  Hunger: No  Mood changes: No  Nervousness/Anxiety: No  Sleepiness: No  Speech difficulty: No  Sweats: No  Feeling shaky: Yes    Hypoglycemia Complications  Blackouts: No  Hospitalization: No  Nocturnal hypoglycemia: No  Required assistance: No  Required glucagon injection: No  Seizures: No    Reducing Risks:  Reducing Risks Assessed Today: Yes  Diabetes Risks: Age over 45 years, Sedentary Lifestyle  CAD Risks: Diabetes Mellitus, Dyslipidemia, Family history, Hypertension, Sedentary lifestyle, Stress  Has dilated eye exam at least once a year?: Yes  Sees dentist every 6 months?: No  Feet checked by healthcare provider in the last year?: Yes    Healthy Coping:  Healthy Coping Assessed Today: Yes  Emotional response to diabetes: Helplessness, Fear/Anxiety  Informal Support system:: Jacklyn based, Family, Friends  Stage of  change: PREPARATION (Decided to change - considering how)  Support resources: Websites, Magazines, In-person Offerings  Patient Activation Measure Survey Score:  No flowsheet data found.    Diabetes knowledge and skills assessment:   Patient is knowledgeable in diabetes management concepts related to: Healthy Eating, Monitoring, Problem Solving, Reducing Risks and Healthy Coping    Patient needs further education on the following diabetes management concepts: Being Active and Taking Medication    Based on learning assessment above, most appropriate setting for further diabetes education would be: Individual setting.      INTERVENTIONS:    Education provided today on:  AADE Self-Care Behaviors:  Healthy Eating: carbohydrate counting, weight reduction, heart healthy diet, portion control and plate planning method  Taking Medication: drawing up, administering and storing injectable diabetes medications, proper site selection and rotation for injections and when to take medications  Problem Solving: low blood glucose - causes, signs/symptoms, treatment and prevention and carrying a carbohydrate source at all times    Opportunities for ongoing education and support in diabetes-self management were discussed.    Pt verbalized understanding of concepts discussed and recommendations provided today.       Education Materials Provided:  Living Healthy with Diabetes, Goals for Your Diabetes Care, Carbohydrate Counting and My Plate Planner    ASSESSMENT:  Elevated evening hyperglycemia.  Encouraged premeal bolus at least 15 min prior to meals.  Overall glucose avg controlled at 147mg/dL.  Reviewed correction factor.      Basaglar decrease to 27u from 28u    Humalog 15u for noon and evening meal  Humalog 5u for snacks    Humalog Correction Factor before meals  Add another 1u if sensor between 150 - 200mg/dL  Add another 2u if sensor between 201 - 250mg/dL    Metformin XR 500mg ii tabs twice a day  Jardiance 25 mg daily     Goals  Addressed as of 6/22/2022 at 7:32 PM                 6/21/22      Taking Medication   90%    Added 3/29/22 by Avril Martinez RD     Take diabetes medication as directed              Patient's most recent a1c due   Lab Results   Component Value Date    A1C 8.1 11/30/2021    A1C 8.6 02/11/2021    is not meeting goal of <7.0    PLAN  See Patient Instructions for co-developed, patient-stated behavior change goals.  AVS printed and provided to patient today. See Follow-Up section for recommended follow-up.      Time Spent: 30 minutes  Encounter Type: Individual    Any diabetes medication dose changes were made via the CDE Protocol and Collaborative Practice Agreement with the patient's referring provider. A copy of this encounter was shared with the provider.

## 2022-06-27 ENCOUNTER — OFFICE VISIT (OUTPATIENT)
Dept: FAMILY MEDICINE | Facility: CLINIC | Age: 67
End: 2022-06-27
Payer: COMMERCIAL

## 2022-06-27 VITALS
HEART RATE: 54 BPM | OXYGEN SATURATION: 98 % | BODY MASS INDEX: 29.08 KG/M2 | SYSTOLIC BLOOD PRESSURE: 128 MMHG | WEIGHT: 159 LBS | DIASTOLIC BLOOD PRESSURE: 60 MMHG

## 2022-06-27 DIAGNOSIS — F33.1 MAJOR DEPRESSIVE DISORDER, RECURRENT EPISODE, MODERATE WITH ANXIOUS DISTRESS (H): Primary | ICD-10-CM

## 2022-06-27 DIAGNOSIS — L84 CALLUS OF FOOT: ICD-10-CM

## 2022-06-27 DIAGNOSIS — F41.1 GENERALIZED ANXIETY DISORDER: ICD-10-CM

## 2022-06-27 DIAGNOSIS — Z79.4 TYPE 2 DIABETES MELLITUS WITH OTHER CIRCULATORY COMPLICATION, WITH LONG-TERM CURRENT USE OF INSULIN (H): ICD-10-CM

## 2022-06-27 DIAGNOSIS — E11.59 TYPE 2 DIABETES MELLITUS WITH OTHER CIRCULATORY COMPLICATION, WITH LONG-TERM CURRENT USE OF INSULIN (H): ICD-10-CM

## 2022-06-27 DIAGNOSIS — E11.69 TYPE 2 DIABETES MELLITUS WITH OTHER SPECIFIED COMPLICATION, WITH LONG-TERM CURRENT USE OF INSULIN (H): ICD-10-CM

## 2022-06-27 DIAGNOSIS — Z79.4 TYPE 2 DIABETES MELLITUS WITH OTHER SPECIFIED COMPLICATION, WITH LONG-TERM CURRENT USE OF INSULIN (H): ICD-10-CM

## 2022-06-27 DIAGNOSIS — E78.00 HYPERCHOLESTEROLEMIA: ICD-10-CM

## 2022-06-27 LAB
CHOLEST SERPL-MCNC: 122 MG/DL
CREAT UR-MCNC: 18.5 MG/DL
HBA1C MFR BLD: 7.2 % (ref 0–5.6)
HDLC SERPL-MCNC: 47 MG/DL
LDLC SERPL CALC-MCNC: 53 MG/DL
MICROALBUMIN UR-MCNC: <12 MG/L
MICROALBUMIN/CREAT UR: NORMAL MG/G{CREAT}
NONHDLC SERPL-MCNC: 75 MG/DL
TRIGL SERPL-MCNC: 111 MG/DL

## 2022-06-27 PROCEDURE — 36415 COLL VENOUS BLD VENIPUNCTURE: CPT | Performed by: FAMILY MEDICINE

## 2022-06-27 PROCEDURE — 82043 UR ALBUMIN QUANTITATIVE: CPT | Performed by: FAMILY MEDICINE

## 2022-06-27 PROCEDURE — 99214 OFFICE O/P EST MOD 30 MIN: CPT | Performed by: FAMILY MEDICINE

## 2022-06-27 PROCEDURE — 83036 HEMOGLOBIN GLYCOSYLATED A1C: CPT | Performed by: FAMILY MEDICINE

## 2022-06-27 PROCEDURE — 80061 LIPID PANEL: CPT | Performed by: FAMILY MEDICINE

## 2022-06-27 RX ORDER — ROSUVASTATIN CALCIUM 20 MG/1
20 TABLET, COATED ORAL AT BEDTIME
Qty: 90 TABLET | Refills: 3 | Status: SHIPPED | OUTPATIENT
Start: 2022-06-27 | End: 2022-12-20

## 2022-06-27 RX ORDER — METFORMIN HCL 500 MG
1000 TABLET, EXTENDED RELEASE 24 HR ORAL 2 TIMES DAILY WITH MEALS
Qty: 360 TABLET | Refills: 0 | Status: SHIPPED | OUTPATIENT
Start: 2022-06-27 | End: 2022-11-22

## 2022-06-27 NOTE — PROGRESS NOTES
Assessment & Plan   Problem List Items Addressed This Visit        Endocrine    Type 2 diabetes mellitus with circulatory disorder, with long-term current use of insulin (H)    Relevant Medications    metFORMIN (GLUCOPHAGE XR) 500 MG 24 hr tablet    Other Relevant Orders    Hemoglobin A1c (Completed)    Lipid panel reflex to direct LDL Fasting (Completed)    Albumin Random Urine Quantitative with Creat Ratio (Completed)       Behavioral    Major depressive disorder, recurrent episode, moderate with anxious distress (H) - Primary    Relevant Medications    FLUoxetine (PROZAC) 20 MG capsule      Other Visit Diagnoses     Type 2 diabetes mellitus with other specified complication, with long-term current use of insulin (H)        Relevant Medications    metFORMIN (GLUCOPHAGE XR) 500 MG 24 hr tablet    Callus of foot        Generalized anxiety disorder        Relevant Medications    FLUoxetine (PROZAC) 20 MG capsule    Hypercholesterolemia        Relevant Medications    rosuvastatin (CRESTOR) 20 MG tablet       as noted in orders above    Follow-up with Shell Martinez in 3 months and with me in 6 months and refer to podiatry.  Patient continues to meet criteria for CGM coverage;   - patient has type two diabetes mellitus; and   - patient has been using CGM daily; and   - patient is insulin treated with insulin completing multiple injections daily 3 or more times/ day   - patient is frequently adjusting insulin based on blood glucose readings   - within six months prior to ordering the CGM, the patient has had an in-person visit with the practitioner; and determined that criteria (1-4) above are met; and   - every six months following the initial prescription of the CGM, the treating practitioner has an in-person visit with the patient to assess adherence to their CGM regimen and diabetes treatment plan       Basaglar decrease to 27u every day       Humalog 15u for noon and evening meal   Humalog 5u for snacks      "  Humalog Correction Factor before meals   Add another 1u if sensor between 150 - 200mg/dL   Add another 2u if sensor between 201 - 250mg/dL       Metformin XR 500mg ii tabs twice a day   Jardiance 25 mg daily     Total time spent reviewing chart and preparing for appointment, with patient for appointment, and time spent charting and coordinating care on the day of the appointment in minutes was: 33               BMI:   Estimated body mass index is 29.08 kg/m  as calculated from the following:    Height as of 6/21/22: 1.575 m (5' 2\").    Weight as of this encounter: 72.1 kg (159 lb).   Weight management plan: Discussed healthy diet and exercise guidelines      Return for 6months with me and 3 months with Shell.    Samanta Maza MD  [facility]    Bhanu Roca is a 66 year old accompanied by her daughter., presenting for the following health issues:  Diabetes (6 month follow up)      History of Present Illness       Diabetes:   She presents for follow up of diabetes.  She is checking home blood glucose four or more times daily. She checks blood glucose before and after meals and at bedtime.  Blood glucose is sometimes over 200 and sometimes under 70. She is aware of hypoglycemia symptoms including other. She is concerned about other. She is having weight gain. The patient has had a diabetic eye exam in the last 12 months.         She eats 0-1 servings of fruits and vegetables daily.She consumes 1 sweetened beverage(s) daily.She exercises with enough effort to increase her heart rate 9 or less minutes per day.  She exercises with enough effort to increase her heart rate 4 days per week. She is missing 1 dose(s) of medications per week.     Satya and depression well controlled on her fluoxetine 20 mg po qday        Review of Systems   Negative except as per HPI including orthopnea or, chest pain or paroxysmal nocturnal dyspnea.      Objective    /60 (BP Location: Right arm, " Patient Position: Sitting)   Pulse 54   Wt 72.1 kg (159 lb)   SpO2 98%   BMI 29.08 kg/m    Body mass index is 29.08 kg/m .  Physical Exam   DIABETIC FOOT EXAM: normal DP and PT pulses, no trophic changes or ulcerative lesions and normal sensory exam  She does have a right great toe lateral callus      General: alert and oriented ×3 no acute distress.    HEENT: Normocephalic and atraumatic.   Eyes  normal conjunctiva    Hearing is grossly normal and there is no otorrhea.     Chest: has bilateral rise with no increased work of breathing. clear to auscultation without wheezes, rhonchi, or rales.    Cardiovascular: normal perfusion and brisk capillary refill. S1S2 with regular rate and rhythm and no murmurs, gallops or rubs.    Musculoskeletal: no gross focal abnormalities and normal gait.    Neuro: no gross focal abnormalities and memory seems intact. CN 2-12 are grossly intact.    Psychiatric: speech is clear and coherent and fluent. Patient dressed appropriately for the weather. Mood is appropriate and affect is full.                        .  ..

## 2022-06-27 NOTE — LETTER
June 28, 2022      Chanelle Billy  [address] [address]        Dear ,    We are writing to inform you of your test results.      Resulted Orders   Hemoglobin A1c   Result Value Ref Range    Hemoglobin A1C 7.2 (H) 0.0 - 5.6 %      Comment:      Normal <5.7%   Prediabetes 5.7-6.4%    Diabetes 6.5% or higher     Note: Adopted from ADA consensus guidelines.   Lipid panel reflex to direct LDL Fasting   Result Value Ref Range    Cholesterol 122 <200 mg/dL    Triglycerides 111 <150 mg/dL    Direct Measure HDL 47 (L) >=50 mg/dL    LDL Cholesterol Calculated 53 <=100 mg/dL    Non HDL Cholesterol 75 <130 mg/dL    Narrative    Cholesterol  Desirable:  <200 mg/dL    Triglycerides  Normal:  Less than 150 mg/dL  Borderline High:  150-199 mg/dL  High:  200-499 mg/dL  Very High:  Greater than or equal to 500 mg/dL    Direct Measure HDL  Female:  Greater than or equal to 50 mg/dL   Male:  Greater than or equal to 40 mg/dL    LDL Cholesterol  Desirable:  <100mg/dL  Above Desirable:  100-129 mg/dL   Borderline High:  130-159 mg/dL   High:  160-189 mg/dL   Very High:  >= 190 mg/dL    Non HDL Cholesterol  Desirable:  130 mg/dL  Above Desirable:  130-159 mg/dL  Borderline High:  160-189 mg/dL  High:  190-219 mg/dL  Very High:  Greater than or equal to 220 mg/dL   Albumin Random Urine Quantitative with Creat Ratio   Result Value Ref Range    Albumin Urine mg/L <12.0 mg/L      Comment:      The reference ranges have not been established in urine albumin. The results should be integrated into the clinical context for interpretation.    Albumin Urine mg/g Cr        Comment:      Unable to calculate, urine albumin and/or urine creatinine is outside detectable limits.  Microalbuminuria is defined as an albumin:creatinine ratio of 17 to 299 for males and 25 to 299 for females. A ratio of albumin:creatinine of 300 or higher is indicative of overt proteinuria.  Due to biologic variability, positive results  should be confirmed by a second, first-morning random or 24-hour timed urine specimen. If there is discrepancy, a third specimen is recommended. When 2 out of 3 results are in the microalbuminuria range, this is evidence for incipient nephropathy and warrants increased efforts at glucose control, blood pressure control, and institution of therapy with an angiotensin-converting-enzyme (ACE) inhibitor (if the patient can tolerate it).      Creatinine Urine mg/dL 18.5 mg/dL      Comment:      The reference ranges have not been established in urine creatinine. The results should be integrated into the clinical context for interpretation.     You have been doing a great job with your diabetes!  This is the best that its been since we met!  Keep up the good work and let see if we can get your hemoglobin A1c under 7.     Your cholesterol is looking a lot better also.  I would love for you to do some more exercise on a daily basis so we can try to get that good cholesterol greater than 50.     If you have any questions or concerns, please call the clinic at the number listed above.       Sincerely,      Samanta Maza MD

## 2022-06-28 DIAGNOSIS — E11.59 TYPE 2 DIABETES MELLITUS WITH CIRCULATORY DISORDER, WITH LONG-TERM CURRENT USE OF INSULIN (H): ICD-10-CM

## 2022-06-28 DIAGNOSIS — Z79.4 TYPE 2 DIABETES MELLITUS WITH CIRCULATORY DISORDER, WITH LONG-TERM CURRENT USE OF INSULIN (H): ICD-10-CM

## 2022-06-29 ENCOUNTER — TELEPHONE (OUTPATIENT)
Dept: FAMILY MEDICINE | Facility: CLINIC | Age: 67
End: 2022-06-29

## 2022-06-29 NOTE — TELEPHONE ENCOUNTER
Pt requesting refill for Freestyle Dm 2   I contacted [facility] and they are in need of most recent MD clinical notes.  Patient was seen in office 6/27/2022 MD will update note and then it has to be faxed to [facility] at [phone] and [phone]    Next shipment date is July 14.

## 2022-06-30 ENCOUNTER — MEDICAL CORRESPONDENCE (OUTPATIENT)
Dept: HEALTH INFORMATION MANAGEMENT | Facility: CLINIC | Age: 67
End: 2022-06-30

## 2022-06-30 RX ORDER — INSULIN DEGLUDEC 100 U/ML
28 INJECTION, SOLUTION SUBCUTANEOUS AT BEDTIME
Qty: 30 ML | Refills: 1 | Status: SHIPPED | OUTPATIENT
Start: 2022-06-30 | End: 2023-01-24

## 2022-06-30 RX ORDER — INSULIN LISPRO 100 [IU]/ML
INJECTION, SOLUTION INTRAVENOUS; SUBCUTANEOUS
Qty: 15 ML | Refills: 1 | Status: SHIPPED | OUTPATIENT
Start: 2022-06-30 | End: 2022-09-13

## 2022-06-30 NOTE — TELEPHONE ENCOUNTER
Prescription approved per [facility] Refill Protocol.      Visit: 6/27/22.    Lab Results   Component Value Date    A1C 7.2 06/27/2022    A1C 8.1 11/30/2021    A1C 8.7 08/19/2021    A1C 8.6 02/11/2021    A1C 8.6 02/11/2021    A1C 11.4 10/22/2020    A1C 11.4 10/22/2020    A1C 6.8 03/13/2020    A1C 11.7 12/06/2019

## 2022-08-08 DIAGNOSIS — R06.09 DYSPNEA ON EXERTION: ICD-10-CM

## 2022-08-08 DIAGNOSIS — I25.119 CORONARY ARTERY DISEASE INVOLVING NATIVE CORONARY ARTERY OF NATIVE HEART WITH ANGINA PECTORIS (H): ICD-10-CM

## 2022-08-09 RX ORDER — CARVEDILOL 25 MG/1
25 TABLET ORAL 2 TIMES DAILY WITH MEALS
Qty: 180 TABLET | Refills: 0 | Status: SHIPPED | OUTPATIENT
Start: 2022-08-09 | End: 2022-08-10

## 2022-08-10 ENCOUNTER — NURSE TRIAGE (OUTPATIENT)
Dept: NURSING | Facility: CLINIC | Age: 67
End: 2022-08-10

## 2022-08-10 RX ORDER — CARVEDILOL 25 MG/1
25 TABLET ORAL 2 TIMES DAILY WITH MEALS
Qty: 180 TABLET | Refills: 0 | Status: SHIPPED | OUTPATIENT
Start: 2022-08-10 | End: 2023-02-08

## 2022-08-10 RX ORDER — CARVEDILOL 25 MG/1
50 TABLET ORAL 2 TIMES DAILY WITH MEALS
Qty: 360 TABLET | Refills: 0 | Status: SHIPPED | OUTPATIENT
Start: 2022-08-10 | End: 2022-08-10 | Stop reason: DRUGHIGH

## 2022-08-10 NOTE — TELEPHONE ENCOUNTER
TC with pt, who stated she is taking 12.5mg two tablets twice daily. Pt is needing a refill as she is out. Writer advised 25mg tablets have been sent in and so she will take one tablet two times a day. Pt verbalized understanding and appreciation.

## 2022-08-10 NOTE — TELEPHONE ENCOUNTER
Calling about medication. Sees MD next month on the 28th. carvedilol 2, 25 mg tablets, twice a day. She is out of the medication. Pharmacy couldn't get thru to the clinic.  Please call her:  [phone]. She is out of the medication.  Zohra Marcano RN  [facility]

## 2022-08-10 NOTE — TELEPHONE ENCOUNTER
Calling about medication. Sees MD next month on the 28th. carvedilol 2, 25 mg tablets, twice a day. She is out of the medication. Pharmacy couldn't get thru to the clinic.  Please call her:  [phone]. She is out of the medication. I sent an high priority encounter to the clinic, adding it to the Pharmacy's message for a refill.  Zohra Marcano RN  [facility]     Reason for Disposition    Caller has URGENT medicine question about med that PCP or specialist prescribed and triager unable to answer question    Additional Information    Negative: Intentional drug overdose and suicidal thoughts or ideas    Negative: Drug overdose and triager unable to answer question    Negative: Caller requesting a renewal or refill of a medicine patient is currently taking    Negative: Caller requesting information unrelated to medicine    Negative: Caller requesting information about COVID-19 Vaccine    Negative: Caller requesting information about Emergency Contraception    Negative: Caller requesting information about Combined Birth Control Pills    Negative: Caller requesting information about Progestin Birth Control Pills    Negative: Caller requesting information about Post-Op pain or medicines    Negative: Caller requesting a prescription antibiotic (such as penicillin) for Strep throat and has a positive culture result    Negative: Caller requesting a prescription anti-viral med (such as Tamiflu) and has influenza (flu) symptoms    Negative: Immunization reaction suspected    Negative: Rash while taking a medicine or within 3 days of stopping it    Negative: Asthma and having symptoms of asthma (cough, wheezing, etc.)    Negative: Symptom of illness (e.g., headache, abdominal pain, earache, vomiting) that are more than mild    Negative: Breastfeeding questions about mother's medicines and diet    Negative: MORE THAN A DOUBLE DOSE of a prescription or over-the-counter (OTC) drug    Negative: DOUBLE DOSE (an  extra dose or lesser amount) of prescription drug and any symptoms (e.g., dizziness, nausea, pain, sleepiness)    Negative: DOUBLE DOSE (an extra dose or lesser amount) of over-the-counter (OTC) drug and any symptoms (e.g., dizziness, nausea, pain, sleepiness)    Negative: Took another person's prescription drug    Negative: DOUBLE DOSE (an extra dose or lesser amount) of prescription drug and NO symptoms  (Exception: A double dose of antibiotics.)    Negative: Diabetes drug error or overdose (e.g., took wrong type of insulin or took extra dose)    Negative: Caller has medication question about med NOT prescribed by PCP and triager unable to answer question (e.g., compatibility with other med, storage)    Negative: Prescription not at pharmacy and was prescribed by PCP recently  (Exception: triager has access to EMR and prescription is recorded there. Go to Home Care and confirm for pharmacy.)    Negative: Pharmacy calling with prescription question and triager unable to answer question    Protocols used: MEDICATION QUESTION CALL-A-[place]

## 2022-09-13 DIAGNOSIS — Z79.4 TYPE 2 DIABETES MELLITUS WITH CIRCULATORY DISORDER, WITH LONG-TERM CURRENT USE OF INSULIN (H): ICD-10-CM

## 2022-09-13 DIAGNOSIS — E11.59 TYPE 2 DIABETES MELLITUS WITH CIRCULATORY DISORDER, WITH LONG-TERM CURRENT USE OF INSULIN (H): ICD-10-CM

## 2022-09-13 RX ORDER — INSULIN LISPRO 100 [IU]/ML
INJECTION, SOLUTION INTRAVENOUS; SUBCUTANEOUS
Qty: 15 ML | Refills: 0 | Status: SHIPPED | OUTPATIENT
Start: 2022-09-13 | End: 2023-01-24

## 2022-09-13 NOTE — TELEPHONE ENCOUNTER
"    Last Written Prescription Date:  6/30/22  Last Fill Quantity: 15ml,  # refills: 1   Last office visit provider:  6/27/22     Requested Prescriptions   Pending Prescriptions Disp Refills     insulin lispro (HUMALOG KWIKPEN) 100 UNIT/ML (1 unit dial) KWIKPEN [Pharmacy Med Name: INSULIN LISPRO KWIKPEN 100/ML SOLN PEN-INJ] 15 mL 1     Sig: 15 UNITS BEFORE BREAKFAST, NOON AND EVENING MEALS. FIVE UNITS BEFORE SNACKS. IF BLOOD SUGAR AT THE BEGINNING OF THE MEAL IS LESS THAN 90 MG/DL - DO NOT TAKE ANY SHORT-ACTING (NOVOLOG) INSULIN.       Short Acting Insulin Protocol Passed - 9/13/2022  9:23 AM        Passed - Serum creatinine on file in past 12 months     Recent Labs   Lab Test 03/16/22  1403   CR 0.76       Ok to refill medication if creatinine is low          Passed - HgbA1C in past 3 or 6 months     If HgbA1C is 8 or greater, it needs to be on file within the past 3 months.  If less than 8, must be on file within the past 6 months.     Recent Labs   Lab Test 06/27/22  1248 11/30/21  1503 08/19/21  1340   A1C 7.2*   < > 8.7*   05715  --   --  8.7*    < > = values in this interval not displayed.             Passed - Medication is active on med list        Passed - Patient is age 18 or older        Passed - Recent (6 mo) or future (30 days) visit within the authorizing provider's specialty     Patient had office visit in the last 6 months or has a visit in the next 30 days with authorizing provider or within the authorizing provider's specialty.  See \"Patient Info\" tab in inbasket, or \"Choose Columns\" in Meds & Orders section of the refill encounter.                 CHELSY DELGADO RN 09/13/22 9:38 AM    "

## 2022-09-28 ENCOUNTER — OFFICE VISIT (OUTPATIENT)
Dept: CARDIOLOGY | Facility: CLINIC | Age: 67
End: 2022-09-28
Attending: INTERNAL MEDICINE
Payer: COMMERCIAL

## 2022-09-28 VITALS
HEART RATE: 68 BPM | DIASTOLIC BLOOD PRESSURE: 50 MMHG | HEIGHT: 62 IN | BODY MASS INDEX: 28.84 KG/M2 | WEIGHT: 156.7 LBS | SYSTOLIC BLOOD PRESSURE: 132 MMHG | RESPIRATION RATE: 16 BRPM

## 2022-09-28 DIAGNOSIS — I10 ESSENTIAL HYPERTENSION: ICD-10-CM

## 2022-09-28 DIAGNOSIS — E78.5 HYPERLIPIDEMIA LDL GOAL <70: ICD-10-CM

## 2022-09-28 DIAGNOSIS — I25.119 CORONARY ARTERY DISEASE INVOLVING NATIVE CORONARY ARTERY OF NATIVE HEART WITH ANGINA PECTORIS (H): ICD-10-CM

## 2022-09-28 DIAGNOSIS — I25.5 ISCHEMIC CARDIOMYOPATHY: ICD-10-CM

## 2022-09-28 DIAGNOSIS — R06.09 DYSPNEA ON EXERTION: ICD-10-CM

## 2022-09-28 LAB
ANION GAP SERPL CALCULATED.3IONS-SCNC: 13 MMOL/L (ref 7–15)
BUN SERPL-MCNC: 22.8 MG/DL (ref 8–23)
CALCIUM SERPL-MCNC: 9.4 MG/DL (ref 8.8–10.2)
CHLORIDE SERPL-SCNC: 99 MMOL/L (ref 98–107)
CREAT SERPL-MCNC: 0.68 MG/DL (ref 0.51–0.95)
DEPRECATED HCO3 PLAS-SCNC: 24 MMOL/L (ref 22–29)
GFR SERPL CREATININE-BSD FRML MDRD: >90 ML/MIN/1.73M2
GLUCOSE SERPL-MCNC: 225 MG/DL (ref 70–99)
POTASSIUM SERPL-SCNC: 4.1 MMOL/L (ref 3.4–5.3)
SODIUM SERPL-SCNC: 136 MMOL/L (ref 136–145)

## 2022-09-28 PROCEDURE — 99214 OFFICE O/P EST MOD 30 MIN: CPT | Performed by: INTERNAL MEDICINE

## 2022-09-28 PROCEDURE — 36415 COLL VENOUS BLD VENIPUNCTURE: CPT | Performed by: INTERNAL MEDICINE

## 2022-09-28 PROCEDURE — 80048 BASIC METABOLIC PNL TOTAL CA: CPT | Performed by: INTERNAL MEDICINE

## 2022-09-28 RX ORDER — LISINOPRIL 10 MG/1
10 TABLET ORAL DAILY
Qty: 90 TABLET | Refills: 3 | Status: SHIPPED | OUTPATIENT
Start: 2022-09-28 | End: 2023-10-06

## 2022-09-28 NOTE — LETTER
"9/28/2022    Samanta Maza MD  [address] [facility] [address]    RE: Chanelle Billy       Dear Colleague,     I had the pleasure of seeing Chanelle Billy in the [facility].      [facility]  [phone]      Assessment/Recommendations   Patient with known coronary artery disease, significant reduction left ventricular systolic function  Titrated up on the beta-blocker.  Blood pressure is not optimal today so we will increase her lisinopril to 10 mg she will keep track of her blood pressure at home.    We wanted to get an echocardiogram and we will try to arrange that in the near future to see if her left ventricular ejection fraction has improved.  I am hopeful as her functional capacity is better.    Will check electrolytes today with BUN and creatinine because of the increase in lisinopril.    We will plan on seeing him back in 3 months, but of course would be happy to see her sooner if questions or problems arise.  30 minutes with chart review, patient visit, documentation and .     History of Present Illness/Subjective    Ms. Chanelle Billy is a 67 year old female with known coronary artery disease status post recent bypass surgery and significant reduction left ventricular systolic function.  We have been titrating up on her guideline based medical therapy and she is now on target dose of carvedilol.  Has been feeling well.  She goes on walks on a regular basis and feels like her breathing is stable if not better.  Overall her energy level is better.  She denies orthopnea, paroxysmal nocturnal dyspnea, peripheral edema, or chest discomfort.  No palpitations.  No dizziness or lightheaded feeling.  Blood pressures at home tend to run in the 160s.         Physical Examination Review of Systems   /50 (BP Location: Right arm, Patient Position: Sitting, Cuff Size: Adult Regular)   Pulse 68   Resp 16   Ht 1.575 m (5' 2\")   Wt 71.1 kg (156 lb " "11.2 oz)   BMI 28.66 kg/m    Body mass index is 28.66 kg/m .  Wt Readings from Last 3 Encounters:   09/28/22 71.1 kg (156 lb 11.2 oz)   06/27/22 72.1 kg (159 lb)   06/21/22 74.1 kg (163 lb 4.8 oz)     General Appearance:   Alert, cooperative and in no acute distress.   ENT/Mouth: Patient wearing a mask.      EYES:  no scleral icterus, normal conjunctivae   Neck: JVP normal. No Hepatojugular reflux. Thyroid not visualized.   Chest/Lungs:   Lungs are clear to auscultation, equal chest wall expansion.   Cardiovascular:   S1, S2 without murmur ,clicks or rubs. Brachial, radial  pulses are intact and symetric. No carotid bruits noted   Abdomen:  Nontender. BS+   Extremities: No cyanosis, clubbing or edema   Skin: no xanthelasma, warm.    Neurologic: normal arm movement bilateral, no tremors     Psychiatric: Appropriate affect.      Enc Vitals  BP: 132/50  Pulse: 68  Resp: 16  Weight: 71.1 kg (156 lb 11.2 oz)  Height: 157.5 cm (5' 2\")                                           Medical History  Surgical History Family History Social History   No past medical history on file. Past Surgical History:   Procedure Laterality Date     ANGIOPLASTY  2011     CATARACT EXTRACTION W/ INTRAOCULAR LENS IMPLANT  05/07/2019     CATARACT EXTRACTION W/ INTRAOCULAR LENS IMPLANT  05/21/2019     ESOPHAGOGASTRODUODENOSCOPY W/ BANDING  09/27/2019     STENT  07/2011     TUBAL LIGATION  1979    Family History   Problem Relation Age of Onset     Cerebrovascular Disease Mother      Diabetes Mother      Cerebrovascular Disease Father      Diabetes Father      Glaucoma Sister      Arthritis Maternal Grandmother      No Known Problems Daughter         Age: 46 years    Social History     Socioeconomic History     Marital status:      Spouse name: Not on file     Number of children: Not on file     Years of education: Not on file     Highest education level: Not on file   Occupational History     Not on file   Tobacco Use     Smoking status: " Former Smoker     Smokeless tobacco: Never Used     Tobacco comment: smoked in high school and a little after her  .   Vaping Use     Vaping Use: Never used   Substance and Sexual Activity     Alcohol use: Yes     Comment: occasionally     Drug use: Not on file     Sexual activity: Not on file   Other Topics Concern     Parent/sibling w/ CABG, MI or angioplasty before 65F 55M? Not Asked   Social History Narrative     Not on file     Social Determinants of Health     Financial Resource Strain: Not on file   Food Insecurity: Not on file   Transportation Needs: Not on file   Physical Activity: Not on file   Stress: Not on file   Social Connections: Not on file   Intimate Partner Violence: Not on file   Housing Stability: Not on file          Medications  Allergies   Current Outpatient Medications   Medication Sig Dispense Refill     aspirin (ASA) 81 MG chewable tablet Take 81 mg by mouth daily       B-D ULTRA-FINE 33 LANCETS MISC Use to test 3 to 4 times a day as directed.       blood glucose (NO BRAND SPECIFIED) test strip Use to test 3 to 4 times daily as directed.       calcium carbonate (TUMS) 500 MG chewable tablet Take 1 chew tab by mouth 2 times daily As needed for heartburn, acid reflux       carvedilol (COREG) 25 MG tablet Take 1 tablet (25 mg) by mouth 2 times daily (with meals) 180 tablet 0     Continuous Blood Gluc  (FREESTYLE ROSAURA 2 READER SYSTM) LOUIS        Continuous Blood Gluc Sensor (FREESTYLE ROSAURA 2 SENSOR SYSTM) MISC        Dextrose, Diabetic Use, (GLUCOSE) 1 g CHEW Take 15 g by mouth As needed       docusate sodium (COLACE) 100 MG capsule Take 100 mg by mouth 2 times daily       empagliflozin (JARDIANCE) 25 MG TABS tablet Take 1 tablet (25 mg) by mouth daily 90 tablet 1     FLUoxetine (PROZAC) 20 MG capsule TAKE 1 CAPSULE BY MOUTH EVERY MORNING 90 capsule 1     furosemide (LASIX) 20 MG tablet TAKE ONE TABLET BY MOUTH ONCE DAILY 30 tablet 10     insulin degludec (TRESIBA  "FLEXTOUCH) 100 UNIT/ML pen Inject 28 Units Subcutaneous daily       insulin lispro (HUMALOG KWIKPEN) 100 UNIT/ML (1 unit dial) KWIKPEN 15 UNITS BEFORE BREAKFAST, NOON AND EVENING MEALS. FIVE UNITS BEFORE SNACKS. IF BLOOD SUGAR AT THE BEGINNING OF THE MEAL IS LESS THAN 90 MG/DL - DO NOT TAKE ANY SHORT-ACTING (NOVOLOG) INSULIN. 15 mL 0     insulin pen needle (32G X 4 MM) 32G X 4 MM miscellaneous Use for insulin injection under the skin 4 to 5 times a day as directed       lisinopril (ZESTRIL) 10 MG tablet Take 1 tablet (10 mg) by mouth daily 90 tablet 3     loratadine (CLARITIN) 10 MG tablet Take 10 mg by mouth At Bedtime        melatonin 5 MG tablet Take 5 mg by mouth nightly as needed for sleep       metFORMIN (GLUCOPHAGE XR) 500 MG 24 hr tablet Take 2 tablets (1,000 mg) by mouth 2 times daily (with meals) 360 tablet 0     multivitamin w/minerals (THERA-VIT-M) tablet Take 1 tablet by mouth daily       Naphazoline-Pheniramine (EQ EYE ALLERGY RELIEF OP) Pt does not know what the active drug is. \"Eye allergy relief\"       nitroGLYcerin (NITROSTAT) 0.4 MG sublingual tablet Place 0.4 mg under the tongue every 5 minutes as needed For chest pain place 1 tablet under the tongue every 5 minutes for 3 doses. If symptoms persist 5 minutes after 1st dose call 911.       polyethylene glycol-propylene glycol (SYSTANE ULTRA) 0.4-0.3 % SOLN ophthalmic solution Place 1 drop into both eyes every hour as needed for dry eyes       potassium chloride ER (K-TAB/KLOR-CON) 10 MEQ CR tablet TAKE ONE TABLET BY MOUTH ONCE DAILY 30 tablet 10     rosuvastatin (CRESTOR) 20 MG tablet Take 1 tablet (20 mg) by mouth At Bedtime 90 tablet 3     Sharps Container (SHARPS-A-GATOR LOCKING BRACKET) MISC Use as directed       TRESIBA FLEXTOUCH 100 UNIT/ML pen INJECT 28 UNITS SUBCUTANEOUS AT BEDTIME 30 mL 1    Allergies   Allergen Reactions     Atorvastatin Nausea and Vomiting and Diarrhea     Pt reported     Mirtazapine      Nightmares and suicidal " ideation for 1 day (per Dr. Maza's note)     Sodium Hypochlorite      Bleeding from nose and eyes after using a bleach product, patient/daughter reported     Animal Dander Other (See Comments)     Cats      Runny nose, pt reported.  Takes allergy medication for this and still lives with a cat.     Trazodone      nightmares         Lab Results    Chemistry/lipid CBC Cardiac Enzymes/BNP/TSH/INR   Lab Results   Component Value Date    CHOL 122 06/27/2022    HDL 47 (L) 06/27/2022    TRIG 111 06/27/2022    BUN 16 03/16/2022     03/16/2022    CO2 25 03/16/2022    Lab Results   Component Value Date    WBC 8.2 11/30/2021    HGB 11.2 (L) 11/30/2021    HCT 36.3 11/30/2021    MCV 75.2 (L) 11/30/2021     11/30/2021    Lab Results   Component Value Date     (H) 12/10/2021              Thank you for allowing me to participate in the care of your patient.      Sincerely,     Lan Long MD     [facility]  cc:   Lan Long MD  [address] [facility] [address]  [place] [address]

## 2022-09-28 NOTE — PROGRESS NOTES
"    [facility]  [phone]      Assessment/Recommendations   Patient with known coronary artery disease, significant reduction left ventricular systolic function  Titrated up on the beta-blocker.  Blood pressure is not optimal today so we will increase her lisinopril to 10 mg she will keep track of her blood pressure at home.    We wanted to get an echocardiogram and we will try to arrange that in the near future to see if her left ventricular ejection fraction has improved.  I am hopeful as her functional capacity is better.    Will check electrolytes today with BUN and creatinine because of the increase in lisinopril.    We will plan on seeing him back in 3 months, but of course would be happy to see her sooner if questions or problems arise.  30 minutes with chart review, patient visit, documentation and .     History of Present Illness/Subjective    Ms. Chanelel Billy is a 67 year old female with known coronary artery disease status post recent bypass surgery and significant reduction left ventricular systolic function.  We have been titrating up on her guideline based medical therapy and she is now on target dose of carvedilol.  Has been feeling well.  She goes on walks on a regular basis and feels like her breathing is stable if not better.  Overall her energy level is better.  She denies orthopnea, paroxysmal nocturnal dyspnea, peripheral edema, or chest discomfort.  No palpitations.  No dizziness or lightheaded feeling.  Blood pressures at home tend to run in the 160s.         Physical Examination Review of Systems   /50 (BP Location: Right arm, Patient Position: Sitting, Cuff Size: Adult Regular)   Pulse 68   Resp 16   Ht 1.575 m (5' 2\")   Wt 71.1 kg (156 lb 11.2 oz)   BMI 28.66 kg/m    Body mass index is 28.66 kg/m .  Wt Readings from Last 3 Encounters:   09/28/22 71.1 kg (156 lb 11.2 oz)   06/27/22 72.1 kg (159 lb)   06/21/22 74.1 kg (163 lb 4.8 oz) " "    General Appearance:   Alert, cooperative and in no acute distress.   ENT/Mouth: Patient wearing a mask.      EYES:  no scleral icterus, normal conjunctivae   Neck: JVP normal. No Hepatojugular reflux. Thyroid not visualized.   Chest/Lungs:   Lungs are clear to auscultation, equal chest wall expansion.   Cardiovascular:   S1, S2 without murmur ,clicks or rubs. Brachial, radial  pulses are intact and symetric. No carotid bruits noted   Abdomen:  Nontender. BS+   Extremities: No cyanosis, clubbing or edema   Skin: no xanthelasma, warm.    Neurologic: normal arm movement bilateral, no tremors     Psychiatric: Appropriate affect.      Enc Vitals  BP: 132/50  Pulse: 68  Resp: 16  Weight: 71.1 kg (156 lb 11.2 oz)  Height: 157.5 cm (5' 2\")                                           Medical History  Surgical History Family History Social History   No past medical history on file. Past Surgical History:   Procedure Laterality Date     ANGIOPLASTY       CATARACT EXTRACTION W/ INTRAOCULAR LENS IMPLANT  2019     CATARACT EXTRACTION W/ INTRAOCULAR LENS IMPLANT  2019     ESOPHAGOGASTRODUODENOSCOPY W/ BANDING  2019     STENT  2011     TUBAL LIGATION  1979    Family History   Problem Relation Age of Onset     Cerebrovascular Disease Mother      Diabetes Mother      Cerebrovascular Disease Father      Diabetes Father      Glaucoma Sister      Arthritis Maternal Grandmother      No Known Problems Daughter         Age: 46 years    Social History     Socioeconomic History     Marital status:      Spouse name: Not on file     Number of children: Not on file     Years of education: Not on file     Highest education level: Not on file   Occupational History     Not on file   Tobacco Use     Smoking status: Former Smoker     Smokeless tobacco: Never Used     Tobacco comment: smoked in high school and a little after her  .   Vaping Use     Vaping Use: Never used   Substance and Sexual Activity "     Alcohol use: Yes     Comment: occasionally     Drug use: Not on file     Sexual activity: Not on file   Other Topics Concern     Parent/sibling w/ CABG, MI or angioplasty before 65F 55M? Not Asked   Social History Narrative     Not on file     Social Determinants of Health     Financial Resource Strain: Not on file   Food Insecurity: Not on file   Transportation Needs: Not on file   Physical Activity: Not on file   Stress: Not on file   Social Connections: Not on file   Intimate Partner Violence: Not on file   Housing Stability: Not on file          Medications  Allergies   Current Outpatient Medications   Medication Sig Dispense Refill     aspirin (ASA) 81 MG chewable tablet Take 81 mg by mouth daily       B-D ULTRA-FINE 33 LANCETS MISC Use to test 3 to 4 times a day as directed.       blood glucose (NO BRAND SPECIFIED) test strip Use to test 3 to 4 times daily as directed.       calcium carbonate (TUMS) 500 MG chewable tablet Take 1 chew tab by mouth 2 times daily As needed for heartburn, acid reflux       carvedilol (COREG) 25 MG tablet Take 1 tablet (25 mg) by mouth 2 times daily (with meals) 180 tablet 0     Continuous Blood Gluc  (PrestaderoSTYLE ROSAURA 2 READER SYSTM) LOUIS        Continuous Blood Gluc Sensor (FREESTYLE ROSAURA 2 SENSOR SYSTM) MISC        Dextrose, Diabetic Use, (GLUCOSE) 1 g CHEW Take 15 g by mouth As needed       docusate sodium (COLACE) 100 MG capsule Take 100 mg by mouth 2 times daily       empagliflozin (JARDIANCE) 25 MG TABS tablet Take 1 tablet (25 mg) by mouth daily 90 tablet 1     FLUoxetine (PROZAC) 20 MG capsule TAKE 1 CAPSULE BY MOUTH EVERY MORNING 90 capsule 1     furosemide (LASIX) 20 MG tablet TAKE ONE TABLET BY MOUTH ONCE DAILY 30 tablet 10     insulin degludec (TRESIBA FLEXTOUCH) 100 UNIT/ML pen Inject 28 Units Subcutaneous daily       insulin lispro (HUMALOG KWIKPEN) 100 UNIT/ML (1 unit dial) KWIKPEN 15 UNITS BEFORE BREAKFAST, NOON AND EVENING MEALS. FIVE UNITS BEFORE  "SNACKS. IF BLOOD SUGAR AT THE BEGINNING OF THE MEAL IS LESS THAN 90 MG/DL - DO NOT TAKE ANY SHORT-ACTING (NOVOLOG) INSULIN. 15 mL 0     insulin pen needle (32G X 4 MM) 32G X 4 MM miscellaneous Use for insulin injection under the skin 4 to 5 times a day as directed       lisinopril (ZESTRIL) 10 MG tablet Take 1 tablet (10 mg) by mouth daily 90 tablet 3     loratadine (CLARITIN) 10 MG tablet Take 10 mg by mouth At Bedtime        melatonin 5 MG tablet Take 5 mg by mouth nightly as needed for sleep       metFORMIN (GLUCOPHAGE XR) 500 MG 24 hr tablet Take 2 tablets (1,000 mg) by mouth 2 times daily (with meals) 360 tablet 0     multivitamin w/minerals (THERA-VIT-M) tablet Take 1 tablet by mouth daily       Naphazoline-Pheniramine (EQ EYE ALLERGY RELIEF OP) Pt does not know what the active drug is. \"Eye allergy relief\"       nitroGLYcerin (NITROSTAT) 0.4 MG sublingual tablet Place 0.4 mg under the tongue every 5 minutes as needed For chest pain place 1 tablet under the tongue every 5 minutes for 3 doses. If symptoms persist 5 minutes after 1st dose call 911.       polyethylene glycol-propylene glycol (SYSTANE ULTRA) 0.4-0.3 % SOLN ophthalmic solution Place 1 drop into both eyes every hour as needed for dry eyes       potassium chloride ER (K-TAB/KLOR-CON) 10 MEQ CR tablet TAKE ONE TABLET BY MOUTH ONCE DAILY 30 tablet 10     rosuvastatin (CRESTOR) 20 MG tablet Take 1 tablet (20 mg) by mouth At Bedtime 90 tablet 3     Sharps Container (SHARPS-A-GATOR LOCKING BRACKET) MISC Use as directed       TRESIBA FLEXTOUCH 100 UNIT/ML pen INJECT 28 UNITS SUBCUTANEOUS AT BEDTIME 30 mL 1    Allergies   Allergen Reactions     Atorvastatin Nausea and Vomiting and Diarrhea     Pt reported     Mirtazapine      Nightmares and suicidal ideation for 1 day (per Dr. Maza's note)     Sodium Hypochlorite      Bleeding from nose and eyes after using a bleach product, patient/daughter reported     Animal Dander Other (See Comments)     Cats      " Runny nose, pt reported.  Takes allergy medication for this and still lives with a cat.     Trazodone      nightmares         Lab Results    Chemistry/lipid CBC Cardiac Enzymes/BNP/TSH/INR   Lab Results   Component Value Date    CHOL 122 06/27/2022    HDL 47 (L) 06/27/2022    TRIG 111 06/27/2022    BUN 16 03/16/2022     03/16/2022    CO2 25 03/16/2022    Lab Results   Component Value Date    WBC 8.2 11/30/2021    HGB 11.2 (L) 11/30/2021    HCT 36.3 11/30/2021    MCV 75.2 (L) 11/30/2021     11/30/2021    Lab Results   Component Value Date     (H) 12/10/2021

## 2022-10-04 ENCOUNTER — TELEPHONE (OUTPATIENT)
Dept: NURSING | Facility: CLINIC | Age: 67
End: 2022-10-04

## 2022-10-04 NOTE — TELEPHONE ENCOUNTER
They will not refill early.  Pt called and discussed.  Pt is to contact [facility] for a replacement (provided pt with #).  Pt also given a sample [facility] Dm 2 which is left at the  for pt to .

## 2022-10-04 NOTE — TELEPHONE ENCOUNTER
Pt calling to request immediate refill of her Dm Sensor. Her last sensor is due to go in 2 hours.    Pt states 2 out of her 7 sensors were defective and ran out sooner.  Pt states she accidentally threw away the defective sensors as she was cleaning her home for a housing inspection.    Still has a glucometer with test strips but pays cash for those.    Pt requests to have it sent to [facility]. Pt states she sees Avril Martinez RD for her DM concerns.    Please call pt [phone] for updates.    Jeanette Whitlock RN/[place] Nurse Advisor

## 2022-10-18 ENCOUNTER — HOSPITAL ENCOUNTER (OUTPATIENT)
Dept: CARDIOLOGY | Facility: CLINIC | Age: 67
Discharge: HOME | End: 2022-10-18
Attending: INTERNAL MEDICINE | Admitting: INTERNAL MEDICINE
Payer: COMMERCIAL

## 2022-10-18 DIAGNOSIS — I25.5 ISCHEMIC CARDIOMYOPATHY: ICD-10-CM

## 2022-10-18 DIAGNOSIS — I25.119 CORONARY ARTERY DISEASE INVOLVING NATIVE CORONARY ARTERY OF NATIVE HEART WITH ANGINA PECTORIS (H): ICD-10-CM

## 2022-10-18 DIAGNOSIS — R06.09 DYSPNEA ON EXERTION: ICD-10-CM

## 2022-10-18 DIAGNOSIS — I10 ESSENTIAL HYPERTENSION: ICD-10-CM

## 2022-10-18 DIAGNOSIS — E78.5 HYPERLIPIDEMIA LDL GOAL <70: ICD-10-CM

## 2022-10-18 LAB — LVEF ECHO: NORMAL

## 2022-10-18 PROCEDURE — 999N000208 ECHOCARDIOGRAM COMPLETE

## 2022-10-18 PROCEDURE — 255N000002 HC RX 255 OP 636: Performed by: INTERNAL MEDICINE

## 2022-10-18 PROCEDURE — 93306 TTE W/DOPPLER COMPLETE: CPT | Mod: 26 | Performed by: INTERNAL MEDICINE

## 2022-10-18 RX ADMIN — PERFLUTREN 3 ML: 6.52 INJECTION, SUSPENSION INTRAVENOUS at 11:40

## 2022-11-14 ENCOUNTER — TRANSFERRED RECORDS (OUTPATIENT)
Dept: HEALTH INFORMATION MANAGEMENT | Facility: CLINIC | Age: 67
End: 2022-11-14

## 2022-11-14 LAB — RETINOPATHY: POSITIVE

## 2022-11-22 DIAGNOSIS — Z79.4 TYPE 2 DIABETES MELLITUS WITH OTHER CIRCULATORY COMPLICATION, WITH LONG-TERM CURRENT USE OF INSULIN (H): ICD-10-CM

## 2022-11-22 DIAGNOSIS — E11.59 TYPE 2 DIABETES MELLITUS WITH OTHER CIRCULATORY COMPLICATION, WITH LONG-TERM CURRENT USE OF INSULIN (H): ICD-10-CM

## 2022-11-22 RX ORDER — METFORMIN HCL 500 MG
TABLET, EXTENDED RELEASE 24 HR ORAL
Qty: 120 TABLET | Refills: 0 | Status: SHIPPED | OUTPATIENT
Start: 2022-11-22 | End: 2022-12-20

## 2022-11-22 NOTE — TELEPHONE ENCOUNTER
"  Last Written Prescription Date:  6/27/22  Last Fill Quantity: 360,  # refills: 0   Last office visit provider:  6/27/22    Requested Prescriptions   Pending Prescriptions Disp Refills     metFORMIN (GLUCOPHAGE XR) 500 MG 24 hr tablet [Pharmacy Med Name: METFORMIN HYDROCHLORIDE ER 500MG ER TABLET ER 24HR] 360 tablet 0     Sig: TAKE TWO TABLETS (1,000 MG) BY MOUTH TWO TIMES DAILY (WITH MEALS)       Biguanide Agents Passed - 11/22/2022 10:59 AM        Passed - Patient is age 10 or older        Passed - Patient has documented A1c within the specified period of time.     If HgbA1C is 8 or greater, it needs to be on file within the past 3 months.  If less than 8, must be on file within the past 6 months.     Recent Labs   Lab Test 06/27/22  1248 11/30/21  1503 08/19/21  1340   A1C 7.2*   < > 8.7*   75313  --   --  8.7*    < > = values in this interval not displayed.             Passed - Patient's CR is NOT>1.4 OR Patient's EGFR is NOT<45 within past 12 mos.     Recent Labs   Lab Test 09/28/22  1343 11/30/21  1503 08/19/21  1340 02/11/21  1423 02/11/21  0000   GFRESTIMATED >90   < > 93   < > 92   GFRESTBLACK  --   --   --   --  106   [id number]  --   --  108  --   --    [id number]  --   --  93  --   --     < > = values in this interval not displayed.       Recent Labs   Lab Test 09/28/22  1343   CR 0.68             Passed - Patient does NOT have a diagnosis of CHF.        Passed - Medication is active on med list        Passed - Patient is not pregnant        Passed - Patient has not had a positive pregnancy test within the past 12 mos.         Passed - Recent (6 mo) or future (30 days) visit within the authorizing provider's specialty     Patient had office visit in the last 6 months or has a visit in the next 30 days with authorizing provider or within the authorizing provider's specialty.  See \"Patient Info\" tab in inbasket, or \"Choose Columns\" in Meds & Orders section of the refill encounter.           "       CHELSY DELGADO RN 11/22/22 11:10 AM

## 2022-11-22 NOTE — TELEPHONE ENCOUNTER
Patient calling to f/u on Rx request from pharmacy.  Pt does not have any left and would like to know if this can please be sent in today.    Any questions, call pt at [phone]

## 2022-12-20 ENCOUNTER — TELEPHONE (OUTPATIENT)
Dept: FAMILY MEDICINE | Facility: CLINIC | Age: 67
End: 2022-12-20

## 2022-12-20 ENCOUNTER — OFFICE VISIT (OUTPATIENT)
Dept: FAMILY MEDICINE | Facility: CLINIC | Age: 67
End: 2022-12-20
Payer: COMMERCIAL

## 2022-12-20 VITALS
BODY MASS INDEX: 29.08 KG/M2 | SYSTOLIC BLOOD PRESSURE: 110 MMHG | OXYGEN SATURATION: 96 % | DIASTOLIC BLOOD PRESSURE: 60 MMHG | WEIGHT: 158 LBS | HEIGHT: 62 IN | RESPIRATION RATE: 16 BRPM | HEART RATE: 62 BPM

## 2022-12-20 DIAGNOSIS — E78.00 HYPERCHOLESTEROLEMIA: ICD-10-CM

## 2022-12-20 DIAGNOSIS — Z11.59 NEED FOR HEPATITIS C SCREENING TEST: ICD-10-CM

## 2022-12-20 DIAGNOSIS — E78.5 HYPERLIPIDEMIA LDL GOAL <70: ICD-10-CM

## 2022-12-20 DIAGNOSIS — I50.9 CONGESTIVE HEART FAILURE, UNSPECIFIED HF CHRONICITY, UNSPECIFIED HEART FAILURE TYPE (H): ICD-10-CM

## 2022-12-20 DIAGNOSIS — Z71.89 ACP (ADVANCE CARE PLANNING): Primary | ICD-10-CM

## 2022-12-20 DIAGNOSIS — Z00.00 ENCOUNTER FOR MEDICARE ANNUAL WELLNESS EXAM: ICD-10-CM

## 2022-12-20 DIAGNOSIS — Z79.4 TYPE 2 DIABETES MELLITUS WITH OTHER CIRCULATORY COMPLICATION, WITH LONG-TERM CURRENT USE OF INSULIN (H): ICD-10-CM

## 2022-12-20 DIAGNOSIS — F41.1 GENERALIZED ANXIETY DISORDER: ICD-10-CM

## 2022-12-20 DIAGNOSIS — Z23 NEED FOR VACCINATION: ICD-10-CM

## 2022-12-20 DIAGNOSIS — E11.59 TYPE 2 DIABETES MELLITUS WITH OTHER CIRCULATORY COMPLICATION, WITH LONG-TERM CURRENT USE OF INSULIN (H): ICD-10-CM

## 2022-12-20 DIAGNOSIS — Z12.11 SCREEN FOR COLON CANCER: ICD-10-CM

## 2022-12-20 DIAGNOSIS — I25.10 CORONARY ARTERY DISEASE INVOLVING NATIVE CORONARY ARTERY OF NATIVE HEART WITHOUT ANGINA PECTORIS: ICD-10-CM

## 2022-12-20 LAB
ALT SERPL W P-5'-P-CCNC: 32 U/L (ref 10–35)
ERYTHROCYTE [DISTWIDTH] IN BLOOD BY AUTOMATED COUNT: 13.4 % (ref 10–15)
HBA1C MFR BLD: 7.8 % (ref 0–5.6)
HCT VFR BLD AUTO: 41.3 % (ref 35–47)
HGB BLD-MCNC: 13.1 G/DL (ref 11.7–15.7)
LDLC SERPL DIRECT ASSAY-MCNC: 90 MG/DL
MCH RBC QN AUTO: 28.4 PG (ref 26.5–33)
MCHC RBC AUTO-ENTMCNC: 31.7 G/DL (ref 31.5–36.5)
MCV RBC AUTO: 90 FL (ref 78–100)
PLATELET # BLD AUTO: 241 10E3/UL (ref 150–450)
RBC # BLD AUTO: 4.61 10E6/UL (ref 3.8–5.2)
TSH SERPL DL<=0.005 MIU/L-ACNC: 1.83 UIU/ML (ref 0.3–4.2)
WBC # BLD AUTO: 8.8 10E3/UL (ref 4–11)

## 2022-12-20 PROCEDURE — 99397 PER PM REEVAL EST PAT 65+ YR: CPT | Performed by: FAMILY MEDICINE

## 2022-12-20 PROCEDURE — 83036 HEMOGLOBIN GLYCOSYLATED A1C: CPT | Performed by: FAMILY MEDICINE

## 2022-12-20 PROCEDURE — 99213 OFFICE O/P EST LOW 20 MIN: CPT | Mod: 25 | Performed by: FAMILY MEDICINE

## 2022-12-20 PROCEDURE — 85027 COMPLETE CBC AUTOMATED: CPT | Performed by: FAMILY MEDICINE

## 2022-12-20 PROCEDURE — 91313 COVID-19 VACCINE BIVALENT BOOSTER 18+ (MODERNA): CPT | Performed by: FAMILY MEDICINE

## 2022-12-20 PROCEDURE — 0134A COVID-19 VACCINE BIVALENT BOOSTER 18+ (MODERNA): CPT | Performed by: FAMILY MEDICINE

## 2022-12-20 PROCEDURE — 83721 ASSAY OF BLOOD LIPOPROTEIN: CPT | Performed by: FAMILY MEDICINE

## 2022-12-20 PROCEDURE — 84443 ASSAY THYROID STIM HORMONE: CPT | Performed by: FAMILY MEDICINE

## 2022-12-20 PROCEDURE — 36415 COLL VENOUS BLD VENIPUNCTURE: CPT | Performed by: FAMILY MEDICINE

## 2022-12-20 PROCEDURE — 86803 HEPATITIS C AB TEST: CPT | Performed by: FAMILY MEDICINE

## 2022-12-20 PROCEDURE — 84460 ALANINE AMINO (ALT) (SGPT): CPT | Performed by: FAMILY MEDICINE

## 2022-12-20 RX ORDER — METFORMIN HCL 500 MG
TABLET, EXTENDED RELEASE 24 HR ORAL
Qty: 360 TABLET | Refills: 3 | Status: SHIPPED | OUTPATIENT
Start: 2022-12-20 | End: 2024-01-04

## 2022-12-20 RX ORDER — ROSUVASTATIN CALCIUM 20 MG/1
20 TABLET, COATED ORAL AT BEDTIME
Qty: 90 TABLET | Refills: 3 | Status: SHIPPED | OUTPATIENT
Start: 2022-12-20 | End: 2023-02-01

## 2022-12-20 RX ORDER — POTASSIUM CHLORIDE 750 MG/1
10 TABLET, EXTENDED RELEASE ORAL DAILY
Qty: 90 TABLET | Refills: 3 | Status: SHIPPED | OUTPATIENT
Start: 2022-12-20 | End: 2024-01-04

## 2022-12-20 RX ORDER — NITROGLYCERIN 0.4 MG/1
0.4 TABLET SUBLINGUAL EVERY 5 MIN PRN
Qty: 30 TABLET | Refills: 1 | Status: SHIPPED | OUTPATIENT
Start: 2022-12-20 | End: 2024-02-13

## 2022-12-20 RX ORDER — FUROSEMIDE 20 MG
20 TABLET ORAL DAILY
Qty: 90 TABLET | Refills: 3 | Status: SHIPPED | OUTPATIENT
Start: 2022-12-20 | End: 2024-01-04

## 2022-12-20 ASSESSMENT — ENCOUNTER SYMPTOMS
DIARRHEA: 0
JOINT SWELLING: 0
CHILLS: 0
CONSTIPATION: 0
FREQUENCY: 1
NAUSEA: 0
MYALGIAS: 1
HEADACHES: 0
PARESTHESIAS: 0
HEARTBURN: 1
SHORTNESS OF BREATH: 1
WEAKNESS: 1
ABDOMINAL PAIN: 0
BREAST MASS: 0
HEMATURIA: 0
EYE PAIN: 0
SORE THROAT: 0
ARTHRALGIAS: 1
NERVOUS/ANXIOUS: 1
COUGH: 1
HEMATOCHEZIA: 0
DYSURIA: 0
PALPITATIONS: 0
DIZZINESS: 0
FEVER: 0

## 2022-12-20 ASSESSMENT — PATIENT HEALTH QUESTIONNAIRE - PHQ9
10. IF YOU CHECKED OFF ANY PROBLEMS, HOW DIFFICULT HAVE THESE PROBLEMS MADE IT FOR YOU TO DO YOUR WORK, TAKE CARE OF THINGS AT HOME, OR GET ALONG WITH OTHER PEOPLE: SOMEWHAT DIFFICULT
SUM OF ALL RESPONSES TO PHQ QUESTIONS 1-9: 12
SUM OF ALL RESPONSES TO PHQ QUESTIONS 1-9: 12

## 2022-12-20 ASSESSMENT — ACTIVITIES OF DAILY LIVING (ADL): CURRENT_FUNCTION: NO ASSISTANCE NEEDED

## 2022-12-20 NOTE — PATIENT INSTRUCTIONS
Make an appointment with your Cardiologist.      Please check with your pharmacy to see how many shingles vaccines you have had already.  You should have a total of 2 of the Shingrix vaccines.  You would need 2 doses of Shingrix even if you have had 1 dose of Zostavax previously.          Patient Education     Personalized Prevention Plan  You are due for the preventive services outlined below.  Your care team is available to assist you in scheduling these services.  If you have already completed any of these items, please share that information with your care team to update in your medical record.  Health Maintenance Due   Topic Date Due    Liver Monitoring Lab  Never done    Heart Failure Action Plan  Never done    Thyroid Function Lab  Never done    ANNUAL REVIEW OF HM ORDERS  Never done    Discuss Advance Care Planning  Never done    Depression Action Plan  Never done    Colorectal Cancer Screening  Never done    Hepatitis C Screening  Never done    LUNG CANCER SCREENING  Never done    Zoster (Shingles) Vaccine (2 of 2) 10/20/2021    COVID-19 Vaccine (5 - Booster for Pfizer series) 06/22/2022    Annual Wellness Visit  08/19/2022    Flu Vaccine (1) 09/01/2022    A1C Lab  09/27/2022    Complete Blood Count  11/30/2022       Depression and Suicide in Older Adults    Nearly 2 million older Americans have some type of depression. Some of them even take their own lives. Yet depression among older adults is often ignored. Learn the warning signs. You may help spare a loved one needless pain. You may also save a life.   What is depression?  Depression is a common and serious illness that affects the way you think and feel. It is not a normal part of aging, nor is it a sign of weakness, a character flaw, or something you can snap out of. Most people with depression need treatment to get better. The most common symptom is a feeling of deep sadness. People who are depressed also may seem tired and listless. And nothing  "seems to give them pleasure. It s normal to grieve or be sad sometimes. But sadness lessens or passes with time. Depression rarely goes away or improves on its own. A person with clinical depression can't \"snap out of it.\" Other symptoms of depression are:   Sleeping more or less than normal  Eating more or less than normal  Having headaches, stomachaches, or other pains that don t go away  Feeling nervous,  empty,  or worthless  Crying a great deal  Thinking or talking about suicide or death  Loss of interest in activities previously enjoyed  Social isolation  Feeling confused or forgetful  What causes it?  The causes of depression aren t fully known. But it is thought to result from a complex blend of these factors:   Biochemistry. Certain chemicals in the brain play a role.  Genes. Depression does run in families.  Life stress. Life stresses can also trigger depression in some people. Older adults often face many stressors, such as death of friends or a spouse, health problems, and financial concerns.  Chronic conditions. This includes conditions such as diabetes, heart disease, or cancer. These can cause symptoms of depression. Medicine side effects can cause changes in thoughts and behaviors.  How you can help  Often, depressed people may not want to ask for help. When they do, they may be ignored. Or, they may receive the wrong treatment. You can help by showing parents and older friends love and support. If they seem depressed, don t lecture the person, ignore the symptoms, or discount the symptoms as a  normal  part of aging -which they are not. Get involved, listen, and show interest and support.   Help them understand that depression is a treatable illness. Tell them you can help them find the right treatment. Offer to go to their healthcare provider's appointment with them for support when the symptoms are discussed. With their approval, contact a local mental health center, social service agency, or " hospital about services.   You can be an advocate for him or her at healthcare appointments. Many older adults have chronic illnesses that can cause symptoms of depression. Medicine side effects can change thoughts and behaviors. You can help make sure that the healthcare provider looks at all of these factors. He or she should refer your family member or friend to a mental healthcare provider when needed. in some cases, untreated depression can lead to a misdiagnosis. A person may be diagnosed with a brain disorder such as dementia. If the healthcare provider does not take the issue of depression seriously, help your family member or friend to find another provider.   Don't be afraid to ask  If you think an older person you care about could be suicidal, ask,  Have you thought about suicide?  Most people will tell you the truth. If they say  yes,  they may already have a plan for how and when they will attempt it. Find out as much as you can. The more detailed the plan, and the easier it is to carry out, the more danger the person is in right now. Tell the person you are there for them and do not want them to harm him or herself. Don't wait to get help for the person. Call the person's healthcare provider, local hospital, or emergency services.   To learn more  National Suicide Prevention Lifeline (crisis hotline) 368-880-AYQJ (667-241-0269)  National [place] of Mental Gjudpo398-317-5582wrn.[facility].nih.gov  National [place] on Mental Wimykgn878-878-7975wqr.[place].org  Mental Health Cmmziib234-363-1160iub.[facility].org  National Suicide Qgoboqd402-DDEKDLV (958-566-3535)    Call 911  Never leave the person alone. A person who is actively suicidal needs psychiatric care right away. They will need constant supervision. Never leave the person out of sight. Call 911 or the national 24-hour suicide crisis hotline at 520-293-DRLB (977-633-9830). You can also take the person to the closest emergency room.   [employer] last reviewed  this educational content on 5/1/2020 2000-2021 [employer]. All rights reserved. This information is not intended as a substitute for professional medical care. Always follow your healthcare professional's instructions.           Patient Education   Personalized Prevention Plan  You are due for the preventive services outlined below.  Your care team is available to assist you in scheduling these services.  If you have already completed any of these items, please share that information with your care team to update in your medical record.  Health Maintenance Due   Topic Date Due    Liver Monitoring Lab  Never done    Heart Failure Action Plan  Never done    Thyroid Function Lab  Never done    ANNUAL REVIEW OF HM ORDERS  Never done    Colorectal Cancer Screening  Never done    Hepatitis C Screening  Never done    Zoster (Shingles) Vaccine (2 of 2) 10/20/2021    COVID-19 Vaccine (5 - Booster for Pfizer series) 06/22/2022    Flu Vaccine (1) 09/01/2022    A1C Lab  09/27/2022    Complete Blood Count  11/30/2022     Your Health Risk Assessment indicates you feel you are not in good health    A healthy lifestyle helps keep the body fit and the mind alert. It helps protect you from disease, helps you fight disease, and helps prevent chronic disease (disease that doesn't go away) from getting worse. This is important as you get older and begin to notice twinges in muscles and joints and a decline in the strength and stamina you once took for granted. A healthy lifestyle includes good healthcare, good nutrition, weight control, recreation, and regular exercise. Avoid harmful substances and do what you can to keep safe. Another part of a healthy lifestyle is stay mentally active and socially involved.    Good healthcare   Have a wellness visit every year.   If you have new symptoms, let us know right away. Don't wait until the next checkup.   Take medicines exactly as prescribed and keep your medicines in a  safe place. Tell us if your medicine causes problems.   Healthy diet and weight control   Eat 3 or 4 small, nutritious, low-fat, high-fiber meals a day. Include a variety of fruits, vegetables, and whole-grain foods.   Make sure you get enough calcium in your diet. Calcium, vitamin D, and exercise help prevent osteoporosis (bone thinning).   If you live alone, try eating with others when you can. That way you get a good meal and have company while you eat it.   Try to keep a healthy weight. If you eat more calories than your body uses for energy, it will be stored as fat and you will gain weight.     Recreation   Recreation is not limited to sports and team events. It includes any activity that provides relaxation, interest, enjoyment, and exercise. Recreation provides an outlet for physical, mental, and social energy. It can give a sense of worth and achievement. It can help you stay healthy.    Mental Exercise and Social Involvement  Mental and emotional health is as important as physical health. Keep in touch with friends and family. Stay as active as possible. Continue to learn and challenge yourself.   Things you can do to stay mentally active are:  Learn something new, like a foreign language or musical instrument.   Play SCRABBLE or do crossword puzzles. If you cannot find people to play these games with you at home, you can play them with others on your computer through the Internet.   Join a games club--anything from card games to chess or checkers or lawn bowling.   Start a new hobby.   Go back to school.   Volunteer.   Read.   Keep up with world events.    Exercise for a Healthier Heart  You may wonder how you can improve the health of your heart. If you re thinking about exercise, you re on the right track. You don t need to become an athlete. But you do need a certain amount of brisk exercise to help strengthen your heart. If you have been diagnosed with a heart condition, your healthcare provider may  advise exercise to help stabilize your condition. To help make exercise a habit, choose safe, fun activities.      Exercise with a friend. When activity is fun, you're more likely to stick with it.   Before you start  Check with your healthcare provider before starting an exercise program. This is especially important if you have not been active for a while. It's also important if you have a long-term (chronic) health problem such as heart disease, diabetes, or obesity. Or if you are at high risk for having these problems.   Why exercise?  Exercising regularly offers many healthy rewards. It can help you do all of the following:   Improve your blood cholesterol level to help prevent further heart trouble  Lower your blood pressure to help prevent a stroke or heart attack  Control diabetes, or reduce your risk of getting this disease  Improve your heart and lung function  Reach and stay at a healthy weight  Make your muscles stronger so you can stay active  Prevent falls and fractures by slowing the loss of bone mass (osteoporosis)  Manage stress better  Reduce your blood pressure  Improve your sense of self and your body image  Exercise tips    Ease into your routine. Set small goals. Then build on them. If you are not sure what your activity level should be, talk with your healthcare provider first before starting an exercise routine.  Exercise on most days. Aim for a total of 150 minutes (2 hours and 30 minutes) or more of moderate-intensity aerobic activity each week. Or 75 minutes (1 hour and 15 minutes) or more of vigorous-intensity aerobic activity each week. Or try for a combination of both. Moderate activity means that you breathe heavier and your heart rate increases but you can still talk. Think about doing 40 minutes of moderate exercise, 3 to 4 times a week. For best results, activity should last for about 40 minutes to lower blood pressure and cholesterol. It's OK to work up to the 40-minute period over  time. Examples of moderate-intensity activity are walking 1 mile in 15 minutes. Or doing 30 to 45 minutes of yard work.  Step up your daily activity level.  Along with your exercise program, try being more active the whole day. Walk instead of drive. Or park further away so that you take more steps each day. Do more household tasks or yard work. You may not be able to meet the advised mount of physical activity. But doing some moderate- or vigorous-intensity aerobic activity can help reduce your risk for heart disease. Your healthcare provider can help you figure out what is best for you.  Choose 1 or more activities you enjoy.  Walking is one of the easiest things you can do. You can also try swimming, riding a bike, dancing, or taking an exercise class.    When to call your healthcare provider  Call your healthcare provider if you have any of these:   Chest pain or feel dizzy or lightheaded  Burning, tightness, pressure, or heaviness in your chest, neck, shoulders, back, or arms  Abnormal shortness of breath  More joint or muscle pain  A very fast or irregular heartbeat (palpitations)  [employer] last reviewed this educational content on 7/1/2019 2000-2021 [employer]. All rights reserved. This information is not intended as a substitute for professional medical care. Always follow your healthcare professional's instructions.          Understanding USDA MyPlate  The USDA has guidelines to help you make healthy food choices. These are called MyPlate. MyPlate shows the food groups that make up healthy meals using the image of a place setting. Before you eat, think about the healthiest choices for what to put on your plate or in your cup or bowl. To learn more about building a healthy plate, visit www.choosemyplate.gov.    The food groups  Fruits. Any fruit or 100% fruit juice counts as part of the Fruit Group. Fruits may be fresh, canned, frozen, or dried, and may be whole, cut-up, or pureed. Make 1/2  of your plate fruits and vegetables.  Vegetables. Any vegetable or 100% vegetable juice counts as a member of the Vegetable Group. Vegetables may be fresh, frozen, canned, or dried. They can be served raw or cooked and may be whole, cut-up, or mashed. Make 1/2 of your plate fruits and vegetables.  Grains. All foods made from grains are part of the Grains Group. These include wheat, rice, oats, cornmeal, and barley. Grains are often used to make foods such as bread, pasta, oatmeal, cereal, tortillas, and grits. Grains should be no more than 1/4 of your plate. At least half of your grains should be whole grains.  Protein. This group includes meat, poultry, seafood, beans and peas, eggs, processed soy products (such as tofu), nuts (including nut butters), and seeds. Make protein choices no more than 1/4 of your plate. Meat and poultry choices should be lean or low fat.  Dairy. The Dairy Group includes all fluid milk products and foods made from milk that contain calcium, such as yogurt and cheese. (Foods that have little calcium, such as cream, butter, and cream cheese, are not part of this group.) Most dairy choices should be low-fat or fat-free.  Oils. Oils aren't a food group, but they do contain essential nutrients. However it's important to watch your intake of oils. These are fats that are liquid at room temperature. They include canola, corn, olive, soybean, vegetable, and sunflower oil. Foods that are mainly oil include mayonnaise, certain salad dressings, and soft margarines. You likely already get your daily oil allowance from the foods you eat.  Things to limit  Eating healthy also means limiting these things in your diet:     Salt (sodium). Many processed foods have a lot of sodium. To keep sodium intake down, eat fresh vegetables, meats, poultry, and seafood when possible. Purchase low-sodium, reduced-sodium, or no-salt-added food products at the store. And don't add salt to your meals at home. Instead,  season them with herbs and spices such as dill, oregano, cumin, and paprika. Or try adding flavor with lemon or lime zest and juice.  Saturated fat. Saturated fats are most often found in animal products such as beef, pork, and chicken. They are often solid at room temperature, such as butter. To reduce your saturated fat intake, choose leaner cuts of meat and poultry. And try healthier cooking methods such as grilling, broiling, roasting, or baking. For a simple lower-fat swap, use plain nonfat yogurt instead of mayonnaise when making potato salad or macaroni salad.  Added sugars. These are sugars added to foods. They are in foods such as ice cream, candy, soda, fruit drinks, sports drinks, energy drinks, cookies, pastries, jams, and syrups. Cut down on added sugars by sharing sweet treats with a family member or friend. You can also choose fruit for dessert, and drink water or other unsweetened beverages.     [employer] last reviewed this educational content on 6/1/2020 2000-2021 [employer]. All rights reserved. This information is not intended as a substitute for professional medical care. Always follow your healthcare professional's instructions.          Urinary Incontinence, Female (Adult)   Urinary incontinence means loss of bladder control. This problem affects many women, especially as they get older. If you have incontinence, you may be embarrassed to ask for help. But know that this problem can be treated.   Types of Incontinence  There are different types of incontinence. Two of the main types are described here. You can have more than one type.   Stress incontinence. With this type, urine leaks when pressure (stress) is put on the bladder. This may happen when you cough, sneeze, or laugh. Stress incontinence most often occurs because the pelvic floor muscles that support the bladder and urethra are weak. This can happen after pregnancy and vaginal childbirth or a hysterectomy. It can  also be due to excess body weight or hormone changes.  Urge incontinence (also called overactive bladder). With this type, a sudden urge to urinate is felt often. This may happen even though there may not be much urine in the bladder. The need to urinate often during the night is common. Urge incontinence most often occurs because of bladder spasms. This may be due to bladder irritation or infection. Damage to bladder nerves or pelvic muscles, constipation, and certain medicines can also lead to urge incontinence.  Treatment depends on the cause. Further evaluation is needed to find the type you have. This will likely include an exam and certain tests. Based on the results, you and your healthcare provider can then plan treatment. Until a diagnosis is made, the home care tips below can help ease symptoms.   Home care  Do pelvic floor muscle exercises, if they are prescribed. The pelvic floor muscles help support the bladder and urethra. Many women find that their symptoms improve when doing special exercises that strengthen these muscles. To do the exercises, contract the muscles you would use to stop your stream of urine. But do this when you re not urinating. Hold for 10 seconds, then relax. Repeat 10 to 20 times in a row, at least 3 times a day. Your healthcare provider may give you other instructions for how to do the exercises and how often.  Keep a bladder diary. This helps track how often and how much you urinate over a set period of time. Bring this diary with you to your next visit with the provider. The information can help your provider learn more about your bladder problem.  Lose weight, if advised to by your provider. Extra weight puts pressure on the bladder. Your provider can help you create a weight-loss plan that s right for you. This may include exercising more and making certain diet changes.  Don't have foods and drinks that may irritate the bladder. These can include alcohol and caffeinated  drinks.  Quit smoking. Smoking and other tobacco use can lead to a long-term (chronic) cough that strains the pelvic floor muscles. Smoking may also damage the bladder and urethra. Talk with your provider about treatments or methods you can use to quit smoking.  If drinking large amounts of fluid makes you have symptoms, you may be advised to limit your fluid intake. You may also be advised to drink most of your fluids during the day and to limit fluids at night.  If you re worried about urine leakage or accidents, you may wear absorbent pads to catch urine. Change the pads often. This helps reduce discomfort. It may also reduce the risk of skin or bladder infections.    Follow-up care  Follow up with your healthcare provider, or as directed. It may take some to find the right treatment for your problem. But healthy lifestyle changes can be made right away. These include such things as exercising on a regular basis, eating a healthy diet, losing weight (if needed), and quitting smoking. Your treatment plan may include special therapies or medicines. Certain procedures or surgery may also be options. Talk about any questions you have with your provider.   When to seek medical advice  Call the healthcare provider right away if any of these occur:  Fever of 100.4 F (38 C) or higher, or as directed by your provider  Bladder pain or fullness  Belly swelling  Nausea or vomiting  Back pain  Weakness, dizziness, or fainting  [employer] last reviewed this educational content on 1/1/2020 2000-2021 [employer]. All rights reserved. This information is not intended as a substitute for professional medical care. Always follow your healthcare professional's instructions.        Your Health Risk Assessment indicates you feel you are not in good emotional health.    Recreation   Recreation is not limited to sports and team events. It includes any activity that provides relaxation, interest, enjoyment, and exercise.  "Recreation provides an outlet for physical, mental, and social energy. It can give a sense of worth and achievement. It can help you stay healthy.    Mental Exercise and Social Involvement  Mental and emotional health is as important as physical health. Keep in touch with friends and family. Stay as active as possible. Continue to learn and challenge yourself.   Things you can do to stay mentally active are:  Learn something new, like a foreign language or musical instrument.   Play SCRABBLE or do crossword puzzles. If you cannot find people to play these games with you at home, you can play them with others on your computer through the Internet.   Join a games club--anything from card games to chess or checkers or lawn bowling.   Start a new hobby.   Go back to school.   Volunteer.   Read.   Keep up with world events.    Depression and Suicide in Older Adults    Nearly 2 million older Americans have some type of depression. Some of them even take their own lives. Yet depression among older adults is often ignored. Learn the warning signs. You may help spare a loved one needless pain. You may also save a life.   What is depression?  Depression is a common and serious illness that affects the way you think and feel. It is not a normal part of aging, nor is it a sign of weakness, a character flaw, or something you can snap out of. Most people with depression need treatment to get better. The most common symptom is a feeling of deep sadness. People who are depressed also may seem tired and listless. And nothing seems to give them pleasure. It s normal to grieve or be sad sometimes. But sadness lessens or passes with time. Depression rarely goes away or improves on its own. A person with clinical depression can't \"snap out of it.\" Other symptoms of depression are:   Sleeping more or less than normal  Eating more or less than normal  Having headaches, stomachaches, or other pains that don t go away  Feeling nervous, "  empty,  or worthless  Crying a great deal  Thinking or talking about suicide or death  Loss of interest in activities previously enjoyed  Social isolation  Feeling confused or forgetful  What causes it?  The causes of depression aren t fully known. But it is thought to result from a complex blend of these factors:   Biochemistry. Certain chemicals in the brain play a role.  Genes. Depression does run in families.  Life stress. Life stresses can also trigger depression in some people. Older adults often face many stressors, such as death of friends or a spouse, health problems, and financial concerns.  Chronic conditions. This includes conditions such as diabetes, heart disease, or cancer. These can cause symptoms of depression. Medicine side effects can cause changes in thoughts and behaviors.  How you can help  Often, depressed people may not want to ask for help. When they do, they may be ignored. Or, they may receive the wrong treatment. You can help by showing parents and older friends love and support. If they seem depressed, don t lecture the person, ignore the symptoms, or discount the symptoms as a  normal  part of aging -which they are not. Get involved, listen, and show interest and support.   Help them understand that depression is a treatable illness. Tell them you can help them find the right treatment. Offer to go to their healthcare provider's appointment with them for support when the symptoms are discussed. With their approval, contact a local mental health center, social service agency, or hospital about services.   You can be an advocate for him or her at healthcare appointments. Many older adults have chronic illnesses that can cause symptoms of depression. Medicine side effects can change thoughts and behaviors. You can help make sure that the healthcare provider looks at all of these factors. He or she should refer your family member or friend to a mental healthcare provider when needed. in  some cases, untreated depression can lead to a misdiagnosis. A person may be diagnosed with a brain disorder such as dementia. If the healthcare provider does not take the issue of depression seriously, help your family member or friend to find another provider.   Don't be afraid to ask  If you think an older person you care about could be suicidal, ask,  Have you thought about suicide?  Most people will tell you the truth. If they say  yes,  they may already have a plan for how and when they will attempt it. Find out as much as you can. The more detailed the plan, and the easier it is to carry out, the more danger the person is in right now. Tell the person you are there for them and do not want them to harm him or herself. Don't wait to get help for the person. Call the person's healthcare provider, local hospital, or emergency services.   To learn more  National Suicide Prevention Lifeline (crisis hotline) 654-430-XKTF (221-320-4986)  National [place] of Mental Vcygts740-316-9484rvj.[facility].nih.gov  National [place] on Mental Sxpgdck567-070-1180znm.[place].org  Mental Health Yjiznub133-539-6501kvt.[facility].org  National Suicide Bjqajqf849-BCJAMIT (592-838-6963)    Call 911  Never leave the person alone. A person who is actively suicidal needs psychiatric care right away. They will need constant supervision. Never leave the person out of sight. Call 911 or the national 24-hour suicide crisis hotline at 717-352-ETHV (688-705-4687). You can also take the person to the closest emergency room.   [employer] last reviewed this educational content on 5/1/2020 2000-2021 [employer]. All rights reserved. This information is not intended as a substitute for professional medical care. Always follow your healthcare professional's instructions.

## 2022-12-20 NOTE — PROGRESS NOTES
"    The patient was provided with suggestions to help her develop a healthy physical lifestyle.  She is at risk for lack of exercise and has been provided with information to increase physical activity for the benefit of her well-being.  The patient was counseled and encouraged to consider modifying their diet and eating habits. She was provided with information on recommended healthy diet options.  Information on urinary incontinence and treatment options given to patient.  The patient was provided with suggestions to help her develop a healthy emotional lifestyle.  The patient s PHQ-9 score is consistent with moderate depression. She was provided with information regarding depression and was advised to schedule a follow up appointment in weeks to further address this issue.  Answers for HPI/ROS submitted by the patient on 12/20/2022  If you checked off any problems, how difficult have these problems made it for you to do your work, take care of things at home, or get along with other people?: Somewhat difficult  PHQ9 TOTAL SCORE: 12  In general, how would you rate your overall physical health?: fair  Frequency of exercise:: 1 day/week  Do you usually eat at least 4 servings of fruit and vegetables a day, include whole grains & fiber, and avoid regularly eating high fat or \"junk\" foods? : No  Taking medications regularly:: Yes  Medication side effects:: None  Activities of Daily Living: no assistance needed  Home safety: no safety concerns identified  Hearing Impairment:: no hearing concerns  In the past 6 months, have you been bothered by leaking of urine?: Yes  abdominal pain: No  Blood in stool: No  Blood in urine: No  chest pain: Yes  chills: No  congestion: Yes  constipation: No  cough: Yes  diarrhea: No  dizziness: No  ear pain: Yes  eye pain: No  nervous/anxious: Yes  fever: No  frequency: Yes  genital sores: No  headaches: No  hearing loss: Yes  heartburn: Yes  arthralgias: Yes  joint swelling: " No  peripheral edema: No  mood changes: No  myalgias: Yes  nausea: No  dysuria: No  palpitations: No  Skin sensation changes: No  sore throat: No  urgency: Yes  rash: No  shortness of breath: Yes  visual disturbance: No  weakness: Yes  pelvic pain: No  vaginal bleeding: No  vaginal discharge: No  breast mass: No  breast discharge: No  In general, how would you rate your overall mental or emotional health?: fair  Additional concerns today:: No  Duration of exercise:: Less than 15 minutes

## 2022-12-20 NOTE — LETTER
My Depression Action Plan  Name: Chanelle Billy   Date of Birth 1955  Date: 12/20/2022    My doctor: Samanta Maza   My clinic: [address] [address] [phone]          GREEN    ZONE   Good Control    What it looks like:     Things are going generally well. You have normal ups and downs. You may even feel depressed from time to time, but bad moods usually last less than a day.   What you need to do:  1. Continue to care for yourself (see self care plan)  2. Check your depression survival kit and update it as needed  3. Follow your physician s recommendations including any medication.  4. Do not stop taking medication unless you consult with your physician first.           YELLOW         ZONE Getting Worse    What it looks like:     Depression is starting to interfere with your life.     It may be hard to get out of bed; you may be starting to isolate yourself from others.    Symptoms of depression are starting to last most all day and this has happened for several days.     You may have suicidal thoughts but they are not constant.   What you need to do:     1. Call your care team. Your response to treatment will improve if you keep your care team informed of your progress. Yellow periods are signs an adjustment may need to be made.     2. Continue your self-care.  Just get dressed and ready for the day.  Don't give yourself time to talk yourself out of it.    3. Talk to someone in your support network.    4. Open up your Depression Self-Care Plan/Wellness Kit.           RED    ZONE Medical Alert - Get Help    What it looks like:     Depression is seriously interfering with your life.     You may experience these or other symptoms: You can t get out of bed most days, can t work or engage in other necessary activities, you have trouble taking care of basic hygiene, or basic responsibilities, thoughts of suicide or death  that will not go away, self-injurious behavior.     What you need to do:  1. Call your care team and request a same-day appointment. If they are not available (weekends or after hours) call your local crisis line, emergency room or 911.          Depression Self-Care Plan / Wellness Kit    Many people find that medication and therapy are helpful treatments for managing depression. In addition, making small changes to your everyday life can help to boost your mood and improve your wellbeing. Below are some tips for you to consider. Be sure to talk with your medical provider and/or behavioral health consultant if your symptoms are worsening or not improving.     Sleep   Sleep hygiene  means all of the habits that support good, restful sleep. It includes maintaining a consistent bedtime and wake time, using your bedroom only for sleeping or sex, and keeping the bedroom dark and free of distractions like a computer, smartphone, or television.     Develop a Healthy Routine  Maintain good hygiene. Get out of bed in the morning, make your bed, brush your teeth, take a shower, and get dressed. Don t spend too much time viewing media that makes you feel stressed. Find time to relax each day.    Exercise  Get some form of exercise every day. This will help reduce pain and release endorphins, the  feel good  chemicals in your brain. It can be as simple as just going for a walk or doing some gardening, anything that will get you moving.      Diet  Strive to eat healthy foods, including fruits and vegetables. Drink plenty of water. Avoid excessive sugar, caffeine, alcohol, and other mood-altering substances.     Stay Connected with Others  Stay in touch with friends and family members.    Manage Your Mood  Try deep breathing, massage therapy, biofeedback, or meditation. Take part in fun activities when you can. Try to find something to smile about each day.     Psychotherapy  Be open to working with a therapist if your provider  recommends it.     Medication  Be sure to take your medication as prescribed. Most anti-depressants need to be taken every day. It usually takes several weeks for medications to work. Not all medicines work for all people. It is important to follow-up with your provider to make sure you have a treatment plan that is working for you. Do not stop your medication abruptly without first discussing it with your provider.    Crisis Resources   These hotlines are for both adults and children. They and are open 24 hours a day, 7 days a week unless noted otherwise.      National Suicide Prevention Lifeline   988 or 1-206-454-VFAZ (3515)      Crisis Text Line    www.crisistextline.org  Text HOME to 694670 from anywhere in the [place], anytime, about any type of crisis. A live, trained crisis counselor will receive the text and respond quickly.      [place] Lifeline for LGBTQ Youth  A national crisis intervention and suicide lifeline for LGBTQ youth under 25. Provides a safe place to talk without judgement. Call 1-184.228.8309; text START to 474458 or visit www.thetrevorproject.org to talk to a trained counselor.      For [facility] crisis numbers, visit the [facility] website at:  https://[place].gov/dhs/people-we-serve/adults/health-care/mental-health/resources/crisis-contacts.jsp

## 2022-12-20 NOTE — LETTER
December 29, 2022      Chanelle Billy  [address] [address]        Dear ,    We are writing to inform you of your test results.    Test results indicate you may require additional follow up, see comment below.    Your hemoglobin A1c is still above 7.  We are now up to 7.8 so you are heading in the wrong direction.  I would like you to follow-up with Dr. Pisano and or Shell Martinez so that we can continue to try to get this under better control.    Resulted Orders   ALT   Result Value Ref Range    ALT 32 10 - 35 U/L   TSH WITH FREE T4 REFLEX   Result Value Ref Range    TSH 1.83 0.30 - 4.20 uIU/mL   Hepatitis C Screen Reflex to HCV RNA Quant and Genotype   Result Value Ref Range    Hepatitis C Antibody Nonreactive Nonreactive    Narrative    Assay performance characteristics have not been established for newborns, infants, and children.   HEMOGLOBIN A1C   Result Value Ref Range    Hemoglobin A1C 7.8 (H) 0.0 - 5.6 %      Comment:      Normal <5.7%   Prediabetes 5.7-6.4%    Diabetes 6.5% or higher     Note: Adopted from ADA consensus guidelines.   CBC with Platelets   Result Value Ref Range    WBC Count 8.8 4.0 - 11.0 10e3/uL    RBC Count 4.61 3.80 - 5.20 10e6/uL    Hemoglobin 13.1 11.7 - 15.7 g/dL    Hematocrit 41.3 35.0 - 47.0 %    MCV 90 78 - 100 fL    MCH 28.4 26.5 - 33.0 pg    MCHC 31.7 31.5 - 36.5 g/dL    RDW 13.4 10.0 - 15.0 %    Platelet Count 241 150 - 450 10e3/uL   LDL cholesterol direct   Result Value Ref Range    LDL Cholesterol Direct 90 <100 mg/dL      Comment:      Age 2-19 years:  Desirable: 0-110 mg/dL   Borderline high: 110-129 mg/dL   High: >= 130 mg/dL    Age 20 years and older:  Desirable: <100mg/dL  Above desirable: 100-129 mg/dL   Borderline high: 130-159 mg/dL   High: 160-189 mg/dL   Very high: >= 190 mg/dL       If you have any questions or concerns, please call the clinic at the number listed above.       Sincerely,      Samanta Maza MD

## 2022-12-20 NOTE — PROGRESS NOTES
"SUBJECTIVE:   Chanelle is a 67 year old who presents for Preventive Visit.    Patient has been advised of split billing requirements and indicates understanding: Yes  Are you in the first 12 months of your Medicare coverage?  No    Healthy Habits:     In general, how would you rate your overall health?  Fair    Frequency of exercise:  1 day/week    Duration of exercise:  Less than 15 minutes    Do you usually eat at least 4 servings of fruit and vegetables a day, include whole grains    & fiber and avoid regularly eating high fat or \"junk\" foods?  No    Taking medications regularly:  Yes    Medication side effects:  None    Ability to successfully perform activities of daily living:  No assistance needed    Home Safety:  No safety concerns identified    Hearing Impairment:  No hearing concerns    In the past 6 months, have you been bothered by leaking of urine? Yes    In general, how would you rate your overall mental or emotional health?  Fair      PHQ-2 Total Score: 3    Additional concerns today:  No      Have you ever done Advance Care Planning? (For example, a Health Directive, POLST, or a discussion with a medical provider or your loved ones about your wishes): Yes, patient states has an Advance Care Planning document and will bring a copy to the clinic.      Right Ear:      1000 Hz RESPONSE- on Level:   20 db  (Conditioning sound)   1000 Hz: RESPONSE- on Level:   20 db    2000 Hz: RESPONSE- on Level: tone not heard   4000 Hz: RESPONSE- on Level: tone not heard    Left Ear:      4000 Hz: RESPONSE- on Level: tone not heard   2000 Hz: RESPONSE- on Level: tone not heard   1000 Hz: RESPONSE- on Level:   20 db     500 Hz: RESPONSE- on Level: tone not heard    Right Ear:    500 Hz: RESPONSE- on Level: tone not heard    Hearing Acuity: REFER    Hearing Assessment: abnormal--refer to audiology   Fall risk  Fallen 2 or more times in the past year?: No  Any fall with injury in the past year?: No    Cognitive Screening   1) " Repeat 3 items (Leader, Season, Table)    2) Clock draw: NORMAL  3) 3 item recall: Recalls 3 objects  Results: 3 items recalled: COGNITIVE IMPAIRMENT LESS LIKELY    Mini-CogTM Copyright S Yaniv. Licensed by the author for use in [facility]; reprinted with permission (jose [email or web address]). All rights reserved.          Reviewed and updated as needed this visit by clinical staff    Allergies  Meds              Reviewed and updated as needed this visit by Provider                 Social History     Tobacco Use     Smoking status: Former     Smokeless tobacco: Never     Tobacco comments:     smoked in high school and a little after her  .   Substance Use Topics     Alcohol use: Yes     Comment: occasionally         Alcohol Use 2022   Prescreen: >3 drinks/day or >7 drinks/week? Not Applicable         Current providers sharing in care for this patient include:   Patient Care Team:  Samanta Maza MD as PCP - [facility] (Family Medicine)  [facility]  Samanta Maza MD ([facility])  Lan Long MD as Assigned Heart and Vascular Provider  Samanta Maza MD as Assigned PCP  Avril Martinez RD as Diabetes Educator    The following health maintenance items are reviewed in Epic and correct as of today:  Health Maintenance   Topic Date Due     ALT  Never done     HF ACTION PLAN  Never done     TSH W/FREE T4 REFLEX  Never done     ANNUAL REVIEW OF HM ORDERS  Never done     ADVANCE CARE PLANNING  Never done     DEPRESSION ACTION PLAN  Never done     COLORECTAL CANCER SCREENING  Never done     HEPATITIS C SCREENING  Never done     LUNG CANCER SCREENING  Never done     ZOSTER IMMUNIZATION (2 of 2) 10/20/2021     COVID-19 Vaccine (5 - Booster for Pfizer series) 2022     MEDICARE ANNUAL WELLNESS VISIT  2022     INFLUENZA VACCINE (1) 2022     A1C  2022     CBC  2022     BMP  2023     PHQ-9  2023     LIPID  2023      MICROALBUMIN  06/27/2023     DIABETIC FOOT EXAM  06/27/2023     EYE EXAM  11/14/2023     FALL RISK ASSESSMENT  12/20/2023     MAMMO SCREENING  10/11/2024     DTAP/TDAP/TD IMMUNIZATION (2 - Td or Tdap) 03/23/2028     DEXA  08/23/2036     Pneumococcal Vaccine: 65+ Years  Completed     IPV IMMUNIZATION  Aged Out     MENINGITIS IMMUNIZATION  Aged Out           Breast CA Risk Assessment (FHS-7) 12/20/2022   Do you have a family history of breast, colon, or ovarian cancer? No / Unknown         Patient can contact the local hospital to schedule her mammogram she should do this annually and does not need an order from me to schedule this.  Pertinent mammograms are reviewed under the imaging tab.    Review of Systems   Constitutional: Negative for chills and fever.   HENT: Positive for congestion, ear pain and hearing loss. Negative for sore throat.    Eyes: Negative for pain and visual disturbance.   Respiratory: Positive for cough and shortness of breath.    Cardiovascular: Positive for chest pain. Negative for palpitations and peripheral edema.   Gastrointestinal: Positive for heartburn. Negative for abdominal pain, constipation, diarrhea, hematochezia and nausea.   Breasts:  Negative for breast mass and discharge.   Genitourinary: Positive for frequency and urgency. Negative for dysuria, genital sores, hematuria, pelvic pain, vaginal bleeding and vaginal discharge.   Musculoskeletal: Positive for arthralgias and myalgias. Negative for joint swelling.   Skin: Negative for rash.   Neurological: Positive for weakness. Negative for dizziness, headaches and paresthesias.   Psychiatric/Behavioral: Negative for mood changes. The patient is nervous/anxious.    Answers for HPI/ROS submitted by the patient on 12/20/2022  If you checked off any problems, how difficult have these problems made it for you to do your work, take care of things at home, or get along with other people?: Somewhat difficult  PHQ9 TOTAL SCORE:  "12          OBJECTIVE:   /60 (BP Location: Right arm, Patient Position: Sitting)   Pulse 62   Resp 16   Ht 1.568 m (5' 1.75\")   Wt 71.7 kg (158 lb)   SpO2 96%   BMI 29.13 kg/m   Estimated body mass index is 29.13 kg/m  as calculated from the following:    Height as of this encounter: 1.568 m (5' 1.75\").    Weight as of this encounter: 71.7 kg (158 lb).  Physical Exam      General: alert and oriented ×3 no acute distress.    HEENT: Normocephalic and atraumatic.   Eyes pupils are equal round and reactive to light extraocular motion is intact. normal conjunctiva    Hearing is grossly normal and there is no otorrhea.     Chest: has bilateral rise with no increased work of breathing. clear to auscultation without wheezes, rhonchi, or rales.    Cardiovascular: normal perfusion and brisk capillary refill. S1S2 with regular rate and rhythm and no murmurs, gallops or rubs.    Musculoskeletal: no gross focal abnormalities and normal gait.    Neuro: no gross focal abnormalities and memory seems intact. CN 2-12 are grossly intact.    Psychiatric: speech is clear and coherent and fluent. Patient dressed appropriately for the weather. Mood is appropriate and affect is full.              ASSESSMENT / PLAN:       ICD-10-CM    1. ACP (advance care planning)  Z71.89       2. Hyperlipidemia LDL goal <70  E78.5 ALT     LDL cholesterol direct      3. Type 2 diabetes mellitus with other circulatory complication, with long-term current use of insulin (H)  E11.59 TSH WITH FREE T4 REFLEX    Z79.4 HEMOGLOBIN A1C     metFORMIN (GLUCOPHAGE XR) 500 MG 24 hr tablet     empagliflozin (JARDIANCE) 25 MG TABS tablet     Med Therapy Management Referral      4. Congestive heart failure, unspecified HF chronicity, unspecified heart failure type (H)  I50.9 CBC with Platelets     potassium chloride ER (K-TAB/KLOR-CON) 10 MEQ CR tablet     furosemide (LASIX) 20 MG tablet     Med Therapy Management Referral      5. Screen for colon cancer  " Z12.11 Fecal colorectal cancer screen (FIT)      6. Need for hepatitis C screening test  Z11.59 Hepatitis C Screen Reflex to HCV RNA Quant and Genotype      7. Encounter for Medicare annual wellness exam  Z00.00       8. Hypercholesterolemia  E78.00 rosuvastatin (CRESTOR) 20 MG tablet      9. Generalized anxiety disorder  F41.1 FLUoxetine (PROZAC) 20 MG capsule      10. Coronary artery disease involving native coronary artery of native heart without angina pectoris  I25.10 nitroGLYcerin (NITROSTAT) 0.4 MG sublingual tablet     Med Therapy Management Referral        Patient continues to meet criteria for CGM coverage;   - patient has type two diabetes mellitus; and   - patient has been using CGM daily; and   - patient is insulin treated with insulin completing multiple injections daily 3 or more times/ day   - patient is frequently adjusting insulin based on blood glucose readings   - within six months prior to ordering the CGM, the patient has had an in-person visit with the practitioner; and determined that criteria (1-4) above are met; and   - every six months following the initial prescription of the CGM, the treating practitioner has an in-person visit with the patient to assess adherence to their CGM regimen and diabetes treatment plan       Basaglar decrease to 27u every day       Humalog 15u for noon and evening meal   Humalog 5u for snacks       Humalog Correction Factor before meals   Add another 1u if sensor between 150 - 200mg/dL   Add another 2u if sensor between 201 - 250mg/dL       Metformin XR 500mg ii tabs twice a day   Jardiance 25 mg daily            COUNSELING:  Reviewed preventive health counseling, as reflected in patient instructions       Regular exercise       Healthy diet/nutrition       Vision screening       Hearing screening       Dental care       Bladder control       Fall risk prevention       Osteoporosis prevention/bone health       Consider lung cancer screening for ages 55-80 years  "(77 for Medicare) and 20 pack-year smoking history patient has been a non-smoker for greater than 20 years.       Colon cancer screening       Hepatitis C screening       HIV screening for high risk patient       Advanced Planning       BMI:   Estimated body mass index is 29.13 kg/m  as calculated from the following:    Height as of this encounter: 1.568 m (5' 1.75\").    Weight as of this encounter: 71.7 kg (158 lb).   Weight management plan: Discussed healthy diet and exercise guidelines      She reports that she has quit smoking. She has never used smokeless tobacco.      Appropriate preventive services were discussed with this patient, including applicable screening as appropriate for cardiovascular disease, diabetes, osteopenia/osteoporosis, and glaucoma.  As appropriate for age/gender, discussed screening for colorectal cancer, prostate cancer, breast cancer, and cervical cancer. Checklist reviewing preventive services available has been given to the patient.    Reviewed patients plan of care and provided an AVS. The Basic Care Plan (routine screening as documented in Health Maintenance) for Chanelle meets the Care Plan requirement. This Care Plan has been established and reviewed with the Patient.    . MD Shaunna  [facility]     Identified Health Risks:    The patient s PHQ-9 score is consistent with moderate depression. She was provided with information regarding depression and was advised to schedule a follow up appointment in 8 weeks to further address this issue.  She does not want to try increasing her fluoxetine at this time.  She had been seeing counselor however her counselor retired and is planning to set up an appointment with a counselor from [facility] to establish care with a new counselor.  She declines seeing Lien Mcconnell for bridging.    In addition to time spent performing either a procedure or wellness visit today, total time spent reviewing chart and " preparing for appointment, with patient for appointment, and time spent charting and coordinating care on the day of the appointment in minutes was:40

## 2022-12-20 NOTE — TELEPHONE ENCOUNTER
Please let patient know that I forgot to talk with her about her abnormal hearing screen and was wondering if she would like to have an appointment with the audiologist for a hearing test.  Thank you

## 2022-12-21 LAB — HCV AB SERPL QL IA: NONREACTIVE

## 2023-01-20 ENCOUNTER — TELEPHONE (OUTPATIENT)
Dept: FAMILY MEDICINE | Facility: CLINIC | Age: 68
End: 2023-01-20

## 2023-01-20 NOTE — TELEPHONE ENCOUNTER
1-20-23  Jeanette called from [employer] asking for status on form she faxed on 1-13 & 1-17  Jeanette needs form filled out in order for cayt to receive payment from patients insurance for patients Lacey soni

## 2023-01-24 ENCOUNTER — OFFICE VISIT (OUTPATIENT)
Dept: PHARMACY | Facility: CLINIC | Age: 68
End: 2023-01-24
Attending: FAMILY MEDICINE
Payer: COMMERCIAL

## 2023-01-24 VITALS
BODY MASS INDEX: 28.52 KG/M2 | DIASTOLIC BLOOD PRESSURE: 62 MMHG | OXYGEN SATURATION: 97 % | SYSTOLIC BLOOD PRESSURE: 122 MMHG | HEART RATE: 62 BPM | WEIGHT: 154.7 LBS

## 2023-01-24 DIAGNOSIS — Z79.4 TYPE 2 DIABETES MELLITUS WITH OTHER SPECIFIED COMPLICATION, WITH LONG-TERM CURRENT USE OF INSULIN (H): Primary | ICD-10-CM

## 2023-01-24 DIAGNOSIS — E11.69 TYPE 2 DIABETES MELLITUS WITH OTHER SPECIFIED COMPLICATION, WITH LONG-TERM CURRENT USE OF INSULIN (H): Primary | ICD-10-CM

## 2023-01-24 PROCEDURE — 99207 PR NO CHARGE LOS: CPT | Performed by: PHARMACIST

## 2023-01-24 RX ORDER — LIRAGLUTIDE 6 MG/ML
INJECTION SUBCUTANEOUS
Qty: 2.1 ML | Refills: 0 | OUTPATIENT
Start: 2023-01-24 | End: 2023-01-24

## 2023-01-24 RX ORDER — LIRAGLUTIDE 6 MG/ML
INJECTION SUBCUTANEOUS
Qty: 9 ML | Refills: 0 | Status: SHIPPED | OUTPATIENT
Start: 2023-01-24 | End: 2023-02-28

## 2023-01-24 RX ORDER — INSULIN LISPRO 100 [IU]/ML
INJECTION, SOLUTION INTRAVENOUS; SUBCUTANEOUS
Qty: 15 ML | Refills: 0 | Status: SHIPPED | OUTPATIENT
Start: 2023-01-24 | End: 2023-02-28

## 2023-01-24 RX ORDER — INSULIN DEGLUDEC 100 U/ML
14 INJECTION, SOLUTION SUBCUTANEOUS AT BEDTIME
Qty: 30 ML | Refills: 1 | Status: SHIPPED | OUTPATIENT
Start: 2023-01-24 | End: 2023-02-28

## 2023-01-24 NOTE — PATIENT INSTRUCTIONS
"Recommendations from today's MTM visit:                                                    MTM (medication therapy management) is a service provided by a clinical pharmacist designed to help you get the most of out of your medicines.      1. When you start victoza (see below), Decrease tresiba (long acting) insulin to 14 units once daily at bedtime.  Decrease humalog (short acting) insulin to 5 units three times daily with meals. No short acting insulin with snacks.     2. Start Liraglutide (Victoza) injected under the skin once daily.  Eat small meals and eat slowly to minimize upset stomach.    Day Victoza dose Check this box when given     Day 1 0.6 mg      Day 2 0.6 mg      Day 3 0.6 mg      Day 4 0.6 mg      Day 5 0.6 mg      Day 6 0.6 mg      Day 7 0.6 mg      Day 8 1.2 mg      Day 9 1.2 mg      Day 10 1.2 mg      Day 11 1.2 mg      Day 12 1.2 mg      Day 13 1.2 mg      Day 14 1.2 mg      Day 15 1.8 mg      Day 16 1.8 mg      Day 17 1.8 mg      Day 18 1.8 mg      Day 19 1.8 mg      Day 20 1.8 mg      Day 21 1.8 mg      Following days 1.8 mg      Follow-up: 2/1 with YUMIKO Spicer  1 month with MTM - make an appointment at the .    It was great speaking with you today.  I value your experience and would be very thankful for your time in providing feedback in our clinic survey. In the next few days, you may receive an email or text message from [email or web address] with a link to a survey related to your  clinical pharmacist.\"     To schedule another MTM appointment, please call the clinic directly or you may call the MTM scheduling line at [phone] or toll-free at [phone].     My Clinical Pharmacist's contact information:                                                      Please feel free to contact me with any questions or concerns you have.      Carly Pisano, PharmD  Medication Therapy Management (MTM) Pharmacist   "

## 2023-01-24 NOTE — PROGRESS NOTES
Medication Therapy Management (MTM) Encounter    ASSESSMENT:                            Medication Adherence/Access: See below for considerations    Type 2 Diabetes:  Patient's A1c of 7.8% is not at ADA goal of <7%.   SMBG fasting sugars are sometimes at ADA goal of  mg/dL, but frequently low and post-prandial sugars are not at goal of less than 180 mg/dL.  Patient would benefit from:  Metformin: continue current 2000 mg/day (at max 2000 mg/day)  GLP-1: initiate synergistic therapy to optimize blood sugar control and allow for insulin taper and decreased risk of HYPOglycemia. Preference for once daily for potentially better tolerance; Patient notes that she did have GI upset issues in the past, but these are much less bothersome at this time and she is willing to try a GLP-1 RA.  Discussed with diabetes educator today who will follow-up with Patient in 1-2 weeks after med initiation. Pen injection demo completed today with demo pen.  Long acting insulin: will reduce dose by 50% since overnight and AM blood sugars have been low and since adding a GLP1 RA. Opted not to have Patient taper down her dose weekly on her own due to history of misunderstanding instructions and instead recommend close follow-up with [facility] and myself.  Short acting insulin: will decrease dose to 1/3 of current dose to decrease risk of HYPOglycemia with GLP-1 RA initiation.  SGLT2 (Jardiance): continue current dose (at max 25 mg/day)  CGM: continue use    Will address other medical conditions at follow-up appointment - prioritized the above condition due to time limitations and reason for referral.    PLAN:                            1. When you start victoza (see below), Decrease tresiba (long acting) insulin to 14 units once daily at bedtime.  Decrease humalog (short acting) insulin to 5 units three times daily with meals. No short acting insulin with snacks.     2. Start Liraglutide (Victoza) injected under the skin once daily.  Eat  small meals and eat slowly to minimize upset stomach.    Day Victoza dose Check this box when given     Day 1 0.6 mg      Day 2 0.6 mg      Day 3 0.6 mg      Day 4 0.6 mg      Day 5 0.6 mg      Day 6 0.6 mg      Day 7 0.6 mg      Day 8 1.2 mg      Day 9 1.2 mg      Day 10 1.2 mg      Day 11 1.2 mg      Day 12 1.2 mg      Day 13 1.2 mg      Day 14 1.2 mg      Day 15 1.8 mg      Day 16 1.8 mg      Day 17 1.8 mg      Day 18 1.8 mg      Day 19 1.8 mg      Day 20 1.8 mg      Day 21 1.8 mg      Following days 1.8 mg      Follow-up: 2/1 with YUMIKO Spicer  1 month with MTM - make an appointment at the .    SUBJECTIVE/OBJECTIVE:                          Chanelle Billy is a 67 year old female coming in for a follow-up visit.  Today's visit is a follow-up MTM visit from 3/11/2021     Reason for visit: diabetes not controlled    Allergies/ADRs: Reviewed in chart  Past Medical History: Reviewed in chart  Tobacco: She reports that she has quit smoking. She has never used smokeless tobacco.  Alcohol: very infrequently; last drink was a shot glass size of beer 2-3 years ago.  Caffeine: 1-2 cups/day of coffee; sometimes with cream and sugar.  Activity: minimal, no exercise routine.  Past Medical History: Reviewed in chart  Social: lives alone, often visits with her daughter Dionna     Providers:  Primary care provider: Samanta Maza MD at Samanta Maza MD  Cardiology: Dr. Long  Medication Therapy Management (MTM): Carly Pisano PharmD  Diabetes Education: Avril Martinez RD, CD, [facility]     Medication Adherence/Access:   Patient uses pill box(es).  Patient takes medications 2 time(s) per day + insulin  Per patient, misses medication (pills) 0-1 times per week.  Medication barriers: none.   The patient fills medications at [facility]: NO, fills medications at [facility].    Type 2 Diabetes:  Testing supplies: has freestyle roula 2 and glucometer  Following with certified diabetes care and  ([facility])?  "Planning to re-establish with diabetes ed in 1-2 weeks.  Pt currently taking:  Metformin  m tabs twice daily   Tresiba (degludec) long acting insulin: 28 units daily  Humalog (lispro) rapid acting insulin: 15 units three times daily with meals and 5 units with snacks.  Jardiance (empagliflozin) 25 m tab once daily  Patient is not experiencing side effects.  Record of home blood sugars:  SMBG: see libreview results below   Symptoms of low blood sugar? Did not discuss symptoms in detail today, have discussed in detail in the past and Patient knows to use fast acting sugar.  Symptoms of high blood sugar? Feels \"blah\"             Lab Results   Component Value Date    A1C 7.8 2022    A1C 7.2 2022    A1C 8.1 2021    A1C 8.7 2021    A1C 8.6 2021    A1C 8.6 2021    A1C 11.4 10/22/2020    A1C 6.8 2020    A1C 11.7 2019     Eye exam:  up to date, last 22  Foot exam: up to date, last 22  Diet: eating 3 meals per day and snacking late at nighttime.  Exercise: no exercise routine.  ASCVD Prevention:  Aspirin: Taking 81mg daily and denies side effects   Statin rosuvastatin 20 mg daily.  ACEi/ARB: Yes: lisinopril.   Urine Albumin:   Lab Results   Component Value Date    UMALCR  2022      Comment:      Unable to calculate, urine albumin and/or urine creatinine is outside detectable limits.  Microalbuminuria is defined as an albumin:creatinine ratio of 17 to 299 for males and 25 to 299 for females. A ratio of albumin:creatinine of 300 or higher is indicative of overt proteinuria.  Due to biologic variability, positive results should be confirmed by a second, first-morning random or 24-hour timed urine specimen. If there is discrepancy, a third specimen is recommended. When 2 out of 3 results are in the microalbuminuria range, this is evidence for incipient nephropathy and warrants increased efforts at glucose control, blood pressure control, and " institution of therapy with an angiotensin-converting-enzyme (ACE) inhibitor (if the patient can tolerate it).           Today's Vitals: /62   Pulse 62   Wt 154 lb 11.2 oz (70.2 kg)   SpO2 97%   BMI 28.52 kg/m     ----------------    I spent 45 minutes with this patient today. All changes were made via collaborative practice agreement with Samanta Maza MD. A copy of the visit note was provided to the patient's provider(s).    A summary of these recommendations was given to the patient.    Carly Pisano, KushalD  Medication Therapy Management (MTM) Pharmacist  [place] [place] [facility] (Tu/Th/Fr)  Clinic Phone: [phone]  Pager: [phone]     Medication Therapy Recommendations  Type 2 diabetes mellitus with other specified complication, with long-term current use of insulin (H)    Current Medication: insulin degludec (TRESIBA FLEXTOUCH) 100 UNIT/ML pen (Discontinued)   Rationale: Dose too high - Dosage too high - Safety   Recommendation: Decrease Dose   Status: Accepted per CPA          Current Medication: liraglutide (VICTOZA) 18 MG/3ML solution   Rationale: Synergistic therapy - Needs additional medication therapy - Indication   Recommendation: Start Medication   Status: Accepted per CPA

## 2023-02-01 ENCOUNTER — OFFICE VISIT (OUTPATIENT)
Dept: CARDIOLOGY | Facility: CLINIC | Age: 68
End: 2023-02-01
Attending: INTERNAL MEDICINE
Payer: COMMERCIAL

## 2023-02-01 ENCOUNTER — ALLIED HEALTH/NURSE VISIT (OUTPATIENT)
Dept: EDUCATION SERVICES | Facility: CLINIC | Age: 68
End: 2023-02-01
Payer: COMMERCIAL

## 2023-02-01 VITALS
HEIGHT: 63 IN | DIASTOLIC BLOOD PRESSURE: 64 MMHG | WEIGHT: 151.2 LBS | RESPIRATION RATE: 16 BRPM | HEART RATE: 76 BPM | BODY MASS INDEX: 26.79 KG/M2 | SYSTOLIC BLOOD PRESSURE: 112 MMHG

## 2023-02-01 DIAGNOSIS — I10 ESSENTIAL HYPERTENSION: ICD-10-CM

## 2023-02-01 DIAGNOSIS — Z79.4 TYPE 2 DIABETES MELLITUS WITH CIRCULATORY DISORDER, WITH LONG-TERM CURRENT USE OF INSULIN (H): Primary | ICD-10-CM

## 2023-02-01 DIAGNOSIS — E11.59 TYPE 2 DIABETES MELLITUS WITH CIRCULATORY DISORDER, WITH LONG-TERM CURRENT USE OF INSULIN (H): Primary | ICD-10-CM

## 2023-02-01 DIAGNOSIS — I25.119 CORONARY ARTERY DISEASE INVOLVING NATIVE CORONARY ARTERY OF NATIVE HEART WITH ANGINA PECTORIS (H): ICD-10-CM

## 2023-02-01 DIAGNOSIS — E78.5 HYPERLIPIDEMIA LDL GOAL <70: ICD-10-CM

## 2023-02-01 DIAGNOSIS — R06.09 DYSPNEA ON EXERTION: ICD-10-CM

## 2023-02-01 DIAGNOSIS — I25.5 ISCHEMIC CARDIOMYOPATHY: ICD-10-CM

## 2023-02-01 DIAGNOSIS — E78.00 HYPERCHOLESTEROLEMIA: ICD-10-CM

## 2023-02-01 LAB
ANION GAP SERPL CALCULATED.3IONS-SCNC: 12 MMOL/L (ref 7–15)
BUN SERPL-MCNC: 19.9 MG/DL (ref 8–23)
CALCIUM SERPL-MCNC: 9.5 MG/DL (ref 8.8–10.2)
CHLORIDE SERPL-SCNC: 104 MMOL/L (ref 98–107)
CREAT SERPL-MCNC: 0.69 MG/DL (ref 0.51–0.95)
DEPRECATED HCO3 PLAS-SCNC: 23 MMOL/L (ref 22–29)
GFR SERPL CREATININE-BSD FRML MDRD: >90 ML/MIN/1.73M2
GLUCOSE SERPL-MCNC: 124 MG/DL (ref 70–99)
POTASSIUM SERPL-SCNC: 4.5 MMOL/L (ref 3.4–5.3)
SODIUM SERPL-SCNC: 139 MMOL/L (ref 136–145)

## 2023-02-01 PROCEDURE — 36415 COLL VENOUS BLD VENIPUNCTURE: CPT | Performed by: INTERNAL MEDICINE

## 2023-02-01 PROCEDURE — G0108 DIAB MANAGE TRN  PER INDIV: HCPCS | Mod: AE | Performed by: DIETITIAN, REGISTERED

## 2023-02-01 PROCEDURE — 99214 OFFICE O/P EST MOD 30 MIN: CPT | Performed by: INTERNAL MEDICINE

## 2023-02-01 PROCEDURE — 80048 BASIC METABOLIC PNL TOTAL CA: CPT | Performed by: INTERNAL MEDICINE

## 2023-02-01 RX ORDER — ROSUVASTATIN CALCIUM 40 MG/1
40 TABLET, COATED ORAL AT BEDTIME
Qty: 90 TABLET | Refills: 3 | Status: SHIPPED | OUTPATIENT
Start: 2023-02-01 | End: 2024-01-18

## 2023-02-01 NOTE — LETTER
February 2, 2023      Chanelle Billy  [address] [address]        Dear ,    We are writing to inform you of your test results.    Electrolytes and kidney function look good. Plan for follow-up in clinic in August.     Resulted Orders   Basic metabolic panel   Result Value Ref Range    Sodium 139 136 - 145 mmol/L    Potassium 4.5 3.4 - 5.3 mmol/L    Chloride 104 98 - 107 mmol/L    Carbon Dioxide (CO2) 23 22 - 29 mmol/L    Anion Gap 12 7 - 15 mmol/L    Urea Nitrogen 19.9 8.0 - 23.0 mg/dL    Creatinine 0.69 0.51 - 0.95 mg/dL    Calcium 9.5 8.8 - 10.2 mg/dL    Glucose 124 (H) 70 - 99 mg/dL    GFR Estimate >90 >60 mL/min/1.73m2      Comment:      eGFR calculated using 2021 CKD-EPI equation.       If you have any questions or concerns, please call the clinic at the number listed above.       Sincerely,      Lan Long MD

## 2023-02-01 NOTE — LETTER
"    2023         RE: Chanelle Billy  [address]  [place] [address]        Dear Colleague,    Thank you for referring your patient, Chanelle Billy, to the [facility]. Please see a copy of my visit note below.    Diabetes Self-Management Education & Support    Presents for: Type 2 diabetes update and education and review CGM reports, on MDI insulin.    Type of Service: In Person Visit    Assessment Type:   ASSESSMENT:  2023 patient was started on Victoza by MTM and his taken 7 days of Victoza successfully with reduction in insulin and significant improvement in glycemic control.  Patient feels that her appetite is reduced nicely and does not feel like she wants to \"stuff herself\" every time she eats portion sizes are smaller and also feels like she has a \"peppier mood\".     Prior to Victoza start patient had been on Tresiba 28 units daily and Humalog 15 units 3 times daily with meals and 5 units with snacks)    New Recommendations  Decrease tresiba (long acting) insulin from 14 units to 11 units once daily at bedtime.  Decrease Humalog (short acting) insulin from 5 units to 3 units three times daily with meals. No short acting insulin with snacks.     Continue   Metformin  m tabs twice daily   Jardiance (empagliflozin) 25 m tab once daily  Patient is not experiencing side effects.    Patient's most recent   Lab Results   Component Value Date    A1C 7.8 2022    A1C 8.6 2021     is meeting goal of <8.0    Diabetes knowledge and skills assessment:   Patient is knowledgeable in diabetes management concepts related to: Monitoring, Problem Solving, Reducing Risks and Healthy Coping    Continue education with the following diabetes management concepts: Healthy Eating, Being Active and Taking Medication    Based on learning assessment above, most appropriate setting for further diabetes education would be: Individual setting.      PLAN  New " "Recommendations  Decrease tresiba (long acting) insulin from 14 units to 11 units once daily at bedtime.  Decrease Humalog (short acting) insulin from 5 units to 3 units three times daily with meals. No short acting insulin with snacks.     Continue   Metformin  m tabs twice daily   Jardiance (empagliflozin) 25 m tab once daily  Patient is not experiencing side effects.    Patient will follow up with MTM in 1 month     Topics to cover at upcoming visits: Healthy Eating, Being Active, Monitoring and Taking Medication    Follow-up: 2 months    See Care Plan for co-developed, patient-state behavior change goals.  AVS provided for patient today.    Education Materials Provided:  Goals for Your Diabetes Care, My Plate Planner and Victoza spreadsheet for titration injection schedule updated      SUBJECTIVE/OBJECTIVE:  Diabetes education in the past 24mo: Yes  Diabetes type: Type 2  Disease course: Improving  How confident are you filling out medical forms by yourself:: Other  Diabetes management related comments/concerns: have daughter help me by reading it to me  Cultural Influences/Ethnic Background:  Not  or     Diabetes Symptoms & Complications:  Fatigue: Sometimes  Neuropathy: No  Polydipsia: No  Polyphagia: No  Polyuria: Yes  Visual change: No  Slow healing wounds: No  Autonomic neuropathy: No  CVA: No  Heart disease: Yes  Nephropathy: No  Peripheral neuropathy: No  Peripheral Vascular Disease: No  Retinopathy: No  Sexual dysfunction: Yes    Patient Problem List and Family Medical History reviewed for relevant medical history, current medical status, and diabetes risk factors.    Vitals:  There were no vitals taken for this visit.  Estimated body mass index is 27.21 kg/m  as calculated from the following:    Height as of an earlier encounter on 23: 1.588 m (5' 2.5\").    Weight as of an earlier encounter on 23: 68.6 kg (151 lb 3.2 oz).   Last 3 BP:   BP Readings from Last 3 " Encounters:   02/01/23 112/64   01/24/23 122/62   12/20/22 110/60       History   Smoking Status     Former   Smokeless Tobacco     Never       Labs:  Lab Results   Component Value Date    A1C 7.8 12/20/2022    A1C 8.6 02/11/2021     Lab Results   Component Value Date     02/01/2023    GLC 64 03/16/2022     02/11/2021     Lab Results   Component Value Date    LDL 90 12/20/2022    LDL 53 06/27/2022    LDL 71 03/13/2020     HDL Cholesterol   Date Value Ref Range Status   03/13/2020 60 >or=50 mg/dL Final     Direct Measure HDL   Date Value Ref Range Status   06/27/2022 47 (L) >=50 mg/dL Final   ]  GFR Estimate   Date Value Ref Range Status   02/01/2023 >90 >60 mL/min/1.73m2 Final     Comment:     eGFR calculated using 2021 CKD-EPI equation.   02/11/2021 92 >or=60 ml/min/1.73m2 Final     GFR Estimate If Black   Date Value Ref Range Status   02/11/2021 106 >or=60 ml/min/1.73m2 Final     Lab Results   Component Value Date    CR 0.69 02/01/2023    CR 0.68 02/11/2021     No results found for: MICROALBUMIN    Healthy Eating:  Cultural/Church diet restrictions?: No  Meal planning/habits: Low salt, Smaller portions  How many times a week on average do you eat food made away from home (restaurant/take-out)?: 0  Meals include: Lunch, Dinner  Beverages: Water, Tea, Coffee, Milk, Juice, Coffee drinks    Being Active:  Barrier to exercise: Access    Monitoring:  Blood Glucose Meter: FreeStyle CGM  Times checking blood sugar at home (number): 5+  Times checking blood sugar at home (per): Day  Blood glucose trend: Fluctuating                Taking Medications:  Diabetes Medication(s)     Biguanides       metFORMIN (GLUCOPHAGE XR) 500 MG 24 hr tablet    TAKE TWO TABLETS (1,000 MG) BY MOUTH TWO TIMES DAILY (WITH MEALS) Strength: 500 mg    Diabetic Other       Dextrose, Diabetic Use, (GLUCOSE) 1 g CHEW    Take 15 g by mouth As needed    Insulin       insulin degludec (TRESIBA FLEXTOUCH) 100 UNIT/ML pen    Inject 14  Units Subcutaneous At Bedtime     insulin lispro (HUMALOG KWIKPEN) 100 UNIT/ML (1 unit dial) KWIKPEN    Inject 5 units under the skin three times daily with meals. No short acting insulin with snacks.    Sodium-Glucose Co-Transporter 2 (SGLT2) Inhibitors       empagliflozin (JARDIANCE) 25 MG TABS tablet    Take 1 tablet (25 mg) by mouth daily    Incretin Mimetic Agents       liraglutide (VICTOZA) 18 MG/3ML solution    Inject 0.6 mg Subcutaneous daily for 7 days, THEN 1.2 mg daily for 7 days, THEN 1.8 mg daily.          Current Treatments: Insulin Injections, Oral Medication (taken by mouth)    Problem Solving:  Reviewed signs and symptoms of hypoglycemia and treatment protocol  Provided updated spreadsheet of Victoza titration dosing as patient had spilled coffee on her current sheet       Reducing Risks:  CAD Risks: Diabetes Mellitus, Dyslipidemia, Family history, Hypertension, Sedentary lifestyle, Stress    Ongoing reduction in insulin to prevent hypoglycemia as Victoza titration increases    Healthy Coping:  Informal Support system:: Jacklyn based, Family, Friends  Patient Activation Measure Survey Score:  No flowsheet data found.      Care Plan and Education Provided:  Care Plan: Diabetes   Updates made by Avril Martinez RD since 2/2/2023 12:00 AM      Problem: HbA1C Not In Goal       Goal: Establish Regular Follow-Ups with PCP       Task: Discuss with PCP the recommended timing for patient's next follow up visit(s)    Responsible User: Avril Martinez RD      Task: Discuss schedule for PCP visits with patient Completed 2/2/2023   Responsible User: Avril Martinez RD      Goal: Get HbA1C Level in Goal       Task: Educate patient on diabetes education self-management topics Completed 2/2/2023   Responsible User: Avril Martinez RD      Task: Educate patient on benefits of regular glucose monitoring    Responsible User: Avril Martinez RD      Task: Refer patient to appropriate extended care team member, as needed  (Medication Therapy Management, Behavioral Health, Physical Therapy, etc.)    Responsible User: Avril Martinez RD      Task: Discuss diabetes treatment plan with patient Completed 2/2/2023   Responsible User: Avril Martinez RD      Problem: Diabetes Self-Management Education Needed to Optimize Self-Care Behaviors       Goal: Understand diabetes pathophysiology and disease progression       Task: Provide education on diabetes pathophysiology and disease progression specfic to patient's diabetes type    Responsible User: Avril Martinez RD      Goal: Healthy Eating - follow a healthy eating pattern for diabetes       Task: Provide education on portion control and consistency in amount, composition and timing of food intake Completed 2/2/2023   Responsible User: Avril Martinez RD      Task: Provide education on managing carbohydrate intake (carbohydrate counting, plate planning method, etc.)    Responsible User: Avril Martinez RD      Task: Provide education on weight management    Responsible User: Avril Martinez RD      Task: Provide education on heart healthy eating Completed 2/2/2023   Responsible User: Avril Martinez RD      Task: Provide education on eating out Completed 2/2/2023   Responsible User: Avril Martinez RD      Task: Develop individualized healthy eating plan with patient    Responsible User: Avril Martinez RD      Goal: Being Active - get regular physical activity, working up to at least 150 minutes per week       Task: Provide education on relationship of activity to glucose and precautions to take if at risk for low glucose    Responsible User: Avril Martinez RD      Task: Discuss barriers to physical activity with patient Completed 2/2/2023   Responsible User: Avril Martinez RD      Task: Develop physical activity plan with patient    Responsible User: Avril Martinez RD      Task: Explore community resources including walking groups, assistance programs, and home videos    Responsible  User: Avril Martinez RD      Goal: Monitoring - monitor glucose and ketones as directed       Task: Provide education on blood glucose monitoring (purpose, proper technique, frequency, glucose targets, interpreting results, when to use glucose control solution, sharps disposal)    Responsible User: Avril Martinez RD      Task: Provide education on continuous glucose monitoring (sensor placement, use of bettina or /reader, understanding glucose trends, alerts and alarms, differences between sensor glucose and blood glucose) Completed 2/2/2023   Responsible User: Avril Martinez RD      Task: Provide education on ketone monitoring (when to monitor, frequency, etc.)    Responsible User: Avril Martinez RD      Goal: Taking Medication - patient is consistently taking medications as directed    This Visit's Progress: 100%   Note:    Patient to take diabetes related medications as directed     Task: Provide education on action of prescribed medication, including when to take and possible side effects Completed 2/2/2023   Responsible User: Avril Martinez RD      Task: Provide education on insulin and injectable diabetes medications, including administration, storage, site selection and rotation for injection sites Completed 2/2/2023   Responsible User: Avril Martinez RD      Task: Discuss barriers to medication adherence with patient and provide management technique ideas as appropriate    Responsible User: Avril Martinez RD      Task: Provide education on frequency and refill details of medications    Responsible User: Avril Martinez RD      Goal: Problem Solving - know how to prevent and manage short-term diabetes complications       Task: Provide education on high blood glucose - causes, signs/symptoms, prevention and treatment    Responsible User: Avril Martinez RD      Task: Provide education on low blood glucose - causes, signs/symptoms, prevention, treatment, carrying a carbohydrate source at all times,  and medical identification    Responsible User: Avril Martinez RD      Task: Provide education on safe travel with diabetes    Responsible User: Avril Martinez RD      Task: Provide education on how to care for diabetes on sick days    Responsible User: Avril Martinez RD      Task: Provide education on when to call a health care provider    Responsible User: Avril Martinez RD      Goal: Reducing Risks - know how to prevent and treat long-term diabetes complications       Task: Provide education on major complications of diabetes, prevention, early diagnostic measures and treatment of complications    Responsible User: Avril Martinez RD      Task: Provide education on recommended care for dental, eye and foot health    Responsible User: Avril Martinez RD      Task: Provide education on Hemoglobin A1c - goals and relationship to blood glucose levels    Responsible User: Avril Martinez RD      Task: Provide education on recommendations for heart health - lipid levels and goals, blood pressure and goals, and aspirin therapy, if indicated    Responsible User: Avril Martinez RD      Task: Provide education on tobacco cessation    Responsible User: Avril Martinez RD      Goal: Healthy Coping - use available resources to cope with the challenges of managing diabetes       Task: Discuss recognizing feelings about having diabetes    Responsible User: Avril Martinez RD      Task: Provide education on the benefits of making appropriate lifestyle changes Completed 2/2/2023   Responsible User: Avril Martinez RD      Task: Provide education on benefits of utilizing support systems    Responsible User: Avril Martinez RD      Task: Discuss methods for coping with stress    Responsible User: Avril Martinez RD      Task: Provide education on when to seek professional counseling    Responsible User: Avril Martinez RD          Time Spent: 60 minutes  Encounter Type: Individual    Any diabetes medication dose changes were  made via the CDE Protocol per the patient's referring provider. A copy of this encounter was shared with the provider.

## 2023-02-01 NOTE — PROGRESS NOTES
"Diabetes Self-Management Education & Support    Presents for: Type 2 diabetes update and education and review CGM reports, on MDI insulin.    Type of Service: In Person Visit    Assessment Type:   ASSESSMENT:  2023 patient was started on Victoza by MTM and his taken 7 days of Victoza successfully with reduction in insulin and significant improvement in glycemic control.  Patient feels that her appetite is reduced nicely and does not feel like she wants to \"stuff herself\" every time she eats portion sizes are smaller and also feels like she has a \"peppier mood\".     Prior to Victoza start patient had been on Tresiba 28 units daily and Humalog 15 units 3 times daily with meals and 5 units with snacks)    New Recommendations  Decrease tresiba (long acting) insulin from 14 units to 11 units once daily at bedtime.  Decrease Humalog (short acting) insulin from 5 units to 3 units three times daily with meals. No short acting insulin with snacks.     Continue   Metformin  m tabs twice daily   Jardiance (empagliflozin) 25 m tab once daily  Patient is not experiencing side effects.    Patient's most recent   Lab Results   Component Value Date    A1C 7.8 2022    A1C 8.6 2021     is meeting goal of <8.0    Diabetes knowledge and skills assessment:   Patient is knowledgeable in diabetes management concepts related to: Monitoring, Problem Solving, Reducing Risks and Healthy Coping    Continue education with the following diabetes management concepts: Healthy Eating, Being Active and Taking Medication    Based on learning assessment above, most appropriate setting for further diabetes education would be: Individual setting.      PLAN  New Recommendations  Decrease tresiba (long acting) insulin from 14 units to 11 units once daily at bedtime.  Decrease Humalog (short acting) insulin from 5 units to 3 units three times daily with meals. No short acting insulin with snacks.     Continue   Metformin XR " "500 m tabs twice daily   Jardiance (empagliflozin) 25 m tab once daily  Patient is not experiencing side effects.    Patient will follow up with MTM in 1 month     Topics to cover at upcoming visits: Healthy Eating, Being Active, Monitoring and Taking Medication    Follow-up: 2 months    See Care Plan for co-developed, patient-state behavior change goals.  AVS provided for patient today.    Education Materials Provided:  Goals for Your Diabetes Care, My Plate Planner and Victoza spreadsheet for titration injection schedule updated      SUBJECTIVE/OBJECTIVE:  Diabetes education in the past 24mo: Yes  Diabetes type: Type 2  Disease course: Improving  How confident are you filling out medical forms by yourself:: Other  Diabetes management related comments/concerns: have daughter help me by reading it to me  Cultural Influences/Ethnic Background:  Not  or     Diabetes Symptoms & Complications:  Fatigue: Sometimes  Neuropathy: No  Polydipsia: No  Polyphagia: No  Polyuria: Yes  Visual change: No  Slow healing wounds: No  Autonomic neuropathy: No  CVA: No  Heart disease: Yes  Nephropathy: No  Peripheral neuropathy: No  Peripheral Vascular Disease: No  Retinopathy: No  Sexual dysfunction: Yes    Patient Problem List and Family Medical History reviewed for relevant medical history, current medical status, and diabetes risk factors.    Vitals:  There were no vitals taken for this visit.  Estimated body mass index is 27.21 kg/m  as calculated from the following:    Height as of an earlier encounter on 23: 1.588 m (5' 2.5\").    Weight as of an earlier encounter on 23: 68.6 kg (151 lb 3.2 oz).   Last 3 BP:   BP Readings from Last 3 Encounters:   23 112/64   23 122/62   22 110/60       History   Smoking Status     Former   Smokeless Tobacco     Never       Labs:  Lab Results   Component Value Date    A1C 7.8 2022    A1C 8.6 2021     Lab Results   Component Value Date    "  02/01/2023    GLC 64 03/16/2022     02/11/2021     Lab Results   Component Value Date    LDL 90 12/20/2022    LDL 53 06/27/2022    LDL 71 03/13/2020     HDL Cholesterol   Date Value Ref Range Status   03/13/2020 60 >or=50 mg/dL Final     Direct Measure HDL   Date Value Ref Range Status   06/27/2022 47 (L) >=50 mg/dL Final   ]  GFR Estimate   Date Value Ref Range Status   02/01/2023 >90 >60 mL/min/1.73m2 Final     Comment:     eGFR calculated using 2021 CKD-EPI equation.   02/11/2021 92 >or=60 ml/min/1.73m2 Final     GFR Estimate If Black   Date Value Ref Range Status   02/11/2021 106 >or=60 ml/min/1.73m2 Final     Lab Results   Component Value Date    CR 0.69 02/01/2023    CR 0.68 02/11/2021     No results found for: MICROALBUMIN    Healthy Eating:  Cultural/Mormonism diet restrictions?: No  Meal planning/habits: Low salt, Smaller portions  How many times a week on average do you eat food made away from home (restaurant/take-out)?: 0  Meals include: Lunch, Dinner  Beverages: Water, Tea, Coffee, Milk, Juice, Coffee drinks    Being Active:  Barrier to exercise: Access    Monitoring:  Blood Glucose Meter: FreeStyle CGM  Times checking blood sugar at home (number): 5+  Times checking blood sugar at home (per): Day  Blood glucose trend: Fluctuating                Taking Medications:  Diabetes Medication(s)     Biguanides       metFORMIN (GLUCOPHAGE XR) 500 MG 24 hr tablet    TAKE TWO TABLETS (1,000 MG) BY MOUTH TWO TIMES DAILY (WITH MEALS) Strength: 500 mg    Diabetic Other       Dextrose, Diabetic Use, (GLUCOSE) 1 g CHEW    Take 15 g by mouth As needed    Insulin       insulin degludec (TRESIBA FLEXTOUCH) 100 UNIT/ML pen    Inject 14 Units Subcutaneous At Bedtime     insulin lispro (HUMALOG KWIKPEN) 100 UNIT/ML (1 unit dial) KWIKPEN    Inject 5 units under the skin three times daily with meals. No short acting insulin with snacks.    Sodium-Glucose Co-Transporter 2 (SGLT2) Inhibitors       empagliflozin  (JARDIANCE) 25 MG TABS tablet    Take 1 tablet (25 mg) by mouth daily    Incretin Mimetic Agents       liraglutide (VICTOZA) 18 MG/3ML solution    Inject 0.6 mg Subcutaneous daily for 7 days, THEN 1.2 mg daily for 7 days, THEN 1.8 mg daily.          Current Treatments: Insulin Injections, Oral Medication (taken by mouth)    Problem Solving:  Reviewed signs and symptoms of hypoglycemia and treatment protocol  Provided updated spreadsheet of Victoza titration dosing as patient had spilled coffee on her current sheet       Reducing Risks:  CAD Risks: Diabetes Mellitus, Dyslipidemia, Family history, Hypertension, Sedentary lifestyle, Stress    Ongoing reduction in insulin to prevent hypoglycemia as Victoza titration increases    Healthy Coping:  Informal Support system:: Jacklyn based, Family, Friends  Patient Activation Measure Survey Score:  No flowsheet data found.      Care Plan and Education Provided:  Care Plan: Diabetes   Updates made by Avril Martinez RD since 2/2/2023 12:00 AM      Problem: HbA1C Not In Goal       Goal: Establish Regular Follow-Ups with PCP       Task: Discuss with PCP the recommended timing for patient's next follow up visit(s)    Responsible User: Avril Martinez RD      Task: Discuss schedule for PCP visits with patient Completed 2/2/2023   Responsible User: Avril Martinez RD      Goal: Get HbA1C Level in Goal       Task: Educate patient on diabetes education self-management topics Completed 2/2/2023   Responsible User: Avril Martinez RD      Task: Educate patient on benefits of regular glucose monitoring    Responsible User: Avril Martinez RD      Task: Refer patient to appropriate extended care team member, as needed (Medication Therapy Management, Behavioral Health, Physical Therapy, etc.)    Responsible User: Avril Martinez RD      Task: Discuss diabetes treatment plan with patient Completed 2/2/2023   Responsible User: Avril Martinez RD      Problem: Diabetes Self-Management  Education Needed to Optimize Self-Care Behaviors       Goal: Understand diabetes pathophysiology and disease progression       Task: Provide education on diabetes pathophysiology and disease progression specfic to patient's diabetes type    Responsible User: Avril Martinez RD      Goal: Healthy Eating - follow a healthy eating pattern for diabetes       Task: Provide education on portion control and consistency in amount, composition and timing of food intake Completed 2/2/2023   Responsible User: Avril Martinez RD      Task: Provide education on managing carbohydrate intake (carbohydrate counting, plate planning method, etc.)    Responsible User: Avril Martinez RD      Task: Provide education on weight management    Responsible User: Avril Martinez RD      Task: Provide education on heart healthy eating Completed 2/2/2023   Responsible User: Avril Martinez RD      Task: Provide education on eating out Completed 2/2/2023   Responsible User: Avril Martinez RD      Task: Develop individualized healthy eating plan with patient    Responsible User: Avril Martinez RD      Goal: Being Active - get regular physical activity, working up to at least 150 minutes per week       Task: Provide education on relationship of activity to glucose and precautions to take if at risk for low glucose    Responsible User: Avril Martinez RD      Task: Discuss barriers to physical activity with patient Completed 2/2/2023   Responsible User: Avril Martinez RD      Task: Develop physical activity plan with patient    Responsible User: Avril Martinez RD      Task: Explore community resources including walking groups, assistance programs, and home videos    Responsible User: Avril Martinez RD      Goal: Monitoring - monitor glucose and ketones as directed       Task: Provide education on blood glucose monitoring (purpose, proper technique, frequency, glucose targets, interpreting results, when to use glucose control solution, sharps  disposal)    Responsible User: Avril Martinez RD      Task: Provide education on continuous glucose monitoring (sensor placement, use of bettina or /reader, understanding glucose trends, alerts and alarms, differences between sensor glucose and blood glucose) Completed 2/2/2023   Responsible User: Avril Martinez RD      Task: Provide education on ketone monitoring (when to monitor, frequency, etc.)    Responsible User: Avril Martinez RD      Goal: Taking Medication - patient is consistently taking medications as directed    This Visit's Progress: 100%   Note:    Patient to take diabetes related medications as directed     Task: Provide education on action of prescribed medication, including when to take and possible side effects Completed 2/2/2023   Responsible User: Avril Martinez RD      Task: Provide education on insulin and injectable diabetes medications, including administration, storage, site selection and rotation for injection sites Completed 2/2/2023   Responsible User: Avril Martinez RD      Task: Discuss barriers to medication adherence with patient and provide management technique ideas as appropriate    Responsible User: Avril Martinez RD      Task: Provide education on frequency and refill details of medications    Responsible User: Avril Martinez RD      Goal: Problem Solving - know how to prevent and manage short-term diabetes complications       Task: Provide education on high blood glucose - causes, signs/symptoms, prevention and treatment    Responsible User: Avril Martinez RD      Task: Provide education on low blood glucose - causes, signs/symptoms, prevention, treatment, carrying a carbohydrate source at all times, and medical identification    Responsible User: Avril Martinez RD      Task: Provide education on safe travel with diabetes    Responsible User: Avril Martinez RD      Task: Provide education on how to care for diabetes on sick days    Responsible User: Avril Martinez  AMADOU      Task: Provide education on when to call a health care provider    Responsible User: Avril Martinez RD      Goal: Reducing Risks - know how to prevent and treat long-term diabetes complications       Task: Provide education on major complications of diabetes, prevention, early diagnostic measures and treatment of complications    Responsible User: Avril Martinez RD      Task: Provide education on recommended care for dental, eye and foot health    Responsible User: Avril Martinez RD      Task: Provide education on Hemoglobin A1c - goals and relationship to blood glucose levels    Responsible User: Avril Martienz RD      Task: Provide education on recommendations for heart health - lipid levels and goals, blood pressure and goals, and aspirin therapy, if indicated    Responsible User: Avril Martinez RD      Task: Provide education on tobacco cessation    Responsible User: Avril Martinez RD      Goal: Healthy Coping - use available resources to cope with the challenges of managing diabetes       Task: Discuss recognizing feelings about having diabetes    Responsible User: Avril Martinez RD      Task: Provide education on the benefits of making appropriate lifestyle changes Completed 2/2/2023   Responsible User: Avril Martinez RD      Task: Provide education on benefits of utilizing support systems    Responsible User: Avril Martinez RD      Task: Discuss methods for coping with stress    Responsible User: Avril Martinez RD      Task: Provide education on when to seek professional counseling    Responsible User: Avril Martinez RD          Time Spent: 60 minutes  Encounter Type: Individual    Any diabetes medication dose changes were made via the CDE Protocol per the patient's referring provider. A copy of this encounter was shared with the provider.

## 2023-02-01 NOTE — PROGRESS NOTES
"    [facility]  [phone]          Assessment/Recommendations   Patient with known coronary artery disease, reduced left ventricular systolic function which had mild improvement after bypass surgery.  She does not have any evidence for pulmonary vascular congestion today.  She is on guideline based therapy with beta-blocker, ACE inhibitor, SGL T2 inhibitor and a low-dose of diuretic.  We will check her electrolytes and renal function today given her lisinopril and furosemide therapy.  Her LDL cholesterol is not quite at goal and I have increased her rosuvastatin to 40 mg a day and she should have a follow-up lipid panel later this year as well.  We will plan on checking an echocardiogram later this year with a follow-up.    I have encouraged her to be a bit more active and double her exercise time walking through the hallways and steroids of her building.  She will take that under advisement.    30 minutes spent with chart review, patient visit, documentation, and .     History of Present Illness/Subjective    Ms. Chanelle Billy is a 67 year old female with known coronary artery disease with bypass surgery in 2022.  She had an ejection fraction in the high 30% prior to surgery and more recently her ejection fraction was estimated at 45 to 50% with distal inferior and inferior lateral segments being hypokinetic.  He is actually been feeling well and is on a good medical regimen.  She has some shortness of breath when she climbs stairs but walking in her hallways and walking outside does not cause her to be short of breath.  She denies orthopnea, paroxysmal nocturnal dyspnea, peripheral edema, palpitations, chest discomfort, or syncopal episodes.           Physical Examination Review of Systems   /64 (BP Location: Right arm, Patient Position: Sitting, Cuff Size: Adult Regular)   Pulse 76   Resp 16   Ht 1.588 m (5' 2.5\")   Wt 68.6 kg (151 lb 3.2 oz)   BMI 27.21 kg/m  " "  Body mass index is 27.21 kg/m .  Wt Readings from Last 3 Encounters:   02/01/23 68.6 kg (151 lb 3.2 oz)   01/24/23 70.2 kg (154 lb 11.2 oz)   12/20/22 71.7 kg (158 lb)     General Appearance:   Alert, cooperative and in no acute distress.   ENT/Mouth: Patient wearing a mask.      EYES:  no scleral icterus, normal conjunctivae   Neck: JVP normal. No Hepatojugular reflux. Thyroid not visualized.   Chest/Lungs:   Lungs are clear to auscultation, equal chest wall expansion.   Cardiovascular:   S1, S2 without murmur ,clicks or rubs. Brachial, radial  pulses are intact and symetric. No carotid bruits noted   Abdomen:  Nontender.    Extremities: No cyanosis, clubbing or edema   Skin: no xanthelasma, warm.    Neurologic: normal arm movement bilateral, no tremors     Psychiatric: Appropriate affect.      Enc Vitals  BP: 112/64  Pulse: 76  Resp: 16  Weight: 68.6 kg (151 lb 3.2 oz)  Height: 158.8 cm (5' 2.5\")                                           Medical History  Surgical History Family History Social History   No past medical history on file. Past Surgical History:   Procedure Laterality Date     ANGIOPLASTY  2011     CATARACT EXTRACTION W/ INTRAOCULAR LENS IMPLANT  05/07/2019     CATARACT EXTRACTION W/ INTRAOCULAR LENS IMPLANT  05/21/2019     ESOPHAGOGASTRODUODENOSCOPY W/ BANDING  09/27/2019     STENT  07/2011     TUBAL LIGATION  1979    Family History   Problem Relation Age of Onset     Cerebrovascular Disease Mother      Diabetes Mother      Cerebrovascular Disease Father      Diabetes Father      Glaucoma Sister      Arthritis Maternal Grandmother      No Known Problems Daughter         Age: 46 years    Social History     Socioeconomic History     Marital status:      Spouse name: Not on file     Number of children: Not on file     Years of education: Not on file     Highest education level: Not on file   Occupational History     Not on file   Tobacco Use     Smoking status: Former     Smokeless tobacco: " Never     Tobacco comments:     smoked in high school and a little after her  .   Vaping Use     Vaping Use: Never used   Substance and Sexual Activity     Alcohol use: Yes     Comment: occasionally     Drug use: Not on file     Sexual activity: Not on file   Other Topics Concern     Parent/sibling w/ CABG, MI or angioplasty before 65F 55M? Not Asked   Social History Narrative     Not on file     Social Determinants of Health     Financial Resource Strain: Not on file   Food Insecurity: Not on file   Transportation Needs: Not on file   Physical Activity: Not on file   Stress: Not on file   Social Connections: Not on file   Intimate Partner Violence: Not on file   Housing Stability: Not on file          Medications  Allergies   Current Outpatient Medications   Medication Sig Dispense Refill     aspirin (ASA) 81 MG chewable tablet Take 81 mg by mouth daily       B-D ULTRA-FINE 33 LANCETS MISC Use to test 3 to 4 times a day as directed.       blood glucose (NO BRAND SPECIFIED) test strip Use to test 3 to 4 times daily as directed.       calcium carbonate (TUMS) 500 MG chewable tablet Take 1 chew tab by mouth 2 times daily As needed for heartburn, acid reflux       carvedilol (COREG) 25 MG tablet Take 1 tablet (25 mg) by mouth 2 times daily (with meals) 180 tablet 0     Continuous Blood Gluc  (FREESTYLE ROSAURA 2 READER SYSTM) LOUIS        Continuous Blood Gluc Sensor (FREESTYLE ROSAURA 2 SENSOR SYSTM) MISC        Dextrose, Diabetic Use, (GLUCOSE) 1 g CHEW Take 15 g by mouth As needed       docusate sodium (COLACE) 100 MG capsule Take 100 mg by mouth 2 times daily as needed for constipation       empagliflozin (JARDIANCE) 25 MG TABS tablet Take 1 tablet (25 mg) by mouth daily 90 tablet 1     FLUoxetine (PROZAC) 20 MG capsule TAKE 1 CAPSULE BY MOUTH EVERY MORNING 90 capsule 1     furosemide (LASIX) 20 MG tablet Take 1 tablet (20 mg) by mouth daily 90 tablet 3     insulin degludec (TRESIBA FLEXTOUCH) 100  "UNIT/ML pen Inject 14 Units Subcutaneous At Bedtime 30 mL 1     insulin lispro (HUMALOG KWIKPEN) 100 UNIT/ML (1 unit dial) KWIKPEN Inject 5 units under the skin three times daily with meals. No short acting insulin with snacks. 15 mL 0     insulin pen needle (32G X 4 MM) 32G X 4 MM miscellaneous Use for insulin injection under the skin 4 to 5 times a day as directed       liraglutide (VICTOZA) 18 MG/3ML solution Inject 0.6 mg Subcutaneous daily for 7 days, THEN 1.2 mg daily for 7 days, THEN 1.8 mg daily. 9 mL 0     lisinopril (ZESTRIL) 10 MG tablet Take 1 tablet (10 mg) by mouth daily 90 tablet 3     loratadine (CLARITIN) 10 MG tablet Take 10 mg by mouth nightly as needed for allergies       melatonin 5 MG tablet Take 5 mg by mouth nightly as needed for sleep       metFORMIN (GLUCOPHAGE XR) 500 MG 24 hr tablet TAKE TWO TABLETS (1,000 MG) BY MOUTH TWO TIMES DAILY (WITH MEALS) Strength: 500 mg 360 tablet 3     multivitamin w/minerals (THERA-VIT-M) tablet Take 1 tablet by mouth daily       Naphazoline-Pheniramine (EQ EYE ALLERGY RELIEF OP) Pt does not know what the active drug is. \"Eye allergy relief\" using this as needed for eye allergy symptoms.       nitroGLYcerin (NITROSTAT) 0.4 MG sublingual tablet Place 1 tablet (0.4 mg) under the tongue every 5 minutes as needed for chest pain For chest pain place 1 tablet under the tongue every 5 minutes for 3 doses. If symptoms persist 5 minutes after 1st dose call 911. 30 tablet 1     polyethylene glycol-propylene glycol (SYSTANE ULTRA) 0.4-0.3 % SOLN ophthalmic solution Place 1 drop into both eyes every hour as needed for dry eyes       potassium chloride ER (K-TAB/KLOR-CON) 10 MEQ CR tablet Take 1 tablet (10 mEq) by mouth daily 90 tablet 3     rosuvastatin (CRESTOR) 40 MG tablet Take 1 tablet (40 mg) by mouth At Bedtime 90 tablet 3     Sharps Container (SHARPS-A-GATOR LOCKING BRACKET) MISC Use as directed      Allergies   Allergen Reactions     Atorvastatin Nausea and " Vomiting and Diarrhea     Pt reported  Other reaction(s): Diarrhea, nausea, vomiting, Diarrhea,Nausea,Vomiting     Mirtazapine      Nightmares and suicidal ideation for 1 day (per Dr. Maza's note)  Other reaction(s): Nightmare, Suicidal ideation     Sodium Hypochlorite      Bleeding from nose and eyes after using a bleach product, patient/daughter reported  Other reaction(s): Runny nose     Animal Dander Other (See Comments)     Cats      Runny nose, pt reported.  Takes allergy medication for this and still lives with a cat.  Other reaction(s): Nasal congestion, Runny nose     Trazodone      nightmares  Other reaction(s): Nightmares         Lab Results    Chemistry/lipid CBC Cardiac Enzymes/BNP/TSH/INR   Lab Results   Component Value Date    CHOL 122 06/27/2022    HDL 47 (L) 06/27/2022    TRIG 111 06/27/2022    BUN 22.8 09/28/2022     09/28/2022    CO2 24 09/28/2022    Lab Results   Component Value Date    WBC 8.8 12/20/2022    HGB 13.1 12/20/2022    HCT 41.3 12/20/2022    MCV 90 12/20/2022     12/20/2022    Lab Results   Component Value Date     (H) 12/10/2021    TSH 1.83 12/20/2022

## 2023-02-01 NOTE — LETTER
2/1/2023    Samanta Maza MD  [address] [facility] [address]    RE: Chanelle Billy       Dear Colleague,     I had the pleasure of seeing Chanelle Billy in the [facility].      [facility]  [phone]          Assessment/Recommendations   Patient with known coronary artery disease, reduced left ventricular systolic function which had mild improvement after bypass surgery.  She does not have any evidence for pulmonary vascular congestion today.  She is on guideline based therapy with beta-blocker, ACE inhibitor, SGL T2 inhibitor and a low-dose of diuretic.  We will check her electrolytes and renal function today given her lisinopril and furosemide therapy.  Her LDL cholesterol is not quite at goal and I have increased her rosuvastatin to 40 mg a day and she should have a follow-up lipid panel later this year as well.  We will plan on checking an echocardiogram later this year with a follow-up.    I have encouraged her to be a bit more active and double her exercise time walking through the hallways and steroids of her building.  She will take that under advisement.    30 minutes spent with chart review, patient visit, documentation, and .     History of Present Illness/Subjective    Ms. Chanelle Billy is a 67 year old female with known coronary artery disease with bypass surgery in 2022.  She had an ejection fraction in the high 30% prior to surgery and more recently her ejection fraction was estimated at 45 to 50% with distal inferior and inferior lateral segments being hypokinetic.  He is actually been feeling well and is on a good medical regimen.  She has some shortness of breath when she climbs stairs but walking in her hallways and walking outside does not cause her to be short of breath.  She denies orthopnea, paroxysmal nocturnal dyspnea, peripheral edema, palpitations, chest discomfort, or syncopal episodes.           Physical Examination  "Review of Systems   /64 (BP Location: Right arm, Patient Position: Sitting, Cuff Size: Adult Regular)   Pulse 76   Resp 16   Ht 1.588 m (5' 2.5\")   Wt 68.6 kg (151 lb 3.2 oz)   BMI 27.21 kg/m    Body mass index is 27.21 kg/m .  Wt Readings from Last 3 Encounters:   02/01/23 68.6 kg (151 lb 3.2 oz)   01/24/23 70.2 kg (154 lb 11.2 oz)   12/20/22 71.7 kg (158 lb)     General Appearance:   Alert, cooperative and in no acute distress.   ENT/Mouth: Patient wearing a mask.      EYES:  no scleral icterus, normal conjunctivae   Neck: JVP normal. No Hepatojugular reflux. Thyroid not visualized.   Chest/Lungs:   Lungs are clear to auscultation, equal chest wall expansion.   Cardiovascular:   S1, S2 without murmur ,clicks or rubs. Brachial, radial  pulses are intact and symetric. No carotid bruits noted   Abdomen:  Nontender.    Extremities: No cyanosis, clubbing or edema   Skin: no xanthelasma, warm.    Neurologic: normal arm movement bilateral, no tremors     Psychiatric: Appropriate affect.      Enc Vitals  BP: 112/64  Pulse: 76  Resp: 16  Weight: 68.6 kg (151 lb 3.2 oz)  Height: 158.8 cm (5' 2.5\")                                           Medical History  Surgical History Family History Social History   No past medical history on file. Past Surgical History:   Procedure Laterality Date     ANGIOPLASTY 2011     CATARACT EXTRACTION W/ INTRAOCULAR LENS IMPLANT  05/07/2019     CATARACT EXTRACTION W/ INTRAOCULAR LENS IMPLANT  05/21/2019     ESOPHAGOGASTRODUODENOSCOPY W/ BANDING  09/27/2019     STENT  07/2011     TUBAL LIGATION  1979    Family History   Problem Relation Age of Onset     Cerebrovascular Disease Mother      Diabetes Mother      Cerebrovascular Disease Father      Diabetes Father      Glaucoma Sister      Arthritis Maternal Grandmother      No Known Problems Daughter         Age: 46 years    Social History     Socioeconomic History     Marital status:      Spouse name: Not on file     Number of " children: Not on file     Years of education: Not on file     Highest education level: Not on file   Occupational History     Not on file   Tobacco Use     Smoking status: Former     Smokeless tobacco: Never     Tobacco comments:     smoked in high school and a little after her  .   Vaping Use     Vaping Use: Never used   Substance and Sexual Activity     Alcohol use: Yes     Comment: occasionally     Drug use: Not on file     Sexual activity: Not on file   Other Topics Concern     Parent/sibling w/ CABG, MI or angioplasty before 65F 55M? Not Asked   Social History Narrative     Not on file     Social Determinants of Health     Financial Resource Strain: Not on file   Food Insecurity: Not on file   Transportation Needs: Not on file   Physical Activity: Not on file   Stress: Not on file   Social Connections: Not on file   Intimate Partner Violence: Not on file   Housing Stability: Not on file          Medications  Allergies   Current Outpatient Medications   Medication Sig Dispense Refill     aspirin (ASA) 81 MG chewable tablet Take 81 mg by mouth daily       B-D ULTRA-FINE 33 LANCETS MISC Use to test 3 to 4 times a day as directed.       blood glucose (NO BRAND SPECIFIED) test strip Use to test 3 to 4 times daily as directed.       calcium carbonate (TUMS) 500 MG chewable tablet Take 1 chew tab by mouth 2 times daily As needed for heartburn, acid reflux       carvedilol (COREG) 25 MG tablet Take 1 tablet (25 mg) by mouth 2 times daily (with meals) 180 tablet 0     Continuous Blood Gluc  (FREESTYLE ROSAURA 2 READER SYSTM) LOUIS        Continuous Blood Gluc Sensor (FREESTYLE ROSAURA 2 SENSOR SYSTM) MISC        Dextrose, Diabetic Use, (GLUCOSE) 1 g CHEW Take 15 g by mouth As needed       docusate sodium (COLACE) 100 MG capsule Take 100 mg by mouth 2 times daily as needed for constipation       empagliflozin (JARDIANCE) 25 MG TABS tablet Take 1 tablet (25 mg) by mouth daily 90 tablet 1     FLUoxetine  "(PROZAC) 20 MG capsule TAKE 1 CAPSULE BY MOUTH EVERY MORNING 90 capsule 1     furosemide (LASIX) 20 MG tablet Take 1 tablet (20 mg) by mouth daily 90 tablet 3     insulin degludec (TRESIBA FLEXTOUCH) 100 UNIT/ML pen Inject 14 Units Subcutaneous At Bedtime 30 mL 1     insulin lispro (HUMALOG KWIKPEN) 100 UNIT/ML (1 unit dial) KWIKPEN Inject 5 units under the skin three times daily with meals. No short acting insulin with snacks. 15 mL 0     insulin pen needle (32G X 4 MM) 32G X 4 MM miscellaneous Use for insulin injection under the skin 4 to 5 times a day as directed       liraglutide (VICTOZA) 18 MG/3ML solution Inject 0.6 mg Subcutaneous daily for 7 days, THEN 1.2 mg daily for 7 days, THEN 1.8 mg daily. 9 mL 0     lisinopril (ZESTRIL) 10 MG tablet Take 1 tablet (10 mg) by mouth daily 90 tablet 3     loratadine (CLARITIN) 10 MG tablet Take 10 mg by mouth nightly as needed for allergies       melatonin 5 MG tablet Take 5 mg by mouth nightly as needed for sleep       metFORMIN (GLUCOPHAGE XR) 500 MG 24 hr tablet TAKE TWO TABLETS (1,000 MG) BY MOUTH TWO TIMES DAILY (WITH MEALS) Strength: 500 mg 360 tablet 3     multivitamin w/minerals (THERA-VIT-M) tablet Take 1 tablet by mouth daily       Naphazoline-Pheniramine (EQ EYE ALLERGY RELIEF OP) Pt does not know what the active drug is. \"Eye allergy relief\" using this as needed for eye allergy symptoms.       nitroGLYcerin (NITROSTAT) 0.4 MG sublingual tablet Place 1 tablet (0.4 mg) under the tongue every 5 minutes as needed for chest pain For chest pain place 1 tablet under the tongue every 5 minutes for 3 doses. If symptoms persist 5 minutes after 1st dose call 911. 30 tablet 1     polyethylene glycol-propylene glycol (SYSTANE ULTRA) 0.4-0.3 % SOLN ophthalmic solution Place 1 drop into both eyes every hour as needed for dry eyes       potassium chloride ER (K-TAB/KLOR-CON) 10 MEQ CR tablet Take 1 tablet (10 mEq) by mouth daily 90 tablet 3     rosuvastatin (CRESTOR) 40 MG " tablet Take 1 tablet (40 mg) by mouth At Bedtime 90 tablet 3     Sharps Container (SHARPS-A-GATOR LOCKING BRACKET) MISC Use as directed      Allergies   Allergen Reactions     Atorvastatin Nausea and Vomiting and Diarrhea     Pt reported  Other reaction(s): Diarrhea, nausea, vomiting, Diarrhea,Nausea,Vomiting     Mirtazapine      Nightmares and suicidal ideation for 1 day (per Dr. Maza's note)  Other reaction(s): Nightmare, Suicidal ideation     Sodium Hypochlorite      Bleeding from nose and eyes after using a bleach product, patient/daughter reported  Other reaction(s): Runny nose     Animal Dander Other (See Comments)     Cats      Runny nose, pt reported.  Takes allergy medication for this and still lives with a cat.  Other reaction(s): Nasal congestion, Runny nose     Trazodone      nightmares  Other reaction(s): Nightmares         Lab Results    Chemistry/lipid CBC Cardiac Enzymes/BNP/TSH/INR   Lab Results   Component Value Date    CHOL 122 06/27/2022    HDL 47 (L) 06/27/2022    TRIG 111 06/27/2022    BUN 22.8 09/28/2022     09/28/2022    CO2 24 09/28/2022    Lab Results   Component Value Date    WBC 8.8 12/20/2022    HGB 13.1 12/20/2022    HCT 41.3 12/20/2022    MCV 90 12/20/2022     12/20/2022    Lab Results   Component Value Date     (H) 12/10/2021    TSH 1.83 12/20/2022                Thank you for allowing me to participate in the care of your patient.      Sincerely,     Lan Long MD     [facility]  cc:   Lan Long MD  [address]  [place] [address]

## 2023-02-01 NOTE — PATIENT INSTRUCTIONS
1. When you start victoza (see below), Decrease tresiba (long acting) insulin to 11 units once daily at bedtime.  Decrease humalog (short acting) insulin to 3 units three times daily with meals. No short acting insulin with snacks.      2. Liraglutide (Victoza) injected under the skin once daily.  Eat small meals and eat slowly to minimize upset stomach.      Day Victoza dose Check this box when given   Wed    Day 7 0.6 mg        X   Thurs    Day 8 1.2 mg     Friday    Day 9 1.2 mg     Saturday    Day 10 1.2 mg     Grady    Day 11 1.2 mg     Monday    Day 12 1.2 mg     Tuesday    Day 13 1.2 mg     Wednesday    Day 14 1.2 mg     Thurs    Day 15 1.8 mg     Friday    Day 16 1.8 mg     Saturday    Day 17 1.8 mg     Grady    Day 18 1.8 mg     Monday    Day 19 1.8 mg     Tuesday    Day 20 1.8 mg     Wed    Day 21 1.8 mg         Following days 1.8 mg

## 2023-02-28 ENCOUNTER — OFFICE VISIT (OUTPATIENT)
Dept: PHARMACY | Facility: CLINIC | Age: 68
End: 2023-02-28
Payer: COMMERCIAL

## 2023-02-28 VITALS
BODY MASS INDEX: 26.39 KG/M2 | DIASTOLIC BLOOD PRESSURE: 74 MMHG | SYSTOLIC BLOOD PRESSURE: 121 MMHG | OXYGEN SATURATION: 98 % | HEART RATE: 76 BPM | WEIGHT: 146.6 LBS

## 2023-02-28 DIAGNOSIS — Z79.4 TYPE 2 DIABETES MELLITUS WITH OTHER SPECIFIED COMPLICATION, WITH LONG-TERM CURRENT USE OF INSULIN (H): ICD-10-CM

## 2023-02-28 DIAGNOSIS — E11.69 TYPE 2 DIABETES MELLITUS WITH OTHER SPECIFIED COMPLICATION, WITH LONG-TERM CURRENT USE OF INSULIN (H): ICD-10-CM

## 2023-02-28 PROCEDURE — 99207 PR NO CHARGE LOS: CPT | Performed by: PHARMACIST

## 2023-02-28 RX ORDER — LIRAGLUTIDE 6 MG/ML
1.8 INJECTION SUBCUTANEOUS DAILY
Qty: 9 ML | Refills: 3 | Status: SHIPPED | OUTPATIENT
Start: 2023-02-28 | End: 2023-05-25

## 2023-02-28 RX ORDER — INSULIN LISPRO 100 [IU]/ML
INJECTION, SOLUTION INTRAVENOUS; SUBCUTANEOUS
Qty: 15 ML | Refills: 0 | Status: SHIPPED | OUTPATIENT
Start: 2023-02-28 | End: 2023-05-25

## 2023-02-28 RX ORDER — INSULIN DEGLUDEC 100 U/ML
9 INJECTION, SOLUTION SUBCUTANEOUS AT BEDTIME
Qty: 30 ML | Refills: 1 | Status: SHIPPED | OUTPATIENT
Start: 2023-02-28 | End: 2023-10-16

## 2023-02-28 NOTE — PATIENT INSTRUCTIONS
"Recommendations from today's MTM visit:                                                    MTM (medication therapy management) is a service provided by a clinical pharmacist designed to help you get the most of out of your medicines.      1. Decrease tresiba long acting insulin to 9 units once daily at bedtime.    2. Continue victoza 1.8 Mg once daily. Refill sent.    HYPOGLYCEMIA (LOW BLOOD SUGAR) TREATMENT:  If you have blurry vision, weakness/fatigue, headache, irritability, shakiness, sweatiness, feeling dizzy or hungry, you may be experiencing low blood sugar.    A) Use your glucometer to check your blood sugar. If your sugar is less than 70 mg/dL, eat or drink 16 grams of fast-acting sugar (orange juice, sugary candies, 4 glucose tabs).    B) Check blood sugar again 15 minutes later.  If your blood sugar is still below 80 mg/dL, treat again with fast acting sugar (as above).  C) Once your blood sugar has normalized, always follow-up with a complex carbohydrate or carb with protein/fat (peanut butter, nuts, eggs with cheese) or your regular meal if it is mealtime.    Follow-up: 2-3 weeks: Thursday March 16th at 1pm in clinic.    It was great speaking with you today.  I value your experience and would be very thankful for your time in providing feedback in our clinic survey. In the next few days, you may receive an email or text message from [employer] with a link to a survey related to your  clinical pharmacist.\"     To schedule another MTM appointment, please call the clinic directly or you may call the MTM scheduling line at [phone] or toll-free at [phone].     My Clinical Pharmacist's contact information:                                                      Please feel free to contact me with any questions or concerns you have.      Carly Pisano, PharmD  Medication Therapy Management (MTM) Pharmacist   "

## 2023-02-28 NOTE — PROGRESS NOTES
Medication Therapy Management (MTM) Encounter    ASSESSMENT:                            Medication Adherence/Access: No issues identified    Type 2 Diabetes:  Patient's A1c of 7.8% is not at ADA goal of <7%.   SMBG fasting sugars are not at ADA goal of  mg/dL (with a number of hypoglycemic readings) and post-prandial sugars are not at goal of less than 180 mg/dL (with both hyperand hypoglycemia).  Patient would benefit from:  Long acting insulin: Given that patient has had hypoglycemia in the morning on 4 occasions, recommend decreasing her long-acting insulin dose.  Short acting insulin: Continue current mealtime dose of 3 units, will continue monitoring closely.  Patient would benefit from re: education on hypoglycemia treatment today.  GLP-1 (victoza/liraglutide): Continue current dose (max 1.8 mg/day) -discussed that she will likely continue to build tolerance to this, but minimizing greasy foods and other foods that trigger her heartburn would also be recommended.  SGLT2 (Jardiance): continue current dose (at max 25 mg/day)  CGM: continue use  HM: address further at follow-up appointment.    Will address other medical conditions at follow-up appointment - prioritized the above conditions due to time limitations.    PLAN:                            1. Decrease tresiba long acting insulin to 9 units once daily at bedtime.    2. Continue victoza 1.8 Mg once daily. Refill sent.    See AVS for details on hypoglycemia education.    Updated other med orders to reflect current dosing.    Future: Cardiology follow-up recommended in 6 months (8/2023), with echo prior to visit.    Follow-up: 2-3 weeks: Thursday March 16th at 1pm in clinic.    SUBJECTIVE/OBJECTIVE:                          Chanelle Billy is a 67 year old female coming in for a follow-up visit.  Today's visit is a follow-up MTM visit from 1/24/2023     Reason for visit: diabetes follow-up.  -- Per chart review, patient also saw cardiology since her last  "MTM visit and her statin medication was increased.  Patient confirms that she did make this medication change.    Allergies/ADRs: Reviewed in chart  Past Medical History: Reviewed in chart  Tobacco: She reports that she has quit smoking. She has never used smokeless tobacco.  Alcohol: very infrequently; last drink was a shot glass size of beer 2-3 years ago.  Caffeine: 1-2 cups/day of coffee; sometimes with cream and sugar.  Activity: minimal, no exercise routine.  Past Medical History: Reviewed in chart  Social: lives alone, often visits with her daughter Dionna     Providers:  Primary care provider: Samanta Maza MD at Samanta Maza MD  Cardiology: Dr. Long  Medication Therapy Management (MTM): Carly Pisano PharmD  Diabetes Education: Avril Martinez RD, CD, YUMIKO     Medication Adherence/Access:   Patient uses pill box(es).  Patient takes medications 2 time(s) per day + insulin  Per patient, misses medication (pills) 0-1 times per week.  Medication barriers: none.   The patient fills medications at [facility]: NO, fills medications at [facility].    Type 2 Diabetes:  Testing supplies: freestyle roula 2 and glucometer  Following with certified diabetes care and  (CD[facility])? Yes Avril Martinez RD, CD, CDCES  Pt currently taking:  Metformin  m tabs twice daily   Tresiba (degludec) long acting insulin: 11 units daily (dose decreased since last MTM visit)  Humalog (lispro) rapid acting insulin: 3 units three times daily with meals and no insulin with snacks (dose decreased since last MTM visit).  Jardiance (empagliflozin) 25 m tab once daily  Liraglutide (Victoza) subcut injection: 1.8 mg injected under the skin once daily.  Notes that she had vomiting one night, but doesn't think that it was from the victoza, says \"it's from what I ate.\" has been having a lower appetite and using portion control.   Notes increased belching, sometimes heartburn - notes that it's from specific foods " "- sometimes greasy foods. Trying to make of list of these so she can cut back on these specific foods.  Record of home blood sugars:  SMBG: See freestyle roula report below.  Symptoms of low blood sugar? Patient has \"not really\" been feeling low.  We reviewed hypoglycemia treatment today in detail-see AVS  Symptoms of high blood sugar? None noted.              Lab Results   Component Value Date    A1C 7.8 12/20/2022    A1C 7.2 06/27/2022    A1C 8.1 11/30/2021    A1C 8.7 08/19/2021    A1C 8.6 02/11/2021    A1C 8.6 02/11/2021    A1C 11.4 10/22/2020    A1C 6.8 03/13/2020    A1C 11.7 12/06/2019     Eye exam:  up to date, last 11/14/22  Foot exam: up to date, last 6/27/22  Diet: eating 2 meals per day with some snacking in between; notes a decreased appetite-see above for more details.  Exercise: no exercise routine.  ASCVD Prevention:  Aspirin: Taking 81mg daily and denies side effects   Statin rosuvastatin 40 mg daily (recent dose increase).  ACEi/ARB: Yes: lisinopril.   Urine Albumin:   Lab Results   Component Value Date    UMALCR  06/27/2022     Wt Readings from Last 10 Encounters:   02/28/23 146 lb 9.6 oz (66.5 kg)   02/01/23 151 lb 3.2 oz (68.6 kg)   01/24/23 154 lb 11.2 oz (70.2 kg)   12/20/22 158 lb (71.7 kg)   09/28/22 156 lb 11.2 oz (71.1 kg)   06/27/22 159 lb (72.1 kg)   06/21/22 163 lb 4.8 oz (74.1 kg)   05/11/22 154 lb 14.4 oz (70.3 kg)   03/29/22 159 lb 11.2 oz (72.4 kg)   03/16/22 154 lb 11.2 oz (70.2 kg)     Today's Vitals: /74   Pulse 76   Wt 146 lb 9.6 oz (66.5 kg)   SpO2 98%   BMI 26.39 kg/m     ----------------    I spent 30 minutes with this patient today. All changes were made via collaborative practice agreement with Samanta Maza MD. A copy of the visit note was provided to the patient's provider(s).    A summary of these recommendations was given to the patient.    Carly Pisano PharmD  Medication Therapy Management (MTM) Pharmacist  [place] [place] [facility] " (Tu/Th/Fr)  Clinic Phone: [phone]  Pager: [phone]     Medication Therapy Recommendations  Type 2 diabetes mellitus with circulatory disorder, with long-term current use of insulin (H)    Current Medication: insulin degludec (TRESIBA FLEXTOUCH) 100 UNIT/ML pen (Discontinued)   Rationale: Dose too high - Dosage too high - Safety   Recommendation: Decrease Dose   Status: Accepted per CPA

## 2023-03-16 ENCOUNTER — OFFICE VISIT (OUTPATIENT)
Dept: PHARMACY | Facility: CLINIC | Age: 68
End: 2023-03-16
Payer: COMMERCIAL

## 2023-03-16 VITALS
SYSTOLIC BLOOD PRESSURE: 97 MMHG | WEIGHT: 142.6 LBS | HEART RATE: 84 BPM | DIASTOLIC BLOOD PRESSURE: 59 MMHG | OXYGEN SATURATION: 98 % | BODY MASS INDEX: 25.67 KG/M2

## 2023-03-16 DIAGNOSIS — I25.5 ISCHEMIC CARDIOMYOPATHY: ICD-10-CM

## 2023-03-16 DIAGNOSIS — I25.119 CORONARY ARTERY DISEASE INVOLVING NATIVE CORONARY ARTERY OF NATIVE HEART WITH ANGINA PECTORIS (H): ICD-10-CM

## 2023-03-16 DIAGNOSIS — Z79.4 TYPE 2 DIABETES MELLITUS WITH OTHER SPECIFIED COMPLICATION, WITH LONG-TERM CURRENT USE OF INSULIN (H): Primary | ICD-10-CM

## 2023-03-16 DIAGNOSIS — I50.9 CONGESTIVE HEART FAILURE, UNSPECIFIED HF CHRONICITY, UNSPECIFIED HEART FAILURE TYPE (H): ICD-10-CM

## 2023-03-16 DIAGNOSIS — I10 ESSENTIAL HYPERTENSION: ICD-10-CM

## 2023-03-16 DIAGNOSIS — J30.2 SEASONAL ALLERGIC RHINITIS, UNSPECIFIED TRIGGER: ICD-10-CM

## 2023-03-16 DIAGNOSIS — E78.5 HYPERLIPIDEMIA LDL GOAL <70: ICD-10-CM

## 2023-03-16 DIAGNOSIS — E11.69 TYPE 2 DIABETES MELLITUS WITH OTHER SPECIFIED COMPLICATION, WITH LONG-TERM CURRENT USE OF INSULIN (H): Primary | ICD-10-CM

## 2023-03-16 PROCEDURE — 99207 PR NO CHARGE LOS: CPT | Performed by: PHARMACIST

## 2023-03-16 NOTE — PROGRESS NOTES
Medication Therapy Management (MTM) Encounter    ASSESSMENT:                            Medication Adherence/Access: See below for considerations    Creatinine   Date Value Ref Range Status   02/01/2023 0.69 0.51 - 0.95 mg/dL Final   02/11/2021 0.68 0.50 - 0.99 mg/dL Final     Type 2 Diabetes:  Patient's A1c of 7.8% is not at ADA goal of <7%.   Decreased frequency of overnight HYPOglycemia with the long acting insulin dose reduction and most AM sugars are at goal  mg/dL; however post-prandial sugars are not at goal of less than 180 mg/dL.  Patient would benefit from:  Long acting insulin: continue current dose since most AM readings at goal.  Short acting insulin: increase adherence to prescribed regimen to decrease post-prandial hyperglycemia. Education provided about not using short acting insulin without meal or post-meal to prevent delayed HYPOglycemia or stacking (likely what happened with her overnight HYPO episode).  GLP-1 (victoza/liraglutide): Continue current dose (max 1.8 mg/day) -discussed that she will likely continue to build tolerance to this, and we will continue monitoring this; okay to use TUMS as needed.  SGLT2 (Jardiance): continue current dose (at max 25 mg/day)  CGM: continue use  HM: address further at follow-up appointments, due for A1c next visit, ordered.    Hypertension/CAD/ischemic cardiomyopathy/CHF:   Blood pressure lower today likely due to taking nitroglycerin this AM, Patient is not feeling lightheadedness / dizziness, confirmed this before she left clinic.  Plan in place to follow-up with cards later this year.    Dyslipidemia: tolerating current statin, will repeat lipid panel later this year.    Allergic rhinitis: discussed option to change to use current antihistamine more routinely or use alternate antihistamine, Patient prefers to continue current regimen - will continue to address at follow-ups.    PLAN:                            1. Be sure to take your 3 units of short  acting insulin with your meals. You can carry the pen with you in your purse or keep it out on the counter so that you remember to take this.  It is important to take the short acting insulin within 15 to 30 minutes of starting your meal - not after your meal.    2. If you decide you want to try an alternate allergy medication, let us know.    Future: lipid panel later this year per cards (dose was increased 2023).  Due for A1c ~3/20/2023.    Follow-up: 2023 with Avril Martinez RD, CD, [facility]   at 1pm in clinic with MTM.    SUBJECTIVE/OBJECTIVE:                          Chanelle Billy is a 67 year old female coming in for a follow-up visit.  Today's visit is a follow-up MTM visit from 2023.   Reason for visit: diabetes follow-up and cardiology meds.    Allergies/ADRs: Reviewed in chart  Past Medical History: Reviewed in chart  Tobacco: She reports that she has quit smoking. She has never used smokeless tobacco.  Alcohol: none at this time.  Social: living alone, with cat.    Medication Adherence/Access: no issues reported    Type 2 Diabetes:  Testing supplies: freestyle roula 2 and glucometer  Following with certified diabetes care and  ([facility])? Yes  - Avril Martinez RD, CD, [facility]  Pt currently taking:  Metformin  m tabs twice daily   Tresiba (degludec) long acting insulin: 9 units daily (dose decreased since last MTM visit) - missed one dose since our last visit.  Humalog (lispro) rapid acting insulin: prescribed as 3 units three times daily with meals and no insulin with snacks. Patient notes that she's only using about 2 short acting insulin doses per week, otherwise forgets to use this.  Jardiance (empagliflozin) 25 m tab once daily  Liraglutide (Victoza) subcut injection: 1.8 mg injected under the skin once daily - no missed doses since last MTM Visit, some GI upset - see below.  Calcium carbonate (TUMS) 500 mg: Pt taking as needed for heartburn symptoms with  "victoza. Estimated use: using about 1-2 per day.  Side effect? Some GI upset and heartburn with victoza use, decreased appetite  Record of home blood sugars:  SMBG:  Symptoms of low blood sugar? Patient aware of HYPOglycemia tx, one low event since last visit was Friday 3/10/2023 - she thinks that the night before was when she went to a restaurant to eat. Then forgot to take short acting insulin but her daughter got upset with her so she used the insulin a few hours later when she got home.   Symptoms of high blood sugar? none          Lab Results   Component Value Date    A1C 7.8 12/20/2022    A1C 7.2 06/27/2022    A1C 8.1 11/30/2021    A1C 8.7 08/19/2021    A1C 8.6 02/11/2021    A1C 8.6 02/11/2021    A1C 11.4 10/22/2020    A1C 6.8 03/13/2020    A1C 11.7 12/06/2019   Eye exam:  up to date, last 11/14/22  Foot exam: up to date, last 6/27/22  Diet: usually hungry about 10:30/11am, then eats 2 meals per day with sometimes a snack in-between. Has not been eating donuts anymore; Notes that she drank some strawberry soda, ate watermelon flavored sherbert, and ate peeps and that is the reason for her recent high blood sugars - but she's been working to decrease her sugar intake overall. Noticing less cravings for sweets.  Exercise: trying to \"exercise\" every other day doing the stairs around her building - making a few loops around the building including up and down the stairs.  ASCVD Prevention:  Aspirin: Taking 81mg daily and denies side effects   Statin rosuvastatin 40 mg daily.  ACEi/ARB: Yes: lisinopril.   Urine Albumin:  Lab Results   Component Value Date    UMALCR  06/27/2022   Home weight was 141 lbs on 3/15/23  Wt Readings from Last 10 Encounters:   03/16/23 142 lb 9.6 oz (64.7 kg)   02/28/23 146 lb 9.6 oz (66.5 kg)   02/01/23 151 lb 3.2 oz (68.6 kg)   01/24/23 154 lb 11.2 oz (70.2 kg)   12/20/22 158 lb (71.7 kg)   09/28/22 156 lb 11.2 oz (71.1 kg)   06/27/22 159 lb (72.1 kg)   06/21/22 163 lb 4.8 oz (74.1 kg) " "  22 154 lb 14.4 oz (70.3 kg)   22 159 lb 11.2 oz (72.4 kg)     Hypertension/CAD/ischemic cardiomyopathy/CHF:  Patient following with cardiology, LOV 2023 planning for follow-up ECHO and appointment 2023.  Per chart notes, history of bypass surgery in ; history of CAD and MI, previous stenting.  LVEF: 30% before bypass; 45-50% after surgery.  Current Meds:  Lisinopril 10 mg: once daily  Carvedilol 25 m tab twice daily  Aspirin 81 mg: once daily - no concern for bruising/bleeding.  Furosemide 20 mg: once daily in the morning  Potassium chloride 10 MEQ: 1 tab once daily  +Jardiance, as above.  Nitroglycerin 0.4 mg sublingual tab: Place 1 tablet under the tongue at onset of chest pain.  May repeat x 3 doses q 5 min.  Call 911 after first dose if pain persists. Pt use: took 1 tab this morning - \"didn't seem like it was working or necessary\" - then took TUMS and 20 minutes later she felt better so she thinks that this was actually heartburn.  Patient does not self-monitor BP. Does have an arm and a wrist blood pressure monitor.  No lightheadedness / dizziness.  Symptoms: last shortness of breath episode was about 1 month ago when climbed the stairs when she was overly tired; no lower leg edema per Patient; notes that she's trying to limit salt intake, no alcohol.  Notes that she elevates the head of her bed when sleeping and this helps with allergies and breathing.  Medication History: amlodipine (stopped due to BP control from other meds), metoprolol (changed to carvedilol), clopidogrel (stopped by cards 3/2022 - told to continue aspirin 81 mg daily).  BP Readings from Last 3 Encounters:   23 97/59   23 121/74   23 112/64     Pulse Readings from Last 3 Encounters:   23 84   23 76   23 76     Dyslipidemia:  Current therapy includes rosuvastatin 40 mg daily (increased 2023), recommended for repeat lipid panel later this year per cards.  Pt reports no " significant myalgias or other side effects.  The ASCVD Risk score (Luis Felipe DK, et al., 2019) failed to calculate for the following reasons:    The valid total cholesterol range is 130 to 320 mg/dL  Recent Labs   Lab Test 12/20/22  1609 06/27/22  1248 08/19/21  1340   CHOL  --  122 167   HDL  --  47* 51   LDL 90 53 88   TRIG  --  111 185*  185*   CHOLHDLRATIO  --   --  3.3     Allergic Rhinitis:  Runny nose, post-nasal drip, itchy eyes - relieved by washing eyes with warm water.  Current medications include:  Loratadine (Claritin) 10 mg: once daily as needed for allergies - feels like this isn't working well at this time. Doesn't take this everyday, and still having symptoms some days.  Also using systane ultra artificial tears and generic eye allergy drops as needed, but not routinely, hasn't used recently.    Today's Vitals: BP 97/59   Pulse 84   Wt 142 lb 9.6 oz (64.7 kg)   SpO2 98%   BMI 25.67 kg/m    ----------------    I spent 55 minutes with this patient today. All changes were made via collaborative practice agreement with Samanta Maza MD. A copy of the visit note was provided to the patient's provider(s).    A summary of these recommendations was given to the patient.    Carly Pisano, KushalD  Medication Therapy Management (MTM) Pharmacist  [place] [place] [facility] (Tu/Th/Fr)  Clinic Phone: [phone]  Pager: [phone]     Medication Therapy Recommendations  Type 2 diabetes mellitus with circulatory disorder, with long-term current use of insulin (H)    Current Medication: insulin lispro (HUMALOG KWIKPEN) 100 UNIT/ML (1 unit dial) KWIKPEN   Rationale: Patient forgets to take - Adherence - Adherence   Recommendation: Provide Education   Status: Patient Agreed - Adherence/Education

## 2023-03-16 NOTE — PATIENT INSTRUCTIONS
"Recommendations from today's MTM visit:                                                    MTM (medication therapy management) is a service provided by a clinical pharmacist designed to help you get the most of out of your medicines.      1. Be sure to take your 3 units of short acting insulin with your meals. You can carry the pen with you in your purse or keep it out on the counter so that you remember to take this.  It is important to take the short acting insulin within 15 to 30 minutes of starting your meal - not after your meal.    2. If you decide you want to try an alternate allergy medication, let us know.    Follow-up: 4/4/2023 [facility] CD[facility]  4/18 at 1pm in clinic with MTM.    It was great speaking with you today.  I value your experience and would be very thankful for your time in providing feedback in our clinic survey. In the next few days, you may receive an email or text message from [employer] with a link to a survey related to your  clinical pharmacist.\"     To schedule another MTM appointment, please call the clinic directly or you may call the MTM scheduling line at [phone] or toll-free at [phone].     My Clinical Pharmacist's contact information:                                                      Please feel free to contact me with any questions or concerns you have.      Carly Pisano, PharmD  Medication Therapy Management (MTM) Pharmacist   "

## 2023-03-16 NOTE — Clinical Note
MTM note route as FYI; Patient is having post-prandial hyperglycemia, but only uses 2 doses of short acting insulin per week; discussed importance of increased adherence to short acting insulin. See MTM visit note for details. KB

## 2023-04-04 ENCOUNTER — ALLIED HEALTH/NURSE VISIT (OUTPATIENT)
Dept: EDUCATION SERVICES | Facility: CLINIC | Age: 68
End: 2023-04-04
Payer: COMMERCIAL

## 2023-04-04 VITALS — WEIGHT: 138 LBS | BODY MASS INDEX: 24.45 KG/M2 | HEIGHT: 63 IN

## 2023-04-04 DIAGNOSIS — Z79.4 TYPE 2 DIABETES MELLITUS WITH CIRCULATORY DISORDER, WITH LONG-TERM CURRENT USE OF INSULIN (H): Primary | ICD-10-CM

## 2023-04-04 DIAGNOSIS — E11.59 TYPE 2 DIABETES MELLITUS WITH CIRCULATORY DISORDER, WITH LONG-TERM CURRENT USE OF INSULIN (H): Primary | ICD-10-CM

## 2023-04-04 DIAGNOSIS — E78.5 HYPERLIPIDEMIA LDL GOAL <70: ICD-10-CM

## 2023-04-04 PROCEDURE — G0108 DIAB MANAGE TRN  PER INDIV: HCPCS | Mod: AE | Performed by: DIETITIAN, REGISTERED

## 2023-04-04 NOTE — PROGRESS NOTES
"  Diabetes Self-Management Education & Support    Presents for: CGM Review for Type II diabetes     Type of Service: In Person Visit    Assessment Type:   ASSESSMENT:  Patient brings today a copy of all a home care test with results of hemoglobin A1c 6.9% demonstrating an improvement from 7.8%.    Freestyle roula 2 download.  Patient is 86% in target range over the last 21 days.      Patient continues to meet criteria for CGM coverage;   - patient has type two diabetes mellitus; and  - patient has been using CGM daily; and  - patient is insulin treated with insulin completing multiple bolus s daily 3 or more times/ day   - patient is frequently adjusting insulin based on blood glucose readings  - within six months prior to ordering the CGM, the patient has had an in-person visit with the practitioner; and determined that criteria (1-4) above are met; and   - every six months following the initial prescription of the CGM, the treating practitioner has an in-person visit with the patient to assess adherence to their CGM regimen and diabetes treatment plan    Current diabetes regimen  Victoza 1.8 mg daily  Jardiance 25mg daily   Metformin  mg 2 tabs twice a day  Tresiba 9 units every day   Humalog 3 units prior to meals     Patient does not consistently use Humalog with noted higher readings post prandial evening meals.  Patient is educated on how to safely adjust Humalog depending on carbohydrate intake.  Patient can decrease Humalog to 1 or 2 units if she is consuming a small meal with only 1 carbohydrate choice but if patient knows she is going to have a \"cheat meal\" as patient explains a cheat meal would be something sweet along with a starch then she is to take 3 units prior to eating     Main goal for today   **Humalog 3 units (**focus on taking this 20 minutes BEFORE you eat, especially starch - potato, pasta, rice, fruit, sweet)   Okay to decrease to 1-2 units if decreased carb intake     Patient's most " recent 6.9% on 3/27/2023 (home health testing), will have additional testing confirmed in clinic      Lab Results   Component Value Date    A1C 7.8 12/20/2022    A1C 8.6 02/11/2021     is meeting goal of <7.0    Diabetes knowledge and skills assessment:   Patient is knowledgeable in diabetes management concepts related to: Healthy Eating, Being Active, Monitoring, Taking Medication, Problem Solving, Reducing Risks and Healthy Coping    Continue education with the following diabetes management concepts: Patient benefits from review use in all areas for ongoing diabetes self-management    Based on learning assessment above, most appropriate setting for further diabetes education would be: Individual setting.      PLAN  Victoza 1.8 mg daily  Jardiance 25mg daily   Metformin  mg 2 tabs twice a day  Tresiba 9 units every day     **Humalog 3 units (**focus on taking this 20 minutes BEFORE you eat, especially starch - potato, pasta, rice, fruit)     Topics to cover at upcoming visits: Healthy Eating, Being Active, Monitoring and Taking Medication    Follow-up: 4 months    See Care Plan for co-developed, patient-state behavior change goals.  AVS provided for patient today.    Education Materials Provided:  Goals for Your Diabetes Care and My Plate Planner      SUBJECTIVE/OBJECTIVE:  Presents for: CGM Review  Accompanied by: Self  Diabetes education in the past 24mo: Yes  Focus of Visit: Healthy Eating, Taking Medication, CGM  Diabetes type: Type 2  Disease course: Improving  How confident are you filling out medical forms by yourself:: Other  Transportation concerns: Yes (Does not drive, will walk or get a ride to clinic)  Difficulty affording diabetes medication?: No  Difficulty affording diabetes testing supplies?: No  Other concerns:: None  Cultural Influences/Ethnic Background:  Not  or     Diabetes Symptoms & Complications:  Fatigue: Sometimes  Neuropathy: No  Polydipsia: No  Polyphagia:  "No  Polyuria: Yes  Visual change: No  Slow healing wounds: No  Autonomic neuropathy: No  CVA: No  Heart disease: Yes  Nephropathy: No  Peripheral neuropathy: No  Peripheral Vascular Disease: No  Retinopathy: No  Sexual dysfunction: Yes    Patient Problem List and Family Medical History reviewed for relevant medical history, current medical status, and diabetes risk factors.    Vitals:  Ht 1.588 m (5' 2.5\")   Wt 62.6 kg (138 lb)   BMI 24.84 kg/m    Estimated body mass index is 24.84 kg/m  as calculated from the following:    Height as of this encounter: 1.588 m (5' 2.5\").    Weight as of this encounter: 62.6 kg (138 lb).   Last 3 BP:   BP Readings from Last 3 Encounters:   03/16/23 97/59   02/28/23 121/74   02/01/23 112/64       History   Smoking Status     Former   Smokeless Tobacco     Never       Labs:  Lab Results   Component Value Date    A1C 7.8 12/20/2022    A1C 8.6 02/11/2021     Lab Results   Component Value Date     02/01/2023    GLC 64 03/16/2022     02/11/2021     Lab Results   Component Value Date    LDL 90 12/20/2022    LDL 53 06/27/2022    LDL 71 03/13/2020     HDL Cholesterol   Date Value Ref Range Status   03/13/2020 60 >or=50 mg/dL Final     Direct Measure HDL   Date Value Ref Range Status   06/27/2022 47 (L) >=50 mg/dL Final   ]  GFR Estimate   Date Value Ref Range Status   02/01/2023 >90 >60 mL/min/1.73m2 Final     Comment:     eGFR calculated using 2021 CKD-EPI equation.   02/11/2021 92 >or=60 ml/min/1.73m2 Final     GFR Estimate If Black   Date Value Ref Range Status   02/11/2021 106 >or=60 ml/min/1.73m2 Final     Lab Results   Component Value Date    CR 0.69 02/01/2023    CR 0.68 02/11/2021     No results found for: MICROALBUMIN    Healthy Eating:  Cultural/[facility] diet restrictions?: No  Meal planning/habits: Low salt, Smaller portions  How many times a week on average do you eat food made away from home (restaurant/take-out)?: 0  Meals include: Lunch, Dinner  Beverages: " Water, Tea, Coffee, Coffee drinks    Being Active:  Days per week of moderate to strenuous exercise (like a brisk walk): 5  On average, minutes per day of exercise at this level: 20  How intense was your typical exercise? : Light (like stretching or slow walking)  Exercise Minutes per Week: 100  Barrier to exercise: None    Monitoring:  Blood Glucose Meter: CGM, FreeStyle  Times checking blood sugar at home (number): 5+  Times checking blood sugar at home (per): Day          Taking Medications:  Diabetes Medication(s)     Biguanides       metFORMIN (GLUCOPHAGE XR) 500 MG 24 hr tablet    TAKE TWO TABLETS (1,000 MG) BY MOUTH TWO TIMES DAILY (WITH MEALS) Strength: 500 mg    Diabetic Other       Dextrose, Diabetic Use, (GLUCOSE) 1 g CHEW    Take 15 g by mouth As needed    Insulin       insulin degludec (TRESIBA FLEXTOUCH) 100 UNIT/ML pen    Inject 9 Units Subcutaneous At Bedtime     insulin lispro (HUMALOG KWIKPEN) 100 UNIT/ML (1 unit dial) KWIKPEN    Inject 3 units under the skin three times daily with meals. No short acting insulin with snacks.    Sodium-Glucose Co-Transporter 2 (SGLT2) Inhibitors       empagliflozin (JARDIANCE) 25 MG TABS tablet    Take 1 tablet (25 mg) by mouth daily    Incretin Mimetic Agents       liraglutide (VICTOZA) 18 MG/3ML solution    Inject 1.8 mg Subcutaneous daily          Taking Medication Assessed Today: Yes  Current Treatments: Insulin Injections, Oral Medication (taken by mouth), Non-insulin Injectables  Problems taking diabetes medications regularly?: No    Problem Solving:  Problem Solving Assessed Today: Yes  Is the patient at risk for hypoglycemia?: Yes  Hypoglycemia Frequency: Rarely  Hypoglycemia Treatment: Candy, Glucose (tablets or gel), Juice  Medical ID: No  Does patient have glucagon emergency kit?: No  Is the patient at risk for DKA?: No  Does patient have severe weather/disaster plan for diabetes management?: Yes  Does patient have sick day plan for diabetes management?:  Yes    Hypoglycemia symptoms  Confusion: No  Dizziness or Light-Headedness: Yes  Headaches: No  Hunger: No  Mood changes: No  Nervousness/Anxiety: No  Sleepiness: No  Speech difficulty: No  Sweats: Yes  Feeling shaky: Yes    Hypoglycemia Complications  Blackouts: No  Hospitalization: No  Nocturnal hypoglycemia: No  Required assistance: No  Required glucagon injection: No  Seizures: No    Reducing Risks:  Reducing Risks Assessed Today: Yes  Diabetes Risks: Age over 45 years, Sedentary Lifestyle  CAD Risks: Diabetes Mellitus, Dyslipidemia, Family history, Hypertension, Sedentary lifestyle, Stress  Has dilated eye exam at least once a year?: Yes  Sees dentist every 6 months?: No  Feet checked by healthcare provider in the last year?: Yes    Healthy Coping:  Healthy Coping Assessed Today: Yes  Emotional response to diabetes: Helplessness, Fear/Anxiety  Informal Support system:: [place] based, Family, Friends  Stage of change: PREPARATION (Decided to change - considering how)  Support resources: Websites, Magazines, In-person Offerings  Patient Activation Measure Survey Score:       View : No data to display.                  Care Plan and Education Provided:  Care Plan: Diabetes   Updates made by Avril Martinez RD since 4/4/2023 12:00 AM      Problem: Diabetes Self-Management Education Needed to Optimize Self-Care Behaviors       Goal: Understand diabetes pathophysiology and disease progression       Task: Provide education on diabetes pathophysiology and disease progression specfic to patient's diabetes type Completed 4/4/2023   Responsible User: Avril Martinez RD      Goal: Healthy Eating - follow a healthy eating pattern for diabetes       Task: Provide education on weight management Completed 4/4/2023   Responsible User: Avril Martinez RD      Goal: Being Active - get regular physical activity, working up to at least 150 minutes per week       Task: Provide education on relationship of activity to glucose and  precautions to take if at risk for low glucose Completed 4/4/2023   Responsible User: Avril Martinez RD      Goal: Taking Medication - patient is consistently taking medications as directed    This Visit's Progress: 70%   Recent Progress: 100%   Note:    Patient to take diabetes related medications as directed, specifically focusing on taking Humalog 20 minutes prior to eating     Goal: Problem Solving - know how to prevent and manage short-term diabetes complications       Task: Provide education on low blood glucose - causes, signs/symptoms, prevention, treatment, carrying a carbohydrate source at all times, and medical identification Completed 4/4/2023   Responsible User: Avril Martinez RD      Goal: Reducing Risks - know how to prevent and treat long-term diabetes complications       Task: Provide education on recommended care for dental, eye and foot health Completed 4/4/2023   Responsible User: Avril Martinez RD      Task: Provide education on Hemoglobin A1c - goals and relationship to blood glucose levels Completed 4/4/2023   Responsible User: Avril Martinez RD      Goal: Healthy Coping - use available resources to cope with the challenges of managing diabetes       Task: Discuss methods for coping with stress Completed 4/4/2023   Responsible User: Avril Martinez RD            Time Spent: 30 minutes  Encounter Type: Individual    Any diabetes medication dose changes were made via the CDE Protocol per the patient's referring provider. A copy of this encounter was shared with the provider.

## 2023-04-04 NOTE — LETTER
4/4/2023         RE: Chanelle Billy  [address]  [facility] [place] [address]        Dear Colleague,    Thank you for referring your patient, Chanelle Billy, to the [facility]. Please see a copy of my visit note below.    Cabbage and vegetables   Walking stairs and halls   Walking nire   Reading books     Continue with   Victoza 1.8 mg daily  Jardiance 25mg daily   Metformin  mg 2 tabs twice a day  Tresiba 9 units every day     **Humalog 3 units (**focus on taking this 20 minutes BEFORE you eat, especially starch - potato, pasta, rice, fruit)     Diabetes Self-Management Education & Support    Presents for: CGM Review    Type of Service: In Person Visit    Assessment Type:   ASSESSMENT:  Patient brings today a copy of all a home care test with results of hemoglobin A1c 6.9% demonstrating an improvement from 7.8%.    Freestyle roula 2 download.  Patient is 86% in target range over the last 21 days.      Patient continues to meet criteria for CGM coverage;   - patient has type two diabetes mellitus; and  - patient has been using CGM daily; and  - patient is insulin treated with insulin completing multiple bolus s daily 3 or more times/ day   - patient is frequently adjusting insulin based on blood glucose readings  - within six months prior to ordering the CGM, the patient has had an in-person visit with the practitioner; and determined that criteria (1-4) above are met; and   - every six months following the initial prescription of the CGM, the treating practitioner has an in-person visit with the patient to assess adherence to their CGM regimen and diabetes treatment plan    Current diabetes regimen  Victoza 1.8 mg daily  Jardiance 25mg daily   Metformin  mg 2 tabs twice a day  Tresiba 9 units every day   Humalog 3 units prior to meals     Patient does not consistently use Humalog with noted higher readings post prandial evening meals.  Patient is educated on  "how to safely adjust Humalog depending on carbohydrate intake.  Patient can decrease Humalog to 1 or 2 units if she is consuming a small meal with only 1 carbohydrate choice but if patient knows she is going to have a \"cheat meal\" as patient explains a cheat meal would be something sweet along with a starch then she is to take 3 units prior to eating     Main goal for today   **Humalog 3 units (**focus on taking this 20 minutes BEFORE you eat, especially starch - potato, pasta, rice, fruit, sweet)   Okay to decrease to 1-2 units if decreased carb intake     Patient's most recent 6.9% on 3/27/2023 (home health testing), will have additional testing confirmed in clinic    Lab Results   Component Value Date    A1C 7.8 12/20/2022    A1C 8.6 02/11/2021     is meeting goal of <7.0    Diabetes knowledge and skills assessment:   Patient is knowledgeable in diabetes management concepts related to: Healthy Eating, Being Active, Monitoring, Taking Medication, Problem Solving, Reducing Risks and Healthy Coping    Continue education with the following diabetes management concepts: Patient benefits from review use in all areas for ongoing diabetes self-management    Based on learning assessment above, most appropriate setting for further diabetes education would be: Individual setting.      PLAN  Victoza 1.8 mg daily  Jardiance 25mg daily   Metformin  mg 2 tabs twice a day  Tresiba 9 units every day     **Humalog 3 units (**focus on taking this 20 minutes BEFORE you eat, especially starch - potato, pasta, rice, fruit)     Topics to cover at upcoming visits: Healthy Eating, Being Active, Monitoring and Taking Medication    Follow-up: 4 months    See Care Plan for co-developed, patient-state behavior change goals.  AVS provided for patient today.    Education Materials Provided:  Goals for Your Diabetes Care and My Plate Planner      SUBJECTIVE/OBJECTIVE:  Presents for: CGM Review  Accompanied by: Self  Diabetes education in " "the past 24mo: Yes  Focus of Visit: Healthy Eating, Taking Medication, CGM  Diabetes type: Type 2  Disease course: Improving  How confident are you filling out medical forms by yourself:: Other  Transportation concerns: Yes (Does not drive, will walk or get a ride to clinic)  Difficulty affording diabetes medication?: No  Difficulty affording diabetes testing supplies?: No  Other concerns:: None  Cultural Influences/Ethnic Background:  Not  or     Diabetes Symptoms & Complications:  Fatigue: Sometimes  Neuropathy: No  Polydipsia: No  Polyphagia: No  Polyuria: Yes  Visual change: No  Slow healing wounds: No  Autonomic neuropathy: No  CVA: No  Heart disease: Yes  Nephropathy: No  Peripheral neuropathy: No  Peripheral Vascular Disease: No  Retinopathy: No  Sexual dysfunction: Yes    Patient Problem List and Family Medical History reviewed for relevant medical history, current medical status, and diabetes risk factors.    Vitals:  Ht 1.588 m (5' 2.5\")   Wt 62.6 kg (138 lb)   BMI 24.84 kg/m    Estimated body mass index is 24.84 kg/m  as calculated from the following:    Height as of this encounter: 1.588 m (5' 2.5\").    Weight as of this encounter: 62.6 kg (138 lb).   Last 3 BP:   BP Readings from Last 3 Encounters:   03/16/23 97/59   02/28/23 121/74   02/01/23 112/64       History   Smoking Status     Former   Smokeless Tobacco     Never       Labs:  Lab Results   Component Value Date    A1C 7.8 12/20/2022    A1C 8.6 02/11/2021     Lab Results   Component Value Date     02/01/2023    GLC 64 03/16/2022     02/11/2021     Lab Results   Component Value Date    LDL 90 12/20/2022    LDL 53 06/27/2022    LDL 71 03/13/2020     HDL Cholesterol   Date Value Ref Range Status   03/13/2020 60 >or=50 mg/dL Final     Direct Measure HDL   Date Value Ref Range Status   06/27/2022 47 (L) >=50 mg/dL Final   ]  GFR Estimate   Date Value Ref Range Status   02/01/2023 >90 >60 mL/min/1.73m2 Final     Comment:    "  eGFR calculated using 2021 CKD-EPI equation.   02/11/2021 92 >or=60 ml/min/1.73m2 Final     GFR Estimate If Black   Date Value Ref Range Status   02/11/2021 106 >or=60 ml/min/1.73m2 Final     Lab Results   Component Value Date    CR 0.69 02/01/2023    CR 0.68 02/11/2021     No results found for: MICROALBUMIN    Healthy Eating:  Cultural/Congregation diet restrictions?: No  Meal planning/habits: Low salt, Smaller portions  How many times a week on average do you eat food made away from home (restaurant/take-out)?: 0  Meals include: Lunch, Dinner  Beverages: Water, Tea, Coffee, Coffee drinks    Being Active:  Days per week of moderate to strenuous exercise (like a brisk walk): 5  On average, minutes per day of exercise at this level: 20  How intense was your typical exercise? : Light (like stretching or slow walking)  Exercise Minutes per Week: 100  Barrier to exercise: None    Monitoring:  Blood Glucose Meter: CGM, FreeStyle  Times checking blood sugar at home (number): 5+  Times checking blood sugar at home (per): Day          Taking Medications:  Diabetes Medication(s)     Biguanides       metFORMIN (GLUCOPHAGE XR) 500 MG 24 hr tablet    TAKE TWO TABLETS (1,000 MG) BY MOUTH TWO TIMES DAILY (WITH MEALS) Strength: 500 mg    Diabetic Other       Dextrose, Diabetic Use, (GLUCOSE) 1 g CHEW    Take 15 g by mouth As needed    Insulin       insulin degludec (TRESIBA FLEXTOUCH) 100 UNIT/ML pen    Inject 9 Units Subcutaneous At Bedtime     insulin lispro (HUMALOG KWIKPEN) 100 UNIT/ML (1 unit dial) KWIKPEN    Inject 3 units under the skin three times daily with meals. No short acting insulin with snacks.    Sodium-Glucose Co-Transporter 2 (SGLT2) Inhibitors       empagliflozin (JARDIANCE) 25 MG TABS tablet    Take 1 tablet (25 mg) by mouth daily    Incretin Mimetic Agents       liraglutide (VICTOZA) 18 MG/3ML solution    Inject 1.8 mg Subcutaneous daily          Taking Medication Assessed Today: Yes  Current Treatments: Insulin  Injections, Oral Medication (taken by mouth), Non-insulin Injectables  Problems taking diabetes medications regularly?: No    Problem Solving:  Problem Solving Assessed Today: Yes  Is the patient at risk for hypoglycemia?: Yes  Hypoglycemia Frequency: Rarely  Hypoglycemia Treatment: Candy, Glucose (tablets or gel), Juice  Medical ID: No  Does patient have glucagon emergency kit?: No  Is the patient at risk for DKA?: No  Does patient have severe weather/disaster plan for diabetes management?: Yes  Does patient have sick day plan for diabetes management?: Yes    Hypoglycemia symptoms  Confusion: No  Dizziness or Light-Headedness: Yes  Headaches: No  Hunger: No  Mood changes: No  Nervousness/Anxiety: No  Sleepiness: No  Speech difficulty: No  Sweats: Yes  Feeling shaky: Yes    Hypoglycemia Complications  Blackouts: No  Hospitalization: No  Nocturnal hypoglycemia: No  Required assistance: No  Required glucagon injection: No  Seizures: No    Reducing Risks:  Reducing Risks Assessed Today: Yes  Diabetes Risks: Age over 45 years, Sedentary Lifestyle  CAD Risks: Diabetes Mellitus, Dyslipidemia, Family history, Hypertension, Sedentary lifestyle, Stress  Has dilated eye exam at least once a year?: Yes  Sees dentist every 6 months?: No  Feet checked by healthcare provider in the last year?: Yes    Healthy Coping:  Healthy Coping Assessed Today: Yes  Emotional response to diabetes: Helplessness, Fear/Anxiety  Informal Support system:: Jacklyn based, Family, Friends  Stage of change: PREPARATION (Decided to change - considering how)  Support resources: Websites, Magazines, In-person Offerings  Patient Activation Measure Survey Score:       View : No data to display.                  Care Plan and Education Provided:  Care Plan: Diabetes   Updates made by Avril Martinez RD since 4/4/2023 12:00 AM      Problem: Diabetes Self-Management Education Needed to Optimize Self-Care Behaviors       Goal: Understand diabetes pathophysiology  and disease progression       Task: Provide education on diabetes pathophysiology and disease progression specfic to patient's diabetes type Completed 4/4/2023   Responsible User: Avril Martinez RD      Goal: Healthy Eating - follow a healthy eating pattern for diabetes       Task: Provide education on weight management Completed 4/4/2023   Responsible User: Avril Martinez RD      Goal: Being Active - get regular physical activity, working up to at least 150 minutes per week       Task: Provide education on relationship of activity to glucose and precautions to take if at risk for low glucose Completed 4/4/2023   Responsible User: Avril Martinez RD      Goal: Taking Medication - patient is consistently taking medications as directed    This Visit's Progress: 70%   Recent Progress: 100%   Note:    Patient to take diabetes related medications as directed, specifically focusing on taking Humalog 20 minutes prior to eating     Goal: Problem Solving - know how to prevent and manage short-term diabetes complications       Task: Provide education on low blood glucose - causes, signs/symptoms, prevention, treatment, carrying a carbohydrate source at all times, and medical identification Completed 4/4/2023   Responsible User: Avril Martinez RD      Goal: Reducing Risks - know how to prevent and treat long-term diabetes complications       Task: Provide education on recommended care for dental, eye and foot health Completed 4/4/2023   Responsible User: Avril Martinez RD      Task: Provide education on Hemoglobin A1c - goals and relationship to blood glucose levels Completed 4/4/2023   Responsible User: Avril Martinez RD      Goal: Healthy Coping - use available resources to cope with the challenges of managing diabetes       Task: Discuss methods for coping with stress Completed 4/4/2023   Responsible User: Avril Martinez RD            Time Spent: 30 minutes  Encounter Type: Individual    Any diabetes medication dose  changes were made via the CDE Protocol per the patient's referring provider. A copy of this encounter was shared with the provider.

## 2023-04-04 NOTE — PATIENT INSTRUCTIONS
Continue with   Victoza 1.8 mg daily  Jardiance 25mg daily   Metformin  mg 2 tabs twice a day  Tresiba 9 units every day     **Humalog 3 units (**focus on taking this 20 minutes BEFORE you eat, especially starch - potato, pasta, rice, fruit)         Goals:  Practice healthy stress management and mindful eating - think are you physically hungry or are you bored, stressed, emotional etc, make of list of things to do besides eat.    Try to get good quality sleep with a goal of 7-8 hours per night.  Stay physically active daily.  Recommend working up to a total of 30 minutes on 5 days/ week.  Recommend a fitness tracker.     Eat in a healthy way- eliminate trans fats, limit saturated fats and added sugars; follow the plate method - picture above.  Keep a food record (MyFitnessPal, Jacques).    A meal is 3 or more food groups; make it colorful for better nutrition.    Total Carbohydrates (in grams) = Breakfast  30    Lunch  30    Supper  30    If desired snacks 15

## 2023-04-12 ENCOUNTER — TELEPHONE (OUTPATIENT)
Dept: FAMILY MEDICINE | Facility: CLINIC | Age: 68
End: 2023-04-12

## 2023-04-12 NOTE — TELEPHONE ENCOUNTER
04/12/23  General Call      Reason for Call:  Need a signature for incontinence supply    What are your questions or concerns:  Tyler @ [facility] stated that they sent over a fax for incontinence supplies for pt. They are refaxing today and just need a signature from the provider to get these to the pt.    Date of last appointment with provider: 03/16/23      [phone] Ext [phone]

## 2023-04-18 ENCOUNTER — OFFICE VISIT (OUTPATIENT)
Dept: PHARMACY | Facility: CLINIC | Age: 68
End: 2023-04-18
Payer: COMMERCIAL

## 2023-04-18 VITALS
SYSTOLIC BLOOD PRESSURE: 96 MMHG | BODY MASS INDEX: 24.86 KG/M2 | WEIGHT: 138.1 LBS | OXYGEN SATURATION: 99 % | HEART RATE: 75 BPM | DIASTOLIC BLOOD PRESSURE: 58 MMHG

## 2023-04-18 DIAGNOSIS — Z79.4 TYPE 2 DIABETES MELLITUS WITH OTHER CIRCULATORY COMPLICATION, WITH LONG-TERM CURRENT USE OF INSULIN (H): Primary | ICD-10-CM

## 2023-04-18 DIAGNOSIS — E11.59 TYPE 2 DIABETES MELLITUS WITH OTHER CIRCULATORY COMPLICATION, WITH LONG-TERM CURRENT USE OF INSULIN (H): Primary | ICD-10-CM

## 2023-04-18 PROCEDURE — 99207 PR NO CHARGE LOS: CPT | Performed by: PHARMACIST

## 2023-04-18 NOTE — PROGRESS NOTES
Medication Therapy Management (MTM) Encounter    ASSESSMENT:                            Medication Adherence/Access: See below for considerations    Type 2 Diabetes:  Patient's A1c of 6.9% (self-reported) is at ADA goal of <7%. SMBG fasting sugars are not at ADA goal of  mg/dL -several hypoglycemic lows overnight resulting in either low blood sugars in the morning significant hyperglycemia from overcorrection after a low event; and post-prandial sugars are not all at goal of less than 180 mg/dL.  Patient would benefit from:  Long acting insulin: continue current dose at this time, but watch closely with ongoing overnight HYPOglycemia - see below.  Short acting insulin: Long discussion about the safety concerns with using multiple short acting insulin doses both before a meal and after a meal for correction and the resulting hypoglycemia (likely what happened with her overnight HYPO episodes).  We will start with ensuring that patient does not stack her short acting insulin doses and spaces each short acting insulin dose at least 5 hours apart.  If still having overnight or AM hypoglycemia, will reduce long-acting insulin as well.  GLP-1 (victoza/liraglutide): Continue current dose (max 1.8 mg/day)  SGLT2 (Jardiance): continue current dose (at max 25 mg/day)  CGM: continue use  HM: address further at follow-up appointments.    PLAN:                            1. Today we discussed the risk of correcting your high blood sugars with short acting insulin after you've already given yourself the short acting insulin for that meal. You should wait at least 5 hours between doses of your short acting insulin.    It is important to take the short acting 20 minutes before starting your meal - not after your meal.    2. No changes to your medication schedule.    Future: lipid panel later this year per cards (dose was increased 2/2023).    Follow-up: 1 month: 5/16/23 at 1pm in clinic.    SUBJECTIVE/OBJECTIVE:                           Chanelle Billy is a 67 year old female coming in for a follow-up visit.  Today's visit is a follow-up MTM visit from 3/16/2021     Reason for visit: diabetes check in.    Allergies/ADRs: Reviewed in chart  Past Medical History: Reviewed in chart  Tobacco: She reports that she has quit smoking. She has never used smokeless tobacco.  Alcohol: none   Social: living alone, with cat.    Medication Adherence/Access: see below for details.    Type 2 Diabetes:  Testing supplies: freestyle roula 2 and glucometer  Following with certified diabetes care and  ([facility])? Yes  - Avril Martinez, RD, CD, [facility]  Pt currently taking:  Metformin  m tabs twice daily   Tresiba (degludec) long acting insulin: 9 units daily  Humalog (lispro) rapid acting insulin: prescribed as 3 units three times daily with meals and no insulin with snacks - Patient reports using 3-4 units 2-3 times per day with meals, then sometimes will add an additional dose a couple hours after her evening meal if her blood sugar is going up.  Jardiance (empagliflozin) 25 m tab once daily  Liraglutide (Victoza) subcut injection: 1.8 mg injected under the skin once daily   Calcium carbonate (TUMS) 500 mg: Pt taking as needed for heartburn symptoms with victoza. Estimated use: Not eating routinely at this time  Side effect?  Patient notes of continued lower appetite but is not having problems with GI upset or heartburn at this time  Record of home blood sugars:  SMBG: Review report below  Symptoms of low blood sugar?   She wakes up feeling unwell or her cat awakens her  Symptoms of high blood sugar? none                      Patient reports A1c reading at her 3/27: 6.9% - had this completed with a monitor at her apartment /care center.  Lab Results   Component Value Date    A1C 7.8 2022    A1C 7.2 2022    A1C 8.1 2021    A1C 8.7 2021    A1C 8.6 2021    A1C 8.6 2021    A1C 11.4 10/22/2020    A1C  "6.8 03/13/2020    A1C 11.7 12/06/2019   Eye exam:  up to date, last 11/14/22  Foot exam: up to date, last 6/27/22  Diet: usually hungry about 10:30/11am, and eating 2-3 meals per day  Exercise: trying to \"exercise\" every other day doing the stairs around her building - making a few loops around the building including up and down the stairs.  ASCVD Prevention:  Aspirin: Taking 81mg daily and denies side effects   Statin rosuvastatin 40 mg daily.  ACEi/ARB: Yes: lisinopril.   Urine Albumin:        Lab Results   Component Value Date     UMALCR   06/27/2022   Home weight was 141 lbs on 3/15/23        Wt Readings from Last 10 Encounters:   04/18/23 138 lb 1.6 oz (62.6 kg)   04/04/23 138 lb (62.6 kg)   03/16/23 142 lb 9.6 oz (64.7 kg)   02/28/23 146 lb 9.6 oz (66.5 kg)   02/01/23 151 lb 3.2 oz (68.6 kg)   01/24/23 154 lb 11.2 oz (70.2 kg)   12/20/22 158 lb (71.7 kg)   09/28/22 156 lb 11.2 oz (71.1 kg)   06/27/22 159 lb (72.1 kg)   06/21/22 163 lb 4.8 oz (74.1 kg)     Today's Vitals: BP 96/58   Pulse 75   Wt 138 lb 1.6 oz (62.6 kg)   SpO2 99%   BMI 24.86 kg/m    ----------------    I spent 30 minutes with this patient today. All changes were made via collaborative practice agreement with Samanta Maza MD. A copy of the visit note was provided to the patient's provider(s).    A summary of these recommendations was given to the patient.    Carly Pisano, Erick  Medication Therapy Management (MTM) Pharmacist  [place] [place] [facility] (Tu/Th/Fr)  Clinic Phone: [phone]  Pager: [phone]      Medication Therapy Recommendations  Type 2 diabetes mellitus with circulatory disorder, with long-term current use of insulin (H)    Current Medication: insulin lispro (HUMALOG KWIKPEN) 100 UNIT/ML (1 unit dial) KWIKPEN   Rationale: Incorrect administration - Adverse medication event - Safety   Recommendation: Provide Education   Status: Patient Agreed - Adherence/Education                  "

## 2023-04-18 NOTE — PATIENT INSTRUCTIONS
"Recommendations from today's MTM visit:                                                    MTM (medication therapy management) is a service provided by a clinical pharmacist designed to help you get the most of out of your medicines.      1. Today we discussed the risk of correcting your high blood sugars with short acting insulin after you've already given yourself the short acting insulin for that meal. You should wait at least 5 hours between doses of your short acting insulin.    It is important to take the short acting 20 minutes before starting your meal - not after your meal.    2. No changes to your medication schedule.    Follow-up: 1 month: 5/16/23 at 1pm in clinic.    It was great speaking with you today.  I value your experience and would be very thankful for your time in providing feedback in our clinic survey. In the next few days, you may receive an email or text message from [employer] with a link to a survey related to your  clinical pharmacist.\"     To schedule another MTM appointment, please call the clinic directly or you may call the MTM scheduling line at [phone] or toll-free at [phone].     My Clinical Pharmacist's contact information:                                                      Please feel free to contact me with any questions or concerns you have.      Carly Pisano, PharmFLIP  Medication Therapy Management (MTM) Pharmacist   "

## 2023-05-06 ENCOUNTER — TRANSFERRED RECORDS (OUTPATIENT)
Dept: MULTI SPECIALTY CLINIC | Facility: CLINIC | Age: 68
End: 2023-05-06

## 2023-05-06 LAB — RETINOPATHY: NORMAL

## 2023-05-18 ENCOUNTER — TELEPHONE (OUTPATIENT)
Dept: FAMILY MEDICINE | Facility: CLINIC | Age: 68
End: 2023-05-18
Payer: COMMERCIAL

## 2023-05-19 NOTE — TELEPHONE ENCOUNTER
Patient no-showed her MTM visit this week.    Routing to care team- please contact patient and invite to reschedule her MTM visit.

## 2023-05-25 ENCOUNTER — OFFICE VISIT (OUTPATIENT)
Dept: PHARMACY | Facility: CLINIC | Age: 68
End: 2023-05-25
Payer: COMMERCIAL

## 2023-05-25 VITALS
DIASTOLIC BLOOD PRESSURE: 59 MMHG | SYSTOLIC BLOOD PRESSURE: 96 MMHG | OXYGEN SATURATION: 98 % | BODY MASS INDEX: 23.67 KG/M2 | WEIGHT: 131.5 LBS | HEART RATE: 73 BPM

## 2023-05-25 DIAGNOSIS — Z79.4 TYPE 2 DIABETES MELLITUS WITH OTHER CIRCULATORY COMPLICATION, WITH LONG-TERM CURRENT USE OF INSULIN (H): Primary | ICD-10-CM

## 2023-05-25 DIAGNOSIS — E11.59 TYPE 2 DIABETES MELLITUS WITH OTHER CIRCULATORY COMPLICATION, WITH LONG-TERM CURRENT USE OF INSULIN (H): Primary | ICD-10-CM

## 2023-05-25 DIAGNOSIS — E11.69 TYPE 2 DIABETES MELLITUS WITH OTHER SPECIFIED COMPLICATION, WITH LONG-TERM CURRENT USE OF INSULIN (H): ICD-10-CM

## 2023-05-25 DIAGNOSIS — Z79.4 TYPE 2 DIABETES MELLITUS WITH OTHER SPECIFIED COMPLICATION, WITH LONG-TERM CURRENT USE OF INSULIN (H): ICD-10-CM

## 2023-05-25 PROCEDURE — 99207 PR NO CHARGE LOS: CPT | Performed by: PHARMACIST

## 2023-05-25 RX ORDER — INSULIN LISPRO 100 [IU]/ML
INJECTION, SOLUTION INTRAVENOUS; SUBCUTANEOUS
Qty: 15 ML | Refills: 0 | Status: SHIPPED | OUTPATIENT
Start: 2023-05-25 | End: 2023-08-29

## 2023-05-25 RX ORDER — LIRAGLUTIDE 6 MG/ML
INJECTION SUBCUTANEOUS
Qty: 9 ML | Refills: 3 | Status: SHIPPED | OUTPATIENT
Start: 2023-05-25 | End: 2023-12-19

## 2023-05-25 NOTE — PATIENT INSTRUCTIONS
"Recommendations from today's MTM visit:                                                    MTM (medication therapy management) is a service provided by a clinical pharmacist designed to help you get the most of out of your medicines.      1. Change your mealtime insulin (humalog) dose:   Breakfast: 3 units  Lunch: check your blood sugar. If 150 or higher, take 4 units. If lower than 150, take 3 units.  Evening meal:  check your blood sugar. If 150 or higher, take 4 units. If lower than 150, take 3 units.    2. Contact the pharmacy to request a victoza refill.    Follow-up: 6/15 at 12:30 pm in clinic. Bring all your medications (bottles, pill boxes, etc.) to this appointment.    It was great speaking with you today.  I value your experience and would be very thankful for your time in providing feedback in our clinic survey. In the next few days, you may receive an email or text message from [employer] with a link to a survey related to your  clinical pharmacist.\"     To schedule another MTM appointment, please call the clinic directly or you may call the MTM scheduling line at [phone] or toll-free at [phone].     My Clinical Pharmacist's contact information:                                                      Please feel free to contact me with any questions or concerns you have.      Carly Pisano, PharmD  Medication Therapy Management (MTM) Pharmacist   " scalp abrasion

## 2023-05-25 NOTE — TELEPHONE ENCOUNTER
"    Last office visit: 5/25/2023     Future Appointments 5/25/2023 - 11/21/2023      Date Visit Type Length Department Provider     6/15/2023 12:30 PM RETURN - MTM 60 min RVFL MTM Anyi Pisano, formerly [facility]    Location Instructions:     [address] [place] [address]              7/3/2023 10:30 AM DIABETIC ED 60 min RVFL DIABETES EDUCATION Avril Martinez, RD    Location Instructions:     [address] [place] [address]                   Requested Prescriptions   Pending Prescriptions Disp Refills     VICTOZA PEN 18 MG/3ML soln [Pharmacy Med Name: VICTOZA 18MG/3ML SOLN PEN-INJ] 9 mL 3     Sig: INJECT 1.8 MG SUBCUTANEOUS DAILY       GLP-1 Agonists Protocol Passed - 5/25/2023  1:46 PM        Passed - HgbA1C in past 3 or 6 months     If HgbA1C is 8 or greater, it needs to be on file within the past 3 months.  If less than 8, must be on file within the past 6 months.     Recent Labs   Lab Test 12/20/22  1609 11/30/21  1503 08/19/21  1340   A1C 7.8*   < > 8.7*   [id number]  --   --  8.7*    < > = values in this interval not displayed.             Passed - Medication is active on med list        Passed - Patient is age 18 or older        Passed - No active pregnancy on record        Passed - Normal serum creatinine on file in past 12 months     Recent Labs   Lab Test 02/01/23  0823   CR 0.69       Ok to refill medication if creatinine is low          Passed - No positive pregnancy test in past 12 months        Passed - Recent (6 mo) or future (30 days) visit within the authorizing provider's specialty     Patient had office visit in the last 6 months or has a visit in the next 30 days with authorizing provider.  See \"Patient Info\" tab in inbasket, or \"Choose Columns\" in Meds & Orders section of the refill encounter.                       "

## 2023-05-25 NOTE — PROGRESS NOTES
Medication Therapy Management (MTM) Encounter    ASSESSMENT:                            Medication Adherence/Access: Recommend patient call the pharmacy for refills of Victoza.    Type 2 Diabetes:  Patient's A1c of 7.8% is not at ADA goal of <7%, had a home A1c reading <7% within last 3 months.   SMBG fasting sugars are not at ADA goal of  mg/dL and post-prandial sugars are not at goal of less than 180 mg/dL. However, Patient is no longer having frequent lows , likely because she is not overcorrecting for HYPERglycemia anymore, resulting insulin stacking late night and overnight lows.  Patient would benefit from:  Metformin: continue current 2000 mg/day (at max 2000 mg/day)  SGLT2 (Jardiance): Continue current dose (at max 25 mg/day)   GLP-1 (victoza/liraglutide): Continue current dose (max 1.8 mg/day); we discussed the option to try an alternate GLP-1 that may be more effective but may also have an increased risk of GI upset given once weekly higher dose.  Patient agrees to stay on the current medication.  Long acting insulin: Continue current dose since most a.m. blood sugars are at goal and elevations are post-prandial glucose.  Short acting insulin: Continue current dose with morning meal; increase afternoon and evening meal short acting insulin doses.  HM: Due for A1c and UACR 6/2023    PLAN:                            1. Change your mealtime insulin (humalog) dose:   Breakfast: 3 units  Lunch: check your blood sugar. If 150 or higher, take 4 units. If lower than 150, take 3 units.  Evening meal:  check your blood sugar. If 150 or higher, take 4 units. If lower than 150, take 3 units.    2. Contact the pharmacy to request a victoza refill.    Future labs: A1c, UACR -labs future ordered.    Follow-up: 6/15 at 12:30 pm in clinic. Bring all your medications (bottles, pill boxes, etc.) to this appointment.    SUBJECTIVE/OBJECTIVE:                          Chanelle Billy is a 67 year old female coming in for a  follow-up visit.  Today's visit is a follow-up MTM visit from 2023   Reason for visit: diabetes follow-up.    Allergies/ADRs: Reviewed in chart  Past Medical History: Reviewed in chart  Tobacco: She reports that she has quit smoking. She has never used smokeless tobacco.  Alcohol: none  Social: living alone with cat.    Medication Adherence/Access: no issues reported, does not bring any pills with her today.    Type 2 Diabetes:  Testing supplies: freestyle roula 2 and glucometer  Following with certified diabetes care and  (CDCES)? Yes  - Avril Martinez, RD, CD, CDCES  Pt currently taking:  Metformin  m tabs twice daily   Tresiba (degludec) long acting insulin: 9 units daily at bedtime.  Humalog (lispro) rapid acting insulin: prescribed as 3 units three times daily with meals and no insulin with snacks; patient estimates that she is using this about twice a day, sometimes 3 times a day.  She notes that she is no longer using additional doses when her blood sugar is elevated a few hours after her meal.  Jardiance (empagliflozin) 25 m tab once daily  Liraglutide (Victoza) subcut injection: 1.8 mg injected under the skin once daily in the morning -patient requests a refill, per chart notes there should be refills on file at the pharmacy.  Calcium carbonate (TUMS) 500 mg: Pt taking as needed for heartburn symptoms with victoza.   Side effect? : does note that she had a stomach bug recently had vomiting, used over-the-counter medication for this; this is now resolved.  Otherwise has been tolerating the Victoza okay, notes a decreased appetite.  Record of home blood sugars:  SMBG: Review report below  Symptoms of low blood sugar?   She wakes up feeling unwell or her cat awakens her  Symptoms of high blood sugar? none             Patient reports A1c reading at her 3/27: 6.9% - had this completed with a monitor at her apartment /care center.  Lab Results   Component Value Date     "A1C 7.8 12/20/2022    A1C 7.2 06/27/2022    A1C 8.1 11/30/2021    A1C 8.7 08/19/2021    A1C 8.6 02/11/2021    A1C 8.6 02/11/2021    A1C 11.4 10/22/2020    A1C 6.8 03/13/2020    A1C 11.7 12/06/2019   Eye exam:  up to date, last 11/14/22  Foot exam: up to date, last 6/27/22  Diet: usually hungry about 10:30/11am, and eating 2-3 meals per day  Exercise: trying to \"exercise\" every other day doing the stairs around her building - making a few loops around the building including up and down the stairs.  ASCVD Prevention:  Aspirin: Taking 81mg daily and denies side effects   Statin rosuvastatin 40 mg daily.  ACEi/ARB: Yes: lisinopril.   Urine albumin is   Lab Results   Component Value Date    UMALCR  06/27/2022      Comment:      Unable to calculate, urine albumin and/or urine creatinine is outside detectable limits.  Microalbuminuria is defined as an albumin:creatinine ratio of 17 to 299 for males and 25 to 299 for females. A ratio of albumin:creatinine of 300 or higher is indicative of overt proteinuria.  Due to biologic variability, positive results should be confirmed by a second, first-morning random or 24-hour timed urine specimen. If there is discrepancy, a third specimen is recommended. When 2 out of 3 results are in the microalbuminuria range, this is evidence for incipient nephropathy and warrants increased efforts at glucose control, blood pressure control, and institution of therapy with an angiotensin-converting-enzyme (ACE) inhibitor (if the patient can tolerate it).       Wt Readings from Last 10 Encounters:   05/25/23 131 lb 8 oz (59.6 kg)   04/18/23 138 lb 1.6 oz (62.6 kg)   04/04/23 138 lb (62.6 kg)   03/16/23 142 lb 9.6 oz (64.7 kg)   02/28/23 146 lb 9.6 oz (66.5 kg)   02/01/23 151 lb 3.2 oz (68.6 kg)   01/24/23 154 lb 11.2 oz (70.2 kg)   12/20/22 158 lb (71.7 kg)   09/28/22 156 lb 11.2 oz (71.1 kg)   06/27/22 159 lb (72.1 kg)     Today's Vitals: BP 96/59   Pulse 73   Wt 131 lb 8 oz (59.6 kg)   SpO2 " 98%   BMI 23.67 kg/m     ----------------    I spent 30 minutes with this patient today. All changes were made via collaborative practice agreement with Samanta Maza MD. A copy of the visit note was provided to the patient's provider(s).     A summary of these recommendations was given to the patient.    Carly Pisano PharmD  Medication Therapy Management (MTM) Pharmacist  [place] [place] [facility] (Tu/Th/Fr)  Clinic Phone: [phone]  Pager: [phone]     Medication Therapy Recommendations  Type 2 diabetes mellitus with circulatory disorder, with long-term current use of insulin (H)    Current Medication: insulin lispro (HUMALOG KWIKPEN) 100 UNIT/ML (1 unit dial) KWIKPEN (Discontinued)   Rationale: Dose too low - Dosage too low - Effectiveness   Recommendation: Increase Dose   Status: Accepted per CPA

## 2023-06-20 ENCOUNTER — LAB (OUTPATIENT)
Dept: LAB | Facility: CLINIC | Age: 68
End: 2023-06-20
Payer: COMMERCIAL

## 2023-06-20 ENCOUNTER — OFFICE VISIT (OUTPATIENT)
Dept: PHARMACY | Facility: CLINIC | Age: 68
End: 2023-06-20
Payer: COMMERCIAL

## 2023-06-20 VITALS
BODY MASS INDEX: 23.29 KG/M2 | SYSTOLIC BLOOD PRESSURE: 96 MMHG | WEIGHT: 129.4 LBS | HEART RATE: 73 BPM | OXYGEN SATURATION: 99 % | DIASTOLIC BLOOD PRESSURE: 63 MMHG

## 2023-06-20 DIAGNOSIS — F33.1 MAJOR DEPRESSIVE DISORDER, RECURRENT EPISODE, MODERATE WITH ANXIOUS DISTRESS (H): ICD-10-CM

## 2023-06-20 DIAGNOSIS — I10 ESSENTIAL HYPERTENSION: ICD-10-CM

## 2023-06-20 DIAGNOSIS — E78.5 HYPERLIPIDEMIA LDL GOAL <70: Primary | ICD-10-CM

## 2023-06-20 DIAGNOSIS — I50.9 CONGESTIVE HEART FAILURE, UNSPECIFIED HF CHRONICITY, UNSPECIFIED HEART FAILURE TYPE (H): ICD-10-CM

## 2023-06-20 DIAGNOSIS — J30.2 SEASONAL ALLERGIC RHINITIS, UNSPECIFIED TRIGGER: ICD-10-CM

## 2023-06-20 DIAGNOSIS — I25.5 ISCHEMIC CARDIOMYOPATHY: ICD-10-CM

## 2023-06-20 DIAGNOSIS — Z79.4 TYPE 2 DIABETES MELLITUS WITH OTHER SPECIFIED COMPLICATION, WITH LONG-TERM CURRENT USE OF INSULIN (H): ICD-10-CM

## 2023-06-20 DIAGNOSIS — Z78.9 TAKES DIETARY SUPPLEMENTS: ICD-10-CM

## 2023-06-20 DIAGNOSIS — E11.69 TYPE 2 DIABETES MELLITUS WITH OTHER SPECIFIED COMPLICATION, WITH LONG-TERM CURRENT USE OF INSULIN (H): ICD-10-CM

## 2023-06-20 DIAGNOSIS — I25.119 CORONARY ARTERY DISEASE INVOLVING NATIVE CORONARY ARTERY OF NATIVE HEART WITH ANGINA PECTORIS (H): ICD-10-CM

## 2023-06-20 DIAGNOSIS — E78.5 HYPERLIPIDEMIA LDL GOAL <70: ICD-10-CM

## 2023-06-20 DIAGNOSIS — R12 HEARTBURN: ICD-10-CM

## 2023-06-20 DIAGNOSIS — G47.00 INSOMNIA, UNSPECIFIED TYPE: ICD-10-CM

## 2023-06-20 LAB
CHOLEST SERPL-MCNC: 117 MG/DL
CREAT UR-MCNC: 76.7 MG/DL
HBA1C MFR BLD: 7.4 % (ref 0–5.6)
HDLC SERPL-MCNC: 34 MG/DL
LDLC SERPL CALC-MCNC: 38 MG/DL
MICROALBUMIN UR-MCNC: 82.9 MG/L
MICROALBUMIN/CREAT UR: 108.08 MG/G CR (ref 0–25)
NONHDLC SERPL-MCNC: 83 MG/DL
TRIGL SERPL-MCNC: 225 MG/DL

## 2023-06-20 PROCEDURE — 82570 ASSAY OF URINE CREATININE: CPT

## 2023-06-20 PROCEDURE — 99207 PR NO CHARGE LOS: CPT | Performed by: PHARMACIST

## 2023-06-20 PROCEDURE — 82043 UR ALBUMIN QUANTITATIVE: CPT

## 2023-06-20 PROCEDURE — 83036 HEMOGLOBIN GLYCOSYLATED A1C: CPT

## 2023-06-20 PROCEDURE — 36415 COLL VENOUS BLD VENIPUNCTURE: CPT

## 2023-06-20 PROCEDURE — 80061 LIPID PANEL: CPT

## 2023-06-20 ASSESSMENT — PATIENT HEALTH QUESTIONNAIRE - PHQ9: SUM OF ALL RESPONSES TO PHQ QUESTIONS 1-9: 7

## 2023-06-20 NOTE — PROGRESS NOTES
Medication Therapy Management (MTM) Encounter    ASSESSMENT:                            Medication Adherence/Access: No issues identified    Type 2 Diabetes:  Patient's A1c of 7.8% is not at ADA goal of <7% (note this is outdated and patient did have an A1c on a home monitor at 6.9% in March), recommend repeat A1c today.   SMBG fasting sugars are mostly at ADA goal of  mg/dL and post-prandial sugars are not at goal of less than 180 mg/dL.  Patient would benefit from:  -Late night/overnight hypoglycemia events are likely secondary to patient using her short acting insulin after her mealtime instead of just before/with her meal.  Education provided about bringing her short acting insulin with her when she leaves the house to eat a meal so that she does not miss the dose and end up dosing it later resulting in hypoglycemia.  -Metformin: continue current 2000 mg/day (at max 2000 mg/day)  -SGLT2 (Jardiance): Continue current dose (at max 25 mg/day)   -GLP-1 (victoza/liraglutide): Continue current dose (max 1.8 mg/day)  -Long acting insulin: Continue current dose since most a.m. blood sugars are at goal and HYPOglycemia episodes seem to be from delayed short-acting insulin admin.  -Short acting insulin: education provided about increasing mealtime insulin dose for meals if blood sugar >150 mg/dL.  HM: Due for A1c and UACR 6/2023-recommend labs today    Hypertension/CAD/ischemic cardiomyopathy/CHF: Blood pressure and symptoms stable; patient is due for cardiology follow-up in the near future, may consider decreasing blood pressure regimen given her weight loss and lower blood pressures.  Patient is not symptomatic for hypotension at this time.    Dyslipidemia: Recommend fasting lipid labs today.    Allergic rhinitis: Discussed option to use an alternate antihistamine; patient preference to continue the current medication since it is working okay and avoid med changes.     Depression/fatigue/insomnia: Symptoms stable,  continue current medication.  Therapy: Recommend establish with new therapist, I will ask primary care provider to enter a referral    Heartburn: Stable    Vitamins/Supplements: Stable      PLAN:                            1. Continue current medications.   For your short acting insulin, be sure to take 4 units if your blood sugar is above 150 at lunch or evening meal.  Additionally, be sure to take this insulin with you when you leave the house so you can use it when you eat a meal away from home.    Short acting insulin plan:  Breakfast: 3 units  Lunch: check your blood sugar. If 150 or higher, take 4 units. If lower than 150, take 3 units.  Evening meal:  check your blood sugar. If 150 or higher, take 4 units. If lower than 150, take 3 units.    2. Recommend referral to therapist since your last therapist retired; I will ask Dr. Samanta Maza MD to place this order. Chart route sent.    3. Recommend making an appointment with Cardiology for a follow-up appointment - due 8/1/2023.  They have also ordered an echo for you to complete.    4. Refill of these meds sent today: jardiance, fluoxetine.    5. Labs today: UACR, A1c, fasting lipid panel    Future: Consider decreasing blood pressure medicine if blood pressures continue to lower to prevent fall risk/hypotensive symptoms - will re-assess at follow-up visit / after cards appointment.    Follow-up: 2 months: 8/29/2023 at 1pm in clinic; sooner if needed.    SUBJECTIVE/OBJECTIVE:                          Chanelle Billy is a 67 year old female coming in for a follow-up visit.  Today's visit is a follow-up MTM visit from 5/25/2023.     Reason for visit: diabetes follow-up and med review.    Allergies/ADRs: Reviewed in chart  Past Medical History: Reviewed in chart  Tobacco: She reports that she has quit smoking. She has never used smokeless tobacco.  Alcohol: none  Social: living alone with cat.     Medication Adherence/Access: setting up meds in a pill box  herself-brings this box to the appointment today which is set up correctly.    AM:  MVI  Metformin  Potassium  Fluoxetine  Carvedilol  Lisinopril  jardiance  furosemide    PM:   Metformin  Rosuvastatin  Carvedilol  Aspirin    As needed:  loratadine    Type 2 Diabetes:  Testing supplies: freestyle roula 2 and glucometer  Following with certified diabetes care and  (CDCES)? Yes  - Avril Martinez, RD, CD, CDCES  Pt currently taking:  Metformin  m tabs twice daily   Tresiba (degludec) long acting insulin: 9 units daily at bedtime.  Humalog (lispro) rapid acting insulin: prescribed as 3 units three times daily with meals and no insulin with snacks; Patient increasing the insulin to 4 units if her pre-meal sugar is >159 mg/dL (note this threshold is higher than instructed at last MTM visit, the instruction was greater than 150)  Jardiance (empagliflozin) 25 m tab once daily  Liraglutide (Victoza) subcut injection: 1.8 mg injected under the skin once daily in the morning  + calcium carb  (TUMS) and pepto- see below  Side effect?  Patient notes a decreased appetite and has been losing weight because of this.  She feels successful with her weight loss.  Sometimes she still does eat high sugar meals/snacks, but then generally doesn't feel as well after eating these  Record of home blood sugars:  SMBG: Review report below  Symptoms of low blood sugar?  She wakes up feeling unwell or her cat awakens her  Symptoms of high blood sugar? none          -Notes that some evenings when she leaves the house to eat dinner then she does not take her insulin before her meal, then she takes her insulin later in the evening when she returns home.  Patient reports A1c reading at her 3/27: 6.9% - had this completed with a monitor at her apartment /care center.  Lab Results   Component Value Date    A1C 7.8 2022    A1C 7.2 2022    A1C 8.1 2021    A1C 8.7 2021    A1C 8.6 2021    A1C  "8.6 02/11/2021    A1C 11.4 10/22/2020    A1C 6.8 03/13/2020    A1C 11.7 12/06/2019   Eye exam:  up to date, last 11/14/22  Foot exam: up to date, last 6/27/22  Diet: usually hungry about 10:30/11am, and eating 2-3 meals per day  Exercise: trying to \"exercise\" every other day doing the stairs around her building - making a few loops around the building including up and down the stairs.  ASCVD Prevention:  Aspirin: Taking 81mg daily and denies side effects   Statin rosuvastatin 40 mg daily.  ACEi/ARB: Yes: lisinopril.   Urine albumin is   Lab Results   Component Value Date     UMALCR   06/27/2022         Comment:         Unable to calculate, urine albumin and/or urine creatinine is outside detectable limits.  Microalbuminuria is defined as an albumin:creatinine ratio of 17 to 299 for males and 25 to 299 for females. A ratio of albumin:creatinine of 300 or higher is indicative of overt proteinuria.  Due to biologic variability, positive results should be confirmed by a second, first-morning random or 24-hour timed urine specimen. If there is discrepancy, a third specimen is recommended. When 2 out of 3 results are in the microalbuminuria range, this is evidence for incipient nephropathy and warrants increased efforts at glucose control, blood pressure control, and institution of therapy with an angiotensin-converting-enzyme (ACE) inhibitor (if the patient can tolerate it).       Wt Readings from Last 10 Encounters:   06/20/23 129 lb 6.4 oz (58.7 kg)   05/25/23 131 lb 8 oz (59.6 kg)   04/18/23 138 lb 1.6 oz (62.6 kg)   04/04/23 138 lb (62.6 kg)   03/16/23 142 lb 9.6 oz (64.7 kg)   02/28/23 146 lb 9.6 oz (66.5 kg)   02/01/23 151 lb 3.2 oz (68.6 kg)   01/24/23 154 lb 11.2 oz (70.2 kg)   12/20/22 158 lb (71.7 kg)   09/28/22 156 lb 11.2 oz (71.1 kg)      Hypertension/CAD/ischemic cardiomyopathy/CHF:  Patient following with cardiology, LOV 2/2023 planning for follow-up ECHO and appointment 8/2023.  Patient needs to make an " appointment.  Per chart notes, history of bypass surgery in ; history of CAD and MI, previous stenting.  LVEF: 30% before bypass; 45-50% after surgery.  : no concerns about chest pain, edema or shortness of breath at this time.  Current Meds:  Lisinopril 10 mg: once daily AM  Carvedilol 25 m tab twice daily  Aspirin 81 mg: once daily - no concern for bruising/bleeding.  Furosemide 20 mg: once daily in the morning  Potassium chloride 10 MEQ: 1 tab once daily  +Jardiance, as above.  Nitroglycerin 0.4 mg sublingual tab: Place 1 tablet under the tongue at onset of chest pain.  May repeat x 3 doses q 5 min.  Call 911 after first dose if pain persists. Pt use: none recently; current supply expires 2024.  Patient does not self-monitor BP. Does have an arm and a wrist blood pressure monitor, but not using either one.  No lightheadedness / dizziness.  Medication History: amlodipine (stopped due to BP control from other meds), metoprolol (changed to carvedilol), clopidogrel (stopped by cards 3/2022 - told to continue aspirin 81 mg daily).  BP Readings from Last 3 Encounters:   23 96/63   23 96/59   23 96/58     Pulse Readings from Last 3 Encounters:   23 73   23 73   23 75     Dyslipidemia:  Current therapy includes rosuvastatin 40 mg daily (increased 2023), fasting today, willing to recheck labs.  Pt reports no significant myalgias or other side effects.  The ASCVD Risk score (Luis Felipe DK, et al., 2019) failed to calculate for the following reasons:    The valid total cholesterol range is 130 to 320 mg/dL  Recent Labs   Lab Test 22  1609 22  1248 21  1340   CHOL  --  122 167   HDL  --  47* 51   LDL 90 53 88   TRIG  --  111 185*  185*   CHOLHDLRATIO  --   --  3.3      Allergic Rhinitis:  Runny nose, post-nasal drip, itchy eyes - relieved by washing eyes with warm water.  Current medications include:  Loratadine (Claritin) 10 mg: once daily as needed for  allergies. Working okay, would like to keep this medication the same.  Also using systane ultra artificial tears and generic eye allergy drops as needed, but not routinely, hasn't used recently.    Depression/Fatigue/insomnia:  Not following with a counselor at this time, interested in establishing with a different counselor at this time; previously followed with Sada Aguero, counselor in [place] (whom has retired now).  6/20: Feels that her mood has been relatively stable at this time but she misses meeting with a therapist.  Would like to continue current medication; no concerns noted with insomnia/sleep  History: activities to help with her depression: book club at [employer], praying, going to [employer], meeting with Sada, playing with the cat, pushing the cat in the stroller.  Current medications include:  Fluoxetine 20 mg: once daily in the morning.  Medication History: escitalopram (stopped 12/2020 when started fluoxetine), citalopram 40 mg (unknown start - 1/3/2020) - stopped due to limited efficacy and CV/QT prolongation risk with dose >20 mg, mirtazapine (1/3/2020 - 1/10/2020) - stopped due to strange dreams and 1 day of suicidal ideation (per Dr. Maza's notes).  No ALEJANDRA-7 data on file.      2/28/2020    10:50 AM 12/20/2022     2:27 PM 6/20/2023     8:56 AM   PHQ   PHQ-9 Total Score 9 12 7   Q9: Thoughts of better off dead/self-harm past 2 weeks Not at all Not at all Not at all      Heartburn/History of stomach ulcers:  Current medications include:  Calcium carbonate as needed for breakthrough stomach/acid reflux symptoms (side effect from Victoza).  pepto bismol liquid mg: using as needed.  Using either med above as needed intermittently, finds effective, wants to continue the GLP-1.  Medication History: ranitidine (ineffective), pantoprazole     Vitamins/Supplements:  Multivitamin: once daily     Today's Vitals: BP 96/63   Pulse 73   Wt 129 lb 6.4 oz (58.7 kg)   SpO2 99%   BMI 23.29 kg/m     ----------------    I spent 55 minutes with this patient today. All changes were made via collaborative practice agreement with Samanta Maza MD. A copy of the visit note was provided to the patient's provider(s).    A summary of these recommendations was given to the patient.    Carly Pisano PharmD  Medication Therapy Management (MTM) Pharmacist  [place] [place] [facility] (Tu/Th/Fr)  Clinic Phone: [phone]  Pager: [phone]      Medication Therapy Recommendations  Type 2 diabetes mellitus with other specified complication, with long-term current use of insulin (H)    Current Medication: insulin lispro (HUMALOG KWIKPEN) 100 UNIT/ML (1 unit dial) KWIKPEN   Rationale: Does not understand instructions - Adherence - Adherence   Recommendation: Provide Adherence Intervention   Status: Patient Agreed - Adherence/Education

## 2023-06-20 NOTE — PATIENT INSTRUCTIONS
"Recommendations from today's MTM visit:                                                    MTM (medication therapy management) is a service provided by a clinical pharmacist designed to help you get the most of out of your medicines.      1. Continue current medications.   For your short acting insulin, be sure to take 4 units if your blood sugar is above 150 at lunch or evening meal.  Additionally, be sure to take this insulin with you when you leave the house so you can use it when you eat a meal away from home.    Short acting insulin plan:  Breakfast: 3 units  Lunch: check your blood sugar. If 150 or higher, take 4 units. If lower than 150, take 3 units.  Evening meal:  check your blood sugar. If 150 or higher, take 4 units. If lower than 150, take 3 units.    2. Recommend referral to therapist since your last therapist retired; I will ask Dr. Samanta Maza MD to place this order.    3. Recommend making an appointment with Cardiology for a follow-up appointment - due 8/1/2023.  They have also ordered an echo for you to complete.    4. Refill of these meds sent today: jardiance, fluoxetine.    5. Labs today: UACR, A1c, fasting lipid panel    Follow-up: 2 months: 8/29/2023 at 1pm in clinic; sooner if needed.    It was great speaking with you today.  I value your experience and would be very thankful for your time in providing feedback in our clinic survey. In the next few days, you may receive an email or text message from [employer] with a link to a survey related to your  clinical pharmacist.\"     To schedule another MTM appointment, please call the clinic directly or you may call the MTM scheduling line at [phone] or toll-free at [phone].     My Clinical Pharmacist's contact information:                                                      Please feel free to contact me with any questions or concerns you have.      Carly Pisano, Erick  Medication Therapy Management (MTM) Pharmacist   "

## 2023-06-21 DIAGNOSIS — I25.10 CORONARY ARTERY DISEASE INVOLVING NATIVE CORONARY ARTERY OF NATIVE HEART WITHOUT ANGINA PECTORIS: ICD-10-CM

## 2023-06-21 DIAGNOSIS — I10 ESSENTIAL HYPERTENSION: ICD-10-CM

## 2023-06-21 DIAGNOSIS — Z79.4 TYPE 2 DIABETES MELLITUS WITH OTHER CIRCULATORY COMPLICATION, WITH LONG-TERM CURRENT USE OF INSULIN (H): ICD-10-CM

## 2023-06-21 DIAGNOSIS — F33.1 MAJOR DEPRESSIVE DISORDER, RECURRENT EPISODE, MODERATE WITH ANXIOUS DISTRESS (H): Primary | ICD-10-CM

## 2023-06-21 DIAGNOSIS — F41.1 GENERALIZED ANXIETY DISORDER: ICD-10-CM

## 2023-06-21 DIAGNOSIS — E78.00 HYPERCHOLESTEROLEMIA: ICD-10-CM

## 2023-06-21 DIAGNOSIS — E11.59 TYPE 2 DIABETES MELLITUS WITH OTHER CIRCULATORY COMPLICATION, WITH LONG-TERM CURRENT USE OF INSULIN (H): ICD-10-CM

## 2023-07-03 ENCOUNTER — ALLIED HEALTH/NURSE VISIT (OUTPATIENT)
Dept: EDUCATION SERVICES | Facility: CLINIC | Age: 68
End: 2023-07-03
Payer: COMMERCIAL

## 2023-07-03 VITALS — HEIGHT: 63 IN | BODY MASS INDEX: 22.45 KG/M2 | WEIGHT: 126.7 LBS

## 2023-07-03 DIAGNOSIS — Z79.4 TYPE 2 DIABETES MELLITUS WITH CIRCULATORY DISORDER, WITH LONG-TERM CURRENT USE OF INSULIN (H): Primary | ICD-10-CM

## 2023-07-03 DIAGNOSIS — E11.59 TYPE 2 DIABETES MELLITUS WITH CIRCULATORY DISORDER, WITH LONG-TERM CURRENT USE OF INSULIN (H): Primary | ICD-10-CM

## 2023-07-03 PROCEDURE — G0108 DIAB MANAGE TRN  PER INDIV: HCPCS | Mod: AE | Performed by: DIETITIAN, REGISTERED

## 2023-07-03 NOTE — PROGRESS NOTES
Diabetes Self-Management Education & Support    Presents for: Type 2 diabetes    Type of Service: In Person Visit    Assessment Type:   ASSESSMENT:  Patient is very happy with her weight loss being on Victoza and Jardiance.  Down approximately 32 pounds over the last year.  Weight is in a healthy weight range.  Body mass index 22.8  Wt Readings from Last 15 Encounters:   07/03/23 57.5 kg (126 lb 11.2 oz)   06/20/23 58.7 kg (129 lb 6.4 oz)   05/25/23 59.6 kg (131 lb 8 oz)   04/18/23 62.6 kg (138 lb 1.6 oz)   04/04/23 62.6 kg (138 lb)   03/16/23 64.7 kg (142 lb 9.6 oz)   02/28/23 66.5 kg (146 lb 9.6 oz)   02/01/23 68.6 kg (151 lb 3.2 oz)   01/24/23 70.2 kg (154 lb 11.2 oz)   12/20/22 71.7 kg (158 lb)   09/28/22 71.1 kg (156 lb 11.2 oz)   06/27/22 72.1 kg (159 lb)   06/21/22 74.1 kg (163 lb 4.8 oz)   05/11/22 70.3 kg (154 lb 14.4 oz)   03/29/22 72.4 kg (159 lb 11.2 oz)     Hemoglobin A1c slightly improved from 7.8 to 7.4%.  Freestyle roula 2 downloaded with noted significant higher readings p.m. -patient not consistently taking Humalog prior to evening meal.      Patient's most recent   Lab Results   Component Value Date    A1C 7.4 06/20/2023    A1C 7.8 12/20/2022    A1C 7.2 06/27/2022    A1C 8.1 11/30/2021    A1C 8.7 08/19/2021    A1C 8.6 02/11/2021    A1C 11.4 10/22/2020    A1C 6.8 03/13/2020    A1C 11.7 12/06/2019       is not meeting goal of <7.0    Patient continues to meet criteria for CGM coverage;   - patient has type two diabetes mellitus; and  - patient has been using CGM daily; and  - patient is insulin treated with insulin completing multiple bolus s daily 3 or more times/ day   - patient is frequently adjusting insulin based on blood glucose readings  - within six months prior to ordering the CGM, the patient has had an in-person visit with the practitioner; and determined that criteria (1-4) above are met; and   - every six months following the initial prescription of the CGM, the treating practitioner  has an in-person visit with the patient to assess adherence to their CGM regimen and diabetes treatment plan    Diabetes knowledge and skills assessment:   Patient is knowledgeable in diabetes management concepts related to: Healthy Eating, Being Active, Monitoring, Taking Medication, Problem Solving, Reducing Risks and Healthy Coping    Continue education with the following diabetes management concepts: Patient benefits from ongoing education and review in areas as needed for diabetes self-management education and support.  Today specifically working on dietary recommendations for carbohydrate controlled, heart healthy, low-sodium.  And importance of taking Humalog 15 minutes prior to meals and importance of not forgetting to take Humalog specifically for evening meal.    Based on learning assessment above, most appropriate setting for further diabetes education would be: Individual setting.      PLAN  Metformin XR continue 500mg 2 tabs twice a day   Jardiance 25 mg daily  Tresiba 9 units daily  Victoza 1.8 mg daily  Humalog Breakfast 3 units, if over 150 take 4 units, lower than 150 take 3 units           (**focus on taking this 20 minutes BEFORE you eat, especially starch - potato, pasta, rice, fruit)     Topics to cover at upcoming visits: Healthy Eating, Being Active, Monitoring and Taking Medication    Follow-up: 2 to 3 months    See Care Plan for co-developed, patient-state behavior change goals.  AVS provided for patient today.    Education Materials Provided:  BG Log Sheet and My Plate Planner      SUBJECTIVE/OBJECTIVE:  Presents for: CGM Review  Accompanied by: Self  Diabetes education in the past 24mo: Yes  Focus of Visit: Healthy Eating, Taking Medication, CGM  Diabetes type: Type 2  Disease course: Improving  How confident are you filling out medical forms by yourself:: Other  Transportation concerns: Yes (Does not drive, will walk or get a ride to clinic)  Difficulty affording diabetes medication?:  "No  Difficulty affording diabetes testing supplies?: No  Other concerns:: None  Cultural Influences/Ethnic Background:  Not  or     Diabetes Symptoms & Complications:  Fatigue: Sometimes  Neuropathy: No  Polydipsia: No  Polyphagia: No  Polyuria: Yes  Visual change: No  Slow healing wounds: No  Weight trend: Decreasing  Autonomic neuropathy: No  CVA: No  Heart disease: Yes  Nephropathy: No  Peripheral neuropathy: No  Peripheral Vascular Disease: No  Retinopathy: No  Sexual dysfunction: Yes    Patient Problem List and Family Medical History reviewed for relevant medical history, current medical status, and diabetes risk factors.    Vitals:  Ht 1.588 m (5' 2.5\")   Wt 57.5 kg (126 lb 11.2 oz)   BMI 22.80 kg/m    Estimated body mass index is 22.8 kg/m  as calculated from the following:    Height as of this encounter: 1.588 m (5' 2.5\").    Weight as of this encounter: 57.5 kg (126 lb 11.2 oz).   Last 3 BP:   BP Readings from Last 3 Encounters:   06/20/23 96/63   05/25/23 96/59   04/18/23 96/58       History   Smoking Status     Former   Smokeless Tobacco     Never       Labs:  Lab Results   Component Value Date    A1C 7.4 06/20/2023    A1C 8.6 02/11/2021     Lab Results   Component Value Date     02/01/2023    GLC 64 03/16/2022     02/11/2021     Lab Results   Component Value Date    LDL 38 06/20/2023    LDL 71 03/13/2020     HDL Cholesterol   Date Value Ref Range Status   03/13/2020 60 >or=50 mg/dL Final     Direct Measure HDL   Date Value Ref Range Status   06/20/2023 34 (L) >=50 mg/dL Final   ]  GFR Estimate   Date Value Ref Range Status   02/01/2023 >90 >60 mL/min/1.73m2 Final     Comment:     eGFR calculated using 2021 CKD-EPI equation.   02/11/2021 92 >or=60 ml/min/1.73m2 Final     GFR Estimate If Black   Date Value Ref Range Status   02/11/2021 106 >or=60 ml/min/1.73m2 Final     Lab Results   Component Value Date    CR 0.69 02/01/2023    CR 0.68 02/11/2021     No results found for: " MICROALBUMIN    Healthy Eating:  Healthy Eating Assessed Today: Yes  Cultural/Yazidism diet restrictions?: No  Meal planning/habits: Low salt, Smaller portions, Low carb, Avoiding sweets  How many times a week on average do you eat food made away from home (restaurant/take-out)?: 0  Meals include: Lunch, Dinner  Beverages: Water, Tea, Coffee, Coffee drinks    Being Active:  Days per week of moderate to strenuous exercise (like a brisk walk): 5  On average, minutes per day of exercise at this level: 20  How intense was your typical exercise? : Light (like stretching or slow walking)  Exercise Minutes per Week: 100  Barrier to exercise: None    Monitoring:  Blood Glucose Meter: CGM, FreeStyle  Times checking blood sugar at home (number): 5+  Times checking blood sugar at home (per): Day        Taking Medications:  Diabetes Medication(s)     Biguanides       metFORMIN (GLUCOPHAGE XR) 500 MG 24 hr tablet    TAKE TWO TABLETS (1,000 MG) BY MOUTH TWO TIMES DAILY (WITH MEALS) Strength: 500 mg    Diabetic Other       Dextrose, Diabetic Use, (GLUCOSE) 1 g CHEW    Take 15 g by mouth As needed    Insulin       insulin degludec (TRESIBA FLEXTOUCH) 100 UNIT/ML pen    Inject 9 Units Subcutaneous At Bedtime     insulin lispro (HUMALOG KWIKPEN) 100 UNIT/ML (1 unit dial) KWIKPEN    Inject under the skin three times daily before meals. Breakfast: 3 units; lunch: check your blood sugar. If 150 or higher, take 4 units. If lower than 150, take 3 units; evening meal: check your blood sugar. If 150 or higher, take 4 units. If lower than 150, take 3 units.    Sodium-Glucose Co-Transporter 2 (SGLT2) Inhibitors       empagliflozin (JARDIANCE) 25 MG TABS tablet    Take 1 tablet (25 mg) by mouth daily    Incretin Mimetic Agents       VICTOZA PEN 18 MG/3ML soln    INJECT 1.8 MG SUBCUTANEOUS DAILY          Taking Medication Assessed Today: Yes  Current Treatments: Insulin Injections, Oral Medication (taken by mouth), Non-insulin  Injectables  Problems taking diabetes medications regularly?: No  Diabetes medication side effects?: No    Problem Solving:  Problem Solving Assessed Today: Yes  Is the patient at risk for hypoglycemia?: Yes  Hypoglycemia Frequency: Rarely  Hypoglycemia Treatment: Candy, Glucose (tablets or gel), Juice  Medical ID: No  Does patient have glucagon emergency kit?: No  Is the patient at risk for DKA?: No  Does patient have severe weather/disaster plan for diabetes management?: Yes  Does patient have sick day plan for diabetes management?: Yes    Hypoglycemia symptoms  Confusion: No  Dizziness or Light-Headedness: Yes  Headaches: No  Hunger: No  Mood changes: No  Nervousness/Anxiety: No  Sleepiness: No  Speech difficulty: No  Sweats: Yes  Feeling shaky: Yes    Hypoglycemia Complications  Blackouts: No  Hospitalization: No  Nocturnal hypoglycemia: No  Required assistance: No  Required glucagon injection: No  Seizures: No    Reducing Risks:  Reducing Risks Assessed Today: Yes  Diabetes Risks: Age over 45 years, Sedentary Lifestyle  CAD Risks: Diabetes Mellitus, Dyslipidemia, Family history, Hypertension, Sedentary lifestyle, Stress  Has dilated eye exam at least once a year?: Yes  Sees dentist every 6 months?: No  Feet checked by healthcare provider in the last year?: Yes    Healthy Coping:  Healthy Coping Assessed Today: Yes  Emotional response to diabetes: Helplessness, Fear/Anxiety  Informal Support system:: Jacklyn based, Family, Friends  Stage of change: PREPARATION (Decided to change - considering how)  Support resources: Websites, Magazines, In-person Offerings  Patient Activation Measure Survey Score:       No data to display                  Care Plan and Education Provided:  Care Plan: Diabetes   Updates made by Avril Martinez RD since 7/3/2023 12:00 AM      Problem: HbA1C Not In Goal       Goal: Get HbA1C Level in Goal       Task: Educate patient on benefits of regular glucose monitoring Completed 7/3/2023    Responsible User: Avril Martinez RD      Problem: Diabetes Self-Management Education Needed to Optimize Self-Care Behaviors       Goal: Healthy Eating - follow a healthy eating pattern for diabetes       Task: Provide education on managing carbohydrate intake (carbohydrate counting, plate planning method, etc.) Completed 7/3/2023   Responsible User: Avril Martinez RD      Task: Develop individualized healthy eating plan with patient Completed 7/3/2023   Responsible User: Avril Martinez RD      Goal: Being Active - get regular physical activity, working up to at least 150 minutes per week       Task: Develop physical activity plan with patient Completed 7/3/2023   Responsible User: Avril Martinez RD      Goal: Taking Medication - patient is consistently taking medications as directed    This Visit's Progress: 80%   Recent Progress: 70%   Note:    Patient to take diabetes related medications as directed, specifically focusing on taking Humalog 20 minutes prior to eating     Task: Discuss barriers to medication adherence with patient and provide management technique ideas as appropriate Completed 7/3/2023   Responsible User: Avril Martinez RD      Goal: Reducing Risks - know how to prevent and treat long-term diabetes complications       Task: Provide education on recommendations for heart health - lipid levels and goals, blood pressure and goals, and aspirin therapy, if indicated Completed 7/3/2023   Responsible User: Avril Martinez RD      Goal: Healthy Coping - use available resources to cope with the challenges of managing diabetes       Task: Discuss recognizing feelings about having diabetes Completed 7/3/2023   Responsible User: Avril Martinez RD          Time Spent: 30 minutes  Encounter Type: Individual    Any diabetes medication dose changes were made via the CDE Protocol per the patient's referring provider. A copy of this encounter was shared with the provider.

## 2023-07-03 NOTE — LETTER
7/3/2023         RE: Chanelle Billy  [address]  [place] [id number]        Dear Colleague,    Thank you for referring your patient, Chanelle Billy, to the [facility]. Please see a copy of my visit note below.    Diabetes Self-Management Education & Support    Presents for: Type 2 diabetes    Type of Service: In Person Visit    Assessment Type:   ASSESSMENT:  Patient is very happy with her weight loss being on Victoza and Jardiance.  Down approximately 32 pounds over the last year.  Weight is in a healthy weight range.  Body mass index 22.8  Wt Readings from Last 15 Encounters:   07/03/23 57.5 kg (126 lb 11.2 oz)   06/20/23 58.7 kg (129 lb 6.4 oz)   05/25/23 59.6 kg (131 lb 8 oz)   04/18/23 62.6 kg (138 lb 1.6 oz)   04/04/23 62.6 kg (138 lb)   03/16/23 64.7 kg (142 lb 9.6 oz)   02/28/23 66.5 kg (146 lb 9.6 oz)   02/01/23 68.6 kg (151 lb 3.2 oz)   01/24/23 70.2 kg (154 lb 11.2 oz)   12/20/22 71.7 kg (158 lb)   09/28/22 71.1 kg (156 lb 11.2 oz)   06/27/22 72.1 kg (159 lb)   06/21/22 74.1 kg (163 lb 4.8 oz)   05/11/22 70.3 kg (154 lb 14.4 oz)   03/29/22 72.4 kg (159 lb 11.2 oz)     Hemoglobin A1c slightly improved from 7.8 to 7.4%.  Freestyle roula 2 downloaded with noted significant higher readings p.m. -patient not consistently taking Humalog prior to evening meal.      Patient's most recent   Lab Results   Component Value Date    A1C 7.4 06/20/2023    A1C 7.8 12/20/2022    A1C 7.2 06/27/2022    A1C 8.1 11/30/2021    A1C 8.7 08/19/2021    A1C 8.6 02/11/2021    A1C 11.4 10/22/2020    A1C 6.8 03/13/2020    A1C 11.7 12/06/2019       is not meeting goal of <7.0    Patient continues to meet criteria for CGM coverage;   - patient has type two diabetes mellitus; and  - patient has been using CGM daily; and  - patient is insulin treated with insulin completing multiple bolus s daily 3 or more times/ day   - patient is frequently adjusting insulin based on blood glucose  readings  - within six months prior to ordering the CGM, the patient has had an in-person visit with the practitioner; and determined that criteria (1-4) above are met; and   - every six months following the initial prescription of the CGM, the treating practitioner has an in-person visit with the patient to assess adherence to their CGM regimen and diabetes treatment plan    Diabetes knowledge and skills assessment:   Patient is knowledgeable in diabetes management concepts related to: Healthy Eating, Being Active, Monitoring, Taking Medication, Problem Solving, Reducing Risks and Healthy Coping    Continue education with the following diabetes management concepts: Patient benefits from ongoing education and review in areas as needed for diabetes self-management education and support.  Today specifically working on dietary recommendations for carbohydrate controlled, heart healthy, low-sodium.  And importance of taking Humalog 15 minutes prior to meals and importance of not forgetting to take Humalog specifically for evening meal.    Based on learning assessment above, most appropriate setting for further diabetes education would be: Individual setting.      PLAN  Metformin XR continue 500mg 2 tabs twice a day   Jardiance 25 mg daily  Tresiba 9 units daily  Victoza 1.8 mg daily  Humalog Breakfast 3 units, if over 150 take 4 units, lower than 150 take 3 units           (**focus on taking this 20 minutes BEFORE you eat, especially starch - potato, pasta, rice, fruit)     Topics to cover at upcoming visits: Healthy Eating, Being Active, Monitoring and Taking Medication    Follow-up: 2 to 3 months    See Care Plan for co-developed, patient-state behavior change goals.  AVS provided for patient today.    Education Materials Provided:  BG Log Sheet and My Plate Planner      SUBJECTIVE/OBJECTIVE:  Presents for: CGM Review  Accompanied by: Self  Diabetes education in the past 24mo: Yes  Focus of Visit: Healthy Eating,  "Taking Medication, CGM  Diabetes type: Type 2  Disease course: Improving  How confident are you filling out medical forms by yourself:: Other  Transportation concerns: Yes (Does not drive, will walk or get a ride to clinic)  Difficulty affording diabetes medication?: No  Difficulty affording diabetes testing supplies?: No  Other concerns:: None  Cultural Influences/Ethnic Background:  Not  or     Diabetes Symptoms & Complications:  Fatigue: Sometimes  Neuropathy: No  Polydipsia: No  Polyphagia: No  Polyuria: Yes  Visual change: No  Slow healing wounds: No  Weight trend: Decreasing  Autonomic neuropathy: No  CVA: No  Heart disease: Yes  Nephropathy: No  Peripheral neuropathy: No  Peripheral Vascular Disease: No  Retinopathy: No  Sexual dysfunction: Yes    Patient Problem List and Family Medical History reviewed for relevant medical history, current medical status, and diabetes risk factors.    Vitals:  Ht 1.588 m (5' 2.5\")   Wt 57.5 kg (126 lb 11.2 oz)   BMI 22.80 kg/m    Estimated body mass index is 22.8 kg/m  as calculated from the following:    Height as of this encounter: 1.588 m (5' 2.5\").    Weight as of this encounter: 57.5 kg (126 lb 11.2 oz).   Last 3 BP:   BP Readings from Last 3 Encounters:   06/20/23 96/63   05/25/23 96/59   04/18/23 96/58       History   Smoking Status     Former   Smokeless Tobacco     Never       Labs:  Lab Results   Component Value Date    A1C 7.4 06/20/2023    A1C 8.6 02/11/2021     Lab Results   Component Value Date     02/01/2023    GLC 64 03/16/2022     02/11/2021     Lab Results   Component Value Date    LDL 38 06/20/2023    LDL 71 03/13/2020     HDL Cholesterol   Date Value Ref Range Status   03/13/2020 60 >or=50 mg/dL Final     Direct Measure HDL   Date Value Ref Range Status   06/20/2023 34 (L) >=50 mg/dL Final   ]  GFR Estimate   Date Value Ref Range Status   02/01/2023 >90 >60 mL/min/1.73m2 Final     Comment:     eGFR calculated using 2021 " CKD-EPI equation.   02/11/2021 92 >or=60 ml/min/1.73m2 Final     GFR Estimate If Black   Date Value Ref Range Status   02/11/2021 106 >or=60 ml/min/1.73m2 Final     Lab Results   Component Value Date    CR 0.69 02/01/2023    CR 0.68 02/11/2021     No results found for: MICROALBUMIN    Healthy Eating:  Healthy Eating Assessed Today: Yes  Cultural/Jain diet restrictions?: No  Meal planning/habits: Low salt, Smaller portions, Low carb, Avoiding sweets  How many times a week on average do you eat food made away from home (restaurant/take-out)?: 0  Meals include: Lunch, Dinner  Beverages: Water, Tea, Coffee, Coffee drinks    Being Active:  Days per week of moderate to strenuous exercise (like a brisk walk): 5  On average, minutes per day of exercise at this level: 20  How intense was your typical exercise? : Light (like stretching or slow walking)  Exercise Minutes per Week: 100  Barrier to exercise: None    Monitoring:  Blood Glucose Meter: CGM, FreeStyle  Times checking blood sugar at home (number): 5+  Times checking blood sugar at home (per): Day        Taking Medications:  Diabetes Medication(s)     Biguanides       metFORMIN (GLUCOPHAGE XR) 500 MG 24 hr tablet    TAKE TWO TABLETS (1,000 MG) BY MOUTH TWO TIMES DAILY (WITH MEALS) Strength: 500 mg    Diabetic Other       Dextrose, Diabetic Use, (GLUCOSE) 1 g CHEW    Take 15 g by mouth As needed    Insulin       insulin degludec (TRESIBA FLEXTOUCH) 100 UNIT/ML pen    Inject 9 Units Subcutaneous At Bedtime     insulin lispro (HUMALOG KWIKPEN) 100 UNIT/ML (1 unit dial) KWIKPEN    Inject under the skin three times daily before meals. Breakfast: 3 units; lunch: check your blood sugar. If 150 or higher, take 4 units. If lower than 150, take 3 units; evening meal: check your blood sugar. If 150 or higher, take 4 units. If lower than 150, take 3 units.    Sodium-Glucose Co-Transporter 2 (SGLT2) Inhibitors       empagliflozin (JARDIANCE) 25 MG TABS tablet    Take 1 tablet  (25 mg) by mouth daily    Incretin Mimetic Agents       VICTOZA PEN 18 MG/3ML soln    INJECT 1.8 MG SUBCUTANEOUS DAILY          Taking Medication Assessed Today: Yes  Current Treatments: Insulin Injections, Oral Medication (taken by mouth), Non-insulin Injectables  Problems taking diabetes medications regularly?: No  Diabetes medication side effects?: No    Problem Solving:  Problem Solving Assessed Today: Yes  Is the patient at risk for hypoglycemia?: Yes  Hypoglycemia Frequency: Rarely  Hypoglycemia Treatment: Candy, Glucose (tablets or gel), Juice  Medical ID: No  Does patient have glucagon emergency kit?: No  Is the patient at risk for DKA?: No  Does patient have severe weather/disaster plan for diabetes management?: Yes  Does patient have sick day plan for diabetes management?: Yes    Hypoglycemia symptoms  Confusion: No  Dizziness or Light-Headedness: Yes  Headaches: No  Hunger: No  Mood changes: No  Nervousness/Anxiety: No  Sleepiness: No  Speech difficulty: No  Sweats: Yes  Feeling shaky: Yes    Hypoglycemia Complications  Blackouts: No  Hospitalization: No  Nocturnal hypoglycemia: No  Required assistance: No  Required glucagon injection: No  Seizures: No    Reducing Risks:  Reducing Risks Assessed Today: Yes  Diabetes Risks: Age over 45 years, Sedentary Lifestyle  CAD Risks: Diabetes Mellitus, Dyslipidemia, Family history, Hypertension, Sedentary lifestyle, Stress  Has dilated eye exam at least once a year?: Yes  Sees dentist every 6 months?: No  Feet checked by healthcare provider in the last year?: Yes    Healthy Coping:  Healthy Coping Assessed Today: Yes  Emotional response to diabetes: Helplessness, Fear/Anxiety  Informal Support system:: Jacklyn based, Family, Friends  Stage of change: PREPARATION (Decided to change - considering how)  Support resources: Websites, Magazines, In-person Offerings  Patient Activation Measure Survey Score:       No data to display                  Care Plan and Education  Provided:  Care Plan: Diabetes   Updates made by Avril Martinez RD since 7/3/2023 12:00 AM      Problem: HbA1C Not In Goal       Goal: Get HbA1C Level in Goal       Task: Educate patient on benefits of regular glucose monitoring Completed 7/3/2023   Responsible User: Avril Martinez RD      Problem: Diabetes Self-Management Education Needed to Optimize Self-Care Behaviors       Goal: Healthy Eating - follow a healthy eating pattern for diabetes       Task: Provide education on managing carbohydrate intake (carbohydrate counting, plate planning method, etc.) Completed 7/3/2023   Responsible User: Avril Martinez RD      Task: Develop individualized healthy eating plan with patient Completed 7/3/2023   Responsible User: Avril Martinez RD      Goal: Being Active - get regular physical activity, working up to at least 150 minutes per week       Task: Develop physical activity plan with patient Completed 7/3/2023   Responsible User: Avril Martinez RD      Goal: Taking Medication - patient is consistently taking medications as directed    This Visit's Progress: 80%   Recent Progress: 70%   Note:    Patient to take diabetes related medications as directed, specifically focusing on taking Humalog 20 minutes prior to eating     Task: Discuss barriers to medication adherence with patient and provide management technique ideas as appropriate Completed 7/3/2023   Responsible User: Avril Martinez RD      Goal: Reducing Risks - know how to prevent and treat long-term diabetes complications       Task: Provide education on recommendations for heart health - lipid levels and goals, blood pressure and goals, and aspirin therapy, if indicated Completed 7/3/2023   Responsible User: Avril Martinez RD      Goal: Healthy Coping - use available resources to cope with the challenges of managing diabetes       Task: Discuss recognizing feelings about having diabetes Completed 7/3/2023   Responsible User: Avril Martinez RD           Time Spent: 30 minutes  Encounter Type: Individual    Any diabetes medication dose changes were made via the CDE Protocol per the patient's referring provider. A copy of this encounter was shared with the provider.

## 2023-07-03 NOTE — PATIENT INSTRUCTIONS
Goals:  Practice healthy stress management and mindful eating - think are you physically hungry or are you bored, stressed, emotional etc, make of list of things to do besides eat.    Try to get good quality sleep with a goal of 7-8 hours per night.  Stay physically active daily.  Recommend working up to a total of 30 minutes on 5 days/ week.  Recommend a fitness tracker.     Eat in a healthy way- eliminate trans fats, limit saturated fats and added sugars; follow the plate method - picture above.  Keep a food record (MyFitnessPal, Loseit).    A meal is 3 or more food groups; make it colorful for better nutrition.    Total Carbohydrates (in grams) = Breakfast  30    Lunch  30    Supper  30    If desired snacks 15                            Metformin XR continue 500mg 2 tabs twice a day   Jardiance 25 mg daily  Tresiba 9 units daily  Victoza 1.8 mg daily  Humalog Breakfast 3 units, if over 150 take 4 units, lower than 150 take 3 units           (**focus on taking this 20 minutes BEFORE you eat, especially starch - potato, pasta, rice, fruit)

## 2023-08-07 DIAGNOSIS — I25.119 CORONARY ARTERY DISEASE INVOLVING NATIVE CORONARY ARTERY OF NATIVE HEART WITH ANGINA PECTORIS (H): ICD-10-CM

## 2023-08-07 RX ORDER — CARVEDILOL 25 MG/1
TABLET ORAL
Qty: 180 TABLET | Refills: 1 | Status: SHIPPED | OUTPATIENT
Start: 2023-08-07 | End: 2023-08-29 | Stop reason: DRUGHIGH

## 2023-08-29 ENCOUNTER — OFFICE VISIT (OUTPATIENT)
Dept: PHARMACY | Facility: CLINIC | Age: 68
End: 2023-08-29
Payer: COMMERCIAL

## 2023-08-29 VITALS
HEART RATE: 72 BPM | SYSTOLIC BLOOD PRESSURE: 91 MMHG | OXYGEN SATURATION: 100 % | BODY MASS INDEX: 22.26 KG/M2 | WEIGHT: 123.7 LBS | DIASTOLIC BLOOD PRESSURE: 54 MMHG

## 2023-08-29 DIAGNOSIS — Z79.4 TYPE 2 DIABETES MELLITUS WITH OTHER SPECIFIED COMPLICATION, WITH LONG-TERM CURRENT USE OF INSULIN (H): ICD-10-CM

## 2023-08-29 DIAGNOSIS — I50.9 CONGESTIVE HEART FAILURE, UNSPECIFIED HF CHRONICITY, UNSPECIFIED HEART FAILURE TYPE (H): ICD-10-CM

## 2023-08-29 DIAGNOSIS — F33.1 MAJOR DEPRESSIVE DISORDER, RECURRENT EPISODE, MODERATE WITH ANXIOUS DISTRESS (H): ICD-10-CM

## 2023-08-29 DIAGNOSIS — I10 ESSENTIAL HYPERTENSION: Primary | ICD-10-CM

## 2023-08-29 DIAGNOSIS — I25.119 CORONARY ARTERY DISEASE INVOLVING NATIVE CORONARY ARTERY OF NATIVE HEART WITH ANGINA PECTORIS (H): ICD-10-CM

## 2023-08-29 DIAGNOSIS — I25.5 ISCHEMIC CARDIOMYOPATHY: ICD-10-CM

## 2023-08-29 DIAGNOSIS — E11.69 TYPE 2 DIABETES MELLITUS WITH OTHER SPECIFIED COMPLICATION, WITH LONG-TERM CURRENT USE OF INSULIN (H): ICD-10-CM

## 2023-08-29 PROCEDURE — 99207 PR NO CHARGE LOS: CPT | Performed by: PHARMACIST

## 2023-08-29 RX ORDER — CARVEDILOL 12.5 MG/1
12.5 TABLET ORAL 2 TIMES DAILY WITH MEALS
Qty: 180 TABLET | Refills: 1 | Status: SHIPPED | OUTPATIENT
Start: 2023-08-29 | End: 2024-08-15

## 2023-08-29 RX ORDER — INSULIN LISPRO 100 [IU]/ML
INJECTION, SOLUTION INTRAVENOUS; SUBCUTANEOUS
Qty: 15 ML | Refills: 0 | Status: SHIPPED | OUTPATIENT
Start: 2023-08-29 | End: 2023-09-19

## 2023-08-29 ASSESSMENT — PATIENT HEALTH QUESTIONNAIRE - PHQ9
SUM OF ALL RESPONSES TO PHQ QUESTIONS 1-9: 10
SUM OF ALL RESPONSES TO PHQ QUESTIONS 1-9: 10
10. IF YOU CHECKED OFF ANY PROBLEMS, HOW DIFFICULT HAVE THESE PROBLEMS MADE IT FOR YOU TO DO YOUR WORK, TAKE CARE OF THINGS AT HOME, OR GET ALONG WITH OTHER PEOPLE: SOMEWHAT DIFFICULT

## 2023-08-29 NOTE — Clinical Note
I saw Chanelle for MTM visit and she's been losing significant weight (down 30 lbs since Jan). Her blood pressures are much lower and reported some lightheadedness / dizziness. We lowered her carvedilol to 12.5 mg twice daily and I'm having her return to clinic in 2 weeks for blood pressure check. She's then seeing you 10/17/23. Let me know if you would like me to facilitate any other med changes. KB

## 2023-08-29 NOTE — PROGRESS NOTES
Medication Therapy Management (MTM) Encounter    ASSESSMENT:                            Medication Adherence/Access: See below for considerations    Type 2 Diabetes:  Patient's A1c of 7.4% is not at ADA goal of <7%. SMBG fasting sugars are not all, but mostly at ADA goal of  mg/dL and post-prandial sugars are not at goal of less than 180 mg/dL.  Patient would benefit from:  Discussed option to increase insulin /diabetes meds v. Make non-pharm changes - Patient preference to walk more and eat less carbs. She has been able to have better blood sugar control in the past with this med regimen so reasonable to try this non-pharm approach with close follow-up.  -Metformin: continue current 2000 mg/day (at max 2000 mg/day)  -SGLT2 (Jardiance): Continue current dose (at max 25 mg/day)   -GLP-1 (victoza/liraglutide): Continue current dose (max 1.8 mg/day); discussed option to decrease victoza given her GI upset - Patient preference to continue current dose. We will continue monitoring this side effect.  -Long acting insulin: Continue current dose since most a.m. blood sugars are at goal  -Short acting insulin: education provided about increasing mealtime insulin dose for meals if blood sugar >150 mg/dL (rather than >159 mg/dL).    Hypertension/CAD/ischemic cardiomyopathy/CHF:  Patient's blood pressures in clinic have been lower the last several visits, likely 2/2 patient weight loss. Given her symptoms of lightheadedness / dizziness which she does sometimes experience at home, recommend lowering the carvedilol dose with close follow-up.    Depression: Reviewed option to make medication change today, but given that her increased depression may be more so situational anxiety/ depression, patient preference to re-establish with a therapist.    PLAN:                            1. Call to make an appointment with Tom Garza at [phone].    2. Continue current diabetes medications.     Your goal is to be more active  walking and decrease pastas and bread to help lower your blood sugars.    For your short acting insulin, take 3 units with each meal. However, you should increase to 4 units if your blood sugar is above 150 at mealtime.  Additionally, be sure to take this insulin with you when you leave the house so you can use it when you eat a meal away from home.    3. Blood pressure meds: decrease your carvedilol to 12.5 mg twice daily. You can split your 25 mg tablets and take 1/2 tablet twice daily at this time. When you get the new prescription from the pharmacy, these will be 12.5 mg tablets so you don't need to split them anymore.     Follow-up: 23 at 1pm in clinic. Bring your medications and your freestyle roula reader with to the visit.    SUBJECTIVE/OBJECTIVE:                          Chanelle Billy is a 68 year old female coming in for a follow-up visit from 2023. Today's visit is a co-visit with Dionna , her daughter.      Reason for visit: 2 month follow-up.    Allergies/ADRs: Reviewed in chart  Past Medical History: Reviewed in chart  Tobacco: She reports that she has quit smoking. She has never used smokeless tobacco.  Alcohol: none  Social: living alone with cat.  Medication Adherence/Access: setting up meds in a pill box herself    Type 2 Diabetes:  Testing supplies: freestyle roula 2 and glucometer  Following with certified diabetes care and  (CDCES)? Yes  - Avril Martinez, RD, CD, CDCES  Pt currently taking:  Metformin  m tabs twice daily   Tresiba (degludec) long acting insulin: 9 units daily at bedtime.  Humalog (lispro) rapid acting insulin: prescribed as 3 units three times daily with meals and no insulin with snacks; Patient increasing the insulin to 4 units if her pre-meal sugar is >159 mg/dL (note this threshold is higher than instructed at last MTM visit, the instruction was greater than 150)  Jardiance (empagliflozin) 25 m tab once daily  Liraglutide (Victoza)  "subcut injection: 1.8 mg injected under the skin once daily in the morning  + calcium carb  (TUMS) and pepto- see below  Side effect? notes GI upset attributed to the victoza, specifically worse after certain foods.  Record of home blood sugars:  SMBG: Review report below  Symptoms of low blood sugar?  She wakes up feeling unwell or her cat awakens her  Symptoms of high blood sugar? none          Lab Results   Component Value Date    A1C 7.4 06/20/2023    A1C 7.8 12/20/2022    A1C 7.2 06/27/2022    A1C 8.1 11/30/2021    A1C 8.7 08/19/2021    A1C 8.6 02/11/2021    A1C 11.4 10/22/2020    A1C 6.8 03/13/2020    A1C 11.7 12/06/2019   Eye exam:  up to date, last 11/14/22  Foot exam: up to date, last 6/27/22  Diet: usually hungry about 10:30/11am, and eating 2-3 meals per day  Notes that recently she's been eating more pasta, popcorn, and cheese sandwiches with bread.  Exercise: trying to \"exercise\" every other day doing the stairs around her building - making a few loops around the building including up and down the stairs.  ASCVD Prevention:  Aspirin: Taking 81mg daily and denies side effects   Statin rosuvastatin 40 mg daily.  ACEi/ARB: Yes: lisinopril  Wt Readings from Last 10 Encounters:   08/29/23 123 lb 11.2 oz (56.1 kg)   07/03/23 126 lb 11.2 oz (57.5 kg)   06/20/23 129 lb 6.4 oz (58.7 kg)   05/25/23 131 lb 8 oz (59.6 kg)   04/18/23 138 lb 1.6 oz (62.6 kg)   04/04/23 138 lb (62.6 kg)   03/16/23 142 lb 9.6 oz (64.7 kg)   02/28/23 146 lb 9.6 oz (66.5 kg)   02/01/23 151 lb 3.2 oz (68.6 kg)   01/24/23 154 lb 11.2 oz (70.2 kg)     Hypertension/CAD/ischemic cardiomyopathy/CHF:  Patient following with cardiology, LOV 2/2023 planning for follow-up ECHO and appointment in near future.  Per chart notes, history of bypass surgery in 2022; history of CAD and MI, previous stenting.  LVEF: 30% before bypass; 45-50% after surgery.  8/29: not dizzy or lightheaded in clinic today, does sometimes have lightheadedness / dizziness at " home. No falls as home, no chest pain.  Current Meds:  Lisinopril 10 mg: once daily AM  Carvedilol 25 m tab twice daily  Aspirin 81 mg: once daily - no concern for bruising/bleeding.  Furosemide 20 mg: once daily in the morning  Potassium chloride 10 MEQ: 1 tab once daily  +Jardiance, as above.  Nitroglycerin 0.4 mg sublingual tab: Place 1 tablet under the tongue at onset of chest pain.  May repeat x 3 doses q 5 min.  Call 911 after first dose if pain persists. Pt use: none recently; current supply expires 2024.  Patient does not self-monitor BP. Does have an arm and a wrist blood pressure monitor, but not using either one.  Medication History: amlodipine (stopped due to BP control from other meds), metoprolol (changed to carvedilol), clopidogrel (stopped by cards 3/2022 - told to continue aspirin 81 mg daily).  BP Readings from Last 3 Encounters:   23 91/54   23 96/63   23 96/59     Pulse Readings from Last 3 Encounters:   23 72   23 73   23 73     Depression:  Not following with a counselor at this time, interested in establishing with a different counselor at this time; previously followed with Sada Aguero, counselor in [place] (whom has retired now).  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 She feels like she needs to go though because she spent money onit.  History: activities to help with her depression: book club at [employer], praying, going to [employer], meeting with Sada, playing with the cat, pushing the cat in the stroller.  Current medications include:  Fluoxetine 20 mg: once daily in the morning.  Medication History: escitalopram (stopped 2020 when started fluoxetine), citalopram 40 mg (unknown start - 1/3/2020) - stopped due to limited efficacy and CV/QT prolongation risk with dose >20 mg, mirtazapine  (1/3/2020 - 1/10/2020) - stopped due to strange dreams and 1 day of suicidal ideation (per Dr. Maza's notes).  No ALEJANDRA-7 data on file.      12/20/2022     2:27 PM 6/20/2023     8:56 AM 8/29/2023     1:04 PM   PHQ   PHQ-9 Total Score 12 7 10   Q9: Thoughts of better off dead/self-harm past 2 weeks Not at all Not at all Not at all     Today's Vitals: BP 91/54   Pulse 72   Wt 123 lb 11.2 oz (56.1 kg)   SpO2 100%   BMI 22.26 kg/m    ----------------    I spent 50 minutes with this patient today. All changes were made via collaborative practice agreement with Samanta Maza MD. A copy of the visit note was provided to the patient's provider(s).    A summary of these recommendations was given to the patient.    Carly Pisano, PharmD  Medication Therapy Management (MTM) Pharmacist  [place] [place] [facility] (Tu/Th/Fr)  Clinic Phone: [phone]  Pager: [phone]     Medication Therapy Recommendations  Essential hypertension    Current Medication: carvedilol (COREG) 25 MG tablet (Discontinued)   Rationale: Dose too high - Dosage too high - Safety   Recommendation: Decrease Dose   Status: Accepted per CPA         Type 2 diabetes mellitus with other specified complication, with long-term current use of insulin (H)    Current Medication: insulin lispro (HUMALOG KWIKPEN) 100 UNIT/ML (1 unit dial) KWIKPEN   Rationale: Does not understand instructions - Adherence - Adherence   Recommendation: Provide Education   Status: Patient Agreed - Adherence/Education

## 2023-08-29 NOTE — Clinical Note
Can you enter a therapy referral? She wants to see Tom. She was following with Sandra in [place], but Sandra has since retired. Thanks!

## 2023-08-29 NOTE — PATIENT INSTRUCTIONS
"Recommendations from today's MTM visit:                                                    MTM (medication therapy management) is a service provided by a clinical pharmacist designed to help you get the most of out of your medicines.      1. Call to make an appointment with Tom Garza at [phone].    2. Continue current diabetes medications.     Your goal is to be more active walking and decrease pastas and bread to help lower your blood sugars.    For your short acting insulin, take 3 units with each meal. However, you should increase to 4 units if your blood sugar is above 150 at mealtime.  Additionally, be sure to take this insulin with you when you leave the house so you can use it when you eat a meal away from home.    3. Blood pressure meds: decrease your carvedilol to 12.5 mg twice daily. You can split your 25 mg tablets and take 1/2 tablet twice daily at this time. When you get the new prescription from the pharmacy, these will be 12.5 mg tablets so you don't need to split them anymore.     Follow-up: 9/14/23 at 1pm in clinic. Bring your medications and your freestyle roula reader with to the visit.    It was great speaking with you today.  I value your experience and would be very thankful for your time in providing feedback in our clinic survey. In the next few days, you may receive an email or text message from [facility] with a link to a survey related to your  clinical pharmacist.\"     To schedule another MTM appointment, please call the clinic directly or you may call the MTM scheduling line at [phone] or toll-free at [phone].     My Clinical Pharmacist's contact information:                                                      Please feel free to contact me with any questions or concerns you have.      Carly Pisano, PharmD  Medication Therapy Management (MTM) Pharmacist     "

## 2023-08-29 NOTE — Clinical Note
Omero Santos - is there a way to get this Patient connected to therapy here in [facility] sooner than the standard 3ish month wait time?

## 2023-09-01 ENCOUNTER — HOSPITAL ENCOUNTER (OUTPATIENT)
Dept: CARDIOLOGY | Facility: CLINIC | Age: 68
Discharge: HOME | End: 2023-09-01
Attending: INTERNAL MEDICINE | Admitting: INTERNAL MEDICINE
Payer: COMMERCIAL

## 2023-09-01 DIAGNOSIS — R06.09 DYSPNEA ON EXERTION: ICD-10-CM

## 2023-09-01 DIAGNOSIS — I25.5 ISCHEMIC CARDIOMYOPATHY: ICD-10-CM

## 2023-09-01 DIAGNOSIS — I25.119 CORONARY ARTERY DISEASE INVOLVING NATIVE CORONARY ARTERY OF NATIVE HEART WITH ANGINA PECTORIS (H): ICD-10-CM

## 2023-09-01 DIAGNOSIS — E78.00 HYPERCHOLESTEROLEMIA: ICD-10-CM

## 2023-09-01 DIAGNOSIS — E78.5 HYPERLIPIDEMIA LDL GOAL <70: ICD-10-CM

## 2023-09-01 DIAGNOSIS — I10 ESSENTIAL HYPERTENSION: ICD-10-CM

## 2023-09-01 PROCEDURE — 999N000208 ECHOCARDIOGRAM COMPLETE

## 2023-09-01 PROCEDURE — 255N000002 HC RX 255 OP 636: Performed by: INTERNAL MEDICINE

## 2023-09-01 PROCEDURE — 93306 TTE W/DOPPLER COMPLETE: CPT | Mod: 26 | Performed by: INTERNAL MEDICINE

## 2023-09-01 RX ADMIN — PERFLUTREN 3 ML: 6.52 INJECTION, SUSPENSION INTRAVENOUS at 14:20

## 2023-09-14 ENCOUNTER — TELEPHONE (OUTPATIENT)
Dept: PHARMACY | Facility: CLINIC | Age: 68
End: 2023-09-14
Payer: COMMERCIAL

## 2023-09-14 NOTE — TELEPHONE ENCOUNTER
[facility] Call:    Reason for call: MTM visit today, Patient has not yet arrived after visit start time.  Call attempt: x1  Voicemail left: Yes - to make an MTM appointment. Left Medication Therapy Management (MTM) Coordinators scheduling line: [phone].    Carly Pisano PharmD  Medication Therapy Management (MTM) Pharmacist

## 2023-09-19 ENCOUNTER — OFFICE VISIT (OUTPATIENT)
Dept: PHARMACY | Facility: CLINIC | Age: 68
End: 2023-09-19
Payer: COMMERCIAL

## 2023-09-19 ENCOUNTER — LAB (OUTPATIENT)
Dept: LAB | Facility: CLINIC | Age: 68
End: 2023-09-19
Payer: COMMERCIAL

## 2023-09-19 VITALS
SYSTOLIC BLOOD PRESSURE: 94 MMHG | WEIGHT: 124.7 LBS | HEART RATE: 86 BPM | BODY MASS INDEX: 22.44 KG/M2 | DIASTOLIC BLOOD PRESSURE: 53 MMHG | OXYGEN SATURATION: 98 %

## 2023-09-19 DIAGNOSIS — I25.5 ISCHEMIC CARDIOMYOPATHY: ICD-10-CM

## 2023-09-19 DIAGNOSIS — I10 ESSENTIAL HYPERTENSION: ICD-10-CM

## 2023-09-19 DIAGNOSIS — I25.119 CORONARY ARTERY DISEASE INVOLVING NATIVE CORONARY ARTERY OF NATIVE HEART WITH ANGINA PECTORIS (H): ICD-10-CM

## 2023-09-19 DIAGNOSIS — I10 ESSENTIAL HYPERTENSION: Primary | ICD-10-CM

## 2023-09-19 DIAGNOSIS — E11.69 TYPE 2 DIABETES MELLITUS WITH OTHER SPECIFIED COMPLICATION, WITH LONG-TERM CURRENT USE OF INSULIN (H): ICD-10-CM

## 2023-09-19 DIAGNOSIS — F33.1 MAJOR DEPRESSIVE DISORDER, RECURRENT EPISODE, MODERATE WITH ANXIOUS DISTRESS (H): ICD-10-CM

## 2023-09-19 DIAGNOSIS — E11.69 TYPE 2 DIABETES MELLITUS WITH OTHER SPECIFIED COMPLICATION, WITH LONG-TERM CURRENT USE OF INSULIN (H): Primary | ICD-10-CM

## 2023-09-19 DIAGNOSIS — Z79.4 TYPE 2 DIABETES MELLITUS WITH OTHER SPECIFIED COMPLICATION, WITH LONG-TERM CURRENT USE OF INSULIN (H): Primary | ICD-10-CM

## 2023-09-19 DIAGNOSIS — I50.9 CONGESTIVE HEART FAILURE, UNSPECIFIED HF CHRONICITY, UNSPECIFIED HEART FAILURE TYPE (H): ICD-10-CM

## 2023-09-19 DIAGNOSIS — Z79.4 TYPE 2 DIABETES MELLITUS WITH OTHER SPECIFIED COMPLICATION, WITH LONG-TERM CURRENT USE OF INSULIN (H): ICD-10-CM

## 2023-09-19 LAB
ANION GAP SERPL CALCULATED.3IONS-SCNC: 11 MMOL/L (ref 7–15)
BUN SERPL-MCNC: 13.4 MG/DL (ref 8–23)
CALCIUM SERPL-MCNC: 9.3 MG/DL (ref 8.8–10.2)
CHLORIDE SERPL-SCNC: 104 MMOL/L (ref 98–107)
CREAT SERPL-MCNC: 0.64 MG/DL (ref 0.51–0.95)
DEPRECATED HCO3 PLAS-SCNC: 24 MMOL/L (ref 22–29)
EGFRCR SERPLBLD CKD-EPI 2021: >90 ML/MIN/1.73M2
GLUCOSE SERPL-MCNC: 182 MG/DL (ref 70–99)
HBA1C MFR BLD: 7.9 % (ref 0–5.6)
POTASSIUM SERPL-SCNC: 4 MMOL/L (ref 3.4–5.3)
SODIUM SERPL-SCNC: 139 MMOL/L (ref 136–145)

## 2023-09-19 PROCEDURE — 36415 COLL VENOUS BLD VENIPUNCTURE: CPT

## 2023-09-19 PROCEDURE — 83036 HEMOGLOBIN GLYCOSYLATED A1C: CPT

## 2023-09-19 PROCEDURE — 99207 PR NO CHARGE LOS: CPT | Performed by: PHARMACIST

## 2023-09-19 PROCEDURE — 80048 BASIC METABOLIC PNL TOTAL CA: CPT

## 2023-09-19 RX ORDER — INSULIN LISPRO 100 [IU]/ML
INJECTION, SOLUTION INTRAVENOUS; SUBCUTANEOUS
Qty: 15 ML | Refills: 0 | Status: SHIPPED | OUTPATIENT
Start: 2023-09-19 | End: 2023-12-19

## 2023-09-19 NOTE — PROGRESS NOTES
Medication Therapy Management (MTM) Encounter    ASSESSMENT:                            Medication Adherence/Access: See below for considerations    Type 2 Diabetes:  Patient's A1c of 7.4% is not at ADA goal of <7%; recommend repeat A1c today. SMBG fasting sugars are mostly at ADA goal of  mg/dL and post-prandial sugars are not at goal of less than 180 mg/dL.  Patient would benefit from:   -Increased adherence to short acting insulin; difficult to tell how frequently patient is using her insulin at this point.  I recommend that she start tracking this on her freestyle roula reader.  -long acting insulin: Continue current dose since fasting blood sugars are here/at goal  -short acting insulin: Recommend increasing short acting insulin dose by 2 units with close follow-up.  -Metformin: continue current 2000 mg/day (at max 2000 mg/day)  -SGLT2 (Jardiance): Continue current dose (at max 25 mg/day)   -GLP-1 (victoza/liraglutide): Continue current dose (max 1.8 mg/day)  Non-pharm: Discussed goal to decrease carbohydrate/macaroni/pasta intake.  Patient acknowledges that this is difficult to do when she is stressed or in a hurry and trying to find food quickly.  HM: due for DM foot exam - to complete with PCP at next visit.    Hypertension /CAD/ischemic cardiomyopathy/CHF:  We will notify cardiology provider that patient has needed to use her nitroglycerin twice in the last ~3 weeks, plan in place for cards follow-up visit in near future. chart note routed with comments to Dr. Long.  Recommend continued close blood pressure monitoring given the patient's ongoing weight loss and potential for symptomatic hypotension.    Depression: Plan in place for therapy appointment in the near future.  There was previous communication from patient's primary care provider that she may not be able to see Tom Garza because he cannot see Medicare or Medicaid patients.  I did discuss directly with Tom Garza today.  He states that  he can see Medicaid patients but not Medicare.  We are uncertain whether the patient would get a bill for her therapy visit since she has both Medicaid and Medicare.    I also checked with the care coordinator team to see if they can assist in any way-Per their communication they are unable to assist.  Recommend that patient contact their insurance.     PLAN:                            1. A1c today.    2. Call your insurance to ensure that your therapy appointment with Tom Garza would be covered.    3. Increase your short-acting (mealtime) insulin: take 3 units with each meal. However, you should increase to 6 units if your blood sugar is above 150 at mealtime.    - start logging your insulin injection on your freestyle roula reader (as we demonstrated in clinic today).    Follow-up: MTM in 1 month: Tuesday 10/17 at 1pm in clinic.    SUBJECTIVE/OBJECTIVE:                          Chanelle Billy is a 68 year old female coming in for a follow-up visit from 2023.    Reason for visit: diabetes and hypertension follow-up.    Allergies/ADRs: Reviewed in chart  Past Medical History: Reviewed in chart  Tobacco: She reports that she has quit smoking. She has never used smokeless tobacco.  Alcohol: none  Social: living alone with cat.    Medication Adherence/Access: setting up meds in a pill box herself    Diabetes   Type 2 Diabetes:  Testing supplies: freestyle roula 2 and glucometer  Following with certified diabetes care and  (CDCES)? Yes  - Avril Martinez, RD, CD, CDCES  Pt currently taking:  Metformin  m tabs twice daily   Tresiba (degludec) long acting insulin: 9 units daily at bedtime.  Humalog (lispro) rapid acting insulin: prescribed as 3 units three times daily with meals and no insulin with snacks; Patient increasing the insulin to 4 units if her pre-meal sugar is >150 mg/dL (note that Patient has changed the short acting insulin as directed after last MTM visit).  She is uncertain  "how many times she misses the short acting insulin dose estimates a couple of times per week.  Jardiance (empagliflozin) 25 m tab once daily  Liraglutide (Victoza) subcut injection: 1.8 mg injected under the skin once daily in the morning  + calcium carb  (TUMS) and pepto as needed  Side effect? notes GI upset attributed to the victoza, specifically worse after certain foods. This comes and goes in waves.  Patient preference to remain on her current med regimen.  Record of home blood sugars:            Symptoms of low blood sugar?  She wakes up feeling unwell or her cat awakens her  Symptoms of high blood sugar? none     Diet: usually hungry about 10:30/11am, and eating 2-3 meals per day  Exercise: trying to \"exercise\" every other day doing the stairs around her building - making a few loops around the building including up and down the stairs.  ASCVD Prevention:  Aspirin: Taking 81mg daily and denies side effects   ACEi/ARB: Yes: lisinopril     Eye exam is up to date  Foot exam: due  Urine Albumin:   Lab Results   Component Value Date    UMALCR 108.08 (H) 2023      Lab Results   Component Value Date    A1C 7.4 (H) 2023    A1C 7.8 (H) 2022    A1C 7.2 (H) 2022    A1C 8.1 (H) 2021     Wt Readings from Last 10 Encounters:   23 124 lb 11.2 oz (56.6 kg)   23 123 lb 11.2 oz (56.1 kg)   23 126 lb 11.2 oz (57.5 kg)   23 129 lb 6.4 oz (58.7 kg)   23 131 lb 8 oz (59.6 kg)   23 138 lb 1.6 oz (62.6 kg)   23 138 lb (62.6 kg)   23 142 lb 9.6 oz (64.7 kg)   23 146 lb 9.6 oz (66.5 kg)   23 151 lb 3.2 oz (68.6 kg)   Estimated body mass index is 22.44 kg/m  as calculated from the following:    Height as of 7/3/23: 5' 2.5\" (1.588 m).    Weight as of this encounter: 124 lb 11.2 oz (56.6 kg).    Pulse Readings from Last 3 Encounters:   23 86   23 72   23 73     Hypertension /CAD/ischemic cardiomyopathy/CHF:  Patient following " with cardiology, LOV 2/2023 planning for follow-up ECHO and appointment in October.  Per chart notes, history of bypass surgery in 2022; history of CAD and MI, previous stenting.  LVEF: 30% before bypass; 45-50% after surgery.  9/19: notes that she did use the nitroglycerin x2 since our last visit, which relieved symptoms. She reports has had the cardiology testing done as recommended.  Current Meds:  Lisinopril 10 mg: once daily AM  Carvedilol 25 mg tab: 1/2 tab (12.5 mg)  twice daily - Patient confirms that she's been splitting these and taking 1/2 tabs. has a new order for 12.5 mg tabs available at the pharmacy.  Aspirin 81 mg: once daily - no concern for bruising/bleeding.  Furosemide 20 mg: once daily in the morning  Potassium chloride 10 MEQ: 1 tab once daily  +Jardiance, as above.  Nitroglycerin 0.4 mg sublingual tab: Place 1 tablet under the tongue at onset of chest pain.  May repeat x 3 doses q 5 min.  Call 911 after first dose if pain persists. Pt use: she notes that she did take a dose x2 this past month due to chest pain and found relief after taking this. current supply expires 8/2024.  Medication History: amlodipine (stopped due to BP control from other meds), metoprolol (changed to carvedilol), clopidogrel (stopped by cards 3/2022 - told to continue aspirin 81 mg daily).  Patient reports no current medication side effects.  Patient does not have home blood pressure readings available today.  Does have an arm and a wrist blood pressure monitor, but not using either one.       BP Readings from Last 3 Encounters:   09/19/23 94/53   08/29/23 91/54   06/20/23 96/63     Pulse Readings from Last 3 Encounters:   09/19/23 86   08/29/23 72   06/20/23 73     Depression:  Therapy: Patient planning to establish with Tom Garza in a few weeks, appointment scheduled.  9/19: still feeling stressed because she committed to attending her class reunion (and paid for it), but now she's hesitant to attend because she's  not sure she wants to see people from her class. She feels like she needs to go though because she spent money on it.  History: activities to help with her depression: book club at [employer], praying, going to [employer], playing with the cat, pushing the cat in the stroller.  Current medications include:  Fluoxetine 20 mg: once daily in the morning.  Medication History: escitalopram (stopped 12/2020 when started fluoxetine), citalopram 40 mg (unknown start - 1/3/2020) - stopped due to limited efficacy and CV/QT prolongation risk with dose >20 mg, mirtazapine (1/3/2020 - 1/10/2020) - stopped due to strange dreams and 1 day of suicidal ideation (per Dr. Maza's notes).  No ALEJANDRA-7 data on file.        12/20/2022     2:27 PM 6/20/2023     8:56 AM 8/29/2023     1:04 PM   PHQ   PHQ-9 Total Score 12 7 10   Q9: Thoughts of better off dead/self-harm past 2 weeks Not at all Not at all Not at all        Today's Vitals: BP 94/53   Pulse 86   Wt 124 lb 11.2 oz (56.6 kg)   SpO2 98%   BMI 22.44 kg/m    ----------------    I spent 30 minutes with this patient today. All changes were made via collaborative practice agreement with Samanta Maza MD. A copy of the visit note was provided to the patient's provider(s).    A summary of these recommendations was given to the patient.    Carly Pisano, KushalD  Medication Therapy Management (MTM) Pharmacist  [place] [place] [facility] (Tu/Th/Fr)  Clinic Phone: [phone]  Pager: [phone]     Medication Therapy Recommendations  Type 2 diabetes mellitus with other specified complication, with long-term current use of insulin (H)    Current Medication: insulin lispro (HUMALOG KWIKPEN) 100 UNIT/ML (1 unit dial) KWIKPEN (Discontinued)   Rationale: Dose too low - Dosage too low - Effectiveness   Recommendation: Increase Dose   Status: Accepted per CPA

## 2023-09-19 NOTE — Clinical Note
MTM note route as FYI - Increased her short acting insulin by 2 units and asked her to log her short acting insulin use on her freestyle roula readers.  will likely need further titration given her dietary pattern changes. Seeing Shell in a few weeks.  I sent Dr. Long a cards update as well. Ongoing low blood pressures, likely 2/2 her ongoing weight loss. Did use nitroglycerin x2 over last 3 weeks and has an upcoming visit with cards in near future.   See my note for details. Thanks, KB

## 2023-09-19 NOTE — Clinical Note
Dr. Ethan Haji I saw Chanelle for follow-up. She continues to lose weight and her blood pressure continues to lower. LOV we reduced her carvedilol due to some lightheadedness / dizziness side effects. Her blood pressure is still low, but she did not note any side effects at this visit. She did use her nitroglycerin x2 over the past 3 weeks.  I did not make any other cards med changes. She has an upcoming appointment with you next month.  Let me know if you would like me to assist with any other med changes in the meantime. KB

## 2023-09-19 NOTE — PATIENT INSTRUCTIONS
"Recommendations from today's MTM visit:                                                    MTM (medication therapy management) is a service provided by a clinical pharmacist designed to help you get the most of out of your medicines.      1. A1c today.    2. Call your insurance to ensure that your therapy appointment with Tom Garza would be covered.    3. Increase your short-acting (mealtime) insulin: take 3 units with each meal. However, you should increase to 6 units if your blood sugar is above 150 at mealtime.    - start logging your insulin injection on your freestyle roula reader (as we demonstrated in clinic today).    Follow-up: MTM in 1 month: Tuesday 10/17 at 1pm in clinic.    It was great speaking with you today.  I value your experience and would be very thankful for your time in providing feedback in our clinic survey. In the next few days, you may receive an email or text message from [employer] with a link to a survey related to your  clinical pharmacist.\"     To schedule another MTM appointment, please call the clinic directly or you may call the MTM scheduling line at [phone] or toll-free at [phone].     My Clinical Pharmacist's contact information:                                                      Please feel free to contact me with any questions or concerns you have.      Carly Pisano, PharmD  Medication Therapy Management (MTM) Pharmacist    "

## 2023-09-19 NOTE — PROGRESS NOTES
I asked care coordinators if they would be able to assist Patient with navigating checking their insurance for coverage for therapy.        Therefore, I asked Patient to check with her own insurance directly.    Carly Pisano PharmD  Medication Therapy Management (MTM) Pharmacist

## 2023-09-26 ENCOUNTER — OFFICE VISIT (OUTPATIENT)
Dept: FAMILY MEDICINE | Facility: CLINIC | Age: 68
End: 2023-09-26
Payer: COMMERCIAL

## 2023-09-26 VITALS
BODY MASS INDEX: 23.02 KG/M2 | SYSTOLIC BLOOD PRESSURE: 100 MMHG | HEART RATE: 61 BPM | TEMPERATURE: 98.1 F | DIASTOLIC BLOOD PRESSURE: 60 MMHG | OXYGEN SATURATION: 98 % | RESPIRATION RATE: 16 BRPM | HEIGHT: 62 IN | WEIGHT: 125.1 LBS

## 2023-09-26 DIAGNOSIS — Z23 NEED FOR SHINGLES VACCINE: ICD-10-CM

## 2023-09-26 DIAGNOSIS — Z79.4 TYPE 2 DIABETES MELLITUS WITH OTHER CIRCULATORY COMPLICATION, WITH LONG-TERM CURRENT USE OF INSULIN (H): ICD-10-CM

## 2023-09-26 DIAGNOSIS — E11.59 TYPE 2 DIABETES MELLITUS WITH OTHER CIRCULATORY COMPLICATION, WITH LONG-TERM CURRENT USE OF INSULIN (H): ICD-10-CM

## 2023-09-26 DIAGNOSIS — Z59.86 FINANCIAL INSECURITY: ICD-10-CM

## 2023-09-26 DIAGNOSIS — F41.1 GENERALIZED ANXIETY DISORDER: Primary | ICD-10-CM

## 2023-09-26 DIAGNOSIS — F50.810 BINGE-EATING DISORDER, MILD: ICD-10-CM

## 2023-09-26 PROBLEM — Z95.1 HISTORY OF CORONARY ARTERY BYPASS SURGERY: Status: ACTIVE | Noted: 2023-09-26

## 2023-09-26 PROBLEM — H25.811 COMBINED FORMS OF AGE-RELATED CATARACT OF RIGHT EYE: Status: ACTIVE | Noted: 2019-04-30

## 2023-09-26 PROBLEM — H52.10 MYOPIA: Status: ACTIVE | Noted: 2023-09-26

## 2023-09-26 PROBLEM — N39.3 STRESS INCONTINENCE IN FEMALE: Status: ACTIVE | Noted: 2023-09-26

## 2023-09-26 PROBLEM — H25.812 COMBINED FORMS OF AGE-RELATED CATARACT OF LEFT EYE: Status: ACTIVE | Noted: 2019-04-30

## 2023-09-26 PROBLEM — H91.93 BILATERAL HEARING LOSS: Status: ACTIVE | Noted: 2017-11-28

## 2023-09-26 PROBLEM — I25.2 HISTORY OF MYOCARDIAL INFARCTION: Status: ACTIVE | Noted: 2023-09-26

## 2023-09-26 PROBLEM — I20.89 STABLE ANGINA PECTORIS (H): Status: ACTIVE | Noted: 2023-09-26

## 2023-09-26 PROBLEM — G47.33 OSA (OBSTRUCTIVE SLEEP APNEA): Status: ACTIVE | Noted: 2018-10-09

## 2023-09-26 PROCEDURE — 90662 IIV NO PRSV INCREASED AG IM: CPT | Performed by: FAMILY MEDICINE

## 2023-09-26 PROCEDURE — 99215 OFFICE O/P EST HI 40 MIN: CPT | Mod: 25 | Performed by: FAMILY MEDICINE

## 2023-09-26 PROCEDURE — 90471 IMMUNIZATION ADMIN: CPT | Performed by: FAMILY MEDICINE

## 2023-09-26 RX ORDER — TOPIRAMATE 25 MG/1
25 TABLET, FILM COATED ORAL DAILY
Qty: 30 TABLET | Refills: 1 | Status: SHIPPED | OUTPATIENT
Start: 2023-09-26 | End: 2023-10-24

## 2023-09-26 SDOH — ECONOMIC STABILITY - INCOME SECURITY: FINANCIAL INSECURITY: Z59.86

## 2023-09-26 NOTE — PROGRESS NOTES
Assessment & Plan   Problem List Items Addressed This Visit          Behavioral    Generalized anxiety disorder - Primary    Relevant Orders    Primary Care - Care Coordination Referral     Other Visit Diagnoses       Need for shingles vaccine        Financial insecurity        Relevant Orders    Primary Care - Care Coordination Referral           Type 2 diabetes in a patient with history of MI and coronary artery disease.  Not currently at goal.  Suggested patient consider switching from Victoza to Ozempic.  She continues to work with our MTM pharmacist.        Patient continues to meet criteria for CGM coverage;   - patient has type two diabetes mellitus; and   - patient has been using CGM daily; and   - patient is insulin treated with insulin completing multiple bolus's daily 3 or more times/ day   - patient is frequently adjusting insulin based on blood glucose readings   - within six months prior to ordering the CGM, the patient has had an in-person visit with the practitioner; and determined that criteria (1-4) above are met; and   - every six months following the initial prescription of the CGM, the treating practitioner has an in-person visit with the patient to assess adherence to their CGM regimen and diabetes treatment plan   medication.    Evening grazing/mild binge eating disorder.  Patient will hopefully be establishing care with Tom Garza.  We will also try adding a low-dose of topiramate first just once daily at her evening meal.  Patient and her daughter will monitor for any adverse side effects.  Would like her to follow-up with me or Dr. Pisano in about a month.  She has an appointment scheduled with Dr. Pisano on October 17.    Placing referral to care coordinators to see if they can assist patient with finding out if she is a candidate for [facility] care and if counseling is covered as a [facility] care benefit.    She should plan to follow-up to see me in about 6 months.      Chf cont to  "follow up with cards, meeting dietitian tomorrow    Total time spent reviewing chart and preparing for appointment, with patient for appointment, and time spent charting and coordinating care on the day of the appointment in minutes was: 41             Blood sugar testing frequency justification:  Uncontrolled diabetes and Adjustment of medication(s)      Samanta Maza MD  [facility]    Bhanu Roca is a 68 year old, presenting for the following health issues:  Diabetes        9/26/2023     8:52 AM   Additional Questions   Roomed by Ruth Ann       History of Present Illness       Diabetes:   She presents for follow up of diabetes.  She is checking home blood glucose three times daily.   She checks blood glucose before meals.  Blood glucose is sometimes over 200 and sometimes under 70. She is aware of hypoglycemia symptoms including lethargy.   She is concerned about frequent infections.   She is having weight loss.            She eats 2-3 servings of fruits and vegetables daily.She consumes 1 sweetened beverage(s) daily.She exercises with enough effort to increase her heart rate 10 to 19 minutes per day.  She exercises with enough effort to increase her heart rate 3 or less days per week. She is missing 1 dose(s) of medications per week.                 Review of Systems         Objective    /60 (BP Location: Right arm, Patient Position: Sitting)   Pulse 61   Temp 98.1  F (36.7  C) (Tympanic)   Resp 16   Ht 1.562 m (5' 1.5\")   Wt 56.7 kg (125 lb 1.6 oz)   LMP  (LMP Unknown)   SpO2 98%   BMI 23.25 kg/m    Body mass index is 23.25 kg/m .  Physical Exam                         "

## 2023-09-26 NOTE — COMMUNITY RESOURCES LIST (ENGLISH)
09/26/2023    [employer] [place] [employer]  N/A  For questions about this resource list or additional care needs, please contact your primary care clinic or care manager.  Phone: [phone]   Email: N/A   Address: [address] [place] [id number]   Hours: N/A        Financial Stability       Rent and mortgage payment assistance  1  Workforce Resource - [facility] - Emergency Assistance for the Homeless Distance: 0.23 miles      In-Person, Phone/Virtual   [address] [place] [address]  Language: English, Hmong, Maltese, Portuguese  Hours: Mon - Fri 8:00 AM - 4:30 PM  Fees: Free   Phone: (243) 751-3985 Website: https://www.workforceresource.org/     2  [employer] Distance: 10.16 miles      Phone/Virtual   [address] [place] [address]  Language: English  Hours: Mon - Fri 10:00 AM - 1:00 PM , Thu 5:00 PM - 6:30 PM  Fees: Free   Phone: (487) 350-5444 Email: operationhelp.source@[employer] Website: http://www.operationhelpstcroix.org          Important Numbers & Websites       Emergency Services   911  [facility]   311  Poison Control   (640) 609-7359  Suicide Prevention Lifeline   (540) 431-9094 (TALK)  Child Abuse Hotline   (982) 411-8083 (4-A-Child)  Sexual Assault Hotline   (706) 359-5513 (HOPE)  National Runaway Safeline   (980) 589-7816 (RUNAWAY)  All-Options Talkline   (243) 198-1231  Substance Abuse Referral   (642) 768-9281 (HELP)

## 2023-09-27 ENCOUNTER — ALLIED HEALTH/NURSE VISIT (OUTPATIENT)
Dept: EDUCATION SERVICES | Facility: CLINIC | Age: 68
End: 2023-09-27
Payer: COMMERCIAL

## 2023-09-27 DIAGNOSIS — E11.59 TYPE 2 DIABETES MELLITUS WITH CIRCULATORY DISORDER, WITH LONG-TERM CURRENT USE OF INSULIN (H): Primary | ICD-10-CM

## 2023-09-27 DIAGNOSIS — Z79.4 TYPE 2 DIABETES MELLITUS WITH CIRCULATORY DISORDER, WITH LONG-TERM CURRENT USE OF INSULIN (H): Primary | ICD-10-CM

## 2023-09-27 DIAGNOSIS — E78.5 HYPERLIPIDEMIA LDL GOAL <70: ICD-10-CM

## 2023-09-27 PROCEDURE — 97803 MED NUTRITION INDIV SUBSEQ: CPT | Performed by: DIETITIAN, REGISTERED

## 2023-09-27 NOTE — LETTER
9/27/2023         RE: Chanelle Billy  [address]  [place] [address]        Dear Colleague,    Thank you for referring your patient, Chanelle Billy, to the [facility]. Please see a copy of my visit note below.    Medical Nutrition Therapy for Diabetes  Visit Type:Reassessment and intervention  Chanelle Billy presents today for MNT and education related to type 2 diabetes.   She is accompanied by daughter.     ASSESSMENT:   Patient comments/concerns relating to nutrition:   Pt has recently started on Topamax with evening meal to help decrease HS cravings.    Glucose readings noted to be elevated in the evening.    Insulin   Tresiba 9u and doing very well taking this daily   Humalog infrequent dosing  Victoza 1.8mg daily   Pt is interested in changing to a weekly GLP-1 RA will discuss with MTM.  Per insurance Trulicity is preferred.   NUTRITION HISTORY:    Pt notes decreased appetite during the day.  Does try to have balanced meals but smaller portion sizes.  HS/ evening is when increased intake of crackers, chips etc.    Previous diet education:  Yes     Food allergies/intolerances: no food allergies     EXERCISE: sporadic or irregular exercise    SOCIO/ECONOMIC:   Lives with: self    GLUCOSE MONITORING:  Patient glucose self monitoring as follows:           BG values are: Not in goal current 30 day sensor glucose avg is 192mg/dL.      Patient continues to meet CGM criteria  1. The beneficiary has diabetes mellitus; and,  2. The beneficiary s treating practitioner has concluded that the beneficiary (or beneficiary s caregiver) has sufficient training using the CGM prescribed as evidenced by providing a prescription; and,  3. The CGM is prescribed in accordance with its FDA indications for use; and,  4. The beneficiary for whom a CGM is being prescribed, to improve glycemic control, meets at least one of the  criteria below:  A. The beneficiary is  insulin-treated; or,  B. The beneficiary has a history of problematic hypoglycemia with documentation of at least one of the  following:    Recurrent (more than one) level 2 hypoglycemic events (glucose <54mg/dL (3.0mmol/L)) that  persist despite multiple (more than one) attempts to adjust medication(s) and/or modify the  diabetes treatment plan; or,    A history of one level 3 hypoglycemic event (glucose <54mg/dL (3.0mmol/L)) characterized by  altered mental and/or physical state requiring third-party assistance for treatment of  hypoglycemia.  5. Within six (6) months prior to ordering the CGM, the treating practitioner has an in-person or Medicareapproved telehealth visit with the beneficiary to evaluate their diabetes control and determine that criteria  (1-4) above are met.      Patient's most recent   Lab Results   Component Value Date    A1C 7.9 09/19/2023    A1C 8.6 02/11/2021    is meeting goal of <8.0    MEDICATIONS:  Current Outpatient Medications   Medication     Continuous Blood Gluc  (FREESTYLE ROSAURA 2 READER) LOUIS     Continuous Blood Gluc Sensor (FREESTYLE ROSAURA 2 SENSOR) MISC     empagliflozin (JARDIANCE) 25 MG TABS tablet     insulin degludec (TRESIBA FLEXTOUCH) 100 UNIT/ML pen     insulin lispro (HUMALOG KWIKPEN) 100 UNIT/ML (1 unit dial) KWIKPEN     insulin pen needle (32G X 4 MM) 32G X 4 MM miscellaneous     metFORMIN (GLUCOPHAGE XR) 500 MG 24 hr tablet     VICTOZA PEN 18 MG/3ML soln     aspirin (ASA) 81 MG chewable tablet     bismuth subsalicylate (PEPTO BISMOL) 262 MG/15ML suspension     blood glucose (NO BRAND SPECIFIED) test strip     calcium carbonate (TUMS) 500 MG chewable tablet     carvedilol (COREG) 12.5 MG tablet     Dextrose, Diabetic Use, (GLUCOSE) 1 g CHEW     FLUoxetine (PROZAC) 20 MG capsule     furosemide (LASIX) 20 MG tablet     lisinopril (ZESTRIL) 10 MG tablet     loratadine (CLARITIN) 10 MG tablet     multivitamin w/minerals (THERA-VIT-M) tablet      "Naphazoline-Pheniramine (EQ EYE ALLERGY RELIEF OP)     nitroGLYcerin (NITROSTAT) 0.4 MG sublingual tablet     polyethylene glycol-propylene glycol (SYSTANE ULTRA) 0.4-0.3 % SOLN ophthalmic solution     potassium chloride ER (K-TAB/KLOR-CON) 10 MEQ CR tablet     rosuvastatin (CRESTOR) 40 MG tablet     topiramate (TOPAMAX) 25 MG tablet     No current facility-administered medications for this visit.       LABS:  Lab Results   Component Value Date    A1C 7.9 09/19/2023    A1C 8.6 02/11/2021     Lab Results   Component Value Date     09/19/2023    GLC 64 03/16/2022     02/11/2021     Lab Results   Component Value Date    LDL 38 06/20/2023    LDL 71 03/13/2020     HDL Cholesterol   Date Value Ref Range Status   03/13/2020 60 >or=50 mg/dL Final     Direct Measure HDL   Date Value Ref Range Status   06/20/2023 34 (L) >=50 mg/dL Final   ]  GFR Estimate   Date Value Ref Range Status   09/19/2023 >90 >60 mL/min/1.73m2 Final   02/11/2021 92 >or=60 ml/min/1.73m2 Final     Lab Results   Component Value Date    CR 0.64 09/19/2023    CR 0.68 02/11/2021     No results found for: MICROALBUMIN    ANTHROPOMETRICS:  Vitals: Ht 1.562 m (5' 1.5\")   Wt 57.1 kg (125 lb 12.8 oz)   LMP  (LMP Unknown)   BMI 23.38 kg/m    Body mass index is 23.38 kg/m .      Wt Readings from Last 5 Encounters:   09/27/23 57.1 kg (125 lb 12.8 oz)   09/26/23 56.7 kg (125 lb 1.6 oz)   09/19/23 56.6 kg (124 lb 11.2 oz)   08/29/23 56.1 kg (123 lb 11.2 oz)   07/03/23 57.5 kg (126 lb 11.2 oz)         ESTIMATED KCAL REQUIREMENTS:  ~1400 kcal per day    NUTRITION DIAGNOSIS: Altered nutrition-related laboratory values related to Type II diabetes as evidenced by   Lab Results   Component Value Date    A1C 7.9 09/19/2023    A1C 7.4 06/20/2023    A1C 7.8 12/20/2022    A1C 7.2 06/27/2022    A1C 8.1 11/30/2021    A1C 8.6 02/11/2021    A1C 11.4 10/22/2020    A1C 6.8 03/13/2020    A1C 11.7 12/06/2019       NUTRITION INTERVENTION:  Nutrition Prescription: " Energy Intake: 1400 kcal/day  Carbohydrate Intake: 30 grams/meal and 15 grams/snacks  Education given to support: consistent meals, carb counting, exercise, behavior modification, and heart healthy diet  Education Materials Provided: My Plate Planner/Choose My Plate, Portion Size Guide, and Heart Healthy Food Choices  Motivational Interviewing    PATIENT'S BEHAVIOR CHANGE GOALS:   See Patient Instructions for patient stated behavior change goals. AVS was printed and given to patient at today's appointment.    MONITOR / EVALUATE:  RD will monitor/evaluate:  Pertinent Labs  Readiness to change nutrition-related behaviors  Weight change    FOLLOW-UP:  2 mo   Time spent in minutes: 60  Encounter: Individual

## 2023-09-27 NOTE — PATIENT INSTRUCTIONS
Starting Trulicity weekly injection once you have finished your Victoza     0.75 mg weekly x 4 weeks   1.50 mg weekly and then can potentially increase to higher doses       Tresiba 9 units  Humalog 3 units before all meals   If you are over 150 then increase the Humalog to 6 units

## 2023-09-28 ENCOUNTER — PATIENT OUTREACH (OUTPATIENT)
Dept: CARE COORDINATION | Facility: CLINIC | Age: 68
End: 2023-09-28
Payer: COMMERCIAL

## 2023-09-28 NOTE — PROGRESS NOTES
Clinic Care Coordination Contact  Community Health Worker Initial Outreach    CHW Initial Information Gathering:  Referral Source: PCP  Preferred Hospital: Other ([facility])  Current living arrangement:: I live in a private home, I live alone  Type of residence:: Apartment - handicap accessible  Community Resources: [place] [facility] [place]  Supplies Currently Used at Home: Diabetic Supplies  Equipment Currently Used at Home: shower chair, grab bar, tub/shower, grab bar, toilet, cane, straight, cane, quad, walker, layla  Informal Support system:: Children  No PCP office visit in Past Year: No (9/26/23 with Dr. Maza)  Transportation means:: Friend  CHW Additional Questions  MyChart active?: No  Patient agreeable to assistance with activating MyChart?: Yes    Patient accepts CC: Yes. Patient scheduled for assessment with BARBARA Farah on 10/6/23 at 11:00. Patient noted desire to discuss:    - Financial Resources    - [facility]-Patient received a bill from Cardiology visit. Patient     - Establish with Psychology     Overview  FL's-BARBARA Assessment-Financial Resources, [facility], Establish with Psychology     Niurka Rader  Community Health Worker  [facility]  Clinic Care Coordination   WayneMaru lucas, [place], [place], [facility]  Office: [phone]

## 2023-10-01 VITALS — HEIGHT: 62 IN | BODY MASS INDEX: 23.15 KG/M2 | WEIGHT: 125.8 LBS

## 2023-10-02 NOTE — PROGRESS NOTES
Medical Nutrition Therapy for Diabetes  Visit Type:Reassessment and intervention  Chanelle Billy presents today for MNT and education related to type 2 diabetes.   She is accompanied by daughter.     ASSESSMENT:   Patient comments/concerns relating to nutrition:   Pt has recently started on Topamax with evening meal to help decrease HS cravings.    Glucose readings noted to be elevated in the evening.    Insulin   Tresiba 9u and doing very well taking this daily   Humalog infrequent dosing  Victoza 1.8mg daily   Pt is interested in changing to a weekly GLP-1 RA will discuss with MTM.  Per insurance Trulicity is preferred.   NUTRITION HISTORY:    Pt notes decreased appetite during the day.  Does try to have balanced meals but smaller portion sizes.  HS/ evening is when increased intake of crackers, chips etc.    Previous diet education:  Yes     Food allergies/intolerances: no food allergies     EXERCISE: sporadic or irregular exercise    SOCIO/ECONOMIC:   Lives with: self    GLUCOSE MONITORING:  Patient glucose self monitoring as follows:           BG values are: Not in goal current 30 day sensor glucose avg is 192mg/dL.      Patient continues to meet CGM criteria  1. The beneficiary has diabetes mellitus; and,  2. The beneficiary s treating practitioner has concluded that the beneficiary (or beneficiary s caregiver) has sufficient training using the CGM prescribed as evidenced by providing a prescription; and,  3. The CGM is prescribed in accordance with its FDA indications for use; and,  4. The beneficiary for whom a CGM is being prescribed, to improve glycemic control, meets at least one of the  criteria below:  A. The beneficiary is insulin-treated; or,  B. The beneficiary has a history of problematic hypoglycemia with documentation of at least one of the  following:   Recurrent (more than one) level 2 hypoglycemic events (glucose <54mg/dL (3.0mmol/L)) that  persist despite multiple (more than one) attempts to  adjust medication(s) and/or modify the  diabetes treatment plan; or,   A history of one level 3 hypoglycemic event (glucose <54mg/dL (3.0mmol/L)) characterized by  altered mental and/or physical state requiring third-party assistance for treatment of  hypoglycemia.  5. Within six (6) months prior to ordering the CGM, the treating practitioner has an in-person or Medicareapproved telehealth visit with the beneficiary to evaluate their diabetes control and determine that criteria  (1-4) above are met.      Patient's most recent   Lab Results   Component Value Date    A1C 7.9 09/19/2023    A1C 8.6 02/11/2021    is meeting goal of <8.0    MEDICATIONS:  Current Outpatient Medications   Medication    Continuous Blood Gluc  (FREESTYLE ROSAURA 2 READER) LOUIS    Continuous Blood Gluc Sensor (FREESTYLE ROSAURA 2 SENSOR) MISC    empagliflozin (JARDIANCE) 25 MG TABS tablet    insulin degludec (TRESIBA FLEXTOUCH) 100 UNIT/ML pen    insulin lispro (HUMALOG KWIKPEN) 100 UNIT/ML (1 unit dial) KWIKPEN    insulin pen needle (32G X 4 MM) 32G X 4 MM miscellaneous    metFORMIN (GLUCOPHAGE XR) 500 MG 24 hr tablet    VICTOZA PEN 18 MG/3ML soln    aspirin (ASA) 81 MG chewable tablet    bismuth subsalicylate (PEPTO BISMOL) 262 MG/15ML suspension    blood glucose (NO BRAND SPECIFIED) test strip    calcium carbonate (TUMS) 500 MG chewable tablet    carvedilol (COREG) 12.5 MG tablet    Dextrose, Diabetic Use, (GLUCOSE) 1 g CHEW    FLUoxetine (PROZAC) 20 MG capsule    furosemide (LASIX) 20 MG tablet    lisinopril (ZESTRIL) 10 MG tablet    loratadine (CLARITIN) 10 MG tablet    multivitamin w/minerals (THERA-VIT-M) tablet    Naphazoline-Pheniramine (EQ EYE ALLERGY RELIEF OP)    nitroGLYcerin (NITROSTAT) 0.4 MG sublingual tablet    polyethylene glycol-propylene glycol (SYSTANE ULTRA) 0.4-0.3 % SOLN ophthalmic solution    potassium chloride ER (K-TAB/KLOR-CON) 10 MEQ CR tablet    rosuvastatin (CRESTOR) 40 MG tablet    topiramate (TOPAMAX) 25 MG  "tablet     No current facility-administered medications for this visit.       LABS:  Lab Results   Component Value Date    A1C 7.9 09/19/2023    A1C 8.6 02/11/2021     Lab Results   Component Value Date     09/19/2023    GLC 64 03/16/2022     02/11/2021     Lab Results   Component Value Date    LDL 38 06/20/2023    LDL 71 03/13/2020     HDL Cholesterol   Date Value Ref Range Status   03/13/2020 60 >or=50 mg/dL Final     Direct Measure HDL   Date Value Ref Range Status   06/20/2023 34 (L) >=50 mg/dL Final   ]  GFR Estimate   Date Value Ref Range Status   09/19/2023 >90 >60 mL/min/1.73m2 Final   02/11/2021 92 >or=60 ml/min/1.73m2 Final     Lab Results   Component Value Date    CR 0.64 09/19/2023    CR 0.68 02/11/2021     No results found for: MICROALBUMIN    ANTHROPOMETRICS:  Vitals: Ht 1.562 m (5' 1.5\")   Wt 57.1 kg (125 lb 12.8 oz)   LMP  (LMP Unknown)   BMI 23.38 kg/m    Body mass index is 23.38 kg/m .      Wt Readings from Last 5 Encounters:   09/27/23 57.1 kg (125 lb 12.8 oz)   09/26/23 56.7 kg (125 lb 1.6 oz)   09/19/23 56.6 kg (124 lb 11.2 oz)   08/29/23 56.1 kg (123 lb 11.2 oz)   07/03/23 57.5 kg (126 lb 11.2 oz)         ESTIMATED KCAL REQUIREMENTS:  ~1400 kcal per day    NUTRITION DIAGNOSIS: Altered nutrition-related laboratory values related to Type II diabetes as evidenced by   Lab Results   Component Value Date    A1C 7.9 09/19/2023    A1C 7.4 06/20/2023    A1C 7.8 12/20/2022    A1C 7.2 06/27/2022    A1C 8.1 11/30/2021    A1C 8.6 02/11/2021    A1C 11.4 10/22/2020    A1C 6.8 03/13/2020    A1C 11.7 12/06/2019       NUTRITION INTERVENTION:  Nutrition Prescription: Energy Intake: 1400 kcal/day  Carbohydrate Intake: 30 grams/meal and 15 grams/snacks  Education given to support: consistent meals, carb counting, exercise, behavior modification, and heart healthy diet  Education Materials Provided: My Plate Planner/Choose My Plate, Portion Size Guide, and Heart Healthy Food Choices  Motivational " Interviewing    PATIENT'S BEHAVIOR CHANGE GOALS:   See Patient Instructions for patient stated behavior change goals. AVS was printed and given to patient at today's appointment.    MONITOR / EVALUATE:  RD will monitor/evaluate:  Pertinent Labs  Readiness to change nutrition-related behaviors  Weight change    FOLLOW-UP:  2 mo   Time spent in minutes: 60  Encounter: Individual

## 2023-10-06 ENCOUNTER — PATIENT OUTREACH (OUTPATIENT)
Dept: NURSING | Facility: CLINIC | Age: 68
End: 2023-10-06
Payer: COMMERCIAL

## 2023-10-06 DIAGNOSIS — I10 ESSENTIAL HYPERTENSION: ICD-10-CM

## 2023-10-06 RX ORDER — LISINOPRIL 10 MG/1
TABLET ORAL
Qty: 90 TABLET | Refills: 0 | Status: SHIPPED | OUTPATIENT
Start: 2023-10-06 | End: 2024-01-10

## 2023-10-06 ASSESSMENT — ACTIVITIES OF DAILY LIVING (ADL): DEPENDENT_IADLS:: INDEPENDENT

## 2023-10-06 NOTE — LETTER
[facility]  Patient Centered Plan of Care  About Me:        Patient Name:  Chanelle Billy    YOB: 1955  Age:         68 year old   Miranda MRN:    [id number] Telephone Information:  Home Phone [phone]   Mobile [phone]       Address:  [address] [address] Email address:  No e-mail address on record      Emergency Contact(s)    Name Relationship Lgl Grd Work Phone Home Phone Mobile Phone   1. NONA MURRELL* Daughter    [phone]           Primary language:  English     needed? No   [place] [employer] [facility]:  [phone] op. 1  Other communication barriers:None    Preferred Method of Communication:     Current living arrangement: I live in a private home; I live alone    Mobility Status/ Medical Equipment: Independent w/Device        Health Maintenance  Health Maintenance Reviewed: Due/Overdue   Health Maintenance Due   Topic Date Due    HF ACTION PLAN  Never done    COLORECTAL CANCER SCREENING  Never done    ZOSTER IMMUNIZATION (2 of 2) 10/20/2021    DIABETIC FOOT EXAM  06/27/2023    COVID-19 Vaccine (6 - 2023-24 season) 09/01/2023          My Access Plan  Medical Emergency 911   Primary Clinic Line [facility] - [phone]   24 Hour Appointment Line [phone] or  [phone]) (toll-free)   24 Hour Nurse Line [phone] (toll-free)   Preferred Urgent Care Other     Preferred Hospital Other     Preferred Pharmacy [place], [place] - [address]     Behavioral Health Crisis Line The National Suicide Prevention Lifeline at 1-906.109.6702 or Text/Call 048             My Care Team Members  Patient Care Team         Relationship Specialty Notifications Start End    Samanta Maza MD PCP - General Family Medicine  2/5/22     Merged    Phone: [phone] Fax: [phone]         [address] [facility] [address]    [facility]    2/6/15     Merged     Phone: [phone] Fax: [phone]         [address] [facility] [address]    Samanta Maza MD  Family Practice  2/5/22     Merged    Phone: [phone] Fax: [phone]         [address] [facility] [address]    Lan Long MD Assigned Heart and Vascular Provider   2/13/22     Phone: [phone] Fax: [phone]         [address] [facility] [address] [facility] [address]    Samanta Maza MD Assigned PCP   2/27/22     Phone: [phone] Fax: [phone]         [address] [facility] [address]    Avril Martinez RD Diabetes Educator   3/29/22     Phone: [phone] Fax: [phone]         [address] [facility] [address]    Anyi Pisano, [facility] Assigned MTM Pharmacist   2/4/23     Phone: [phone] Fax: [phone]         [address] [facility] [address]    Lan Long MD MD Cardiovascular Disease  8/7/23     Phone: [phone] Fax: [phone]         [address] [facility] [address] [facility] [address]    Sofi Barrientos LICSW Lead Care Coordinator  Admissions 9/28/23     Niurka Rader Community Health Worker  Admissions 10/6/23               My Care Plans  Self Management and Treatment Plan  Care Plan  Care Plan: Financial Wellbeing       Problem: Dominique Care       Goal: Dominique Care and Insurance Help       Start Date: 10/6/2023    Note:     Goal Statement: I will continue to take steps to further support my financial wellbeing over the next three month(s).  Barriers: application times  Strengths: accepting of help, family support  Patient expressed understanding of goal: yes    Action steps to achieve this goal:  I will review resources and supports sent to me via Mail with my family and consider outreaching and establishing with one or more which interest me: dominique care application  I understand that a referral has been made to the financial resource workers to assist with dominique care. I will answer the phone when they call or return their voicemail  when I get it.   I will reach out to my insurance company with the support of my daughter to look into my bill not being covered.   I will continue to outreach to care coordination as needed for additional resources or supports.                                      My Medical and Care Information  Problem List   Patient Active Problem List   Diagnosis    Coronary artery disease involving native coronary artery of native heart with angina pectoris (H24)    Hyperlipidemia LDL goal <70    Type 2 diabetes mellitus with circulatory disorder, with long-term current use of insulin (H)    Dyspnea on exertion    Ischemic cardiomyopathy    Major depressive disorder, recurrent episode, moderate with anxious distress (H)    Essential hypertension    Generalized anxiety disorder    History of coronary artery bypass surgery    History of myocardial infarction    Insomnia    Myopia    Bilateral hearing loss    MARIAA (obstructive sleep apnea)    Combined forms of age-related cataract of left eye    Combined forms of age-related cataract of right eye    Stress incontinence in female    Stable angina pectoris      Current Medications and Allergies:    Current Outpatient Medications   Medication    aspirin (ASA) 81 MG chewable tablet    bismuth subsalicylate (PEPTO BISMOL) 262 MG/15ML suspension    blood glucose (NO BRAND SPECIFIED) test strip    calcium carbonate (TUMS) 500 MG chewable tablet    carvedilol (COREG) 12.5 MG tablet    Continuous Blood Gluc  (FREESTYLE ROSAURA 2 READER) LOUIS    Continuous Blood Gluc Sensor (FREESTYLE ROSAURA 2 SENSOR) MISC    Dextrose, Diabetic Use, (GLUCOSE) 1 g CHEW    empagliflozin (JARDIANCE) 25 MG TABS tablet    FLUoxetine (PROZAC) 20 MG capsule    furosemide (LASIX) 20 MG tablet    insulin degludec (TRESIBA FLEXTOUCH) 100 UNIT/ML pen    insulin lispro (HUMALOG KWIKPEN) 100 UNIT/ML (1 unit dial) KWIKPEN    insulin pen needle (32G X 4 MM) 32G X 4 MM miscellaneous    lisinopril (ZESTRIL) 10 MG  tablet    loratadine (CLARITIN) 10 MG tablet    metFORMIN (GLUCOPHAGE XR) 500 MG 24 hr tablet    multivitamin w/minerals (THERA-VIT-M) tablet    Naphazoline-Pheniramine (EQ EYE ALLERGY RELIEF OP)    nitroGLYcerin (NITROSTAT) 0.4 MG sublingual tablet    polyethylene glycol-propylene glycol (SYSTANE ULTRA) 0.4-0.3 % SOLN ophthalmic solution    potassium chloride ER (K-TAB/KLOR-CON) 10 MEQ CR tablet    rosuvastatin (CRESTOR) 40 MG tablet    topiramate (TOPAMAX) 25 MG tablet    VICTOZA PEN 18 MG/3ML soln     No current facility-administered medications for this visit.       Allergies   Allergen Reactions    Atorvastatin Nausea and Vomiting and Diarrhea     Pt reported  Other reaction(s): Diarrhea, nausea, vomiting, Diarrhea,Nausea,Vomiting    Mirtazapine      Nightmares and suicidal ideation for 1 day (per Dr. Maza's note)  Other reaction(s): Nightmare, Suicidal ideation    Sodium Hypochlorite      Bleeding from nose and eyes after using a bleach product, patient/daughter reported  Other reaction(s): Runny nose    Animal Dander Other (See Comments)    Cats      Runny nose, pt reported.  Takes allergy medication for this and still lives with a cat.  Other reaction(s): Nasal congestion, Runny nose    Trazodone      nightmares  Other reaction(s): Nightmares        Care Coordination Start Date: 9/26/2023   Frequency of Care Coordination: monthly     Form Last Updated: 10/06/2023

## 2023-10-06 NOTE — LETTER
[facility] CARE COORDINATION  [address] [facility] [address]   October 6, 2023    Chanelle Billy  [address] [address]      Dear Chanelle,        I am a  clinic care coordinator who works with Samanta Maza MD with the [facility]. I wanted to thank you for spending the time to talk with me.  Below is a description of the resources we talked about together on the phone.     Dominique Care: I have completed the referral to Dominique Care through [place]. Someone from the team will be reaching out to assist with the application.     Insurance Questions: You will be calling your insurance company with the support of your daughter to find out more about your bill not being covered.     Therapy: Once we have your dominique care and insurance questions answered; we will work together on setting up a therapy appointment.     I will call you again at 1:00 on October 20th. This meeting will be over the phone - not in person.     Please feel free to contact me with any questions or concerns regarding care coordination and what we can offer.      We are focused on providing you with the highest-quality healthcare experience possible.    Sincerely,     Sofi Barrientos, [facility] Clinical Care Coordinator  [facility], [place], [place], [place], [facility], and [facility]     Enclosed: I have enclosed a copy of the Patient Centered Plan of Care. This has helpful information and goals that we have talked about. Please keep this in an easy to access place to use as needed.

## 2023-10-06 NOTE — PROGRESS NOTES
Clinic Care Coordination Contact  Clinic Care Coordination Contact  OUTREACH    Referral Information:  Referral Source: PCP    Primary Diagnosis: Psychosocial    Chief Complaint   Patient presents with    Clinic Care Coordination - Initial        Universal Utilization:   Clinic Utilization  Difficulty keeping appointments:: No  Compliance Concerns: No  No-Show Concerns: No  No PCP office visit in Past Year: No  Utilization      Hospital Admissions  0             ED Visits  0             No Show Count (past year)  2                    Current as of: 10/1/2023 11:56 PM                Clinical Concerns:  Current Medical Concerns:    Patient Active Problem List   Diagnosis    Coronary artery disease involving native coronary artery of native heart with angina pectoris (H24)    Hyperlipidemia LDL goal <70    Type 2 diabetes mellitus with circulatory disorder, with long-term current use of insulin (H)    Dyspnea on exertion    Ischemic cardiomyopathy    Major depressive disorder, recurrent episode, moderate with anxious distress (H)    Essential hypertension    Generalized anxiety disorder    History of coronary artery bypass surgery    History of myocardial infarction    Insomnia    Myopia    Bilateral hearing loss    MARIAA (obstructive sleep apnea)    Combined forms of age-related cataract of left eye    Combined forms of age-related cataract of right eye    Stress incontinence in female    Stable angina pectoris        Current Behavioral Concerns: no current concerns      Education Provided to patient:      [facility] Care: I have completed the referral to [facility] through [place]. Someone from the team will be reaching out to assist with the application.     Insurance Questions: You will be calling your insurance company with the support of your daughter to find out more about your bill not being covered.     Therapy: Once we have your calin care and insurance questions answered; we will work together on setting up  a therapy appointment.     Pain  Pain (GOAL):: No  Health Maintenance Reviewed: Due/Overdue   Health Maintenance Due   Topic Date Due    HF ACTION PLAN  Never done    COLORECTAL CANCER SCREENING  Never done    ZOSTER IMMUNIZATION (2 of 2) 10/20/2021    DIABETIC FOOT EXAM  06/27/2023    COVID-19 Vaccine (6 - 2023-24 season) 09/01/2023      Clinical Pathway: None    Medication Management:  Medication review status: Medications reviewed and no changes reported per patient.             Functional Status:  Dependent ADLs:: Ambulation-cane, Ambulation-walker  Dependent IADLs:: Independent  Bed or wheelchair confined:: No  Mobility Status: Independent w/Device  Fallen 2 or more times in the past year?: No  Any fall with injury in the past year?: No    Living Situation:  Current living arrangement:: I live in a private home, I live alone  Type of residence:: Apartment - handicap accessible    Lifestyle & Psychosocial Needs:    Social Determinants of Health     Food Insecurity: Low Risk  (9/26/2023)    Food Insecurity     Within the past 12 months, did you worry that your food would run out before you got money to buy more?: No     Within the past 12 months, did the food you bought just not last and you didn t have money to get more?: No   Depression: At risk (8/29/2023)    PHQ-2     PHQ-2 Score: 3   Housing Stability: High Risk (9/26/2023)    Housing Stability     Do you have housing? : Yes     Are you worried about losing your housing?: Yes   Tobacco Use: Medium Risk (9/26/2023)    Patient History     Smoking Tobacco Use: Former     Smokeless Tobacco Use: Never     Passive Exposure: Not on file   Financial Resource Strain: Low Risk  (9/26/2023)    Financial Resource Strain     Within the past 12 months, have you or your family members you live with been unable to get utilities (heat, electricity) when it was really needed?: No   Alcohol Use: Not At Risk (1/3/2020)    AUDIT-C     Frequency of Alcohol Consumption: Monthly or  less     Average Number of Drinks: 1 or 2     Frequency of Binge Drinking: Never   Transportation Needs: Low Risk  (9/26/2023)    Transportation Needs     Within the past 12 months, has lack of transportation kept you from medical appointments, getting your medicines, non-medical meetings or appointments, work, or from getting things that you need?: No   Physical Activity: Not on file   Interpersonal Safety: Not on file   Stress: Not on file   Social Connections: Not on file     Diet:: Diabetic diet  Inadequate nutrition (GOAL):: No  Tube Feeding: No  Inadequate activity/exercise (GOAL):: No  Significant changes in sleep pattern (GOAL): No  Transportation means:: Friend     Episcopal or spiritual beliefs that impact treatment:: No  Mental health DX:: Yes  Mental health DX how managed:: Medication, Outpatient Counseling  Mental health management concern (GOAL):: No  Chemical Dependency Status: No Current Concerns  Informal Support system:: Children             Resources and Interventions:  Current Resources:      Community Resources: County Programs (Food [place])  Supplies Currently Used at Home: Diabetic Supplies  Equipment Currently Used at Home: shower chair, grab bar, tub/shower, grab bar, toilet, cane, straight, cane, quad, walker, layla  Employment Status: disabled, retired         Advance Care Plan/Directive  Advanced Care Plans/Directives on file:: No  Advanced Care Plan/Directive Status: Declined Further Information    Referrals Placed: Financial Services, Mental Health         Care Plan:  Care Plan: Financial Wellbeing       Problem: Dominique Care       Goal: Dominique Care and Insurance Help       Start Date: 10/6/2023    Note:     Goal Statement: I will continue to take steps to further support my financial wellbeing over the next three month(s).  Barriers: application times  Strengths: accepting of help, family support  Patient expressed understanding of goal: yes    Action steps to achieve this goal:  I  will review resources and supports sent to me via Mail with my family and consider outreaching and establishing with one or more which interest me: calin care application  I understand that a referral has been made to the financial resource workers to assist with calin care. I will answer the phone when they call or return their voicemail when I get it.   I will reach out to my insurance company with the support of my daughter to look into my bill not being covered.   I will continue to outreach to care coordination as needed for additional resources or supports.                                 Patient/Caregiver understanding: Pt reports understanding and denies any additional questions or concerns at this times. SW CC engaged in AIDET communication during encounter.     Outreach Frequency: monthly  Future Appointments                In 1 week Anyi Pisano RPH M [facility]    In 1 week Lan Long MD M [facility] Heart [facility]    In 1 month Avril Martinez RD M [facility]            Plan: [facility] completed enrollment to [facility]. CC completed handoff to [facility]. CC provided resources via phone and mail. [facility] will complete outreach in about 4 week. [facility] will complete chart review in about 6 weeks. CC reviewed care coordination role and availability with Pt. [facility] discussed resources and supports available. Resources discussed: calin care, insurance questions, therapy. [facility] and Pt completed application for calin care. Pt noted they aren't sure why bill wasn't covered as bill had always been covered in the part. Pt is going to call insurance with daughter to look into this. Pt had therapy appointment but canceled due to insurance issue. Pt would like to get calin care and insurance questions answered and then look into therapy again. New goal will be added at that time for therapy.

## 2023-10-09 ENCOUNTER — PATIENT OUTREACH (OUTPATIENT)
Dept: CARE COORDINATION | Facility: CLINIC | Age: 68
End: 2023-10-09
Payer: COMMERCIAL

## 2023-10-09 NOTE — PROGRESS NOTES
Clinic Care Coordination Contact  Program: [facility]   County:  [facility] Case #:  County Worker:   Jefry #:   Subscriber #:   Renewal:  Date Applied:     GENE Outreach:   10/9/23: FRW called pt on referral. Pt doesn't want to apply for [facility] until after her appointment with the  on 10/20.  Catrina Broussard   Financial Resource Worker  [facility]  Clinic Care Coordination  [phone]     Health Insurance:      Referral/Screening:

## 2023-10-16 DIAGNOSIS — E11.69 TYPE 2 DIABETES MELLITUS WITH OTHER SPECIFIED COMPLICATION, WITH LONG-TERM CURRENT USE OF INSULIN (H): ICD-10-CM

## 2023-10-16 DIAGNOSIS — Z79.4 TYPE 2 DIABETES MELLITUS WITH OTHER SPECIFIED COMPLICATION, WITH LONG-TERM CURRENT USE OF INSULIN (H): ICD-10-CM

## 2023-10-16 RX ORDER — INSULIN DEGLUDEC 100 U/ML
INJECTION, SOLUTION SUBCUTANEOUS
Qty: 30 ML | Refills: 1 | Status: SHIPPED | OUTPATIENT
Start: 2023-10-16

## 2023-10-16 NOTE — TELEPHONE ENCOUNTER
Prescription approved per [facility] Refill Protocol.     JOSE CARLOS [facility]

## 2023-10-17 ENCOUNTER — OFFICE VISIT (OUTPATIENT)
Dept: PHARMACY | Facility: CLINIC | Age: 68
End: 2023-10-17
Payer: COMMERCIAL

## 2023-10-17 ENCOUNTER — OFFICE VISIT (OUTPATIENT)
Dept: CARDIOLOGY | Facility: CLINIC | Age: 68
End: 2023-10-17
Payer: COMMERCIAL

## 2023-10-17 VITALS
RESPIRATION RATE: 12 BRPM | HEIGHT: 62 IN | SYSTOLIC BLOOD PRESSURE: 92 MMHG | HEART RATE: 76 BPM | DIASTOLIC BLOOD PRESSURE: 42 MMHG | WEIGHT: 121 LBS | BODY MASS INDEX: 22.26 KG/M2

## 2023-10-17 VITALS
OXYGEN SATURATION: 100 % | SYSTOLIC BLOOD PRESSURE: 93 MMHG | BODY MASS INDEX: 22.49 KG/M2 | WEIGHT: 121 LBS | DIASTOLIC BLOOD PRESSURE: 55 MMHG | HEART RATE: 67 BPM

## 2023-10-17 DIAGNOSIS — G47.00 INSOMNIA, UNSPECIFIED TYPE: ICD-10-CM

## 2023-10-17 DIAGNOSIS — F33.1 MAJOR DEPRESSIVE DISORDER, RECURRENT EPISODE, MODERATE WITH ANXIOUS DISTRESS (H): ICD-10-CM

## 2023-10-17 DIAGNOSIS — R06.09 DYSPNEA ON EXERTION: ICD-10-CM

## 2023-10-17 DIAGNOSIS — E11.69 TYPE 2 DIABETES MELLITUS WITH OTHER SPECIFIED COMPLICATION, WITH LONG-TERM CURRENT USE OF INSULIN (H): Primary | ICD-10-CM

## 2023-10-17 DIAGNOSIS — I10 ESSENTIAL HYPERTENSION: ICD-10-CM

## 2023-10-17 DIAGNOSIS — E78.5 HYPERLIPIDEMIA LDL GOAL <70: ICD-10-CM

## 2023-10-17 DIAGNOSIS — I25.5 ISCHEMIC CARDIOMYOPATHY: ICD-10-CM

## 2023-10-17 DIAGNOSIS — I25.119 CORONARY ARTERY DISEASE INVOLVING NATIVE CORONARY ARTERY OF NATIVE HEART WITH ANGINA PECTORIS (H): ICD-10-CM

## 2023-10-17 DIAGNOSIS — F50.810 MILD BINGE-EATING DISORDER: ICD-10-CM

## 2023-10-17 DIAGNOSIS — E78.00 HYPERCHOLESTEROLEMIA: ICD-10-CM

## 2023-10-17 DIAGNOSIS — Z79.4 TYPE 2 DIABETES MELLITUS WITH OTHER SPECIFIED COMPLICATION, WITH LONG-TERM CURRENT USE OF INSULIN (H): Primary | ICD-10-CM

## 2023-10-17 DIAGNOSIS — I50.9 CONGESTIVE HEART FAILURE, UNSPECIFIED HF CHRONICITY, UNSPECIFIED HEART FAILURE TYPE (H): ICD-10-CM

## 2023-10-17 PROCEDURE — 99213 OFFICE O/P EST LOW 20 MIN: CPT | Performed by: INTERNAL MEDICINE

## 2023-10-17 PROCEDURE — 99207 PR NO CHARGE LOS: CPT | Performed by: PHARMACIST

## 2023-10-17 RX ORDER — CARVEDILOL 25 MG/1
12.5 TABLET ORAL 2 TIMES DAILY WITH MEALS
COMMUNITY

## 2023-10-17 ASSESSMENT — ANXIETY QUESTIONNAIRES
3. WORRYING TOO MUCH ABOUT DIFFERENT THINGS: SEVERAL DAYS
6. BECOMING EASILY ANNOYED OR IRRITABLE: SEVERAL DAYS
7. FEELING AFRAID AS IF SOMETHING AWFUL MIGHT HAPPEN: NOT AT ALL
1. FEELING NERVOUS, ANXIOUS, OR ON EDGE: SEVERAL DAYS
4. TROUBLE RELAXING: MORE THAN HALF THE DAYS
5. BEING SO RESTLESS THAT IT IS HARD TO SIT STILL: SEVERAL DAYS
GAD7 TOTAL SCORE: 7
GAD7 TOTAL SCORE: 7
IF YOU CHECKED OFF ANY PROBLEMS ON THIS QUESTIONNAIRE, HOW DIFFICULT HAVE THESE PROBLEMS MADE IT FOR YOU TO DO YOUR WORK, TAKE CARE OF THINGS AT HOME, OR GET ALONG WITH OTHER PEOPLE: NOT DIFFICULT AT ALL
2. NOT BEING ABLE TO STOP OR CONTROL WORRYING: SEVERAL DAYS

## 2023-10-17 ASSESSMENT — PATIENT HEALTH QUESTIONNAIRE - PHQ9
10. IF YOU CHECKED OFF ANY PROBLEMS, HOW DIFFICULT HAVE THESE PROBLEMS MADE IT FOR YOU TO DO YOUR WORK, TAKE CARE OF THINGS AT HOME, OR GET ALONG WITH OTHER PEOPLE: NOT DIFFICULT AT ALL
SUM OF ALL RESPONSES TO PHQ QUESTIONS 1-9: 6
SUM OF ALL RESPONSES TO PHQ QUESTIONS 1-9: 6

## 2023-10-17 NOTE — PROGRESS NOTES
Medication Therapy Management (MTM) Encounter    ASSESSMENT:                            Medication Adherence/Access: See below for considerations    Type 2 Diabetes:  Patient's A1c of 7.9% is not at ADA goal of <7%.   Self-monitoring blood glucose are largely unknown-I encourage patient to bring her continuous glucose monitor to clinic this afternoon when she sees the cardiologist and I can upload this data into her chart.  Patient would benefit from:  -long acting insulin: Continue current dose at this time since we do not have blood sugars available to make any dose adjustments.  -short acting insulin: Recommend that patient notate when she is using the short acting insulin on her freestyle roula reader to allow for better future med adjustments  -Metformin: continue current 2000 mg/day (at max 2000 mg/day)  -SGLT2 (Jardiance): Continue current dose (at max 25 mg/day)   -GLP-1 (victoza/liraglutide): Continue current dose (max 1.8 mg/day); discussed option to change GLP-1's, but opted to continue with the current Victoza  HM: due for DM foot exam - to complete with PCP at next visit; recommend patient share her diabetes eye exam records with our clinic.    Evening grazing/mild binge eating disorder: Potential that patient does not need the topiramate synergistic therapy given that Victoza is already causing significant appetite suppression. Patient has lost another 4lbs in the past 3 weeks. Concern that Patient may not get adequate nutritional and caloric intake with too much appetite suppression and goal to minimize pill burden. Patient's BMI is within the normal range and she has successfully lost weight to reach this lucy.  I will review with our dietitian and patient's primary care provider.    Hypertension /CAD/ischemic cardiomyopathy/CHF: Blood pressure stable in clinic today, although on the low end of normal.  Patient is not having symptoms of bradycardia or hypotension.  Plan in place to see cardiology as  well today.    Depression/insomnia: Continue current meds; recommend establishing with therapist as previously discussed.    PLAN:                            1. Contact Dr. Harris's clinic and request that they send your office note to Samanta Maza MD. Our fax number is [phone].    2. You are due for your yearly diabetes foot exam. Your primary provider can complete this at your next clinic visit.    3. Bring your freestyle roula reader when you come back to clinic this afternoon.    4. Call your insurance to inquire about whether a therapy appointment would be covered with Tom Garza or Lien Rangel    5. Start logging your insulin injection on your freestyle roula reader (as we demonstrated in clinic at your last visit).    MTM to: follow-up with [facility] and PCP about topiramate.    Follow-up: Tuesday November 7th at 10:30 in clinic.    Addended on October 17, 2023  Patient did bring her continuous glucose monitor to cards appointment and I am adding her results to the chart here.  Assessment/Plan:  Patient has marked improvement in her blood sugars since changing the short acting insulin regimen at last appointment. Will continue with this regimen -- and specifically the plan to have her log her short acting insulin injections on her freestyle roula reader. Then we will re-assess at her follow-up in 2-3 weeks.  Patient had 1 low on 10/8/2023 of 64 that she corrected.      Addended on October 24, 2023  Response from PCP to discontinue topiramate. Called to Patient to inform. and she states that she had decided to stop the topiramate already anyway. Reports that her weight is right about ~120 lbs.  KB    SUBJECTIVE/OBJECTIVE:                          Chanelle Billy is a 68 year old female coming in for a follow-up visit from 9/19/2023.  Accompanied by daughter.     Reason for visit: diabetes, CV and mood follow-up.    Allergies/ADRs: Reviewed in chart  Past Medical History: Reviewed in chart  Tobacco: She  reports that she has quit smoking. She has never used smokeless tobacco.  Alcohol: none  Social: living alone, with cat    Medication Adherence/Access: Setting up meds in a pillbox herself    Diabetes /Type 2 Diabetes:  Testing supplies: freestyle roula 2 (getting this through [employer]) and glucometer  Following with certified diabetes care and  ([facility])? Yes  - Avril Martinez, RD, CD, [facility]  Pt currently taking:  Metformin  m tabs twice daily   Tresiba (degludec) long acting insulin: 9 units daily at bedtime.  Humalog (lispro) rapid acting insulin: prescribed as  take 3 units with each meal if blood sugar is 150 or lower. Increase to 6 units if blood sugar is above 150 at mealtime and no insulin with snacks.  Patient notes that she has not been recording this on her freestyle roula reader (but she also forgot to bring in her meter today)  Jardiance (empagliflozin) 25 m tab once daily  Liraglutide (Victoza) subcut injection: 1.8 mg injected under the skin once daily in the morning did discuss the option of changing to once weekly GLP-1 with Dr. Samanta Maza MD and Shell Martinez previously- we reviewed today that she can make this change when she finishes the Victoza, but she should be aware that changing to a once weekly GLP-1 may increase her GI upset.  Patient preference to continue what she is currently taking because she is tolerating this okay at this time.  + calcium carb  (TUMS) and pepto bismol as needed  Side effect? notes GI upset attributed to the victoza, specifically worse after certain foods. This comes and goes in waves.  Record of home blood sugars: forgot to bring her freestyle roula reader today. States that she's been in the goal range on her CGM for a lot more of the time recently. Denies any low blood sugars at this time, but is aware to use her glucose tablets if she does have a low blood sugar.  --Is planning to come back to clinic this afternoon to see the  "cardiologist.  Symptoms of low blood sugar?  She wakes up feeling unwell or her cat awakens her -has not occurred recently.  Symptoms of high blood sugar? none  Diet: usually hungry about 10:30/11am, and eating 2 meals per day + sometimes snacking in between but not always. Eating sandwiches, bought a steak and cooked on her isak galeana, potato salad, veggies; carrots with peanut butter.  Exercise: no routine exercise, but she recently was helping her daughter with outdoor decorations. Notes that she tries to use the stairs at times.  ASCVD Prevention:  Aspirin: Taking 81mg daily and denies side effects   ACEi/ARB: Yes: lisinopril     Eye exam in the last 12 months? Patient states that she thinks she had this in April but she's uncertain of the date. Saw Dr. Harris.  Foot exam: due  Urine Albumin:   Lab Results   Component Value Date    UMALCR 108.08 (H) 2023      Lab Results   Component Value Date    A1C 7.9 (H) 2023     Wt Readings from Last 10 Encounters:   10/17/23 121 lb (54.9 kg)   23 125 lb 12.8 oz (57.1 kg)   23 125 lb 1.6 oz (56.7 kg)   23 124 lb 11.2 oz (56.6 kg)   23 123 lb 11.2 oz (56.1 kg)   23 126 lb 11.2 oz (57.5 kg)   23 129 lb 6.4 oz (58.7 kg)   23 131 lb 8 oz (59.6 kg)   23 138 lb 1.6 oz (62.6 kg)   23 138 lb (62.6 kg)     Estimated body mass index is 22.49 kg/m  as calculated from the following:    Height as of 23: 5' 1.5\" (1.562 m).    Weight as of this encounter: 121 lb (54.9 kg).    Evening grazing/mild binge eating disorder:  Patient reports that she has been eating too much late at night and then her belly will hurt so Dr. Samanta Maza MD started her on the topiramate.  Topiramate 25 m tab daily with the evening meal -started on  after PCP appointment. Feels that this medication prevents her from feeling like eating at 8pm at nighttime. Does not want to drink soda anymore.    Hypertension /CAD/ischemic " cardiomyopathy/CHF:  Patient following with cardiology, LOV 2/2023 has a follow-up visit today with Dr. Long.  Per chart notes, history of bypass surgery in 2022; history of CAD and MI, previous stenting.  LVEF: 30% before bypass; 45-50% after surgery.  10/17: no chest pain or need to use her nitroglycerin recently. No lightheadedness / dizziness at this time.  Current Meds:  Lisinopril 10 mg: once daily AM  Carvedilol 25 mg tab: 1/2 tab (12.5 mg)  twice daily - Patient confirms that she's been splitting these and taking 1/2 tabs. has a new order for 12.5 mg tabs available at the pharmacy, has not picked this up yet because she has sufficient supply of 25 mg tabs at home.  Aspirin 81 mg: once daily - no concern for bruising/bleeding.  Furosemide 20 mg: once daily in the morning  Potassium chloride 10 MEQ: 1 tab once daily  +Jardiance, as above.  Nitroglycerin 0.4 mg sublingual tab: Place 1 tablet under the tongue at onset of chest pain.  May repeat x 3 doses q 5 min.  Call 911 after first dose if pain persists. Pt use: none since last MTM Visit. current supply expires 8/2024.  Medication History: amlodipine (stopped due to BP control from other meds), metoprolol (changed to carvedilol), clopidogrel (stopped by cards 3/2022 - told to continue aspirin 81 mg daily).  Patient reports no current medication side effects.  Patient does not have home blood pressure readings available.  Does have an arm and a wrist blood pressure monitor, but not using either one at this time.  Home weight: 120 lbs today (10/17/2023)     BP Readings from Last 3 Encounters:   10/17/23 98/60   09/26/23 100/60   09/19/23 94/53     Pulse Readings from Last 3 Encounters:   10/17/23 79   09/26/23 61   09/19/23 86     Depression/insomnia:  Therapy: Was planning to establish with Tom Garza, but they are unsure if this provider is covered through her insurance.   10/17: Patient aware she needs to connect with her insurance to explore coverage to  see the therapist. When asked about her mood, she states that she's not crying. Feels things are okay, but still interested in establishing with a therapist.  [place] that she was having more trouble falling asleep but this has resolved since she self started melatonin-see below.  History: activities to help with her depression: book club at [employer], praying, going to [employer], playing with the cat, pushing the cat in the stroller.  Current medications include:  Fluoxetine 20 mg: once daily in the morning.  Melatonin 5 m tab once daily before bedtime - Patient self-started this and states that she had funny dreams, but they are not bothersome, they make her laugh. She is sleeping better with this.  Medication History: escitalopram (stopped 2020 when started fluoxetine), citalopram 40 mg (unknown start - 1/3/2020) - stopped due to limited efficacy and CV/QT prolongation risk with dose >20 mg, mirtazapine (1/3/2020 - 1/10/2020) - stopped due to strange dreams and 1 day of suicidal ideation (per Dr. Maza's notes).      10/17/2023     1:04 PM   ALEJANDRA-7 SCORE   Total Score 7 (mild anxiety)   Total Score 7           2023     8:56 AM 2023     1:04 PM 10/17/2023     1:03 PM   PHQ   PHQ-9 Total Score 7 10 6   Q9: Thoughts of better off dead/self-harm past 2 weeks Not at all Not at all Not at all     Today's Vitals: BP 93/55   Pulse 67   Wt 121 lb (54.9 kg)   LMP  (LMP Unknown)   SpO2 100%   BMI 22.49 kg/m    ----------------    I spent 45 minutes with this patient today. All changes were made via collaborative practice agreement with Samanta Maza MD. A copy of the visit note was provided to the patient's provider(s).    A summary of these recommendations was given to the patient.    Carly Pisano, KushalD  Medication Therapy Management (MTM) Pharmacist  [place] [place] [facility]  Clinic Phone: [phone]     Medication Therapy Recommendations  Mild binge-eating disorder    Current  Medication: topiramate (TOPAMAX) 25 MG tablet   Rationale: Duplicate Therapy - Unnecessary medication therapy - Indication   Recommendation: Discontinue Medication   Status: Contact Provider - Awaiting Response   Note: oscar review with [facility] and PCP

## 2023-10-17 NOTE — PROGRESS NOTES
"[facility]  [phone]          Assessment/Recommendations   Patient with known coronary artery disease, reduced left ventricular systolic function but has had improvement in her left ventricular ejection fraction after bypass surgery and medical therapy.  Ejection fraction is in the 45 to 50% range now.  She is on excellent medical regimen and her beta-blocker was decreased because of some hypotension and lightheadedness which the symptoms have now resolved.  Blood pressures well controlled and have not made any changes in her medications today.  LDL is 38 on recent measurement and she takes an antiplatelet agent.  Blood pressure is at goal.    Have not made any changes in her medical regimen, I have encouraged her to maintain an active lifestyle and she walks on a regular basis.    We will see her back in 6 months, but course to be happy to see her sooner if questions or problems arise.       History of Present Illness/Subjective    Ms. Chanelle Billy is a 68 year old female with known coronary artery disease, status post bypass surgery.  Improved but not normalized left ventricular ejection fraction.    She currently denies shortness of breath, orthopnea, paroxysmal nocturnal dyspnea, peripheral edema, and she did have some lightheadedness.  Light headedness is gone away with reduction in her carvedilol.  She has not had any chest pain.  She is lost 30 pounds since bypass surgery.           Physical Examination Review of Systems   BP 92/42 (BP Location: Right arm, Patient Position: Sitting, Cuff Size: Adult Regular)   Pulse 76   Resp 12   Ht 1.562 m (5' 1.5\")   Wt 54.9 kg (121 lb)   LMP  (LMP Unknown)   BMI 22.49 kg/m    Body mass index is 22.49 kg/m .  Wt Readings from Last 3 Encounters:   10/17/23 54.9 kg (121 lb)   10/17/23 54.9 kg (121 lb)   09/27/23 57.1 kg (125 lb 12.8 oz)     General Appearance:   Alert, cooperative and in no acute distress.   ENT/Mouth: Pink/moist oral " "mucosa   EYES:  no scleral icterus, normal conjunctivae   Neck: JVP normal. No Hepatojugular reflux. Thyroid not visualized.   Chest/Lungs:   Lungs are clear to auscultation, equal chest wall expansion.   Cardiovascular:   S1, S2 without murmur ,clicks or rubs. Brachial, radial  pulses are intact and symetric. No carotid bruits noted   Abdomen:  Nontender. BS+.    Extremities: No cyanosis, clubbing or edema   Skin: no xanthelasma, warm.    Neurologic: normal arm movement bilateral, no tremors     Psychiatric: Appropriate affect.      Enc Vitals  BP: 92/42  Pulse: 76  Resp: 12  Weight: 54.9 kg (121 lb)  Height: 156.2 cm (5' 1.5\")                                           Medical History  Surgical History Family History Social History   No past medical history on file. Past Surgical History:   Procedure Laterality Date     2011     CATARACT EXTRACTION W/ INTRAOCULAR LENS IMPLANT  2019     CATARACT EXTRACTION W/ INTRAOCULAR LENS IMPLANT  2019     ESOPHAGOGASTRODUODENOSCOPY W/ BANDING  2019     STENT  2011     TUBAL LIGATION  1979    Family History   Problem Relation Age of Onset     Cerebrovascular Disease Mother      Diabetes Mother      Cerebrovascular Disease Father      Diabetes Father      Glaucoma Sister      Arthritis Maternal Grandmother      No Known Problems Daughter         Age: 46 years    Social History     Socioeconomic History     Marital status:      Spouse name: Not on file     Number of children: Not on file     Years of education: Not on file     Highest education level: Not on file   Occupational History     Not on file   Tobacco Use     Smoking status: Former     Smokeless tobacco: Never     Tobacco comments:     smoked in high school and a little after her  .   Vaping Use     Vaping Use: Never used   Substance and Sexual Activity     Alcohol use: Yes     Comment: occasionally     Drug use: Not on file     Sexual activity: Not on file   Other " Topics Concern     Parent/sibling w/ CABG, MI or angioplasty before 65F 55M? Not Asked   Social History Narrative     Not on file     Social Determinants of Health     Financial Resource Strain: Low Risk  (9/26/2023)    Financial Resource Strain      Within the past 12 months, have you or your family members you live with been unable to get utilities (heat, electricity) when it was really needed?: No   Food Insecurity: Low Risk  (9/26/2023)    Food Insecurity      Within the past 12 months, did you worry that your food would run out before you got money to buy more?: No      Within the past 12 months, did the food you bought just not last and you didn t have money to get more?: No   Transportation Needs: Low Risk  (9/26/2023)    Transportation Needs      Within the past 12 months, has lack of transportation kept you from medical appointments, getting your medicines, non-medical meetings or appointments, work, or from getting things that you need?: No   Physical Activity: Not on file   Stress: Not on file   Social Connections: Not on file   Interpersonal Safety: Not on file   Housing Stability: High Risk (9/26/2023)    Housing Stability      Do you have housing? : Yes      Are you worried about losing your housing?: Yes          Medications  Allergies   Current Outpatient Medications   Medication Sig Dispense Refill     aspirin (ASA) 81 MG chewable tablet Take 81 mg by mouth daily       bismuth subsalicylate (PEPTO BISMOL) 262 MG/15ML suspension Take 15 mLs by mouth every 6 hours as needed for indigestion       blood glucose (NO BRAND SPECIFIED) test strip Use to test 3 to 4 times daily as directed.       calcium carbonate (TUMS) 500 MG chewable tablet Take 1 chew tab by mouth 2 times daily As needed for heartburn, acid reflux       carvedilol (COREG) 25 MG tablet Take 12.5 mg by mouth 2 times daily (with meals)       Continuous Blood Gluc  ([employer] ROSAURA 2 READER) [facility] Use as directed 2 each 1      "Continuous Blood Gluc Sensor (FREESTYLE ROSAURA 2 SENSOR) [facility] Use as directed 6 each 1     empagliflozin (JARDIANCE) 25 MG TABS tablet Take 1 tablet (25 mg) by mouth daily 90 tablet 3     FLUoxetine (PROZAC) 20 MG capsule TAKE 1 CAPSULE BY MOUTH EVERY MORNING 90 capsule 3     furosemide (LASIX) 20 MG tablet Take 1 tablet (20 mg) by mouth daily 90 tablet 3     glucose (BD GLUCOSE) 4 g chewable tablet Take 15 g by mouth As needed for low blood sugars (less than 70 mg/dL)       insulin degludec (TRESIBA FLEXTOUCH) 100 UNIT/ML pen INJECT 9-UNITS SUBCUTANEOUS AT BEDTIME 30 mL 1     insulin lispro (HUMALOG KWIKPEN) 100 UNIT/ML (1 unit dial) KWIKPEN Inject under the skin three times daily before meals. check your blood sugar. If 150 or higher, take 6 units. If lower than 150, take 3 units. 15 mL 0     insulin pen needle (32G X 4 MM) 32G X 4 MM miscellaneous Use 5 pen needles daily or as directed. 100 each 11     lisinopril (ZESTRIL) 10 MG tablet TAKE ONE TABLET (10 MG) BY MOUTH DAILY 90 tablet 0     loratadine (CLARITIN) 10 MG tablet Take 10 mg by mouth nightly as needed for allergies       melatonin 5 MG tablet Take 5 mg by mouth at bedtime       metFORMIN (GLUCOPHAGE XR) 500 MG 24 hr tablet TAKE TWO TABLETS (1,000 MG) BY MOUTH TWO TIMES DAILY (WITH MEALS) Strength: 500 mg 360 tablet 3     multivitamin w/minerals (THERA-VIT-M) tablet Take 1 tablet by mouth daily       Naphazoline-Pheniramine (EQ EYE ALLERGY RELIEF OP) Pt does not know what the active drug is. \"Eye allergy relief\" using this as needed for eye allergy symptoms.       nitroGLYcerin (NITROSTAT) 0.4 MG sublingual tablet Place 1 tablet (0.4 mg) under the tongue every 5 minutes as needed for chest pain For chest pain place 1 tablet under the tongue every 5 minutes for 3 doses. If symptoms persist 5 minutes after 1st dose call 911. 30 tablet 1     polyethylene glycol-propylene glycol (SYSTANE ULTRA) 0.4-0.3 % SOLN ophthalmic solution Place 1 drop into both eyes " every hour as needed for dry eyes       potassium chloride ER (K-TAB/KLOR-CON) 10 MEQ CR tablet Take 1 tablet (10 mEq) by mouth daily 90 tablet 3     rosuvastatin (CRESTOR) 40 MG tablet Take 1 tablet (40 mg) by mouth At Bedtime 90 tablet 3     topiramate (TOPAMAX) 25 MG tablet Take 1 tablet (25 mg) by mouth daily Take with evening meal. 30 tablet 1     VICTOZA PEN 18 MG/3ML soln INJECT 1.8 MG SUBCUTANEOUS DAILY 9 mL 3     carvedilol (COREG) 12.5 MG tablet Take 1 tablet (12.5 mg) by mouth 2 times daily (with meals) (Patient not taking: Reported on 10/17/2023) 180 tablet 1    Allergies   Allergen Reactions     Atorvastatin Nausea and Vomiting and Diarrhea     Pt reported  Other reaction(s): Diarrhea, nausea, vomiting, Diarrhea,Nausea,Vomiting     Mirtazapine      Nightmares and suicidal ideation for 1 day (per Dr. Maza's note)  Other reaction(s): Nightmare, Suicidal ideation     Sodium Hypochlorite      Bleeding from nose and eyes after using a bleach product, patient/daughter reported  Other reaction(s): Runny nose     Animal Dander Other (See Comments)     Cats      Runny nose, pt reported.  Takes allergy medication for this and still lives with a cat.  Other reaction(s): Nasal congestion, Runny nose     Trazodone      nightmares  Other reaction(s): Nightmares         Lab Results    Chemistry/lipid CBC Cardiac Enzymes/BNP/TSH/INR   Lab Results   Component Value Date    CHOL 117 06/20/2023    HDL 34 (L) 06/20/2023    TRIG 225 (H) 06/20/2023    BUN 13.4 09/19/2023     09/19/2023    CO2 24 09/19/2023    Lab Results   Component Value Date    WBC 8.8 12/20/2022    HGB 13.1 12/20/2022    HCT 41.3 12/20/2022    MCV 90 12/20/2022     12/20/2022    Lab Results   Component Value Date     (H) 12/10/2021    TSH 1.83 12/20/2022

## 2023-10-17 NOTE — PATIENT INSTRUCTIONS
"Recommendations from today's MTM visit:                                                    MTM (medication therapy management) is a service provided by a clinical pharmacist designed to help you get the most of out of your medicines.      1. Contact Dr. Harris's clinic and request that they send your office note to Samanta Maza MD. Our fax number is [phone].    2. You are due for your yearly diabetes foot exam. Your primary provider can complete this at your next clinic visit.    3. Bring your freestyle ruola reader when you come back to clinic this afternoon.    4. Call your insurance to inquire about whether a therapy appointment would be covered with Tom Garza or Lien Rangel    5. Start logging your insulin injection on your freestyle roula reader (as we demonstrated in clinic at your last visit).    Follow-up: Tuesday November 7th at 10:30 in clinic.    It was great speaking with you today.  I value your experience and would be very thankful for your time in providing feedback in our clinic survey. In the next few days, you may receive an email or text message from [employer] with a link to a survey related to your  clinical pharmacist.\"     To schedule another MTM appointment, please call the clinic directly or you may call the MTM scheduling line at [phone] or toll-free at [phone].     My Clinical Pharmacist's contact information:                                                      Please feel free to contact me with any questions or concerns you have.      Carly Pisano, PharmD  Medication Therapy Management (MTM) Pharmacist   "

## 2023-10-17 NOTE — Clinical Note
Not significant urgency, but routing high alert because requesting response from Dr. Maza. Cc'ing Shell Martinez as FYI.  I saw Chanelle for MTM follow-up visit. We discussed her new topiramate start specifically. Being that her BMI is within normal range, I'm hesitant to continue this topiramate as she already has significant appetite suppression from the victoza.  Here is my assessment: Potential that patient does not need the topiramate synergistic therapy given that Victoza is already causing significant appetite suppression. Patient has lost another 4lbs in the past 3 weeks. Concern that Patient may not get adequate nutritional and caloric intake with too much appetite suppression and goal to minimize pill burden. Patient's BMI is within the normal range and she has successfully lost weight to reach this lucy.  Dr. Maza - looking for your input as well since you recently started the topiramate.

## 2023-10-17 NOTE — LETTER
"10/17/2023    Samanta Maza MD  [address] [address]    RE: Chanelle Billy       Dear Colleague,     I had the pleasure of seeing Chanelle Billy in the [facility].      [facility]  [phone]          Assessment/Recommendations   Patient with known coronary artery disease, reduced left ventricular systolic function but has had improvement in her left ventricular ejection fraction after bypass surgery and medical therapy.  Ejection fraction is in the 45 to 50% range now.  She is on excellent medical regimen and her beta-blocker was decreased because of some hypotension and lightheadedness which the symptoms have now resolved.  Blood pressures well controlled and have not made any changes in her medications today.  LDL is 38 on recent measurement and she takes an antiplatelet agent.  Blood pressure is at goal.    Have not made any changes in her medical regimen, I have encouraged her to maintain an active lifestyle and she walks on a regular basis.    We will see her back in 6 months, but course to be happy to see her sooner if questions or problems arise.       History of Present Illness/Subjective    Ms. Chanelle Billy is a 68 year old female with known coronary artery disease, status post bypass surgery.  Improved but not normalized left ventricular ejection fraction.    She currently denies shortness of breath, orthopnea, paroxysmal nocturnal dyspnea, peripheral edema, and she did have some lightheadedness.  Light headedness is gone away with reduction in her carvedilol.  She has not had any chest pain.  She is lost 30 pounds since bypass surgery.           Physical Examination Review of Systems   BP 92/42 (BP Location: Right arm, Patient Position: Sitting, Cuff Size: Adult Regular)   Pulse 76   Resp 12   Ht 1.562 m (5' 1.5\")   Wt 54.9 kg (121 lb)   LMP  (LMP Unknown)   BMI 22.49 kg/m    Body mass index is 22.49 kg/m .  Wt Readings from " "Last 3 Encounters:   10/17/23 54.9 kg (121 lb)   10/17/23 54.9 kg (121 lb)   23 57.1 kg (125 lb 12.8 oz)     General Appearance:   Alert, cooperative and in no acute distress.   ENT/Mouth: Pink/moist oral mucosa   EYES:  no scleral icterus, normal conjunctivae   Neck: JVP normal. No Hepatojugular reflux. Thyroid not visualized.   Chest/Lungs:   Lungs are clear to auscultation, equal chest wall expansion.   Cardiovascular:   S1, S2 without murmur ,clicks or rubs. Brachial, radial  pulses are intact and symetric. No carotid bruits noted   Abdomen:  Nontender. BS+.    Extremities: No cyanosis, clubbing or edema   Skin: no xanthelasma, warm.    Neurologic: normal arm movement bilateral, no tremors     Psychiatric: Appropriate affect.      Enc Vitals  BP: 92/42  Pulse: 76  Resp: 12  Weight: 54.9 kg (121 lb)  Height: 156.2 cm (5' 1.5\")                                           Medical History  Surgical History Family History Social History   No past medical history on file. Past Surgical History:   Procedure Laterality Date    ANGIOPLASTY      CATARACT EXTRACTION W/ INTRAOCULAR LENS IMPLANT  2019    CATARACT EXTRACTION W/ INTRAOCULAR LENS IMPLANT  2019    ESOPHAGOGASTRODUODENOSCOPY W/ BANDING  2019    STENT  2011    TUBAL LIGATION  1979    Family History   Problem Relation Age of Onset    Cerebrovascular Disease Mother     Diabetes Mother     Cerebrovascular Disease Father     Diabetes Father     Glaucoma Sister     Arthritis Maternal Grandmother     No Known Problems Daughter         Age: 46 years    Social History     Socioeconomic History    Marital status:      Spouse name: Not on file    Number of children: Not on file    Years of education: Not on file    Highest education level: Not on file   Occupational History    Not on file   Tobacco Use    Smoking status: Former    Smokeless tobacco: Never    Tobacco comments:     smoked in high school and a little after her  . "   Vaping Use    Vaping Use: Never used   Substance and Sexual Activity    Alcohol use: Yes     Comment: occasionally    Drug use: Not on file    Sexual activity: Not on file   Other Topics Concern    Parent/sibling w/ CABG, MI or angioplasty before 65F 55M? Not Asked   Social History Narrative    Not on file     Social Determinants of Health     Financial Resource Strain: Low Risk  (9/26/2023)    Financial Resource Strain     Within the past 12 months, have you or your family members you live with been unable to get utilities (heat, electricity) when it was really needed?: No   Food Insecurity: Low Risk  (9/26/2023)    Food Insecurity     Within the past 12 months, did you worry that your food would run out before you got money to buy more?: No     Within the past 12 months, did the food you bought just not last and you didn t have money to get more?: No   Transportation Needs: Low Risk  (9/26/2023)    Transportation Needs     Within the past 12 months, has lack of transportation kept you from medical appointments, getting your medicines, non-medical meetings or appointments, work, or from getting things that you need?: No   Physical Activity: Not on file   Stress: Not on file   Social Connections: Not on file   Interpersonal Safety: Not on file   Housing Stability: High Risk (9/26/2023)    Housing Stability     Do you have housing? : Yes     Are you worried about losing your housing?: Yes          Medications  Allergies   Current Outpatient Medications   Medication Sig Dispense Refill    aspirin (ASA) 81 MG chewable tablet Take 81 mg by mouth daily      bismuth subsalicylate (PEPTO BISMOL) 262 MG/15ML suspension Take 15 mLs by mouth every 6 hours as needed for indigestion      blood glucose (NO BRAND SPECIFIED) test strip Use to test 3 to 4 times daily as directed.      calcium carbonate (TUMS) 500 MG chewable tablet Take 1 chew tab by mouth 2 times daily As needed for heartburn, acid reflux      carvedilol (COREG)  "25 MG tablet Take 12.5 mg by mouth 2 times daily (with meals)      Continuous Blood Gluc  (FREESTYLE ROSAURA 2 READER) [facility] Use as directed 2 each 1    Continuous Blood Gluc Sensor (FREESTYLE ROSAURA 2 SENSOR) [facility] Use as directed 6 each 1    empagliflozin (JARDIANCE) 25 MG TABS tablet Take 1 tablet (25 mg) by mouth daily 90 tablet 3    FLUoxetine (PROZAC) 20 MG capsule TAKE 1 CAPSULE BY MOUTH EVERY MORNING 90 capsule 3    furosemide (LASIX) 20 MG tablet Take 1 tablet (20 mg) by mouth daily 90 tablet 3    glucose (BD GLUCOSE) 4 g chewable tablet Take 15 g by mouth As needed for low blood sugars (less than 70 mg/dL)      insulin degludec (TRESIBA FLEXTOUCH) 100 UNIT/ML pen INJECT 9-UNITS SUBCUTANEOUS AT BEDTIME 30 mL 1    insulin lispro (HUMALOG KWIKPEN) 100 UNIT/ML (1 unit dial) KWIKPEN Inject under the skin three times daily before meals. check your blood sugar. If 150 or higher, take 6 units. If lower than 150, take 3 units. 15 mL 0    insulin pen needle (32G X 4 MM) 32G X 4 MM miscellaneous Use 5 pen needles daily or as directed. 100 each 11    lisinopril (ZESTRIL) 10 MG tablet TAKE ONE TABLET (10 MG) BY MOUTH DAILY 90 tablet 0    loratadine (CLARITIN) 10 MG tablet Take 10 mg by mouth nightly as needed for allergies      melatonin 5 MG tablet Take 5 mg by mouth at bedtime      metFORMIN (GLUCOPHAGE XR) 500 MG 24 hr tablet TAKE TWO TABLETS (1,000 MG) BY MOUTH TWO TIMES DAILY (WITH MEALS) Strength: 500 mg 360 tablet 3    multivitamin w/minerals (THERA-VIT-M) tablet Take 1 tablet by mouth daily      Naphazoline-Pheniramine (EQ EYE ALLERGY RELIEF OP) Pt does not know what the active drug is. \"Eye allergy relief\" using this as needed for eye allergy symptoms.      nitroGLYcerin (NITROSTAT) 0.4 MG sublingual tablet Place 1 tablet (0.4 mg) under the tongue every 5 minutes as needed for chest pain For chest pain place 1 tablet under the tongue every 5 minutes for 3 doses. If symptoms persist 5 minutes after 1st dose " call 911. 30 tablet 1    polyethylene glycol-propylene glycol (SYSTANE ULTRA) 0.4-0.3 % SOLN ophthalmic solution Place 1 drop into both eyes every hour as needed for dry eyes      potassium chloride ER (K-TAB/KLOR-CON) 10 MEQ CR tablet Take 1 tablet (10 mEq) by mouth daily 90 tablet 3    rosuvastatin (CRESTOR) 40 MG tablet Take 1 tablet (40 mg) by mouth At Bedtime 90 tablet 3    topiramate (TOPAMAX) 25 MG tablet Take 1 tablet (25 mg) by mouth daily Take with evening meal. 30 tablet 1    VICTOZA PEN 18 MG/3ML soln INJECT 1.8 MG SUBCUTANEOUS DAILY 9 mL 3    carvedilol (COREG) 12.5 MG tablet Take 1 tablet (12.5 mg) by mouth 2 times daily (with meals) (Patient not taking: Reported on 10/17/2023) 180 tablet 1    Allergies   Allergen Reactions    Atorvastatin Nausea and Vomiting and Diarrhea     Pt reported  Other reaction(s): Diarrhea, nausea, vomiting, Diarrhea,Nausea,Vomiting    Mirtazapine      Nightmares and suicidal ideation for 1 day (per Dr. Maza's note)  Other reaction(s): Nightmare, Suicidal ideation    Sodium Hypochlorite      Bleeding from nose and eyes after using a bleach product, patient/daughter reported  Other reaction(s): Runny nose    Animal Dander Other (See Comments)    Cats      Runny nose, pt reported.  Takes allergy medication for this and still lives with a cat.  Other reaction(s): Nasal congestion, Runny nose    Trazodone      nightmares  Other reaction(s): Nightmares         Lab Results    Chemistry/lipid CBC Cardiac Enzymes/BNP/TSH/INR   Lab Results   Component Value Date    CHOL 117 06/20/2023    HDL 34 (L) 06/20/2023    TRIG 225 (H) 06/20/2023    BUN 13.4 09/19/2023     09/19/2023    CO2 24 09/19/2023    Lab Results   Component Value Date    WBC 8.8 12/20/2022    HGB 13.1 12/20/2022    HCT 41.3 12/20/2022    MCV 90 12/20/2022     12/20/2022    Lab Results   Component Value Date     (H) 12/10/2021    TSH 1.83 12/20/2022                  Thank you for allowing me to  participate in the care of your patient.      Sincerely,     Lan Long MD     [facility]  cc:   Lan Long MD  [address] [address]

## 2023-10-20 ENCOUNTER — PATIENT OUTREACH (OUTPATIENT)
Dept: CARE COORDINATION | Facility: CLINIC | Age: 68
End: 2023-10-20

## 2023-10-20 NOTE — PROGRESS NOTES
Clinic Care Coordination Contact  [facility]/Voicemail       Clinical Data: Care Coordinator Outreach  Outreach attempted x 2.  Left message on patient's voicemail with call back information and requested return call.    Pt requested this SWCC reach out to pt 10/20/23 at 1:00pm so Pt and SWCC could speak again about resources CC previously provided. Pt did not answer when SWCC called.   Plan: Care Coordinator will do no further outreaches at this time. Pt can call back to this CC as PT has phone number via phone and mail. CHWCC will complete outreach in about two weeks. SWCC will complete chart review in about 4 weeks.

## 2023-10-25 ENCOUNTER — TELEPHONE (OUTPATIENT)
Dept: BEHAVIORAL HEALTH | Facility: CLINIC | Age: 68
End: 2023-10-25
Payer: COMMERCIAL

## 2023-10-25 NOTE — TELEPHONE ENCOUNTER
C received a referral from [facility] Provider to reach out to pt for C services.  A voice message was left for pt and phone number for Behavioral Health Intake.      ZHEN Burch on 10/25/2023 at 1:37 PM

## 2023-11-06 ENCOUNTER — PATIENT OUTREACH (OUTPATIENT)
Dept: CARE COORDINATION | Facility: CLINIC | Age: 68
End: 2023-11-06
Payer: COMMERCIAL

## 2023-11-06 NOTE — PROGRESS NOTES
Clinic Care Coordination Contact  Program: [facility]   County:  [facility] Case #:  [facility] Worker:   Jefry #:   Subscriber #:   Renewal:  Date Applied:     FRW Outreach:   11/6/23: FRW called pt on referral. Pt doesn't have a balance anymore. Pt stated she had her insurance call billing at [facility]. No further FRW needs.   Catrina Broussard   Financial Resource Worker  [facility]  Clinic Care Coordination  [phone]   10/9/23: FRW called pt on referral. Pt doesn't want to apply for [facility] Care until after her appointment with the SW on 10/20.  Catrina Broussard   Financial Resource Worker  [facility]  Clinic Care Coordination  [phone]     Health Insurance:      Referral/Screening:

## 2023-11-07 ENCOUNTER — OFFICE VISIT (OUTPATIENT)
Dept: PHARMACY | Facility: CLINIC | Age: 68
End: 2023-11-07
Payer: COMMERCIAL

## 2023-11-07 VITALS
SYSTOLIC BLOOD PRESSURE: 104 MMHG | WEIGHT: 120.9 LBS | DIASTOLIC BLOOD PRESSURE: 62 MMHG | OXYGEN SATURATION: 97 % | BODY MASS INDEX: 22.47 KG/M2 | HEART RATE: 65 BPM

## 2023-11-07 DIAGNOSIS — E11.69 TYPE 2 DIABETES MELLITUS WITH OTHER SPECIFIED COMPLICATION, WITH LONG-TERM CURRENT USE OF INSULIN (H): Primary | ICD-10-CM

## 2023-11-07 DIAGNOSIS — Z79.4 TYPE 2 DIABETES MELLITUS WITH OTHER SPECIFIED COMPLICATION, WITH LONG-TERM CURRENT USE OF INSULIN (H): Primary | ICD-10-CM

## 2023-11-07 PROCEDURE — 99207 PR NO CHARGE LOS: CPT | Performed by: PHARMACIST

## 2023-11-07 NOTE — Clinical Note
Saw Patient for MTM follow-up. -significantly elevated post-prandial glucose - likely 2/2 missed short acting insulin dosing. Education provided about adherence to regimen and I've also taught Patient to log her insulin dose on her continuous glucose monitor and asked her to start doing this. Seeing Shell in 3 weeks and me again in 6 weeks. See attached note for details. KB

## 2023-11-07 NOTE — PATIENT INSTRUCTIONS
"Recommendations from today's MTM visit:                                                    MTM (medication therapy management) is a service provided by a clinical pharmacist designed to help you get the most of out of your medicines.      1. Contact Dr. Harris's clinic and request that they send your office note to Samanta Maza MD. Our fax number is [phone].    2. You are due for your yearly diabetes foot exam. Your primary provider can complete this at your next clinic visit.    3. Humalog (lispro) rapid acting insulin plan: take 3 units with each meal if blood sugar is 150 or lower. Increase to 6 units if blood sugar is above 150 at mealtime    4. Start logging your insulin injection and your mealtime on your freestyle roula reader.     Follow-up:   Follow-[facility] in place with Avril [facility] RD, CD, [facility] in 3 weeks: 11/28 at 11:15am  MTM follow-up visit in 6 weeks: 12/19 at 10am in clinic.    It was great speaking with you today.  I value your experience and would be very thankful for your time in providing feedback in our clinic survey. In the next few days, you may receive an email or text message from [employer] with a link to a survey related to your  clinical pharmacist.\"     To schedule another MTM appointment, please call the clinic directly or you may call the MTM scheduling line at [phone] or toll-free at [phone].     My Clinical Pharmacist's contact information:                                                      Please feel free to contact me with any questions or concerns you have.      Carly Pisano, PharmD  Medication Therapy Management (MTM) Pharmacist   "

## 2023-11-07 NOTE — PROGRESS NOTES
Medication Therapy Management (MTM) Encounter    ASSESSMENT:                            Medication Adherence/Access: See below for considerations    Type 2 Diabetes:  Patient's A1c of 7.9% is not at ADA goal of <7%. SMBG fasting sugars are not at ADA goal of  mg/dL and post-prandial sugars are not at goal of less than 180 mg/dL.  Patient would benefit from:  -long acting insulin: Continue current dose at this time since she has had AM readings in the 70-80s/  -short acting insulin: likely missed short acting insulin doses or under-dosing is contributing to post meal HYPERglycemia. Recommend Patient adhere to dosing recommendations provided and also log her insulin dose administration in her continuous glucose monitor.  -Metformin: continue current 2000 mg/day (at max 2000 mg/day)  -SGLT2 (Jardiance): Continue current dose (at max 25 mg/day)   -GLP-1 (victoza/liraglutide): Continue current dose (max 1.8 mg/day)\  HM: due for DM foot exam - to complete with PCP at next visit; recommend patient share her diabetes eye exam records with our clinic.    PLAN:                            1. Contact Dr. Harris's clinic and request that they send your office note to Samanta Maza MD. Our fax number is [phone].    2. You are due for your yearly diabetes foot exam. Your primary provider can complete this at your next clinic visit.    3. Humalog (lispro) rapid acting insulin plan: take 3 units with each meal if blood sugar is 150 or lower. Increase to 6 units if blood sugar is above 150 at mealtime    4. Start logging your insulin injection and your mealtime on your freestyle roula reader.     Follow-up:   Follow-up in place with Avril Martinez RD, CD, [facility] in 3 weeks: 11/28 at 11:15am  MTM follow-up visit in 6 weeks: 12/19 at 10am in clinic.    SUBJECTIVE/OBJECTIVE:                          Chanelle Billy is a 68 year old female coming in for a follow-up visit from 10/17/2023. Patient was accompanied by daughter  Dionna.      Reason for visit: diabetes follow-up.  Confirmed that she did discontinue the topiramate after the last visit.  She notes that she's been keeping her apartment cleaned up so that she can pass the inspections.    Allergies/ADRs: Reviewed in chart  Past Medical History: Reviewed in chart  Tobacco: She reports that she has quit smoking. She has never used smokeless tobacco.  Alcohol: none  Social: living alone, with cat     Medication Adherence/Access: Setting up meds in a pillbox herself    Diabetes /Type 2 Diabetes:  Testing supplies: freestyle roula 2 (getting this through [employer]) and glucometer  Following with certified diabetes care and  ([facility])? Yes  - Avril Martinez, RD, CD, [facility]  Pt currently taking:  Metformin  m tabs twice daily   Tresiba (degludec) long acting insulin: 9 units daily at bedtime.  Humalog (lispro) rapid acting insulin: prescribed as  take 3 units with each meal if blood sugar is 150 or lower. Increase to 6 units if blood sugar is above 150 at mealtime and no insulin with snacks.  Patient reports that she's been following this schedule for the most part, but sometimes she is using 4 units instead of 3 or 6 units; also notes that she's often missing her short acting insulin altogether.  Jardiance (empagliflozin) 25 m tab once daily  Liraglutide (Victoza) subcut injection: 1.8 mg injected under the skin once daily in the morning   + calcium carb  (TUMS) and pepto bismol as needed occasionally, specifically when eating gravy or saucy things.  Side effect? notes GI upset attributed to the victoza, specifically worse after certain foods. This comes and goes in waves.  Record of home blood sugars:           Symptoms of low blood sugar?  She wakes up feeling unwell or her cat awakens her -has not occurred recently.  Symptoms of high blood sugar? none  Diet: usually hungry about 10:30/11am, and eating 2 meals per day + sometimes snacking in between but  "not always. Eating sandwiches, bought a steak and cooked on her isak galeana, potato salad, veggies; carrots with peanut butter.  States that she's been \"naughty\" and eating more candy and pop especially since halloween.  Exercise: no routine exercise, but she recently was helping her daughter with outdoor decorations. Notes that she tries to use the stairs at times.  ASCVD Prevention:  Aspirin: Taking 81mg daily and denies side effects   ACEi/ARB: Yes: lisinopril     Eye exam in the last 12 months? Patient reports she had one earlier this year; we do not have records.  Foot exam: due, Patient aware.  Urine Albumin:   Lab Results   Component Value Date    UMALCR 108.08 (H) 06/20/2023      Lab Results   Component Value Date    A1C 7.9 (H) 09/19/2023    A1C 7.4 (H) 06/20/2023    A1C 7.8 (H) 12/20/2022     Today's Vitals: /62   Pulse 65   Wt 120 lb 14.4 oz (54.8 kg)   LMP  (LMP Unknown)   SpO2 97%   BMI 22.47 kg/m    ----------------    I spent 30 minutes with this patient today. All changes were made via collaborative practice agreement with Samanta Maza MD. A copy of the visit note was provided to the patient's provider(s).    A summary of these recommendations was given to the patient.    Carly Pisano, Erick  Medication Therapy Management (MTM) Pharmacist  [place] [place] [facility]  Clinic Phone: [phone]     Medication Therapy Recommendations  Type 2 diabetes mellitus with other specified complication, with long-term current use of insulin (H)    Current Medication: insulin lispro (HUMALOG KWIKPEN) 100 UNIT/ML (1 unit dial) KWIKPEN   Rationale: Patient forgets to take - Adherence - Adherence   Recommendation: Provide Education   Status: Patient Agreed - Adherence/Education   Note: and having pt log administration            "

## 2023-11-15 ENCOUNTER — PATIENT OUTREACH (OUTPATIENT)
Dept: CARE COORDINATION | Facility: CLINIC | Age: 68
End: 2023-11-15
Payer: COMMERCIAL

## 2023-11-15 NOTE — PROGRESS NOTES
Clinic Care Coordination Contact  Community Health Worker Follow Up    Care Gaps:     Health Maintenance Due   Topic Date Due    HF ACTION PLAN  Never done    COLORECTAL CANCER SCREENING  Never done    RSV VACCINE (Pregnancy & 60+) (1 - 1-dose 60+ series) Never done    ZOSTER IMMUNIZATION (2 of 2) 10/20/2021    DIABETIC FOOT EXAM  06/27/2023    COVID-19 Vaccine (6 - 2023-24 season) 09/01/2023    DEPRESSION 12 MO INDEX REPEAT PHQ-9  Never done    EYE EXAM  11/14/2023       Patient accepted scheduling phone number for  [facility]  to schedule independently     Care Plan:   Care Plan: Financial Wellbeing       Problem: Dominique Care       Goal: Dominique Care and Insurance Help  Completed 11/15/2023      Start Date: 10/6/2023    This Visit's Progress: 100%    Note:     Personal Plan  If I am wanting to look into [employer] Financial Assistance to help with medical bills in the future I can call of go online at:  [email or web address]  Or you call 727-811-9575 or (toll-free) [phone], Monday through Thursday, 8 a.m. to 5:30 p.m. and Friday 9 a.m. to 4:30 p.m.                                Intervention and Education during outreach: CHW spoke with patient and patient let CHW know that he insurance ended up covering the bill she had with [employer] and is not needing to look into [employer] Financial Assistance at this time. No other needs at this time.    CHW Plan: Patient has accomplished goals and has no other goals that this patient would like to work on with Clinic Care Coordination. Community Health Worker sent request to [facility] SW pool to review for Maintenance.     Niurka Rader  Community Health Worker  [facility]  Clinic Care Coordination   [place], [facility], [place], [place], [place] and [facility]  Office: [phone]

## 2023-11-16 ENCOUNTER — PATIENT OUTREACH (OUTPATIENT)
Dept: CARE COORDINATION | Facility: CLINIC | Age: 68
End: 2023-11-16
Payer: COMMERCIAL

## 2023-11-16 NOTE — PROGRESS NOTES
Clinic Care Coordination Contact    Situation: Patient chart reviewed by Care Coordinator per CHW request to review for transition to maintenance and for 6 week chart review. Last contact with CHW 11/15/23.      Background: Care Coordination Program started: 9/26/23. Initial assessment completed and patient-centered care plan(s) were developed with participation from patient. Lead CC handed patient off to CHW for continued outreaches.       Assessment: Per chart review, patient outreach completed by CC CHW on 11/15/23.  Patient has accomplished their goal(s). Patient is not due for updated Plan of Care.  Assessments will be completed annually or as needed/with change of patient status.            Plan/Recommendations: Patient moved to Maintenance. CHW will continue outreaches at minimum every 60 days and will involve Lead CC as needed.  Lead CC will continue to monitor CHW outreaches and patient's progress to goal(s) every 6 weeks.     Plan of Care updated and sent to patient: no

## 2023-12-05 ENCOUNTER — ALLIED HEALTH/NURSE VISIT (OUTPATIENT)
Dept: EDUCATION SERVICES | Facility: CLINIC | Age: 68
End: 2023-12-05
Payer: COMMERCIAL

## 2023-12-05 VITALS — WEIGHT: 122.1 LBS | BODY MASS INDEX: 22.47 KG/M2 | HEIGHT: 62 IN

## 2023-12-05 DIAGNOSIS — E78.5 HYPERLIPIDEMIA LDL GOAL <70: ICD-10-CM

## 2023-12-05 DIAGNOSIS — Z79.4 TYPE 2 DIABETES MELLITUS WITH CIRCULATORY DISORDER, WITH LONG-TERM CURRENT USE OF INSULIN (H): Primary | ICD-10-CM

## 2023-12-05 DIAGNOSIS — E11.59 TYPE 2 DIABETES MELLITUS WITH CIRCULATORY DISORDER, WITH LONG-TERM CURRENT USE OF INSULIN (H): Primary | ICD-10-CM

## 2023-12-05 PROCEDURE — 97803 MED NUTRITION INDIV SUBSEQ: CPT | Performed by: DIETITIAN, REGISTERED

## 2023-12-05 NOTE — PROGRESS NOTES
Medical Nutrition Therapy for Diabetes  Visit Type:Reassessment and intervention  Chanelle Billy presents today for MNT and education related to type 2 diabetes.   She is accompanied by daughter.     ASSESSMENT:   Patient comments/concerns relating to nutrition:   Patient is working not snacking late at night, trying to be mindful when eating.  Insulin   Tresiba 9u and doing very well taking this daily   Humalog infrequent use  Victoza 1.8mg daily taking as directed  Jardiance 25 mg daily taking as directed  NUTRITION HISTORY:  A.m. likes to make an egg let  Will have canned fruit and rinses it off  And vegetables also will rinse off  Enjoys salads with vinegar dressing  Evening meals protein, starch and vegetables most of the time    Previous diet education:  Yes     Food allergies/intolerances: no food allergies     EXERCISE: Decreased activity throughout the colder months is not taking walks as directed encouraged walking in apartment for 15 minute intervals twice daily  SOCIO/ECONOMIC:   Lives with: self    GLUCOSE MONITORING:  Patient glucose self monitoring as follows:           Glucose today during office visit approximately 300 related to high sugar hot chocolate coffee drink without Humalog injection.      BG values are: Not in goal current 28 day sensor glucose avg is 195 mg/dL.      Patient continues to meet CGM criteria  1. The beneficiary has diabetes mellitus; and,  2. The beneficiary s treating practitioner has concluded that the beneficiary (or beneficiary s caregiver) has sufficient training using the CGM prescribed as evidenced by providing a prescription; and,  3. The CGM is prescribed in accordance with its FDA indications for use; and,  4. The beneficiary for whom a CGM is being prescribed, to improve glycemic control, meets at least one of the  criteria below:  A. The beneficiary is insulin-treated; or,  B. The beneficiary has a history of problematic hypoglycemia with documentation of at  least one of the  following:   Recurrent (more than one) level 2 hypoglycemic events (glucose <54mg/dL (3.0mmol/L)) that  persist despite multiple (more than one) attempts to adjust medication(s) and/or modify the  diabetes treatment plan; or,   A history of one level 3 hypoglycemic event (glucose <54mg/dL (3.0mmol/L)) characterized by  altered mental and/or physical state requiring third-party assistance for treatment of  hypoglycemia.  5. Within six (6) months prior to ordering the CGM, the treating practitioner has an in-person or Medicareapproved telehealth visit with the beneficiary to evaluate their diabetes control and determine that criteria  (1-4) above are met.      Patient's most recent   Lab Results   Component Value Date    A1C 7.9 09/19/2023    A1C 8.6 02/11/2021    is meeting goal of <8.0    MEDICATIONS:  Current Outpatient Medications   Medication    Continuous Blood Gluc  (FREESTYLE ROSAURA 2 READER) LOUIS    Continuous Blood Gluc Sensor (FREESTYLE ROSAURA 2 SENSOR) MISC    empagliflozin (JARDIANCE) 25 MG TABS tablet    glucose (BD GLUCOSE) 4 g chewable tablet    insulin degludec (TRESIBA FLEXTOUCH) 100 UNIT/ML pen    insulin lispro (HUMALOG KWIKPEN) 100 UNIT/ML (1 unit dial) KWIKPEN    insulin pen needle (32G X 4 MM) 32G X 4 MM miscellaneous    metFORMIN (GLUCOPHAGE XR) 500 MG 24 hr tablet    VICTOZA PEN 18 MG/3ML soln    aspirin (ASA) 81 MG chewable tablet    bismuth subsalicylate (PEPTO BISMOL) 262 MG/15ML suspension    blood glucose (NO BRAND SPECIFIED) test strip    calcium carbonate (TUMS) 500 MG chewable tablet    carvedilol (COREG) 12.5 MG tablet    carvedilol (COREG) 25 MG tablet    FLUoxetine (PROZAC) 20 MG capsule    furosemide (LASIX) 20 MG tablet    lisinopril (ZESTRIL) 10 MG tablet    loratadine (CLARITIN) 10 MG tablet    melatonin 5 MG tablet    multivitamin w/minerals (THERA-VIT-M) tablet    Naphazoline-Pheniramine (EQ EYE ALLERGY RELIEF OP)    nitroGLYcerin (NITROSTAT) 0.4 MG  "sublingual tablet    polyethylene glycol-propylene glycol (SYSTANE ULTRA) 0.4-0.3 % SOLN ophthalmic solution    potassium chloride ER (K-TAB/KLOR-CON) 10 MEQ CR tablet    rosuvastatin (CRESTOR) 40 MG tablet     No current facility-administered medications for this visit.     Provided patient with new spreadsheets as she stopped tracking insulin dosing    LABS:  Lab Results   Component Value Date    A1C 7.9 09/19/2023    A1C 8.6 02/11/2021     Lab Results   Component Value Date     09/19/2023    GLC 64 03/16/2022     02/11/2021     Lab Results   Component Value Date    LDL 38 06/20/2023    LDL 71 03/13/2020     HDL Cholesterol   Date Value Ref Range Status   03/13/2020 60 >or=50 mg/dL Final     Direct Measure HDL   Date Value Ref Range Status   06/20/2023 34 (L) >=50 mg/dL Final   ]  GFR Estimate   Date Value Ref Range Status   09/19/2023 >90 >60 mL/min/1.73m2 Final   02/11/2021 92 >or=60 ml/min/1.73m2 Final     Lab Results   Component Value Date    CR 0.64 09/19/2023    CR 0.68 02/11/2021     No results found for: MICROALBUMIN    ANTHROPOMETRICS:  Vitals: Ht 1.562 m (5' 1.5\")   Wt 55.4 kg (122 lb 1.6 oz)   LMP  (LMP Unknown)   BMI 22.70 kg/m    Body mass index is 22.7 kg/m .    Weight has stabilized since stopping the Topamax  Wt Readings from Last 5 Encounters:   12/05/23 55.4 kg (122 lb 1.6 oz)   11/07/23 54.8 kg (120 lb 14.4 oz)   10/17/23 54.9 kg (121 lb)   10/17/23 54.9 kg (121 lb)   09/27/23 57.1 kg (125 lb 12.8 oz)         ESTIMATED KCAL REQUIREMENTS:  ~1400 kcal per day    NUTRITION DIAGNOSIS: Altered nutrition-related laboratory values related to Type II diabetes as evidenced by   Lab Results   Component Value Date    A1C 7.9 09/19/2023    A1C 7.4 06/20/2023    A1C 7.8 12/20/2022    A1C 7.2 06/27/2022    A1C 8.1 11/30/2021    A1C 8.6 02/11/2021    A1C 11.4 10/22/2020    A1C 6.8 03/13/2020    A1C 11.7 12/06/2019       NUTRITION INTERVENTION:  Nutrition Prescription: Energy Intake: 1400 " kcal/day  Carbohydrate Intake: 30 grams/meal and 15 grams/snacks  Education given to support: consistent meals, carb counting, exercise, behavior modification, and heart healthy diet  Education Materials Provided: My Plate Planner/Choose My Plate, Portion Size Guide, and Heart Healthy Food Choices  Motivational Interviewing    PATIENT'S BEHAVIOR CHANGE GOALS:   See Patient Instructions for patient stated behavior change goals. AVS was printed and given to patient at today's appointment.    MONITOR / EVALUATE:  RD will monitor/evaluate:  Pertinent Labs  Readiness to change nutrition-related behaviors  Weight change    FOLLOW-UP:  2 mo   Time spent in minutes: 60  Encounter: Individual

## 2023-12-05 NOTE — LETTER
12/5/2023         RE: Chanelle Billy  [address]  [place] [address]        Dear Colleague,    Thank you for referring your patient, Chanelle Billy, to the [facility]. Please see a copy of my visit note below.    Medical Nutrition Therapy for Diabetes  Visit Type:Reassessment and intervention  Chanelle Billy presents today for MNT and education related to type 2 diabetes.   She is accompanied by daughter.     ASSESSMENT:   Patient comments/concerns relating to nutrition:   Patient is working not snacking late at night, trying to be mindful when eating.  Insulin   Tresiba 9u and doing very well taking this daily   Humalog infrequent use  Victoza 1.8mg daily taking as directed  Jardiance 25 mg daily taking as directed  NUTRITION HISTORY:  A.m. likes to make an egg let  Will have canned fruit and rinses it off  And vegetables also will rinse off  Enjoys salads with vinegar dressing  Evening meals protein, starch and vegetables most of the time    Previous diet education:  Yes     Food allergies/intolerances: no food allergies     EXERCISE: Decreased activity throughout the colder months is not taking walks as directed encouraged walking in apartment for 15 minute intervals twice daily  SOCIO/ECONOMIC:   Lives with: self    GLUCOSE MONITORING:  Patient glucose self monitoring as follows:           Glucose today during office visit approximately 300 related to high sugar hot chocolate coffee drink without Humalog injection.      BG values are: Not in goal current 28 day sensor glucose avg is 195 mg/dL.      Patient continues to meet CGM criteria  1. The beneficiary has diabetes mellitus; and,  2. The beneficiary s treating practitioner has concluded that the beneficiary (or beneficiary s caregiver) has sufficient training using the CGM prescribed as evidenced by providing a prescription; and,  3. The CGM is prescribed in accordance with its FDA indications for use;  and,  4. The beneficiary for whom a CGM is being prescribed, to improve glycemic control, meets at least one of the  criteria below:  A. The beneficiary is insulin-treated; or,  B. The beneficiary has a history of problematic hypoglycemia with documentation of at least one of the  following:    Recurrent (more than one) level 2 hypoglycemic events (glucose <54mg/dL (3.0mmol/L)) that  persist despite multiple (more than one) attempts to adjust medication(s) and/or modify the  diabetes treatment plan; or,    A history of one level 3 hypoglycemic event (glucose <54mg/dL (3.0mmol/L)) characterized by  altered mental and/or physical state requiring third-party assistance for treatment of  hypoglycemia.  5. Within six (6) months prior to ordering the CGM, the treating practitioner has an in-person or Medicareapproved telehealth visit with the beneficiary to evaluate their diabetes control and determine that criteria  (1-4) above are met.      Patient's most recent   Lab Results   Component Value Date    A1C 7.9 09/19/2023    A1C 8.6 02/11/2021    is meeting goal of <8.0    MEDICATIONS:  Current Outpatient Medications   Medication     Continuous Blood Gluc  (FREESTYLE ROSAURA 2 READER) LOUIS     Continuous Blood Gluc Sensor (FREESTYLE ROSAURA 2 SENSOR) MISC     empagliflozin (JARDIANCE) 25 MG TABS tablet     glucose (BD GLUCOSE) 4 g chewable tablet     insulin degludec (TRESIBA FLEXTOUCH) 100 UNIT/ML pen     insulin lispro (HUMALOG KWIKPEN) 100 UNIT/ML (1 unit dial) KWIKPEN     insulin pen needle (32G X 4 MM) 32G X 4 MM miscellaneous     metFORMIN (GLUCOPHAGE XR) 500 MG 24 hr tablet     VICTOZA PEN 18 MG/3ML soln     aspirin (ASA) 81 MG chewable tablet     bismuth subsalicylate (PEPTO BISMOL) 262 MG/15ML suspension     blood glucose (NO BRAND SPECIFIED) test strip     calcium carbonate (TUMS) 500 MG chewable tablet     carvedilol (COREG) 12.5 MG tablet     carvedilol (COREG) 25 MG tablet     FLUoxetine (PROZAC) 20 MG  "capsule     furosemide (LASIX) 20 MG tablet     lisinopril (ZESTRIL) 10 MG tablet     loratadine (CLARITIN) 10 MG tablet     melatonin 5 MG tablet     multivitamin w/minerals (THERA-VIT-M) tablet     Naphazoline-Pheniramine (EQ EYE ALLERGY RELIEF OP)     nitroGLYcerin (NITROSTAT) 0.4 MG sublingual tablet     polyethylene glycol-propylene glycol (SYSTANE ULTRA) 0.4-0.3 % SOLN ophthalmic solution     potassium chloride ER (K-TAB/KLOR-CON) 10 MEQ CR tablet     rosuvastatin (CRESTOR) 40 MG tablet     No current facility-administered medications for this visit.     Provided patient with new spreadsheets as she stopped tracking insulin dosing    LABS:  Lab Results   Component Value Date    A1C 7.9 09/19/2023    A1C 8.6 02/11/2021     Lab Results   Component Value Date     09/19/2023    GLC 64 03/16/2022     02/11/2021     Lab Results   Component Value Date    LDL 38 06/20/2023    LDL 71 03/13/2020     HDL Cholesterol   Date Value Ref Range Status   03/13/2020 60 >or=50 mg/dL Final     Direct Measure HDL   Date Value Ref Range Status   06/20/2023 34 (L) >=50 mg/dL Final   ]  GFR Estimate   Date Value Ref Range Status   09/19/2023 >90 >60 mL/min/1.73m2 Final   02/11/2021 92 >or=60 ml/min/1.73m2 Final     Lab Results   Component Value Date    CR 0.64 09/19/2023    CR 0.68 02/11/2021     No results found for: MICROALBUMIN    ANTHROPOMETRICS:  Vitals: Ht 1.562 m (5' 1.5\")   Wt 55.4 kg (122 lb 1.6 oz)   LMP  (LMP Unknown)   BMI 22.70 kg/m    Body mass index is 22.7 kg/m .    Weight has stabilized since stopping the Topamax  Wt Readings from Last 5 Encounters:   12/05/23 55.4 kg (122 lb 1.6 oz)   11/07/23 54.8 kg (120 lb 14.4 oz)   10/17/23 54.9 kg (121 lb)   10/17/23 54.9 kg (121 lb)   09/27/23 57.1 kg (125 lb 12.8 oz)         ESTIMATED KCAL REQUIREMENTS:  ~1400 kcal per day    NUTRITION DIAGNOSIS: Altered nutrition-related laboratory values related to Type II diabetes as evidenced by   Lab Results   Component " Value Date    A1C 7.9 09/19/2023    A1C 7.4 06/20/2023    A1C 7.8 12/20/2022    A1C 7.2 06/27/2022    A1C 8.1 11/30/2021    A1C 8.6 02/11/2021    A1C 11.4 10/22/2020    A1C 6.8 03/13/2020    A1C 11.7 12/06/2019       NUTRITION INTERVENTION:  Nutrition Prescription: Energy Intake: 1400 kcal/day  Carbohydrate Intake: 30 grams/meal and 15 grams/snacks  Education given to support: consistent meals, carb counting, exercise, behavior modification, and heart healthy diet  Education Materials Provided: My Plate Planner/Choose My Plate, Portion Size Guide, and Heart Healthy Food Choices  Motivational Interviewing    PATIENT'S BEHAVIOR CHANGE GOALS:   See Patient Instructions for patient stated behavior change goals. AVS was printed and given to patient at today's appointment.    MONITOR / EVALUATE:  RD will monitor/evaluate:  Pertinent Labs  Readiness to change nutrition-related behaviors  Weight change    FOLLOW-UP:  2 mo   Time spent in minutes: 60  Encounter: Individual

## 2023-12-05 NOTE — PATIENT INSTRUCTIONS
Metformin  m tabs twice daily   Tresiba (degludec) long acting insulin: 9 units daily at bedtime.  Humalog (lispro) rapid acting insulin: prescribed as  take 3 units with each meal if blood sugar is 150 or lower. Increase to 6 units if blood sugar is above 150 at mealtime and no insulin with snacks.  Jardiance (empagliflozin) 25 m tab once daily  Liraglutide (Victoza) subcut injection: 1.8 mg injected under the skin once daily in the morning

## 2023-12-19 ENCOUNTER — OFFICE VISIT (OUTPATIENT)
Dept: PHARMACY | Facility: CLINIC | Age: 68
End: 2023-12-19
Payer: COMMERCIAL

## 2023-12-19 VITALS
WEIGHT: 124.5 LBS | HEART RATE: 67 BPM | OXYGEN SATURATION: 99 % | DIASTOLIC BLOOD PRESSURE: 59 MMHG | BODY MASS INDEX: 23.14 KG/M2 | SYSTOLIC BLOOD PRESSURE: 99 MMHG

## 2023-12-19 DIAGNOSIS — E11.69 TYPE 2 DIABETES MELLITUS WITH OTHER SPECIFIED COMPLICATION, WITH LONG-TERM CURRENT USE OF INSULIN (H): ICD-10-CM

## 2023-12-19 DIAGNOSIS — Z79.4 TYPE 2 DIABETES MELLITUS WITH OTHER SPECIFIED COMPLICATION, WITH LONG-TERM CURRENT USE OF INSULIN (H): ICD-10-CM

## 2023-12-19 PROCEDURE — 99207 PR NO CHARGE LOS: CPT | Performed by: PHARMACIST

## 2023-12-19 RX ORDER — LIRAGLUTIDE 6 MG/ML
INJECTION SUBCUTANEOUS
Qty: 9 ML | Refills: 2 | Status: SHIPPED | OUTPATIENT
Start: 2023-12-19 | End: 2024-01-02 | Stop reason: ALTCHOICE

## 2023-12-19 RX ORDER — INSULIN LISPRO 100 [IU]/ML
INJECTION, SOLUTION INTRAVENOUS; SUBCUTANEOUS
Qty: 15 ML | Refills: 0 | Status: SHIPPED | OUTPATIENT
Start: 2023-12-19 | End: 2024-01-22

## 2023-12-19 NOTE — PROGRESS NOTES
Medication Therapy Management (MTM) Encounter    ASSESSMENT:                            Medication Adherence/Access: See below for considerations    Type 2 Diabetes:  Patient's A1c of 7.9% is not at ADA goal of <7%. SMBG fasting sugars are mostly at ADA goal of  mg/dL and post-prandial sugars are not at goal of less than 180 mg/dL-mostly hyperglycemic postprandially with 1 overnight hypoglycemic episode.  Patient would benefit from:    Safety -preventing hypoglycemia overnight.  Suspect that patient potentially gave short acting insulin before bed instead of long-acting insulin; recommend that she verify her pens closely before administering.    -long acting insulin: Continue current dose  -short acting insulin: Recommend patient continue logging her insulin dose administration; given her postprandial hyperglycemia we will increase her short acting insulin doses with meals when her Premeal blood sugar is over 200. Will also have Patient skip short acting insulin when pre-meal blood sugar is less than 100 mg/dL - new schedule created and printed for Patient.  -Metformin: continue current 2000 mg/day (at max 2000 mg/day)  -SGLT2 (Jardiance): Continue current dose (at max 25 mg/day)   -GLP-1 (victoza/liraglutide): Continue current dose (max 1.8 mg/day) - will plan to change to alternate GLP-1 in 2024, waiting to send script until that time so Patient will fill and bring to follow-up CDCES or MTM visit. Will have her wait to  and start so that we can further adjust her insulin as necessary based on blood sugars with today's med changes and decrease risk of med confusion.    HM: due for DM foot exam - to complete with PCP at next visit; recommend patient share her diabetes eye exam records with our clinic.    PLAN:                            Double check your insulin pen to make sure you're using the correct pen with mealtimes v. At bedtime.    1. Continue 9 units of tresiba long acting insulin once  daily.    2. Humalog (short-acting insulin):   For MEALS:  If blood sugar is less than 100, no short acting insulin.  If blood sugar is 100 or above, take 4 units of insulin.  If blood sugar is 150 or above, take 6 units of insulin  If blood sugar is 200 or above, take 8 units of insulin.    NO INSULIN with snacks.    3. Refill your victoza now.    Then we will change your non-insulin injectable therapy for 2024 - I will send a prescription for mounjaro to the pharmacy. You can fill this after Jan 1 and bring this to your next appointment. Do not start the mounjaro until you stop the victoza and come in to see either Avril Martinez RD, CD, YUMIKO or Carly Pisano, KushalD.     4. Contact Dr. Harris's clinic and request that they send your office note to Samanta Maza MD. Our fax number is [phone].     5. You are due for your yearly diabetes foot exam. Your primary provider can complete this at your next clinic visit.    Continue logging your insulin injections    Follow-up: Thursday, Jan 4th at 10:30am in clinic.    SUBJECTIVE/OBJECTIVE:                          Chanelle Billy is a 68 year old female coming in for a follow-up visit from 11/7/2023. Patient was accompanied by daughter Dionna.      Reason for visit: Diabetes follow-up.    Allergies/ADRs: Reviewed in chart  Past Medical History: Reviewed in chart  Tobacco: She reports that she has quit smoking. She has never used smokeless tobacco.  Alcohol: none  Social: living alone, with cat     Medication Adherence/Access: Setting up meds in a pillbox herself   Brings paperwork from her insurance noting that victoza will not be covered in 2024. Alternates include byetta, bydureon, Ozempic and Mounjaro.    Diabetes /Type 2 Diabetes:  Testing supplies: freestyle roula 2 (getting this through [employer]) and glucometer  Following with certified diabetes care and  (CDCES)? Yes  - Avril Martinez RD, CD, RN CDCES  Pt currently taking:  Metformin   m tabs twice daily   Tresiba (degludec) long acting insulin: 9 units daily at bedtime.  Humalog (lispro) rapid acting insulin: prescribed as  take 3 units with each meal if blood sugar is 150 or lower. Increase to 6 units if blood sugar is above 150 at mealtime and no insulin with snacks.  Patient brings in med admin calendar-see below; it appears that she has been using 4 units with blood sugars less than 150, 6 units with blood sugars greater than 150; skipping short acting insulin at mealtimes when her blood sugars are less than 100. A few days gave lower dose instead of higher or vice v versa.  Jardiance (empagliflozin) 25 m tab once daily  Liraglutide (Victoza) subcut injection: 1.8 mg injected under the skin once daily in the morning med access concerns for  - see above. Patient notes she has a 20 day supply at this time.  + calcium carb  (TUMS) and pepto bismol as needed occasionally, specifically when eating gravy or saucy things.  Side effect? notes GI upset attributed to the victoza, specifically worse after certain foods. This comes and goes in waves.  Record of home blood sugars:           She is uncertain why she had a low blood sugar early this morning/overnight.  She suspects that it is possible she gave short acting insulin before bed instead of long-acting insulin - really doesn't know.      Symptoms of low blood sugar?  She wakes up feeling unwell or her cat awakens her.  Symptoms of high blood sugar? none  Diet: usually hungry about 10:30/11am, and eating 2 meals per day + sometimes snacking in between but not always.  Exercise: no routine exercise.  ASCVD Prevention:  Aspirin: Taking 81mg daily and denies side effects   ACEi/ARB: Yes: lisinopril     Eye exam in the last 12 months?  Patient reports she had one earlier this year; we do not have records.  Foot exam: due, Patient aware.  Urine Albumin:   Lab Results   Component Value Date    UMALCR 108.08 (H) 2023      Lab Results    Component Value Date    A1C 7.9 (H) 09/19/2023    A1C 7.4 (H) 06/20/2023    A1C 7.8 (H) 12/20/2022     Today's Vitals: BP 99/59   Pulse 67   Wt 124 lb 8 oz (56.5 kg)   LMP  (LMP Unknown)   SpO2 99%   BMI 23.14 kg/m    ----------------    I spent 30 minutes with this patient today. All changes were made via collaborative practice agreement with Samanta Maza MD. A copy of the visit note was provided to the patient's provider(s).    A summary of these recommendations was given to the patient.    Carly Pisano, KushalD  Medication Therapy Management (MTM) Pharmacist  [place] [place] [facility]  Clinic Phone: [phone]         Medication Therapy Recommendations  Type 2 diabetes mellitus with circulatory disorder, with long-term current use of insulin (H)    Current Medication: insulin lispro (HUMALOG KWIKPEN) 100 UNIT/ML (1 unit dial) KWIKPEN (Discontinued)   Rationale: Dose too low - Dosage too low - Effectiveness   Recommendation: Increase Dose   Status: Accepted per CPA   Note: change short acting insulin regimen and edu provided about being cautious not to mix up her insulin pens          Current Medication: liraglutide (VICTOZA PEN) 18 MG/3ML solution   Rationale: Medication product not available - Adherence - Adherence   Recommendation: Change Medication   Status: Accepted per CPA   Note: will need to change GLP-1 for 2024. will address at f/up with pt planning to start mounjaro

## 2023-12-19 NOTE — Clinical Note
MTM update -   Insulin adjustment today; she had one low not well explained -possible dosing error??    Will need to change GLP-1 for 2024 given insurance coverage; planning to stop victoza and start mounjaro, but waiting to send script to decrease risk of confusion and med error and will send right before next MTM visit for Patient to fill and bring to appointment - educated Patient on this.  See note for details. KB

## 2023-12-19 NOTE — PATIENT INSTRUCTIONS
"Recommendations from today's MTM visit:                                                    MTM (medication therapy management) is a service provided by a clinical pharmacist designed to help you get the most of out of your medicines.      Double check your insulin pen to make sure you're using the correct pen with mealtimes v. At bedtime.    1. Continue 9 units of tresiba long acting insulin once daily.    2. Humalog (short-acting insulin):   For MEALS:  If blood sugar is less than 100, no short acting insulin.  If blood sugar is 100 or above, take 4 units of insulin.  If blood sugar is 150 or above, take 6 units of insulin  If blood sugar is 200 or above, take 8 units of insulin.    NO INSULIN with snacks.    3. Refill your victoza now and continue at this time.    Then we will change your non-insulin injectable therapy for 2024 - I will send a prescription for mounjaro to the pharmacy. You can fill this after Jan 1 and bring this to your next appointment. Do not start the mounjaro until you stop the victoza and come in to see either Avril Martinez RD, CD, [facility] or Carly Pisano PharmD.     4. Contact Dr. Harris's clinic and request that they send your office note to Samanta Maza MD. Our fax number is [phone].     5. You are due for your yearly diabetes foot exam. Your primary provider can complete this at your next clinic visit.    Continue logging your insulin injections / doses on your chart.    Follow-up: Thursday, Jan 4th at 10:30am in clinic.    It was great speaking with you today.  I value your experience and would be very thankful for your time in providing feedback in our clinic survey. In the next few days, you may receive an email or text message from [facility] [address] [employer] with a link to a survey related to your  clinical pharmacist.\"     To schedule another MTM appointment, please call the clinic directly or you may call the MTM scheduling line at [phone] or toll-free at [phone].     My " Clinical Pharmacist's contact information:                                                      Please feel free to contact me with any questions or concerns you have.      Carly Pisano, Erick  Medication Therapy Management (MTM) Pharmacist

## 2023-12-27 ENCOUNTER — PATIENT OUTREACH (OUTPATIENT)
Dept: CARE COORDINATION | Facility: CLINIC | Age: 68
End: 2023-12-27
Payer: COMMERCIAL

## 2023-12-27 NOTE — PROGRESS NOTES
Clinic Care Coordination Contact  Care Coordination Clinician Chart Review    Situation: Patient chart reviewed by Care Coordinator.       Background: Care Coordination Program started: 9/26/2023. Initial assessment completed and patient-centered care plan(s) were developed with participation from patient. Lead CC handed patient off to CHW for continued outreaches.       Assessment: Per chart review, patient outreach completed by CC CHW on 11/15/23 - pt transitioned to maintenance 11/16/23 by [facility].  Patient has actively worked to accomplish goal(s). Patient is due for updated Plan of Care.  Assessments will be completed annually or as needed/with change of patient status.      Care Plan: Financial Wellbeing       Problem: Dominique Care                        Plan/Recommendations: The patient will continue working with Care Coordination to achieve goal(s) as above. CHW will continue outreaches at minimum every 60 days and will involve Lead CC as needed or if patient is ready to move to Graduation. Lead CC will continue to monitor CHW outreaches and patient's progress to goal(s) every 6 weeks.     Plan of Care updated and sent to patient: Yes, via mail

## 2023-12-27 NOTE — LETTER
[facility] CARE COORDINATION  [address] [facility] [address] [address]   December 27, 2023        Chanelle Billy  [address]  [place] [facility] [place] [facility] [address]          Dear Chanelle,     Attached is an updated Patient Centered Plan of Care for your continued enrollment in Care Coordination. Please let us know if you have additional questions, concerns, or goals that we can assist with.    Sincerely,    Sofi Barrientos [facility] Clinical Care Coordinator  [facility], [place], [facility], [facility], and [facility]  Patient Centered Plan of Care  About Me:        Patient Name:  Chanelle Billy    YOB: 1955  Age:         68 year old   [place] MRN:    [id number] Telephone Information:  Home Phone [phone]   Mobile [phone]       Address:  [address] [address] Email address:  No e-mail address on record      Emergency Contact(s)    Name Relationship Lgl Grd Work Phone Home Phone Mobile Phone   1. NONA MURRELL* Daughter    [phone]           Primary language:  English     needed? No   [place] [employer]:  [phone] op. 1  Other communication barriers:None    Preferred Method of Communication:     Current living arrangement: I live in a private home; I live alone    Mobility Status/ Medical Equipment: Independent w/Device        Health Maintenance  Health Maintenance Reviewed: Due/Overdue   Health Maintenance Due   Topic Date Due    HF ACTION PLAN  Never done    COLORECTAL CANCER SCREENING  Never done    RSV VACCINE (Pregnancy & 60+) (1 - 1-dose 60+ series) Never done    ZOSTER IMMUNIZATION (2 of 2) 10/20/2021    DIABETIC FOOT EXAM  06/27/2023    COVID-19 Vaccine (6 - 2023-24 season) 09/01/2023    DEPRESSION 12 MO INDEX REPEAT PHQ-9  10/21/2023    EYE EXAM  11/14/2023    A1C  12/19/2023    ALT  12/20/2023    CBC  12/20/2023    MEDICARE ANNUAL WELLNESS VISIT  12/20/2023          My  Access Plan  Medical Emergency 911   Primary Clinic Line [facility] - [phone]   24 Hour Appointment Line [phone] or  [phone]) (toll-free)   24 Hour Nurse Line [phone] (toll-free)   Preferred Urgent Care Other     Preferred Hospital Other     Preferred Pharmacy [place], [place] - [address]     Behavioral Health Crisis Line The National Suicide Prevention Lifeline at 1-451.848.3577 or Text/Call 988           My Care Team Members  Patient Care Team         Relationship Specialty Notifications Start End    Samanta Maza MD PCP - General Family Medicine  2/5/22     Merged    Phone: [phone] Fax: [phone]         [address] [facility] [facility] [address]    [facility]    2/6/15     Merged    Phone: [phone] Fax: [phone]         [address] [facility] [address]    Samanta Maza MD  Family Practice  2/5/22     Merged    Phone: [phone] Fax: [phone]         [address] [facility] [facility] [address]    Lan Long MD Assigned Heart and Vascular Provider   2/13/22     Phone: [phone] Fax: [phone]         [address] [facility] [address] [facility] [address]    Samanta Maza MD Assigned PCP   2/27/22     Phone: [phone] Fax: [phone]         [address] [facility] [address]    Avril Martinez RD Diabetes Educator   3/29/22     Phone: [phone] Fax: [phone]         [address] [facility] [address]    Anyi Pisano, [facility] Assigned MTM Pharmacist   2/4/23     Phone: [phone] Fax: [phone]         [address] [facility] [address]    Lan Long MD MD Cardiovascular Disease  8/7/23     Phone: [phone] Fax: [phone]         [address] [facility] [address] [facility] [address]    Sofi Barrientos LICSW Lead Care Coordinator  Admissions 9/28/23     Niurka Rader, CHW Community Health Worker  Admissions 10/6/23                 My Care Plans  Self  Management and Treatment Plan    Care Plan  Care Plan: Financial Wellbeing       Problem: [facility]                                My Medical and Care Information  Problem List   Patient Active Problem List   Diagnosis    Coronary artery disease involving native coronary artery of native heart with angina pectoris (H24)    Hyperlipidemia LDL goal <70    Type 2 diabetes mellitus with circulatory disorder, with long-term current use of insulin (H)    Dyspnea on exertion    Ischemic cardiomyopathy    Major depressive disorder, recurrent episode, moderate with anxious distress (H)    Essential hypertension    Generalized anxiety disorder    History of coronary artery bypass surgery    History of myocardial infarction    Insomnia    Myopia    Bilateral hearing loss    MARIAA (obstructive sleep apnea)    Combined forms of age-related cataract of left eye    Combined forms of age-related cataract of right eye    Stress incontinence in female    Stable angina pectoris      Current Medications and Allergies:    Current Outpatient Medications   Medication    aspirin (ASA) 81 MG chewable tablet    bismuth subsalicylate (PEPTO BISMOL) 262 MG/15ML suspension    blood glucose (NO BRAND SPECIFIED) test strip    calcium carbonate (TUMS) 500 MG chewable tablet    carvedilol (COREG) 12.5 MG tablet    carvedilol (COREG) 25 MG tablet    Continuous Blood Gluc  (FREESTYLE ROSAURA 2 READER) LOUIS    Continuous Blood Gluc Sensor (FREESTYLE ROSAURA 2 SENSOR) MISC    empagliflozin (JARDIANCE) 25 MG TABS tablet    FLUoxetine (PROZAC) 20 MG capsule    furosemide (LASIX) 20 MG tablet    glucose (BD GLUCOSE) 4 g chewable tablet    insulin degludec (TRESIBA FLEXTOUCH) 100 UNIT/ML pen    insulin lispro (HUMALOG KWIKPEN) 100 UNIT/ML (1 unit dial) KWIKPEN    insulin pen needle (32G X 4 MM) 32G X 4 MM miscellaneous    liraglutide (VICTOZA PEN) 18 MG/3ML solution    lisinopril (ZESTRIL) 10 MG tablet    loratadine (CLARITIN) 10 MG tablet     melatonin 5 MG tablet    metFORMIN (GLUCOPHAGE XR) 500 MG 24 hr tablet    multivitamin w/minerals (THERA-VIT-M) tablet    Naphazoline-Pheniramine (EQ EYE ALLERGY RELIEF OP)    nitroGLYcerin (NITROSTAT) 0.4 MG sublingual tablet    polyethylene glycol-propylene glycol (SYSTANE ULTRA) 0.4-0.3 % SOLN ophthalmic solution    potassium chloride ER (K-TAB/KLOR-CON) 10 MEQ CR tablet    rosuvastatin (CRESTOR) 40 MG tablet     No current facility-administered medications for this visit.       Allergies   Allergen Reactions    Atorvastatin Nausea and Vomiting and Diarrhea     Pt reported  Other reaction(s): Diarrhea, nausea, vomiting, Diarrhea,Nausea,Vomiting    Mirtazapine      Nightmares and suicidal ideation for 1 day (per Dr. Maza's note)  Other reaction(s): Nightmare, Suicidal ideation    Sodium Hypochlorite      Bleeding from nose and eyes after using a bleach product, patient/daughter reported  Other reaction(s): Runny nose    Animal Dander Other (See Comments)    Cats      Runny nose, pt reported.  Takes allergy medication for this and still lives with a cat.  Other reaction(s): Nasal congestion, Runny nose    Trazodone      nightmares  Other reaction(s): Nightmares        Care Coordination Start Date: 9/26/2023   Frequency of Care Coordination: 2 months, more frequently as needed     Form Last Updated: 12/27/2023

## 2024-01-02 RX ORDER — TIRZEPATIDE 7.5 MG/.5ML
7.5 INJECTION, SOLUTION SUBCUTANEOUS
Qty: 2 ML | Refills: 0 | Status: SHIPPED | OUTPATIENT
Start: 2024-01-02 | End: 2024-04-18

## 2024-01-02 NOTE — PROGRESS NOTES
Mounjaro script sent to replace victoza; starting with medium mounjaro dose.  Carly Pisano, KushalD  Medication Therapy Management (MTM) Pharmacist

## 2024-01-04 DIAGNOSIS — Z79.4 TYPE 2 DIABETES MELLITUS WITH OTHER CIRCULATORY COMPLICATION, WITH LONG-TERM CURRENT USE OF INSULIN (H): ICD-10-CM

## 2024-01-04 DIAGNOSIS — E11.59 TYPE 2 DIABETES MELLITUS WITH OTHER CIRCULATORY COMPLICATION, WITH LONG-TERM CURRENT USE OF INSULIN (H): ICD-10-CM

## 2024-01-04 DIAGNOSIS — I50.9 CONGESTIVE HEART FAILURE, UNSPECIFIED HF CHRONICITY, UNSPECIFIED HEART FAILURE TYPE (H): ICD-10-CM

## 2024-01-04 NOTE — CONFIDENTIAL NOTE
Medication Question or Refill  Contacts         Type Contact Phone/Fax    01/04/2024 08:24 AM CST Phone (Incoming) Chanelle Billy (Self) [phone] (M)          What medication are you calling about (include dose and sig)?:         Preferred Pharmacy:  [facility] [address] [address]  Phone: [phone] Fax: [phone]    [facility] - [address] [address]  Phone: [phone] Fax: [phone]      Controlled Substance Agreement on file:   CSA -- Patient Level:    CSA: None found at the patient level.       Who prescribed the medication?: MJP    Do you need a refill? Yes    Patient offered an appointment? Yes: Patient was overdue for AWV so we scheduled that appointment after her MTM appointment on 01/18/2024    Do you have any questions or concerns?  No      Okay to leave a detailed message?: Yes at Cell number on file:    Telephone Information:   Mobile [phone]

## 2024-01-08 RX ORDER — POTASSIUM CHLORIDE 750 MG/1
10 TABLET, EXTENDED RELEASE ORAL DAILY
Qty: 90 TABLET | Refills: 2 | Status: SHIPPED | OUTPATIENT
Start: 2024-01-08

## 2024-01-08 RX ORDER — FUROSEMIDE 20 MG
20 TABLET ORAL DAILY
Qty: 90 TABLET | Refills: 2 | Status: SHIPPED | OUTPATIENT
Start: 2024-01-08

## 2024-01-08 RX ORDER — METFORMIN HCL 500 MG
TABLET, EXTENDED RELEASE 24 HR ORAL
Qty: 360 TABLET | Refills: 2 | Status: SHIPPED | OUTPATIENT
Start: 2024-01-08

## 2024-01-10 DIAGNOSIS — I10 ESSENTIAL HYPERTENSION: ICD-10-CM

## 2024-01-10 RX ORDER — LISINOPRIL 10 MG/1
TABLET ORAL
Qty: 90 TABLET | Refills: 1 | Status: SHIPPED | OUTPATIENT
Start: 2024-01-10 | End: 2024-04-18

## 2024-01-17 ENCOUNTER — PATIENT OUTREACH (OUTPATIENT)
Dept: CARE COORDINATION | Facility: CLINIC | Age: 69
End: 2024-01-17
Payer: COMMERCIAL

## 2024-01-17 NOTE — PROGRESS NOTES
Clinic Care Coordination Contact  [facility]/Voicemail    Clinical Data: Care Coordinator Outreach    Outreach Documentation Number of Outreach Attempt   1/17/2024  10:22 AM 1     ** No VM available. Unable to leave VM.    ** Patient on CCC Maintenance since 11/15/23.    Plan: Care Coordinator will try to reach patient again in 10 business days.    Niurka [facility] Health Worker  [facility] Care Coordination   [facility], [place], [place], [facility]  Office: [phone]

## 2024-01-18 ENCOUNTER — ANCILLARY PROCEDURE (OUTPATIENT)
Dept: GENERAL RADIOLOGY | Facility: CLINIC | Age: 69
End: 2024-01-18
Attending: FAMILY MEDICINE
Payer: COMMERCIAL

## 2024-01-18 ENCOUNTER — OFFICE VISIT (OUTPATIENT)
Dept: PHARMACY | Facility: CLINIC | Age: 69
End: 2024-01-18
Payer: COMMERCIAL

## 2024-01-18 ENCOUNTER — OFFICE VISIT (OUTPATIENT)
Dept: FAMILY MEDICINE | Facility: CLINIC | Age: 69
End: 2024-01-18
Payer: COMMERCIAL

## 2024-01-18 VITALS
WEIGHT: 121 LBS | DIASTOLIC BLOOD PRESSURE: 60 MMHG | TEMPERATURE: 97.3 F | RESPIRATION RATE: 16 BRPM | BODY MASS INDEX: 22.26 KG/M2 | HEIGHT: 62 IN | HEART RATE: 80 BPM | SYSTOLIC BLOOD PRESSURE: 94 MMHG | OXYGEN SATURATION: 98 %

## 2024-01-18 VITALS
WEIGHT: 121.8 LBS | HEART RATE: 80 BPM | DIASTOLIC BLOOD PRESSURE: 60 MMHG | OXYGEN SATURATION: 97 % | BODY MASS INDEX: 22.64 KG/M2 | SYSTOLIC BLOOD PRESSURE: 94 MMHG

## 2024-01-18 DIAGNOSIS — E11.59 TYPE 2 DIABETES MELLITUS WITH OTHER CIRCULATORY COMPLICATION, WITH LONG-TERM CURRENT USE OF INSULIN (H): ICD-10-CM

## 2024-01-18 DIAGNOSIS — Z79.4 TYPE 2 DIABETES MELLITUS WITH OTHER CIRCULATORY COMPLICATION, WITH LONG-TERM CURRENT USE OF INSULIN (H): ICD-10-CM

## 2024-01-18 DIAGNOSIS — E78.00 HYPERCHOLESTEROLEMIA: ICD-10-CM

## 2024-01-18 DIAGNOSIS — E11.69 TYPE 2 DIABETES MELLITUS WITH OTHER SPECIFIED COMPLICATION, WITH LONG-TERM CURRENT USE OF INSULIN (H): ICD-10-CM

## 2024-01-18 DIAGNOSIS — Z23 NEED FOR SHINGLES VACCINE: ICD-10-CM

## 2024-01-18 DIAGNOSIS — I50.9 CONGESTIVE HEART FAILURE, UNSPECIFIED HF CHRONICITY, UNSPECIFIED HEART FAILURE TYPE (H): ICD-10-CM

## 2024-01-18 DIAGNOSIS — Z12.11 SCREEN FOR COLON CANCER: Primary | ICD-10-CM

## 2024-01-18 DIAGNOSIS — E78.5 HYPERLIPIDEMIA LDL GOAL <70: ICD-10-CM

## 2024-01-18 DIAGNOSIS — E11.69 TYPE 2 DIABETES MELLITUS WITH OTHER SPECIFIED COMPLICATION, WITH LONG-TERM CURRENT USE OF INSULIN (H): Primary | ICD-10-CM

## 2024-01-18 DIAGNOSIS — Z00.00 ENCOUNTER FOR MEDICARE ANNUAL WELLNESS EXAM: ICD-10-CM

## 2024-01-18 DIAGNOSIS — Z79.4 TYPE 2 DIABETES MELLITUS WITH OTHER SPECIFIED COMPLICATION, WITH LONG-TERM CURRENT USE OF INSULIN (H): Primary | ICD-10-CM

## 2024-01-18 DIAGNOSIS — R09.89 SCATTERED RHONCHI OF RIGHT LUNG: ICD-10-CM

## 2024-01-18 DIAGNOSIS — Z29.11 NEED FOR VACCINATION AGAINST RESPIRATORY SYNCYTIAL VIRUS: ICD-10-CM

## 2024-01-18 DIAGNOSIS — L84 CORN OR CALLUS: ICD-10-CM

## 2024-01-18 DIAGNOSIS — Z79.4 TYPE 2 DIABETES MELLITUS WITH OTHER SPECIFIED COMPLICATION, WITH LONG-TERM CURRENT USE OF INSULIN (H): ICD-10-CM

## 2024-01-18 LAB
ERYTHROCYTE [DISTWIDTH] IN BLOOD BY AUTOMATED COUNT: 12.7 % (ref 10–15)
HBA1C MFR BLD: 7.8 % (ref 0–5.6)
HCT VFR BLD AUTO: 43 % (ref 35–47)
HGB BLD-MCNC: 13.8 G/DL (ref 11.7–15.7)
MCH RBC QN AUTO: 28.3 PG (ref 26.5–33)
MCHC RBC AUTO-ENTMCNC: 32.1 G/DL (ref 31.5–36.5)
MCV RBC AUTO: 88 FL (ref 78–100)
PLATELET # BLD AUTO: 262 10E3/UL (ref 150–450)
RBC # BLD AUTO: 4.87 10E6/UL (ref 3.8–5.2)
WBC # BLD AUTO: 6.6 10E3/UL (ref 4–11)

## 2024-01-18 PROCEDURE — G0439 PPPS, SUBSEQ VISIT: HCPCS | Performed by: FAMILY MEDICINE

## 2024-01-18 PROCEDURE — 90471 IMMUNIZATION ADMIN: CPT | Performed by: FAMILY MEDICINE

## 2024-01-18 PROCEDURE — 36415 COLL VENOUS BLD VENIPUNCTURE: CPT | Performed by: FAMILY MEDICINE

## 2024-01-18 PROCEDURE — 99213 OFFICE O/P EST LOW 20 MIN: CPT | Mod: 25 | Performed by: FAMILY MEDICINE

## 2024-01-18 PROCEDURE — 85027 COMPLETE CBC AUTOMATED: CPT | Performed by: FAMILY MEDICINE

## 2024-01-18 PROCEDURE — 83036 HEMOGLOBIN GLYCOSYLATED A1C: CPT | Performed by: FAMILY MEDICINE

## 2024-01-18 PROCEDURE — 71046 X-RAY EXAM CHEST 2 VIEWS: CPT | Mod: TC | Performed by: RADIOLOGY

## 2024-01-18 PROCEDURE — 99207 PR NO CHARGE LOS: CPT | Performed by: PHARMACIST

## 2024-01-18 PROCEDURE — 84460 ALANINE AMINO (ALT) (SGPT): CPT | Performed by: FAMILY MEDICINE

## 2024-01-18 PROCEDURE — 90677 PCV20 VACCINE IM: CPT | Performed by: FAMILY MEDICINE

## 2024-01-18 RX ORDER — ROSUVASTATIN CALCIUM 40 MG/1
40 TABLET, COATED ORAL AT BEDTIME
Qty: 90 TABLET | Refills: 3 | Status: SHIPPED | OUTPATIENT
Start: 2024-01-18 | End: 2024-02-13

## 2024-01-18 RX ORDER — RESPIRATORY SYNCYTIAL VIRUS VACCINE 120MCG/0.5
0.5 KIT INTRAMUSCULAR ONCE
Qty: 1 EACH | Refills: 0 | Status: CANCELLED | OUTPATIENT
Start: 2024-01-18 | End: 2024-01-18

## 2024-01-18 ASSESSMENT — ENCOUNTER SYMPTOMS
BREAST MASS: 0
FEVER: 0
JOINT SWELLING: 0
PALPITATIONS: 0
DIARRHEA: 1
HEARTBURN: 1
CHILLS: 1
NAUSEA: 0
MYALGIAS: 1
HEADACHES: 0
PARESTHESIAS: 1
COUGH: 1
HEMATOCHEZIA: 0
FREQUENCY: 1
ARTHRALGIAS: 1
SHORTNESS OF BREATH: 1
EYE PAIN: 0
HEMATURIA: 0
CONSTIPATION: 0
SORE THROAT: 1
DYSURIA: 0
ABDOMINAL PAIN: 0
WEAKNESS: 0
DIZZINESS: 0
NERVOUS/ANXIOUS: 1

## 2024-01-18 ASSESSMENT — PATIENT HEALTH QUESTIONNAIRE - PHQ9
SUM OF ALL RESPONSES TO PHQ QUESTIONS 1-9: 9
10. IF YOU CHECKED OFF ANY PROBLEMS, HOW DIFFICULT HAVE THESE PROBLEMS MADE IT FOR YOU TO DO YOUR WORK, TAKE CARE OF THINGS AT HOME, OR GET ALONG WITH OTHER PEOPLE: SOMEWHAT DIFFICULT
SUM OF ALL RESPONSES TO PHQ QUESTIONS 1-9: 9

## 2024-01-18 ASSESSMENT — ACTIVITIES OF DAILY LIVING (ADL): CURRENT_FUNCTION: NO ASSISTANCE NEEDED

## 2024-01-18 NOTE — Clinical Note
MTM note route as FYI; we slightly adjusted insulin and planning to change from victoza to mounjaro in the near future. KB

## 2024-01-18 NOTE — PATIENT INSTRUCTIONS
"Recommendations from today's MTM visit:                                                    MTM (medication therapy management) is a service provided by a clinical pharmacist designed to help you get the most of out of your medicines.      1. Labs: A1c + any other labs Dr. Samanta Maza MD would like.    2. Refill your freestyle roula sensors through [facility] pharmacy. You have refills on file there on file. Call: [phone].    In the meantime, you should check your blood sugar with the glucometer and test strips. Bring your glucometer to the pharmacy so they can be sure to get your test strips that are compatible with your current blood sugar reader (called a glucometer).    3. After you finish your supply of victoza, then you will switch to mounjaro.  Mounjaro will replace victoza and you can start it the day after your take your last victoza injection.  The mounjaro 7.5 mg medication is a subcutaneous injection and will be given once weekly. Then we can increase this dose depending how you tolerate the medication.     Education provided about injection teaching today.    4. Insulin dosing instructions:    - Tresiba long acting insulin: 9 units once daily    - Humalog (short-acting insulin):   For MEALS:  If blood sugar is:  less than 100, no short acting insulin.  100-149, take 4 units of insulin.  150-199, take 6 units of insulin  200-249, take 8 units of insulin.  250 + take 10 units of insulin    NO INSULIN with snacks.    5. Contact Dr. Harris's clinic and request that they send your office note to Samanta Maza MD. Our fax number is [phone].    Follow-up: 6 weeks with MTM - schedule at the     It was great speaking with you today.  I value your experience and would be very thankful for your time in providing feedback in our clinic survey. In the next few days, you may receive an email or text message from [employer] with a link to a survey related to your  clinical pharmacist.\"     To " schedule another MTM appointment, please call the clinic directly or you may call the MTM scheduling line at [phone] or toll-free at [phone].     My Clinical Pharmacist's contact information:                                                      Please feel free to contact me with any questions or concerns you have.      Carly Pisano, PharmD  Medication Therapy Management (MTM) Pharmacist

## 2024-01-18 NOTE — PROGRESS NOTES
Medication Therapy Management (MTM) Encounter    ASSESSMENT:                            Medication Adherence/Access: See below for considerations    Diabetes:  Patient's A1c of 7.9% is not at ADA goal of <7%. SMBG fasting sugars are mostly at ADA goal of  mg/dL and post-prandial sugars are not at goal of less than 180 mg/dL.  Patient would benefit from:    Blood sugar testing: Order sent for new test strips to the pharmacy and patient was provided with glucometer and test strips to use temporarily until she is able to pick these up.    -long acting insulin: Continue current dose  -short acting insulin: Recommend patient continue logging her insulin dose administration and we re-reviewed the schedule previously put together acknowledging several times she followed that schedule, but a few times she didn't. Recommended that she check the schedule always before insulin admin to increase accuracy to the dosing instructions; given her postprandial hyperglycemia we will increase her short acting insulin doses with meals when her Premeal blood sugar is over 250. Future: may titrate insulin further but waiting to do so since Patient will be changing GLP-1s in near future and this new GLP-1+ GIP may be more effective.  -Metformin: continue current 2000 mg/day (at max 2000 mg/day)  -SGLT2 (Jardiance): Continue current dose (at max 25 mg/day)   -GLP-1 (victoza/liraglutide): Continue current dose (max 1.8 mg/day) - education provided about changing from victoza to mounjaro.     HM: due for A1c today; due for DM foot exam - to complete with PCP at next visit; recommend patient share her diabetes eye exam records with our clinic.    PLAN:                            1. Labs: A1c + any other labs Dr. Samanta Maza MD would like.    2. Refill your freestyle roula sensors through [facility] pharmacy. You have refills on file there on file. Call: [phone].    In the meantime, you should check your blood sugar with the  glucometer and test strips. Bring your glucometer to the pharmacy so they can be sure to get your test strips that are compatible with your current blood sugar reader (called a glucometer).    3. After you finish your supply of victoza, then you will switch to mounjaro.  Mounjaro will replace victoza and you can start it the day after your take your last victoza injection.  The mounjaro 7.5 mg medication is a subcutaneous injection and will be given once weekly. Then we can increase this dose depending how you tolerate the medication.     Education provided about injection teaching today.    4. Insulin dosing instructions:    - Tresiba long acting insulin: 9 units once daily    - Humalog (short-acting insulin):   For MEALS:  If blood sugar is:  less than 100, no short acting insulin.  100-149, take 4 units of insulin.  150-199, take 6 units of insulin  200-249, take 8 units of insulin.  250 + take 10 units of insulin    NO INSULIN with snacks.    5. Contact Dr. Harris's clinic and request that they send your office note to Samanta Maza MD. Our fax number is [phone].    Follow-up: 6 weeks with MTM - schedule at the     SUBJECTIVE/OBJECTIVE:                          Chanelle Billy is a 68 year old female coming in for a follow-up visit from 12/19/2023. Patient was accompanied by daughter Dionna.      Reason for visit: Diabetes follow-up.    Allergies/ADRs: Reviewed in chart  Past Medical History: Reviewed in chart  Tobacco: She reports that she has quit smoking. She has never used smokeless tobacco.  Alcohol: none  Social: living alone, with cat      Medication Adherence/Access:   Setting up meds in a pillbox herself     Diabetes /Type 2 Diabetes:  Testing supplies: freestyle roula 2 (getting this through [employer]) and glucometer needs refill of her freestyle roula supplies; does not have test strips for her glucometer.  Following with certified diabetes care and  (CDCES)? Yes  -  Avril Martinez, RD, CD, RN CDCES  Pt currently taking:  Metformin  m tabs twice daily   Tresiba (degludec) long acting insulin: 9 units daily at bedtime.  Humalog (lispro) rapid acting insulin:  Last visit was instructed to use the following dosing for MEALS:  If blood sugar is less than 100, no short acting insulin.  If blood sugar is 100 or above, take 4 units of insulin.  If blood sugar is 150 or above, take 6 units of insulin  If blood sugar is 200 or above, take 8 units of insulin.  NO INSULIN with snacks.  See table below for patient's sugars and number of units of insulin injected  Jardiance (empagliflozin) 25 m tab once daily  Liraglutide (Victoza) subcut injection: 1.8 mg injected under the skin once daily in the morning, was able to refill this after the last visit and therefore has more supply before she needs to switch to mounjaro.  Mounjaro 7.5 mg: Patient has access to med but has not yet started it (changing from Victoza because of insurance coverage for )  + calcium carb  (TUMS) and pepto bismol as needed occasionally, specifically when eating gravy or saucy things.  Side effect? notes GI upset attributed to the victoza, specifically worse after certain foods. This comes and goes in waves.  Record of home blood sugars:                   Symptoms of low blood sugar?  She wakes up feeling unwell or her cat awakens her.  Symptoms of high blood sugar? none  Diet: usually hungry about 10:30/11am, and eating 2 meals per day + sometimes snacking in between but not always.  Exercise: no routine exercise.  ASCVD Prevention:  Aspirin: Taking 81mg daily and denies side effects   ACEi/ARB: Yes: lisinopril  Wt Readings from Last 10 Encounters:   24 121 lb (54.9 kg)   24 121 lb 12.8 oz (55.2 kg)   23 124 lb 8 oz (56.5 kg)   23 122 lb 1.6 oz (55.4 kg)   23 120 lb 14.4 oz (54.8 kg)   10/17/23 121 lb (54.9 kg)   10/17/23 121 lb (54.9 kg)   23 125 lb 12.8 oz (57.1  "kg)   09/26/23 125 lb 1.6 oz (56.7 kg)   09/19/23 124 lb 11.2 oz (56.6 kg)   Estimated body mass index is 22.64 kg/m  as calculated from the following:    Height as of 12/5/23: 5' 1.5\" (1.562 m).    Weight as of this encounter: 121 lb 12.8 oz (55.2 kg).       Foot exam: due - Patient aware  Urine Albumin:   Lab Results   Component Value Date    UMALCR 108.08 (H) 06/20/2023      Lab Results   Component Value Date    A1C 7.9 (H) 09/19/2023     Today's Vitals: BP 94/60   Pulse 80   Wt 121 lb 12.8 oz (55.2 kg)   LMP  (LMP Unknown)   SpO2 97%   BMI 22.64 kg/m    No concerns for lightheadedness / dizziness.  ----------------      I spent 30 minutes with this patient today. All changes were made via collaborative practice agreement with Samanta Maza MD. A copy of the visit note was provided to the patient's provider(s).    A summary of these recommendations was given to the patient.    Carly Pisano, Erick  Medication Therapy Management (MTM) Pharmacist  [place] [place] [facility]  Clinic Phone: [phone]       Medication Therapy Recommendations  Type 2 diabetes mellitus with other specified complication, with long-term current use of insulin (H)    Current Medication: blood glucose (NO BRAND SPECIFIED) test strip (Discontinued)   Rationale: Medication product not available - Adherence - Adherence   Recommendation: Provide Adherence Intervention   Status: Accepted per CPA          Current Medication: insulin lispro (HUMALOG KWIKPEN) 100 UNIT/ML (1 unit dial) KWIKPEN (Discontinued)   Rationale: Dose too low - Dosage too low - Effectiveness   Recommendation: Increase Dose   Status: Accepted per CPA                "

## 2024-01-18 NOTE — PROGRESS NOTES
"2-4The patient was provided with suggestions to help her develop a healthy physical lifestyle.  She is at risk for lack of exercise and has been provided with information to increase physical activity for the benefit of her well-being.  The patient was counseled and encouraged to consider modifying their diet and eating habits. She was provided with information on recommended healthy diet options.  The patient was provided with written information regarding signs of hearing loss.  Information on urinary incontinence and treatment options given to patient.  The patient's PHQ-9 score is consistent with mild depression. She was provided with information regarding depression and was advised to schedule a follow up appointment in 2-4 weeks to further address this issue.  Answers submitted by the patient for this visit:  Patient Health Questionnaire (Submitted on 1/18/2024)  If you checked off any problems, how difficult have these problems made it for you to do your work, take care of things at home, or get along with other people?: Somewhat difficult  PHQ9 TOTAL SCORE: 9  Annual Preventive Visit (Submitted on 1/18/2024)  Chief Complaint: Annual Exam:  In general, how would you rate your overall physical health?: fair  Frequency of exercise:: 1 day/week  Do you usually eat at least 4 servings of fruit and vegetables a day, include whole grains & fiber, and avoid regularly eating high fat or \"junk\" foods? : No  Taking medications regularly:: Yes  Medication side effects:: None  Activities of Daily Living: no assistance needed  Home safety: no safety concerns identified  Hearing Impairment:: difficulty following a conversation in a noisy restaurant or crowded room, difficulty understanding speech on the telephone  In the past 6 months, have you been bothered by leaking of urine?: Yes  abdominal pain: No  Blood in stool: No  Blood in urine: No  chest pain: Yes  chills: Yes  congestion: Yes  constipation: No  cough: " Yes  diarrhea: Yes  dizziness: No  ear pain: No  eye pain: No  nervous/anxious: Yes  fever: No  frequency: Yes  genital sores: No  headaches: No  hearing loss: Yes  heartburn: Yes  arthralgias: Yes  joint swelling: No  peripheral edema: No  mood changes: No  myalgias: Yes  nausea: No  dysuria: No  palpitations: No  Skin sensation changes: Yes  sore throat: Yes  urgency: No  rash: No  shortness of breath: Yes  visual disturbance: No  weakness: No  pelvic pain: No  vaginal bleeding: No  vaginal discharge: No  tenderness: No  breast mass: No  breast discharge: No  In general, how would you rate your overall mental or emotional health?: good  Additional concerns today:: No  Exercise outside of work (Submitted on 1/18/2024)  Chief Complaint: Annual Exam:  Duration of exercise:: Less than 15 minutes

## 2024-01-18 NOTE — LETTER
January 19, 2024      Chanelle Billy  [address] [address]        Dear ,    We are writing to inform you of your test results.    Your A1c is 7.8%, above the goal of less than 7%. Therefore, will we continue with the medication adjustment plan we discussed at your Medication Therapy Management (MTM) visit.    Resulted Orders   Hemoglobin A1c   Result Value Ref Range    Hemoglobin A1C 7.8 (H) 0.0 - 5.6 %      Comment:      Normal <5.7%   Prediabetes 5.7-6.4%    Diabetes 6.5% or higher     Note: Adopted from ADA consensus guidelines.       If you have any questions or concerns, please call the clinic at the number listed above.       Sincerely,    Carly Pisano, KushalD  Medication Therapy Management (MTM) Pharmacist    Samanta Maza MD

## 2024-01-18 NOTE — PATIENT INSTRUCTIONS
Schedule your appointment for follow up with Dr. Long.    Patient Education   Personalized Prevention Plan  You are due for the preventive services outlined below.  Your care team is available to assist you in scheduling these services.  If you have already completed any of these items, please share that information with your care team to update in your medical record.  Health Maintenance Due   Topic Date Due    Heart Failure Action Plan  Never done    Colorectal Cancer Screening  Never done    RSV VACCINE (Pregnancy & 60+) (1 - 1-dose 60+ series) Never done    Zoster (Shingles) Vaccine (2 of 2) 10/20/2021    Diabetic Foot Exam  06/27/2023    COVID-19 Vaccine (6 - 2023-24 season) 09/01/2023    Eye Exam  11/14/2023    A1C Lab  12/19/2023    Liver Monitoring Lab  12/20/2023    ANNUAL REVIEW OF HM ORDERS  12/20/2023    Complete Blood Count  12/20/2023     Your Health Risk Assessment indicates you feel you are not in good health    A healthy lifestyle helps keep the body fit and the mind alert. It helps protect you from disease, helps you fight disease, and helps prevent chronic disease (disease that doesn't go away) from getting worse. This is important as you get older and begin to notice twinges in muscles and joints and a decline in the strength and stamina you once took for granted. A healthy lifestyle includes good healthcare, good nutrition, weight control, recreation, and regular exercise. Avoid harmful substances and do what you can to keep safe. Another part of a healthy lifestyle is stay mentally active and socially involved.    Good healthcare   Have a wellness visit every year.   If you have new symptoms, let us know right away. Don't wait until the next checkup.   Take medicines exactly as prescribed and keep your medicines in a safe place. Tell us if your medicine causes problems.   Healthy diet and weight control   Eat 3 or 4 small, nutritious, low-fat, high-fiber meals a day. Include a variety of  "fruits, vegetables, and whole-grain foods.   Make sure you get enough calcium in your diet. Calcium, vitamin D, and exercise help prevent osteoporosis (bone thinning).   If you live alone, try eating with others when you can. That way you get a good meal and have company while you eat it.   Try to keep a healthy weight. If you eat more calories than your body uses for energy, it will be stored as fat and you will gain weight.     Recreation   Recreation is not limited to sports and team events. It includes any activity that provides relaxation, interest, enjoyment, and exercise. Recreation provides an outlet for physical, mental, and social energy. It can give a sense of worth and achievement. It can help you stay healthy.    Mental Exercise and Social Involvement  Mental and emotional health is as important as physical health. Keep in touch with friends and family. Stay as active as possible. Continue to learn and challenge yourself.   Things you can do to stay mentally active are:  Learn something new, like a foreign language or musical instrument.   Play SCRABBLE or do crossword puzzles. If you cannot find people to play these games with you at home, you can play them with others on your computer through the Internet.   Join a games club--anything from card games to chess or checkers or lawn bowling.   Start a new hobby.   Go back to school.   Volunteer.   Read.   Keep up with world events.  Learning About Being Physically Active  What is physical activity?     Being physically active means doing any kind of activity that gets your body moving.  The types of physical activity that can help you get fit and stay healthy include:  Aerobic or \"cardio\" activities. These make your heart beat faster and make you breathe harder, such as brisk walking, riding a bike, or running. They strengthen your heart and lungs and build up your endurance.  Strength training activities. These make your muscles work against, or " "\"resist,\" something. Examples include lifting weights or doing push-ups. These activities help tone and strengthen your muscles and bones.  Stretches. These let you move your joints and muscles through their full range of motion. Stretching helps you be more flexible.  Reaching a balance between these three types of physical activity is important because each one contributes to your overall fitness.  What are the benefits of being active?  Being active is one of the best things you can do for your health. It helps you to:  Feel stronger and have more energy to do all the things you like to do.  Focus better at school or work.  Feel, think, and sleep better.  Reach and stay at a healthy weight.  Lose fat and build lean muscle.  Lower your risk for serious health problems, including diabetes, heart attack, high blood pressure, and some cancers.  Keep your heart, lungs, bones, muscles, and joints strong and healthy.  How can you make being active part of your life?  Start slowly. Make it your long-term goal to get at least 30 minutes of exercise on most days of the week. Walking is a good choice. You also may want to do other activities, such as running, swimming, cycling, or playing tennis or team sports.  Pick activities that you like--ones that make your heart beat faster, your muscles stronger, and your muscles and joints more flexible. If you find more than one thing you like doing, do them all. You don't have to do the same thing every day.  Get your heart pumping every day. Any activity that makes your heart beat faster and keeps it at that rate for a while counts.  Here are some great ways to get your heart beating faster:  Go for a brisk walk, run, or hike.  Go for a swim or bike ride.  Take an online exercise class or dance.  Play a game of touch football, basketball, or soccer.  Play tennis, pickleball, or racquetball.  Climb stairs.  Even some household chores can be aerobic. Just do them at a faster pace. " "Raking or mowing the lawn, sweeping the garage, and vacuuming and cleaning your home all can help get your heart rate up.  Strengthen your muscles during the week. You don't have to lift heavy weights or grow big, bulky muscles to get stronger. Doing a few simple activities that make your muscles work against, or \"resist,\" something can help you get stronger. Aim for at least twice a week.  For example, you can:  Do push-ups or sit-ups, which use your own body weight as resistance.  Lift weights or dumbbells or use stretch bands at home or in a gym or community center.  Stretch your muscles often. Stretching will help you as you become more active. It can help you stay flexible and loosen tight muscles. It can also help improve your balance and posture and can be a great way to relax.  Be sure to stretch the muscles you'll be using when you work out. It's best to warm your muscles slightly before you stretch them. Walk or do some other light aerobic activity for a few minutes. Then start stretching.  When you stretch your muscles:  Do it slowly. Stretching is not about going fast or making sudden movements.  Don't push or bounce during a stretch.  Hold each stretch for at least 15 to 30 seconds, if you can. You should feel a stretch in the muscle, but not pain.  Breathe out as you do the stretch. Then breathe in as you hold the stretch. Don't hold your breath.  If you're worried about how more activity might affect your health, have a checkup before you start. Follow any special advice your doctor gives you for getting a smart start.  Where can you learn more?  Go to https://www.[employer].net/patiented  Enter W332 in the search box to learn more about \"Learning About Being Physically Active.\"  Current as of: June 6, 2023               Content Version: 13.8    1684-6606 [employer].   Care instructions adapted under license by your healthcare professional. If you have questions about a medical condition " or this instruction, always ask your healthcare professional. [employer] disclaims any warranty or liability for your use of this information.      Learning About Dietary Guidelines  What are the Dietary Guidelines for Americans?     Dietary Guidelines for Americans provide tips for eating well and staying healthy. This helps reduce the risk for long-term (chronic) diseases.  These guidelines recommend that you:  Eat and drink the right amount for you. The [place] government's food guide is called MyPlate. It can help you make your own well-balanced eating plan.  Try to balance your eating with your activity. This helps you stay at a healthy weight.  Drink alcohol in moderation, if at all.  Limit foods high in salt, saturated fat, trans fat, and added sugar.  These guidelines are from the [place] Department of Agriculture and the [place] Department of Health and Human Services. They are updated every 5 years.  What is MyPlate?  MyPlate is the [place] government's food guide. It can help you make your own well-balanced eating plan. A balanced eating plan means that you eat enough, but not too much, and that your food gives you the nutrients you need to stay healthy.  MyPlate focuses on eating plenty of whole grains, fruits, and vegetables, and on limiting fat and sugar. It is available online at www.ChooseMyPlate.gov.  How can you get started?  If you're trying to eat healthier, you can slowly change your eating habits over time. You don't have to make big changes all at once. Start by adding one or two healthy foods to your meals each day.  Grains  Choose whole-grain breads and cereals and whole-wheat pasta and whole-grain crackers.  Vegetables  Eat a variety of vegetables every day. They have lots of nutrients and are part of a heart-healthy diet.  Fruits  Eat a variety of fruits every day. Fruits contain lots of nutrients. Choose fresh fruit instead of fruit juice.  Protein foods  Choose fish and lean poultry  "more often. Eat red meat and fried meats less often. Dried beans, tofu, and nuts are also good sources of protein.  Dairy  Choose low-fat or fat-free products from this food group. If you have problems digesting milk, try eating cheese or yogurt instead.  Fats and oils  Limit fats and oils if you're trying to cut calories. Choose healthy fats when you cook. These include canola oil and olive oil.  Where can you learn more?  Go to https://www.[employer].net/patiented  Enter D676 in the search box to learn more about \"Learning About Dietary Guidelines.\"  Current as of: February 28, 2023               Content Version: 13.8    0235-6887 [employer].   Care instructions adapted under license by your healthcare professional. If you have questions about a medical condition or this instruction, always ask your healthcare professional. [employer] disclaims any warranty or liability for your use of this information.      Hearing Loss: Care Instructions  Overview     Hearing loss is a sudden or slow decrease in how well you hear. It can range from slight to profound. Permanent hearing loss can occur with aging. It also can happen when you are exposed long-term to loud noise. Examples include listening to loud music, riding motorcycles, or being around other loud machines.  Hearing loss can affect your work and home life. It can make you feel lonely or depressed. You may feel that you have lost your independence. But hearing aids and other devices can help you hear better and feel connected to others.  Follow-up care is a key part of your treatment and safety. Be sure to make and go to all appointments, and call your doctor if you are having problems. It's also a good idea to know your test results and keep a list of the medicines you take.  How can you care for yourself at home?  Avoid loud noises whenever possible. This helps keep your hearing from getting worse.  Always wear hearing protection " "around loud noises.  Wear a hearing aid as directed.  A professional can help you pick a hearing aid that will work best for you.  You can also get hearing aids over the counter for mild to moderate hearing loss.  Have hearing tests as your doctor suggests. They can show whether your hearing has changed. Your hearing aid may need to be adjusted.  Use other devices as needed. These may include:  Telephone amplifiers and hearing aids that can connect to a television, stereo, radio, or microphone.  Devices that use lights or vibrations. These alert you to the doorbell, a ringing telephone, or a baby monitor.  Television closed-captioning. This shows the words at the bottom of the screen. Most new TVs can do this.  TTY (text telephone). This lets you type messages back and forth on the telephone instead of talking or listening. These devices are also called TDD. When messages are typed on the keyboard, they are sent over the phone line to a receiving TTY. The message is shown on a monitor.  Use text messaging, social media, and email if it is hard for you to communicate by telephone.  Try to learn a listening technique called speechreading. It is not lipreading. You pay attention to people's gestures, expressions, posture, and tone of voice. These clues can help you understand what a person is saying. Face the person you are talking to, and have them face you. Make sure the lighting is good. You need to see the other person's face clearly.  Think about counseling if you need help to adjust to your hearing loss.  When should you call for help?  Watch closely for changes in your health, and be sure to contact your doctor if:    You think your hearing is getting worse.     You have new symptoms, such as dizziness or nausea.   Where can you learn more?  Go to https://www.healthwise.net/patiented  Enter R798 in the search box to learn more about \"Hearing Loss: Care Instructions.\"  Current as of: February 28, 2023             "   Content Version: 13.8    7740-1336 [employer] [facility].   Care instructions adapted under license by your healthcare professional. If you have questions about a medical condition or this instruction, always ask your healthcare professional. [employer] [facility] disclaims any warranty or liability for your use of this information.      Bladder Training: Care Instructions  Your Care Instructions     Bladder training is used to treat urge incontinence and stress incontinence. Urge incontinence means that the need to urinate comes on so fast that you can't get to a toilet in time. Stress incontinence means that you leak urine because of pressure on your bladder. For example, it may happen when you laugh, cough, or lift something heavy.  Bladder training can increase how long you can wait before you have to urinate. It can also help your bladder hold more urine. And it can give you better control over the urge to urinate.  It is important to remember that bladder training takes a few weeks to a few months to make a difference. You may not see results right away, but don't give up.  Follow-up care is a key part of your treatment and safety. Be sure to make and go to all appointments, and call your doctor if you are having problems. It's also a good idea to know your test results and keep a list of the medicines you take.  How can you care for yourself at home?  Work with your doctor to come up with a bladder training program that is right for you. You may use one or more of the following methods.  Delayed urination  In the beginning, try to keep from urinating for 5 minutes after you first feel the need to go.  While you wait, take deep, slow breaths to relax. Kegel exercises can also help you delay the need to go to the bathroom.  After some practice, when you can easily wait 5 minutes to urinate, try to wait 10 minutes before you urinate.  Slowly increase the waiting period until you are able to control when  "you have to urinate.  Scheduled urination  Empty your bladder when you first wake up in the morning.  Schedule times throughout the day when you will urinate.  Start by going to the bathroom every hour, even if you don't need to go.  Slowly increase the time between trips to the bathroom.  When you have found a schedule that works well for you, keep doing it.  If you wake up during the night and have to urinate, do it. Apply your schedule to waking hours only.  Kegel exercises  These tighten and strengthen pelvic muscles, which can help you control the flow of urine. (If doing these exercises causes pain, stop doing them and talk with your doctor.) To do Kegel exercises:  Squeeze your muscles as if you were trying not to pass gas. Or squeeze your muscles as if you were stopping the flow of urine. Your belly, legs, and buttocks shouldn't move.  Hold the squeeze for 3 seconds, then relax for 5 to 10 seconds.  Start with 3 seconds, then add 1 second each week until you are able to squeeze for 10 seconds.  Repeat the exercise 10 times a session. Do 3 to 8 sessions a day.  When should you call for help?  Watch closely for changes in your health, and be sure to contact your doctor if:    Your incontinence is getting worse.     You do not get better as expected.   Where can you learn more?  Go to https://www.[employer].net/patiented  Enter V684 in the search box to learn more about \"Bladder Training: Care Instructions.\"  Current as of: February 28, 2023               Content Version: 13.8    7633-7496 [employer].   Care instructions adapted under license by your healthcare professional. If you have questions about a medical condition or this instruction, always ask your healthcare professional. [employer] disclaims any warranty or liability for your use of this information.      Learning About Depression Screening  What is depression screening?  Depression screening is a way to see if you have " "depression symptoms. It may be done by a doctor or counselor. It's often part of a routine checkup. That's because your mental health is just as important as your physical health.  Depression is a mental health condition that affects how you feel, think, and act. You may:  Have less energy.  Lose interest in your daily activities.  Feel sad and grouchy for a long time.  Depression is very common. It affects people of all ages.  Many things can lead to depression. Some people become depressed after they have a stroke or find out they have a major illness like cancer or heart disease. The death of a loved one or a breakup may lead to depression. It can run in families. Most experts believe that a combination of inherited genes and stressful life events can cause it.  What happens during screening?  You may be asked to fill out a form about your depression symptoms. You and the doctor will discuss your answers. The doctor may ask you more questions to learn more about how you think, act, and feel.  What happens after screening?  If you have symptoms of depression, your doctor will talk to you about your options.  Doctors usually treat depression with medicines or counseling. Often, combining the two works best. Many people don't get help because they think that they'll get over the depression on their own. But people with depression may not get better unless they get treatment.  The cause of depression is not well understood. There may be many factors involved. But if you have depression, it's not your fault.  A serious symptom of depression is thinking about death or suicide. If you or someone you care about talks about this or about feeling hopeless, get help right away.  It's important to know that depression can be treated. Medicine, counseling, and self-care may help.  Where can you learn more?  Go to https://www.healthwise.net/patiented  Enter T185 in the search box to learn more about \"Learning About Depression " "Screening.\"  Current as of: June 25, 2023               Content Version: 13.8    7716-4485 [employer].   Care instructions adapted under license by your healthcare professional. If you have questions about a medical condition or this instruction, always ask your healthcare professional. [employer] disclaims any warranty or liability for your use of this information.         "

## 2024-01-18 NOTE — PROGRESS NOTES
"Preventive Care Visit  [facility]  Samanta Maza MD, Family Medicine  Jan 18, 2024      SUBJECTIVE:   Chanelle is a 68 year old, presenting for the following:  Physical (AWV)        1/18/2024    12:53 PM   Additional Questions   Roomed by Ruth Ann     Are you in the first 12 months of your Medicare coverage?  No    Healthy Habits:     In general, how would you rate your overall health?  Fair    Frequency of exercise:  1 day/week    Duration of exercise:  Less than 15 minutes    Do you usually eat at least 4 servings of fruit and vegetables a day, include whole grains    & fiber and avoid regularly eating high fat or \"junk\" foods?  No    Taking medications regularly:  Yes    Medication side effects:  None    Ability to successfully perform activities of daily living:  No assistance needed    Home Safety:  No safety concerns identified    Hearing Impairment:  Difficulty following a conversation in a noisy restaurant or crowded room and difficulty understanding speech on the telephone    In the past 6 months, have you been bothered by leaking of urine? Yes    In general, how would you rate your overall mental or emotional health?  Good    Additional concerns today:  No    Today's PHQ-9 Score:       1/18/2024    12:53 PM   PHQ-9 SCORE   PHQ-9 Total Score MyChart 9 (Mild depression)   PHQ-9 Total Score 9         Via the Health Maintenance questionnaire, the patient has reported the following services have been completed -Eye Exam, this information has been sent to the abstraction team.  Have you ever done Advance Care Planning? (For example, a Health Directive, POLST, or a discussion with a medical provider or your loved ones about your wishes): No, advance care planning information given to patient to review.  Patient plans to discuss their wishes with loved ones or provider.         Fall risk  Fallen 2 or more times in the past year?: No  Any fall with injury in the past year?: " No    Cognitive Screening   1) Repeat 3 items (Leader, Season, Table)    2) Clock draw: NORMAL  3) 3 item recall: Recalls 3 objects  Results: 3 items recalled: COGNITIVE IMPAIRMENT LESS LIKELY    Mini-CogTM Copyright S Yaniv. Licensed by the author for use in [facility]; reprinted with permission ([email or web address]@[facility]). All rights reserved.          Reviewed and updated as needed this visit by clinical staff   Tobacco  Allergies  Meds  Problems             Reviewed and updated as needed this visit by Provider    Allergies  Meds  Problems             Social History     Tobacco Use    Smoking status: Former     Passive exposure: Past    Smokeless tobacco: Never    Tobacco comments:     smoked in high school and a little after her  .   Substance Use Topics    Alcohol use: Yes     Comment: occasionally             2024     1:02 PM   Alcohol Use   Prescreen: >3 drinks/day or >7 drinks/week? No     Do you have a current opioid prescription? No  Do you use any other controlled substances or medications that are not prescribed by a provider? None              Current providers sharing in care for this patient include:   Patient Care Team:  Samanta Maza MD as PCP - [facility] (Family Medicine)  [facility]  Samanta Maza MD (Family Practice)  Lan Long MD as Assigned Heart and Vascular Provider  Samanta Maza MD as Assigned PCP  Avril Martinez RD as Diabetes Educator  Anyi Pisano [facility] as Assigned [facility] Pharmacist  Lan Long MD as MD (Cardiovascular Disease)  Sofi Barrientos LICSW as Lead Care Coordinator  Niurka Rader CHW as Community Health Worker    The following health maintenance items are reviewed in Epic and correct as of today:  Health Maintenance   Topic Date Due    HF ACTION PLAN  Never done    COLORECTAL CANCER SCREENING  Never done    RSV VACCINE (Pregnancy & 60+) (1 - 1-dose 60+ series) Never done    ZOSTER IMMUNIZATION (2  of 2) 10/20/2021    DIABETIC FOOT EXAM  06/27/2023    COVID-19 Vaccine (6 - 2023-24 season) 09/01/2023    EYE EXAM  11/14/2023    A1C  12/19/2023    ALT  12/20/2023    CBC  12/20/2023    DEPRESSION 6 MO INDEX REPEAT PHQ-9  02/01/2024    BMP  03/19/2024    LIPID  06/20/2024    MICROALBUMIN  06/20/2024    PHQ-9  07/18/2024    MAMMO SCREENING  10/11/2024    MEDICARE ANNUAL WELLNESS VISIT  01/18/2025    ANNUAL REVIEW OF HM ORDERS  01/18/2025    FALL RISK ASSESSMENT  01/18/2025    DTAP/TDAP/TD IMMUNIZATION (3 - Td or Tdap) 03/23/2028    ADVANCE CARE PLANNING  01/18/2029    DEXA  08/23/2036    TSH W/FREE T4 REFLEX  Completed    HEPATITIS C SCREENING  Completed    DEPRESSION ACTION PLAN  Completed    INFLUENZA VACCINE  Completed    Pneumococcal Vaccine: 65+ Years  Completed    IPV IMMUNIZATION  Aged Out    HPV IMMUNIZATION  Aged Out    MENINGITIS IMMUNIZATION  Aged Out    RSV MONOCLONAL ANTIBODY  Aged Out    LUNG CANCER SCREENING  Discontinued               12/20/2022     2:33 PM   Breast CA Risk Assessment (FHS-7)   Do you have a family history of breast, colon, or ovarian cancer? No / Unknown           Pertinent mammograms are reviewed under the imaging tab.  Review of Systems   Constitutional:  Positive for chills. Negative for fever.   HENT:  Positive for congestion, hearing loss and sore throat. Negative for ear pain.    Eyes:  Negative for pain and visual disturbance.   Respiratory:  Positive for cough and shortness of breath.    Cardiovascular:  Positive for chest pain. Negative for palpitations.   Gastrointestinal:  Positive for diarrhea. Negative for abdominal pain, constipation and nausea.   Genitourinary:  Positive for frequency. Negative for dysuria, genital sores, hematuria, pelvic pain, urgency, vaginal bleeding and vaginal discharge.   Musculoskeletal:  Positive for arthralgias and myalgias. Negative for joint swelling.   Skin:  Negative for rash.   Neurological:  Negative for dizziness, weakness and  "headaches.   Psychiatric/Behavioral:  The patient is nervous/anxious.           OBJECTIVE:   BP 94/60 (BP Location: Right arm, Patient Position: Sitting)   Pulse 80   Temp 97.3  F (36.3  C) (Tympanic)   Resp 16   Ht 1.562 m (5' 1.5\")   Wt 54.9 kg (121 lb)   LMP  (LMP Unknown)   SpO2 98%   BMI 22.49 kg/m     Estimated body mass index is 22.49 kg/m  as calculated from the following:    Height as of this encounter: 1.562 m (5' 1.5\").    Weight as of this encounter: 54.9 kg (121 lb).  Physical Exam      General: alert and oriented ×3 no acute distress.    HEENT: pupils are equal round and reactive to light extraocular motion is intact. Normocephalic and atraumatic.     Hearing is grossly normal and there is no otorrhea.     Chest: has bilateral rise with no increased work of breathing.right lung has some rhonchi present today but did not clear with coughing    Cardiovascular: normal perfusion and brisk capillary refill. s1s2    Musculoskeletal: no gross focal abnormalities and normal gait.    Neuro: no gross focal abnormalities and memory seems intact.    Psychiatric: speech is clear and coherent and fluent. Patient dressed appropriately for the weather. Mood is appropriate and affect is full.        normal DP and PT pulses,  and normal sensory exam   She does have a corn at the plantar surface of the left second toe        ASSESSMENT / PLAN:       ICD-10-CM    1. Screen for colon cancer  Z12.11 Fecal colorectal cancer screen (FIT)      2. Need for shingles vaccine  Z23       3. Need for vaccination against respiratory syncytial virus  Z29.11       4. Type 2 diabetes mellitus with other circulatory complication, with long-term current use of insulin (H)  E11.59 Orthopedic  Referral    Z79.4       5. Hyperlipidemia LDL goal <70  E78.5 ALT      6. Congestive heart failure, unspecified HF chronicity, unspecified heart failure type (H)  I50.9 CBC with Platelets      7. Encounter for Medicare annual wellness " exam  Z00.00       8. [place] or callus  L84 Orthopedic  Referral      Patient is here today with her daughter  Last eye exam 03/2023 no reported retinopathy    Recent gastroenteritis, now resolved    Type 2 diabetes patient has been working with our [facility] pharmacist.  She is getting an updated hemoglobin A1c today.  Cough improved now    Left second toe corn referred to podiatry    Candidate for RSV and shingles vaccines will have to get this done at her pharmacy    Right lung rhonchi, no significant history of tobacco abuse, no shortness of breath no fevers or chills will get a CBC today and chest x-ray.  We will get a get back together next week to review these results.    Due for screening for colon cancer would like to do a fit test provided specimen collection kit today.          Counseling  Reviewed preventive health counseling, as reflected in patient instructions        She reports that she has quit smoking. She has been exposed to tobacco smoke. She has never used smokeless tobacco.      Appropriate preventive services were discussed with this patient, including applicable screening as appropriate for fall prevention, nutrition, physical activity, Tobacco-use cessation, weight loss and cognition.  Checklist reviewing preventive services available has been given to the patient.    Reviewed patients plan of care and provided an AVS. The Basic Care Plan (routine screening as documented in Health Maintenance) for Chanelle meets the Care Plan requirement. This Care Plan has been established and reviewed with the Patient.          Signed Electronically by: Samanta Maza MD    Identified Health Risks  I have reviewed Opioid Use Disorder and Substance Use Disorder risk factors and made any needed referrals.   Answers submitted by the patient for this visit:  Patient Health Questionnaire (Submitted on 1/18/2024)  If you checked off any problems, how difficult have these problems made it for you to do your  work, take care of things at home, or get along with other people?: Somewhat difficult  PHQ9 TOTAL SCORE: 9  Annual Preventive Visit (Submitted on 1/18/2024)  Chief Complaint: Annual Exam:  Blood in stool: No  heartburn: Yes  peripheral edema: No  mood changes: No  Skin sensation changes: Yes  tenderness: No  breast mass: No  breast discharge: No

## 2024-01-19 LAB — ALT SERPL W P-5'-P-CCNC: 75 U/L (ref 0–50)

## 2024-01-22 RX ORDER — INSULIN LISPRO 100 [IU]/ML
INJECTION, SOLUTION INTRAVENOUS; SUBCUTANEOUS
Qty: 15 ML | Refills: 0 | Status: SHIPPED | OUTPATIENT
Start: 2024-01-22 | End: 2024-05-09

## 2024-01-26 ENCOUNTER — TELEPHONE (OUTPATIENT)
Dept: FAMILY MEDICINE | Facility: CLINIC | Age: 69
End: 2024-01-26
Payer: COMMERCIAL

## 2024-01-26 NOTE — TELEPHONE ENCOUNTER
Left voicemail for patient to return call to clinic. When patient returns call, please help her schedule an appointment with Dr. Maza to discuss lab results.

## 2024-01-26 NOTE — TELEPHONE ENCOUNTER
----- Message from Samanta Maza MD sent at 1/25/2024  7:02 PM CST -----  Please invite patient to schedule a follow-up appointment with me to discuss her elevated liver enzymes.  Thank you

## 2024-01-29 ENCOUNTER — PATIENT OUTREACH (OUTPATIENT)
Dept: CARE COORDINATION | Facility: CLINIC | Age: 69
End: 2024-01-29
Payer: COMMERCIAL

## 2024-01-29 NOTE — PROGRESS NOTES
Clinic Care Coordination Contact  Community Health Worker Follow Up    Care Gaps:     Health Maintenance Due   Topic Date Due    HF ACTION PLAN  Never done    COLORECTAL CANCER SCREENING  Never done    RSV VACCINE (Pregnancy & 60+) (1 - 1-dose 60+ series) Never done    ZOSTER IMMUNIZATION (2 of 2) 10/20/2021    COVID-19 Vaccine (6 - 2023-24 season) 09/01/2023       Patient accepted scheduling phone number for [facility]  to schedule independently     Care Plan:   Care Plan: Financial Wellbeing       Problem: Dominique Care       Goal: Dominique Care and Insurance Help  Completed 11/15/2023      Start Date: 10/6/2023    This Visit's Progress: 100%    Note:     Personal Plan  If I am wanting to look into [employer] Financial Assistance to help with medical bills in the future I can call of go online at:  [email or web address]  Or you call [phone] or (toll-free) [phone], Monday through Thursday, 8 a.m. to 5:30 p.m. and Friday 9 a.m. to 4:30 p.m.                                Intervention and Education during outreach: Patient let CHW know that she is doing good and has no needs at this time.     CHW Plan: Patient has been on [facility] Maintenance since 11/15/23 and has no other goals that this patient would like to work on with Clinic Care Coordination. CHW sent request to [facility] RN pool to review for Graduation.     Niurka Rader  Community Health Worker  [facility]  Clinic Care Coordination   [place], [facility], [place], [place], [facility]  Office: [phone]

## 2024-01-29 NOTE — TELEPHONE ENCOUNTER
I-stop/Event Note Left voicemail for patient to return call to clinic. When patient returns call, please give help schedule.    Xochitl Saenz CMA ............... 5:28 PM, 01/29/24

## 2024-01-30 ENCOUNTER — PATIENT OUTREACH (OUTPATIENT)
Dept: CARE COORDINATION | Facility: CLINIC | Age: 69
End: 2024-01-30
Payer: COMMERCIAL

## 2024-01-30 NOTE — LETTER
[facility]ORDIN[facility]  [address] [address]   January 30, 2024    Chanelle Milton Wenceslao  [address] [address]    Dear Chanelle,  Your Care Team congratulates you on your journey to maintain wellness. This document will help guide you on your journey to maintain a healthy lifestyle.  You can use this to help you overcome any barriers you may encounter.  If you should have any questions or concerns, you can contact the members of your Care Team or contact your Primary Care Clinic for assistance.     Health Maintenance  Health Maintenance Reviewed:    Health Maintenance Due   Topic Date Due    HF ACTION PLAN  Never done    COLORECTAL CANCER SCREENING  Never done    RSV VACCINE (Pregnancy & 60+) (1 - 1-dose 60+ series) Never done    ZOSTER IMMUNIZATION (2 of 2) 10/20/2021    COVID-19 Vaccine (6 - 2023-24 season) 09/01/2023        My Access Plan  Medical Emergency 911   Primary Clinic Line [facility] - [phone]   24 Hour Appointment Line [phone] or  [phone]) (toll-free)   24 Hour Nurse Line [phone] (toll-free)   Preferred Urgent Care     Preferred Hospital     Preferred Pharmacy [place], [place] - [address]     Behavioral Health Crisis Line The National Suicide Prevention Lifeline at 1-478.203.2357 or 860     My Care Team Members  Patient Care Team         Relationship Specialty Notifications Start End    Samanta Maza MD PCP - General Family Medicine  2/5/22     Merged    Phone: [phone] Fax: [phone]         [address] [address]    [facility]    2/6/15     Merged    Phone: [phone] Fax: [phone]         [address] [place] [address]    Samanta Maza MD  Family Practice  2/5/22     Merged    Phone: [phone] Fax: [phone]         [address] [facility] [address] [address]    Lan Long MD Assigned Heart and Vascular  Provider   2/13/22     Phone: [phone] Fax: [phone] [address] [facility] [address]    Samanta Maza MD Assigned PCP   2/27/22     Phone: [phone] Fax: [phone]         [address] [address]    Avril Martinez RD Diabetes Educator   3/29/22     Phone: [phone] Fax: [phone]         [address] [facility] [address] [facility] [address]    Anyi Pisano, [facility] Assigned MTM Pharmacist   2/4/23     Phone: [phone] Fax: [phone]         [address] [facility] [address]    Lan Long MD MD Cardiovascular Disease  8/7/23     Phone: [phone] Fax: [phone]         [address] [facility] [address] [facility] [address]    Niurka Rader, [facility] Community Health Worker  Admissions 10/6/23 1/30/24                 Goals    None          Advance Care Plans/Directives Type:      We notice that you do not have an Advance Directive on file. Upon completion of your Health Care Directive, please bring a copy with you to your next office visit.    It has been your Clinic Care Team's pleasure to work with you on your goals.    Regards,  Your Clinic Care Team

## 2024-02-08 ENCOUNTER — MEDICAL CORRESPONDENCE (OUTPATIENT)
Dept: HEALTH INFORMATION MANAGEMENT | Facility: CLINIC | Age: 69
End: 2024-02-08

## 2024-02-08 ENCOUNTER — TRANSFERRED RECORDS (OUTPATIENT)
Dept: HEALTH INFORMATION MANAGEMENT | Facility: CLINIC | Age: 69
End: 2024-02-08

## 2024-02-08 ENCOUNTER — ANCILLARY PROCEDURE (OUTPATIENT)
Dept: GENERAL RADIOLOGY | Facility: CLINIC | Age: 69
End: 2024-02-08
Attending: STUDENT IN AN ORGANIZED HEALTH CARE EDUCATION/TRAINING PROGRAM
Payer: COMMERCIAL

## 2024-02-08 DIAGNOSIS — M79.672 PAIN IN BOTH FEET: ICD-10-CM

## 2024-02-08 DIAGNOSIS — M79.671 PAIN IN BOTH FEET: ICD-10-CM

## 2024-02-13 ENCOUNTER — OFFICE VISIT (OUTPATIENT)
Dept: FAMILY MEDICINE | Facility: CLINIC | Age: 69
End: 2024-02-13
Payer: COMMERCIAL

## 2024-02-13 VITALS
TEMPERATURE: 98 F | WEIGHT: 125.8 LBS | HEART RATE: 67 BPM | BODY MASS INDEX: 23.15 KG/M2 | HEIGHT: 62 IN | RESPIRATION RATE: 16 BRPM | DIASTOLIC BLOOD PRESSURE: 60 MMHG | OXYGEN SATURATION: 98 % | SYSTOLIC BLOOD PRESSURE: 100 MMHG

## 2024-02-13 DIAGNOSIS — I25.119 CORONARY ARTERY DISEASE INVOLVING NATIVE CORONARY ARTERY OF NATIVE HEART WITH ANGINA PECTORIS (H): ICD-10-CM

## 2024-02-13 DIAGNOSIS — Z79.4 TYPE 2 DIABETES MELLITUS WITH OTHER CIRCULATORY COMPLICATION, WITH LONG-TERM CURRENT USE OF INSULIN (H): Primary | ICD-10-CM

## 2024-02-13 DIAGNOSIS — F33.1 MAJOR DEPRESSIVE DISORDER, RECURRENT EPISODE, MODERATE WITH ANXIOUS DISTRESS (H): ICD-10-CM

## 2024-02-13 DIAGNOSIS — E78.00 HYPERCHOLESTEROLEMIA: ICD-10-CM

## 2024-02-13 DIAGNOSIS — E11.59 TYPE 2 DIABETES MELLITUS WITH OTHER CIRCULATORY COMPLICATION, WITH LONG-TERM CURRENT USE OF INSULIN (H): Primary | ICD-10-CM

## 2024-02-13 DIAGNOSIS — I10 ESSENTIAL HYPERTENSION: ICD-10-CM

## 2024-02-13 DIAGNOSIS — Z29.11 NEED FOR VACCINATION AGAINST RESPIRATORY SYNCYTIAL VIRUS: ICD-10-CM

## 2024-02-13 DIAGNOSIS — R74.8 ELEVATED LIVER ENZYMES: ICD-10-CM

## 2024-02-13 PROCEDURE — 80074 ACUTE HEPATITIS PANEL: CPT | Performed by: FAMILY MEDICINE

## 2024-02-13 PROCEDURE — 36415 COLL VENOUS BLD VENIPUNCTURE: CPT | Performed by: FAMILY MEDICINE

## 2024-02-13 PROCEDURE — 91320 SARSCV2 VAC 30MCG TRS-SUC IM: CPT | Performed by: FAMILY MEDICINE

## 2024-02-13 PROCEDURE — 90480 ADMN SARSCOV2 VAC 1/ONLY CMP: CPT | Performed by: FAMILY MEDICINE

## 2024-02-13 PROCEDURE — 86708 HEPATITIS A ANTIBODY: CPT | Performed by: FAMILY MEDICINE

## 2024-02-13 PROCEDURE — 80053 COMPREHEN METABOLIC PANEL: CPT | Performed by: FAMILY MEDICINE

## 2024-02-13 PROCEDURE — 99215 OFFICE O/P EST HI 40 MIN: CPT | Performed by: FAMILY MEDICINE

## 2024-02-13 PROCEDURE — 86706 HEP B SURFACE ANTIBODY: CPT | Performed by: FAMILY MEDICINE

## 2024-02-13 RX ORDER — NITROGLYCERIN 0.4 MG/1
0.4 TABLET SUBLINGUAL EVERY 5 MIN PRN
Qty: 30 TABLET | Refills: 1 | Status: SHIPPED | OUTPATIENT
Start: 2024-02-13

## 2024-02-13 RX ORDER — RESPIRATORY SYNCYTIAL VIRUS VACCINE 120MCG/0.5
0.5 KIT INTRAMUSCULAR ONCE
Qty: 1 EACH | Refills: 0 | Status: SHIPPED | OUTPATIENT
Start: 2024-02-13 | End: 2024-02-13

## 2024-02-13 RX ORDER — ROSUVASTATIN CALCIUM 40 MG/1
40 TABLET, COATED ORAL AT BEDTIME
Qty: 90 TABLET | Refills: 3 | Status: SHIPPED | OUTPATIENT
Start: 2024-02-13

## 2024-02-13 ASSESSMENT — PATIENT HEALTH QUESTIONNAIRE - PHQ9
SUM OF ALL RESPONSES TO PHQ QUESTIONS 1-9: 12
10. IF YOU CHECKED OFF ANY PROBLEMS, HOW DIFFICULT HAVE THESE PROBLEMS MADE IT FOR YOU TO DO YOUR WORK, TAKE CARE OF THINGS AT HOME, OR GET ALONG WITH OTHER PEOPLE: SOMEWHAT DIFFICULT
SUM OF ALL RESPONSES TO PHQ QUESTIONS 1-9: 12

## 2024-02-13 NOTE — PROGRESS NOTES
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)    Relevant Medications    respiratory syncytial virus vaccine, bivalent (ABRYSVO) injection    nitroGLYcerin (NITROSTAT) 0.4 MG sublingual tablet    Type 2 diabetes mellitus with circulatory disorder, with long-term current use of insulin (H) - Primary    Relevant Orders    Adult Diabetes Education  Referral    Major depressive disorder, recurrent episode, moderate with anxious distress (H)    Essential hypertension     Other Visit Diagnoses       Need for vaccination against respiratory syncytial virus        Hypercholesterolemia        Relevant Medications    respiratory syncytial virus vaccine, bivalent (ABRYSVO) injection    rosuvastatin (CRESTOR) 40 MG tablet    Elevated liver enzymes        Relevant Orders    US Abdomen Limited    Acute hepatitis panel    Comprehensive metabolic panel (BMP + Alb, Alk Phos, ALT, AST, Total. Bili, TP)           Patient here today with her daughter and granddaughter    Coronary artery disease did recommend the patient.  The pharmacy can see vaccine and the new nitroglycerin    Type 2 diabetes patient is on her last week of Victoza before she starts her Mounjaro would like her to be seen in 3 to 4 weeks by her diabetes educator.  Possible  we will be able to decrease some of her other medications or her as needed insulin.  She continues to use her continuous glucose monitor.    Patient depressive disorder we will continue current treatment.    Hypertension well-controlled will continue with current treatment.    High cholesterol patient has not yet started rosuvastatin.  Prescription to [facility].  Previously sent to [facility].  she has no longer uses [facility].    Elevated liver enzyme will get acute hepatitis panel and check right upper quadrant ultrasound and comprehensive metabolic panel would like  "patient to follow-up after she gets these done.      Total time spent reviewing chart and preparing for appointment, with patient for appointment, and time spent charting and coordinating care on the day of the appointment in minutes was:  41                 Subjective   Chanelle is a 68 year old, presenting for the following health issues:  Results (Follow up elevated liver enzymes. )        2/13/2024     1:47 PM   Additional Questions   Roomed by Ruth Ann     History of Present Illness       Reason for visit:  Talk about results    She eats 0-1 servings of fruits and vegetables daily.She consumes 4 sweetened beverage(s) daily.She exercises with enough effort to increase her heart rate 9 or less minutes per day.  She exercises with enough effort to increase her heart rate 3 or less days per week. She is missing 1 dose(s) of medications per week.                     Objective    /60 (BP Location: Right arm, Patient Position: Sitting)   Pulse 67   Temp 98  F (36.7  C) (Tympanic)   Resp 16   Ht 1.562 m (5' 1.5\")   Wt 57.1 kg (125 lb 12.8 oz)   LMP  (LMP Unknown)   SpO2 98%   BMI 23.38 kg/m    Body mass index is 23.38 kg/m .  Physical Exam               Signed Electronically by: Samanta Maza MD    "

## 2024-02-14 LAB
ALBUMIN SERPL BCG-MCNC: 4.4 G/DL (ref 3.5–5.2)
ALP SERPL-CCNC: 109 U/L (ref 40–150)
ALT SERPL W P-5'-P-CCNC: 60 U/L (ref 0–50)
ANION GAP SERPL CALCULATED.3IONS-SCNC: 10 MMOL/L (ref 7–15)
AST SERPL W P-5'-P-CCNC: 33 U/L (ref 0–45)
BILIRUB SERPL-MCNC: 0.2 MG/DL
BUN SERPL-MCNC: 18.2 MG/DL (ref 8–23)
CALCIUM SERPL-MCNC: 9.5 MG/DL (ref 8.8–10.2)
CHLORIDE SERPL-SCNC: 101 MMOL/L (ref 98–107)
CREAT SERPL-MCNC: 0.61 MG/DL (ref 0.51–0.95)
DEPRECATED HCO3 PLAS-SCNC: 25 MMOL/L (ref 22–29)
EGFRCR SERPLBLD CKD-EPI 2021: >90 ML/MIN/1.73M2
GLUCOSE SERPL-MCNC: 311 MG/DL (ref 70–99)
HAV AB SER QL IA: NONREACTIVE
HAV IGM SERPL QL IA: NONREACTIVE
HBV CORE IGM SERPL QL IA: NONREACTIVE
HBV SURFACE AB SERPL IA-ACNC: <3.5 M[IU]/ML
HBV SURFACE AB SERPL IA-ACNC: NONREACTIVE M[IU]/ML
HBV SURFACE AG SERPL QL IA: NONREACTIVE
HCV AB SERPL QL IA: NONREACTIVE
POTASSIUM SERPL-SCNC: 4.9 MMOL/L (ref 3.4–5.3)
PROT SERPL-MCNC: 7.6 G/DL (ref 6.4–8.3)
SODIUM SERPL-SCNC: 136 MMOL/L (ref 135–145)

## 2024-02-26 ENCOUNTER — TELEPHONE (OUTPATIENT)
Dept: FAMILY MEDICINE | Facility: CLINIC | Age: 69
End: 2024-02-26
Payer: COMMERCIAL

## 2024-02-26 NOTE — CONFIDENTIAL NOTE
Please send patient a letter or give her a call letting her know that her ultrasound of the abdomen is normal except that she does have some gallbladder stones present in her gallbladder.  She is welcome to schedule a follow-up appointment with me to discuss this in more detail.    Samanta Maza MD

## 2024-02-26 NOTE — LETTER
February 27, 2024      Chanelle Billy  [address] [address]        Dear ,    We are writing to inform you of your test results.          Your ultrasound of the abdomen is normal except that you have some gallbladder stones present in your gallbladder.  Your are welcome to schedule a follow-up appointment with me to discuss this in more detail.           If you have any questions or concerns, please call the clinic at the number listed above.       Sincerely,      Samanta Maza MD

## 2024-02-28 ENCOUNTER — DOCUMENTATION ONLY (OUTPATIENT)
Dept: FAMILY MEDICINE | Facility: CLINIC | Age: 69
End: 2024-02-28
Payer: COMMERCIAL

## 2024-02-28 NOTE — PROGRESS NOTES
Please let patient know that her colon cancer screening test was canceled because it was not labeled correctly.  The next time she comes into clinic we can send her home with a new kit.

## 2024-02-28 NOTE — PROGRESS NOTES
I just received a call from  [facility]. They received a specimen from Chanelle for the fecal colorectal screen. The specimen did not have a second identifier on the label so the test was cancelled.    ThanksBeatris

## 2024-03-12 ENCOUNTER — TELEPHONE (OUTPATIENT)
Dept: PHARMACY | Facility: CLINIC | Age: 69
End: 2024-03-12
Payer: COMMERCIAL

## 2024-03-12 NOTE — TELEPHONE ENCOUNTER
2022      RE: Karen Dumont  [address]  [place] [address]       GYNECOLOGIC  ONCOLOGY CLINIC    Referring provider:    Linda Carroll MD  [facility]  [address]  [place] [address]   RE: Karen Dumont  : 1970  JOEL: 2022      CC: Dysfunctional Uterine Bleeding    HPI: Ms Karen Dumont is a 52 year old female who presents for treatment planning.    She presents to discuss surgery options.    She had previously been unable to have surgery as she did not have insurance, however she has since received insurance and would prefer to proceed with surgery at this time.    Bleeding was initially improved with Megace, but has increased significantly in the last week. She has been feeling quite dizzy in the last week as well. Has had a lot of abdominal pain and cramping as well. She has been taking the Megace regularly.     Work up to date  7/3//22 Pelvic US  FINDINGS:   Uterus: Anteverted uterus measuring 14.6 x 8.1 x 4.9 cm. Anterior fundal intramural fibroid measuring 5.3 x 2.8 x 4.6 cm. Anterior mid uterine body intramural/subserosal fibroid measuring 3.5 x 2.5 x 3.1 cm. Right uterine body intramural fibroid measuring 5.0 x 3.8 x 4.9 cm.   Endometrium: Endometrial stripe measures 56 mm. Heterogeneous echogenic endometrial lesion measuring approximately 4.5 x 3.6 x 3.5 cm with apparentvascular stalk.      D & C, reviewed at [facility] report 22  CASE FROM [facility], [place], [place] ([id number], OBTAINED 2022):  A.  Cervix, polypectomy:  -Cervical tissue with nabothian cyst     B.  Endometrium, curettage:  -Fragments of endometrial polyps featuring endometrial hyperplasia without atypia     22 Hg 14.5    OBGYN history and Health Maintenance:    Last Pap Smear: needs to obtain  Last Mammogram: none  Last Colonoscopy: none    Review of Systems:  Systemic:No weight changes.    Skin : No skin changes or new lesions.   Eye : No changes in  Lmx1 on  to schedule follow-up MTM appointment.   "vision.   Pulmonary: No cough or SOB.   Cardiovascular: No CP or palpitations  Gastrointestinal : No diarrhea, constipation, positive for abdominal pain. Bowel habits normal.   Genitourinary: No dysuria, urgency, positive for vaginal bleeding  Psychiatric: Positive for anxiety  Hematologic : No palpable lymph nodes.   Endocrine : No hot flashes. No heat/cold intolerance.      Neurological: No headaches, no numbness.     Past Medical History:   Diagnosis Date     Anxiety      Asthma      Bipolar affective disorder (H)      Essential hypertension      Gastroesophageal reflux disease without esophagitis      Migraine        Past Surgical History:   Procedure Laterality Date      SECTION     - via vertical midline skin incision       Current Outpatient Medications   Medication Sig Dispense Refill     albuterol (PROAIR HFA/PROVENTIL HFA/VENTOLIN HFA) 108 (90 Base) MCG/ACT inhaler Inhale 1-2 puffs into the lungs       megestrol (MEGACE) 40 MG tablet Take 2 tablets (80 mg) by mouth 2 times daily 84 tablet 0     Multiple Vitamins-Minerals (MULTIVITAMIN ADULT, MINERALS,) TABS Take 1 tablet by mouth           No Known Allergies    Social History:  Social History     Tobacco Use     Smoking status: Current Every Day Smoker     Types: Cigarettes     Smokeless tobacco: Never Used   Substance Use Topics     Alcohol use: Not Currently       Family History:   The patient's family history is notable for   No family history on file.      Physical Exam:     /79   Pulse 99   Temp 98.2  F (36.8  C) (Oral)   Resp 18   Ht 1.491 m (4' 10.7\")   Wt 79.4 kg (175 lb)   SpO2 99%   BMI 35.71 kg/m    Body mass index is 35.71 kg/m .    General: Alert and oriented, no acute distress  Psych: Mood stable  Cardio: RRR, no m/r/g  Pulm: CTAB  Abdomen: Soft, mildly tender to palpation, non-distended  Pelvic: Normal appearing external genitalia. Speculum exam with overall normal appearing cervix - nabothian cyst visible on cervix. " Minimal blood in vaginal vault. Bimanual exam with firm palpable posterior fibroid as well as fibroid palpable anteriorly on right side of abdomen.   Extremities: No edema, non-tender to palpation.      Assessment: Karen Dumont is a 52 year old woman with a dysfunctional uterine bleeding, s/p D & C showing endometrial polyp and endometrial hyperplasia with atypia.    Myomatous Uterus on ultrasound with 5 cm right lateal fibroid.    Plan:     1.)   Disease: Will plan to proceed with total abdominal hysterectomy with bilateral salpingo-oophorectomy at this time. Discussed risks vs benefit of removing ovaries and for this patient will plan to proceed with oophorectomy regardless of frozen pathology due to difficulty obtaining insurance and preference to avoid need for future procedures. Risks, benefits, and alternatives discussed including risk of bleeding, infection, and injury to nearby structures including bowel, bladder, ureters, and major nerves and vessels. Discussed that if frozen pathology returns positive for invasive cancer will need to perform lymph node dissection as well. Patient is agreeable to proceed.    Patient will see PAC clinic for pre-op H&P - physical portion of exam completed today. Will need baseline EKG, CXR, and labs (CBC, BMP)    She will need to establish PCP and mental health care       Gladys White MD  Gynecologic Oncology, PGY-3  09/08/2022 2:36 PM      Ling Nicole M.D., MPH,  F.A.C.O.G.  Professor  Department of Ob/Gyn and Women's Health  Division of Gynecologic Oncology  [facility]  [phone]    Time: total time spent today, September 8, 2022, including preparation, review of outside records, face to face counseling, and documentation was 45 minutes.           Ling Nicole MD

## 2024-04-16 DIAGNOSIS — I10 ESSENTIAL HYPERTENSION: ICD-10-CM

## 2024-04-17 NOTE — TELEPHONE ENCOUNTER
Patient has [facility] coverage and with this insurance plan, the patient is eligible to receive certain prescriptions as a 100-day supply at the 90-day supply cost.      Prescriptions to be updated to 100-day supply: lisinopril    New prescription needed given insufficient refills so pended for prescriber review and signature.     Thank you!      Catrina Raya, PharmD, [facility]  Population Health Pharmacist  [phone]

## 2024-04-18 DIAGNOSIS — Z79.4 TYPE 2 DIABETES MELLITUS WITH OTHER SPECIFIED COMPLICATION, WITH LONG-TERM CURRENT USE OF INSULIN (H): ICD-10-CM

## 2024-04-18 DIAGNOSIS — E11.69 TYPE 2 DIABETES MELLITUS WITH OTHER SPECIFIED COMPLICATION, WITH LONG-TERM CURRENT USE OF INSULIN (H): ICD-10-CM

## 2024-04-18 RX ORDER — LISINOPRIL 10 MG/1
TABLET ORAL
Qty: 100 TABLET | Refills: 0 | Status: SHIPPED | OUTPATIENT
Start: 2024-04-18

## 2024-04-18 RX ORDER — TIRZEPATIDE 7.5 MG/.5ML
7.5 INJECTION, SOLUTION SUBCUTANEOUS
Qty: 6 ML | Refills: 0 | Status: SHIPPED | OUTPATIENT
Start: 2024-04-18 | End: 2024-06-03

## 2024-04-18 NOTE — TELEPHONE ENCOUNTER
Medication Question or Refill    Contacts         Type Contact Phone/Fax    04/18/2024 09:37 AM CDT Phone (Incoming) Chanelle Billy (Self) [phone] (M)            What medication are you calling about (include dose and sig)?: tirzepatide (MOUNJARO) 7.5 MG/0.5ML pen     Preferred Pharmacy:   [address] [address]  Phone: [phone] Fax: [phone]      Controlled Substance Agreement on file:   CSA -- Patient Level:    CSA: None found at the patient level.       Who prescribed the medication?: Dr Maza    Do you need a refill? Yes    When did you use the medication last? unknown    Patient offered an appointment? No    Do you have any questions or concerns?  No      Okay to leave a detailed message?: Yes at Cell number on file:    Telephone Information:   Mobile [phone]

## 2024-04-24 ENCOUNTER — TELEPHONE (OUTPATIENT)
Dept: FAMILY MEDICINE | Facility: CLINIC | Age: 69
End: 2024-04-24
Payer: COMMERCIAL

## 2024-04-24 NOTE — TELEPHONE ENCOUNTER
"  General Call    Contacts         Type Contact Phone/Fax    04/24/2024 01:33 PM CDT Phone (Incoming) Donya @ [employer] [phone]     fax # [phone]. reference # [id number]          Reason for Call:  Addendum to office notes from 02/13/2024 for CGM     What are your questions or concerns:  Donya @ [employer] called and is requesting PCP to make an addendum to office notes from 02/13/2024 for CGM.  Note needs to say that Pt is \"currently using her CGM\".  The addendum needs to be resigned/dated with the current date (not 02/13/2024) and faxed back to them at [phone]. Please also put reference # [id number] on the fax.    Date of last appointment with provider: 02/13/2024    Winsome Rebolledo      "

## 2024-05-06 ENCOUNTER — TELEPHONE (OUTPATIENT)
Dept: FAMILY MEDICINE | Facility: CLINIC | Age: 69
End: 2024-05-06
Payer: COMMERCIAL

## 2024-05-06 ENCOUNTER — OFFICE VISIT (OUTPATIENT)
Dept: PHARMACY | Facility: CLINIC | Age: 69
End: 2024-05-06
Payer: COMMERCIAL

## 2024-05-06 VITALS
BODY MASS INDEX: 24.52 KG/M2 | HEART RATE: 62 BPM | OXYGEN SATURATION: 97 % | WEIGHT: 131.9 LBS | SYSTOLIC BLOOD PRESSURE: 100 MMHG | DIASTOLIC BLOOD PRESSURE: 54 MMHG

## 2024-05-06 DIAGNOSIS — Z79.4 TYPE 2 DIABETES MELLITUS WITH OTHER CIRCULATORY COMPLICATION, WITH LONG-TERM CURRENT USE OF INSULIN (H): ICD-10-CM

## 2024-05-06 DIAGNOSIS — E11.59 TYPE 2 DIABETES MELLITUS WITH OTHER CIRCULATORY COMPLICATION, WITH LONG-TERM CURRENT USE OF INSULIN (H): ICD-10-CM

## 2024-05-06 PROCEDURE — 99207 PR NO CHARGE LOS: CPT | Performed by: PHARMACIST

## 2024-05-06 RX ORDER — TIRZEPATIDE 10 MG/.5ML
10 INJECTION, SOLUTION SUBCUTANEOUS
Qty: 2 ML | Refills: 1 | Status: SHIPPED | OUTPATIENT
Start: 2024-05-06 | End: 2024-06-03

## 2024-05-06 NOTE — Clinical Note
Having her increase Mounjaro as she cannot get a refill of the 7.5 due to shortages.  Aubrey Estes, PharmAllyn D., BCACP Medication Therapy Management Pharmacist

## 2024-05-06 NOTE — PATIENT INSTRUCTIONS
"Recommendations from today's MTM visit:                                                    MTM (medication therapy management) is a service provided by a clinical pharmacist designed to help you get the most of out of your medicines.   Today we reviewed what your medicines are for, how to know if they are working, that your medicines are safe and how to make your medicine regimen as easy as possible.    Please call the [facility] at [phone] to request your prescription of Mounjaro.  If you cn get the Mounjaro 10 mg start this once weekly and stop the short acting insulin.  Get your A1c drawn when you can.  Schedule a lab appointment with the   Charge your meter and bring it in to the next visit. I sent a refill for your Dm sensors     Follow-up: Return in about 4 weeks (around 6/3/2024) for Follow up, with me.    It was great speaking with you today.  I value your experience and would be very thankful for your time in providing feedback in our clinic survey. In the next few days, you may receive an email or text message from [employer] with a link to a survey related to your  clinical pharmacist.\"     To schedule another MTM appointment, please call the clinic directly or you may call the MTM scheduling line at [phone] or toll-free at [phone].     My Clinical Pharmacist's contact information:                                                      Please feel free to contact me with any questions or concerns you have.      Aubrey Estes, Pharm. D., [facility]  Medication Therapy Management Pharmacist    "

## 2024-05-06 NOTE — PROGRESS NOTES
Medication Therapy Management (MTM) Encounter    ASSESSMENT:                            Medication Adherence/Access: No issues identified    Diabetes:   Patient has not been checking blood glucose so would benefit from resuming this so will send a refill. She would also benefit from resuming Mounjaro but the 7.5 mg dose is out of stock. Will have her increase to 10 mg per her preference. If she does, she should stop the short acting insulin, especially if she is not checking blood glucose. Recommended A1c but she declined today.     PLAN:                            Please call the [facility] at [phone] to request your prescription of Mounjaro.  If you cn get the Mounjaro 10 mg start this once weekly and stop the short acting insulin.  Get your A1c drawn when you can.  Schedule a lab appointment with the   Charge your meter and bring it in to the next visit. I sent a refill for your Dm sensors     Follow-up: Return in about 4 weeks (around 6/3/2024) for Follow up, with me.    SUBJECTIVE/OBJECTIVE:                          Chanelle Billy is a 68 year old female coming in for a follow-up visit.       Reason for visit: diabetes follow-up.    Allergies/ADRs: Reviewed in chart  Past Medical History: Reviewed in chart  Tobacco: She reports that she has quit smoking. She has been exposed to tobacco smoke. She has never used smokeless tobacco.  Alcohol: none    Medication Adherence/Access: no issues reported    Diabetes   /Type 2 Diabetes::  Metformin  m tabs twice daily   Tresiba (degludec) long acting insulin: 9 units daily at bedtime.  Humalog (lispro) rapid acting insulin: has been using 8 units with meals even though she has not been checking blood glucose   Last visit was instructed to use the following dosing for MEALS:  If blood sugar is less than 100, no short acting insulin.  If blood sugar is 100 or above, take 4 units of insulin.  If blood sugar is 150 or above, take 6  units of insulin  If blood sugar is 200 or above, take 8 units of insulin.  NO INSULIN with snacks.    Jardiance (empagliflozin) 25 m tab once daily    Reports she puts cinnamon in her coffee and that has been helpful.     Mounjaro 7.5 mg: Reports that she ran out of this around [facility]. Was doing well on this - not having any side effects. Reports this is on backorder. Reports she was In range while on this.  Discussed changing to the 5 mg or the 10 mg and she would like to  increase.     Record of home blood sugars:   She hasn't charged her meter lately. Reports she has been out of sensors for 1-2 weeks. Reports that her blood glucose was in good range.  Was previously using Dm CGM and wanting a refill for sensors.     Symptoms of low blood sugar?  none   Symptoms of high blood sugar? none  Diet: reports she normally doesnt eat that much. Reports she has been eating more peanut butter pretzels lately. She has been having more sugary coffee lately.   Exercise: no routine exercise.    Wt Readings from Last 4 Encounters:   24 131 lb 14.4 oz (59.8 kg)   24 125 lb 12.8 oz (57.1 kg)   24 121 lb (54.9 kg)   24 121 lb 12.8 oz (55.2 kg)        Eye exam is up to date  Foot exam is up to date  Urine Albumin:   Lab Results   Component Value Date    UMALCR 108.08 (H) 2023      Lab Results   Component Value Date    A1C 7.8 (H) 2024         Today's Vitals: /54   Pulse 62   Wt 131 lb 14.4 oz (59.8 kg)   LMP  (LMP Unknown)   SpO2 97%   BMI 24.52 kg/m    ----------------    I spent 30 minutes with this patient today. All changes were made via collaborative practice agreement with Samanta Maza MD. A copy of the visit note was provided to the patient's provider(s).    A summary of these recommendations was given to the patient.    Elisa Rios D., BCACP  Medication Therapy Management Pharmacist           Medication Therapy Recommendations  Type 2 diabetes mellitus  with circulatory disorder, with long-term current use of insulin (H)    Current Medication: tirzepatide (MOUNJARO) 7.5 MG/0.5ML pen   Rationale: Dose too low - Dosage too low - Effectiveness   Recommendation: Increase Dose   Status: Accepted per CPA

## 2024-05-08 DIAGNOSIS — E11.69 TYPE 2 DIABETES MELLITUS WITH OTHER SPECIFIED COMPLICATION, WITH LONG-TERM CURRENT USE OF INSULIN (H): ICD-10-CM

## 2024-05-08 DIAGNOSIS — Z79.4 TYPE 2 DIABETES MELLITUS WITH OTHER SPECIFIED COMPLICATION, WITH LONG-TERM CURRENT USE OF INSULIN (H): ICD-10-CM

## 2024-05-09 RX ORDER — INSULIN LISPRO 100 [IU]/ML
INJECTION, SOLUTION INTRAVENOUS; SUBCUTANEOUS
Qty: 15 ML | Refills: 3 | Status: SHIPPED | OUTPATIENT
Start: 2024-05-09

## 2024-05-09 NOTE — TELEPHONE ENCOUNTER
General Call    Contacts         Type Contact Phone/Fax    05/08/2024 02:40 PM CDT Interface (Incoming) [place], [place] - [address] [phone]          Reason for Call:  diabetes    What are your questions or concerns:  Pt is requesting a refill of Lispro. Pt states she needs to have visit note from 2/13/24 faxed to [facility] so she can get her CGM sensors. TC faxed amended OV note.     Prescription approved per [facility] Refill Protocol.    Last Written Prescription Date:  1/22/24  Last Fill Quantity: 15ml,  # refills: 0   Last office visit: 5/6/2024   Future Office Visit:   Next 5 appointments (look out 90 days)      Jun 03, 2024  1:00 PM  Pharmacist Visit with Aubrey Estes RPH  [facility] [address] [address] [phone]                     Date of last appointment with provider: 2/13/24

## 2024-06-03 ENCOUNTER — OFFICE VISIT (OUTPATIENT)
Dept: PHARMACY | Facility: CLINIC | Age: 69
End: 2024-06-03
Payer: COMMERCIAL

## 2024-06-03 VITALS — DIASTOLIC BLOOD PRESSURE: 64 MMHG | HEART RATE: 58 BPM | OXYGEN SATURATION: 97 % | SYSTOLIC BLOOD PRESSURE: 121 MMHG

## 2024-06-03 DIAGNOSIS — Z79.4 TYPE 2 DIABETES MELLITUS WITH OTHER CIRCULATORY COMPLICATION, WITH LONG-TERM CURRENT USE OF INSULIN (H): ICD-10-CM

## 2024-06-03 DIAGNOSIS — E11.59 TYPE 2 DIABETES MELLITUS WITH OTHER CIRCULATORY COMPLICATION, WITH LONG-TERM CURRENT USE OF INSULIN (H): ICD-10-CM

## 2024-06-03 PROCEDURE — 99207 PR NO CHARGE LOS: CPT | Performed by: PHARMACIST

## 2024-06-03 RX ORDER — TIRZEPATIDE 5 MG/.5ML
5 INJECTION, SOLUTION SUBCUTANEOUS
Qty: 2 ML | Refills: 0 | Status: SHIPPED | OUTPATIENT
Start: 2024-06-03

## 2024-06-03 RX ORDER — TIRZEPATIDE 7.5 MG/.5ML
7.5 INJECTION, SOLUTION SUBCUTANEOUS
Qty: 2 ML | Refills: 0 | Status: SHIPPED | OUTPATIENT
Start: 2024-06-03

## 2024-06-03 RX ORDER — TIRZEPATIDE 10 MG/.5ML
10 INJECTION, SOLUTION SUBCUTANEOUS
Qty: 2 ML | Refills: 1 | Status: SHIPPED | OUTPATIENT
Start: 2024-06-03 | End: 2024-08-21

## 2024-06-03 NOTE — PROGRESS NOTES
Medication Therapy Management (MTM) Encounter    ASSESSMENT:                            Medication Adherence/Access: No issues identified    Diabetes:   Patient is not meeting goal of > 70% time in target with continuous glucose monitoring. Would benefit from resuming Mounjaro if able to get it    PLAN:                            Check with [facility] about the Mounjaro. Then start the 5 mg weekly for 4 weeks, then 7.5 mg weekly for 4 weeks, and then 10 mg therafter. Stop short acting insulin when increasing to 10 mg Mounjaro.   If not able to get this call around to different pharmacies.     Follow-up: Return in about 3 months (around 9/3/2024), or with new MTM pharmacist.    SUBJECTIVE/OBJECTIVE:                          Chanelle Billy is a 68 year old female coming in for a follow-up visit.       Reason for visit: diabetes follow-up.    Allergies/ADRs: Reviewed in chart  Past Medical History: Reviewed in chart  Tobacco: She reports that she has quit smoking. She has been exposed to tobacco smoke. She has never used smokeless tobacco.  Alcohol: none    Medication Adherence/Access: no issues reported    Diabetes   /Type 2 Diabetes::  Metformin  m tabs twice daily   Tresiba (degludec) long acting insulin: 9 units daily at bedtime.  Jardiance (empagliflozin) 25 m tab once daily    Humalog (lispro) rapid acting insulin:   Last visit was instructed to use the following dosing for MEALS:  If blood sugar is less than 100, no short acting insulin.  If blood sugar is 100 or above, take 4 units of insulin.  If blood sugar is 150 or above, take 6 units of insulin  If blood sugar is 200 or above, take 8 units of insulin.    Reports she puts cinnamon in her coffee and that has been helpful.     Has been unable to get Mounjaro. [facility] mail order was not contracted with her insurance.     She wants tablets wait to get the A1c until her blood glucose are better    Record of home blood sugars:   Last 14 days time in  "target: 63%   Above 37% and below 0%    Symptoms of low blood sugar?  none   Symptoms of high blood sugar? 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 No  Foot exam is up to date      Today's Vitals: /64   Pulse 58   LMP  (LMP Unknown)   SpO2 97%   ----------------      I spent 25 minutes with this patient today. All changes were made via collaborative practice agreement with Samanta Maza MD. A copy of the visit note was provided to the patient's provider(s).    A summary of these recommendations was given to the patient.    Aubrey Estes, Pharm. D., [facility]  Medication Therapy Management Pharmacist           Medication Therapy Recommendations  Type 2 diabetes mellitus with circulatory disorder, with long-term current use of insulin (H)    Current Medication: tirzepatide (MOUNJARO) 5 MG/0.5ML pen   Rationale: Medication product not available - Adherence - Adherence   Recommendation: Provide Education   Status: Patient Agreed - Adherence/Education            "

## 2024-06-03 NOTE — PATIENT INSTRUCTIONS
"Recommendations from today's MTM visit:                                                    MTM (medication therapy management) is a service provided by a clinical pharmacist designed to help you get the most of out of your medicines.   Today we reviewed what your medicines are for, how to know if they are working, that your medicines are safe and how to make your medicine regimen as easy as possible.    Check with [email or web address] about the Mounjaro. Then start the 5 mg weekly for 4 weeks, then 7.5 mg weekly for 4 weeks, and then 10 mg therafter  If not able to get this call around to different pharmacies.     Follow-up: Return in about 3 months (around 9/3/2024), or with new MTM pharmacist.    It was great speaking with you today.  I value your experience and would be very thankful for your time in providing feedback in our clinic survey. In the next few days, you may receive an email or text message from ???? with a link to a survey related to your  clinical pharmacist.\"     To schedule another MTM appointment, please call the clinic directly or you may call the MTM scheduling line at [phone] or toll-free at [phone].     My Clinical Pharmacist's contact information:                                                      Please feel free to contact me with any questions or concerns you have.      Aubrey Estes, Pharm. D., [facility]  Medication Therapy Management Pharmacist      "

## 2024-06-10 ENCOUNTER — TRANSFERRED RECORDS (OUTPATIENT)
Dept: HEALTH INFORMATION MANAGEMENT | Facility: CLINIC | Age: 69
End: 2024-06-10
Payer: COMMERCIAL

## 2024-06-12 ENCOUNTER — TRANSFERRED RECORDS (OUTPATIENT)
Dept: MULTI SPECIALTY CLINIC | Facility: CLINIC | Age: 69
End: 2024-06-12

## 2024-06-12 ENCOUNTER — MEDICAL CORRESPONDENCE (OUTPATIENT)
Dept: HEALTH INFORMATION MANAGEMENT | Facility: CLINIC | Age: 69
End: 2024-06-12

## 2024-06-12 LAB — RETINOPATHY: NORMAL

## 2024-07-05 ENCOUNTER — NURSE TRIAGE (OUTPATIENT)
Dept: NURSING | Facility: CLINIC | Age: 69
End: 2024-07-05
Payer: COMMERCIAL

## 2024-07-05 NOTE — TELEPHONE ENCOUNTER
Reason for Disposition   Pharmacy calling with prescription question and triager answers question    Protocols used: Medication Question Call-A-OH  Nurse Triage SBAR    Is this a 2nd Level Triage? NO    Situation: medication question    Background: wondering about mounjaro dosage    Assessment: NA    Protocol Recommended Disposition:   Home Care    Recommendation: Mounjaro dose to be increased to 10mg weekly after 4 weeks on 7.5mg dose, according to note on 06/03. Patient understanding and will contact [employer]. 10mg dose should be available at [facility].

## 2024-07-22 ENCOUNTER — TELEPHONE (OUTPATIENT)
Dept: FAMILY MEDICINE | Facility: CLINIC | Age: 69
End: 2024-07-22
Payer: COMMERCIAL

## 2024-07-22 NOTE — TELEPHONE ENCOUNTER
"  General Call - High Priority    Contacts       Contact Date/Time Type Contact Phone/Fax    07/22/2024 10:47 AM CDT Phone (Incoming) Chanelle Billy (Self) [phone] (M)          Reason for Call:  Patient calling to request a follow up appointment to review diabetic medications.     Patient also states,  \"I am just about out of the discs for my roula 2 sensor.\"      What are your questions or concerns:  \"Can you please order the discs for me?\"      Date of last appointment with provider:     Okay to leave a detailed message?: Yes  at Cell number on file:    Telephone Information:   Mobile [phone]     "

## 2024-07-24 ENCOUNTER — OFFICE VISIT (OUTPATIENT)
Dept: FAMILY MEDICINE | Facility: CLINIC | Age: 69
End: 2024-07-24
Payer: COMMERCIAL

## 2024-07-24 VITALS
TEMPERATURE: 98.1 F | HEART RATE: 86 BPM | RESPIRATION RATE: 16 BRPM | WEIGHT: 125.7 LBS | BODY MASS INDEX: 23.73 KG/M2 | SYSTOLIC BLOOD PRESSURE: 104 MMHG | OXYGEN SATURATION: 98 % | HEIGHT: 61 IN | DIASTOLIC BLOOD PRESSURE: 58 MMHG

## 2024-07-24 DIAGNOSIS — E11.59 TYPE 2 DIABETES MELLITUS WITH OTHER CIRCULATORY COMPLICATION, WITH LONG-TERM CURRENT USE OF INSULIN (H): ICD-10-CM

## 2024-07-24 DIAGNOSIS — I10 ESSENTIAL HYPERTENSION: ICD-10-CM

## 2024-07-24 DIAGNOSIS — Z79.4 TYPE 2 DIABETES MELLITUS WITH OTHER CIRCULATORY COMPLICATION, WITH LONG-TERM CURRENT USE OF INSULIN (H): ICD-10-CM

## 2024-07-24 DIAGNOSIS — I25.119 CORONARY ARTERY DISEASE INVOLVING NATIVE CORONARY ARTERY OF NATIVE HEART WITH ANGINA PECTORIS (H): Primary | ICD-10-CM

## 2024-07-24 DIAGNOSIS — E11.3553 STABLE PROLIFERATIVE DIABETIC RETINOPATHY OF BOTH EYES ASSOCIATED WITH TYPE 2 DIABETES MELLITUS (H): ICD-10-CM

## 2024-07-24 PROBLEM — E10.3293 MILD NONPROLIFERATIVE DIABETIC RETINOPATHY OF BOTH EYES WITHOUT MACULAR EDEMA ASSOCIATED WITH TYPE 1 DIABETES MELLITUS (H): Status: ACTIVE | Noted: 2024-07-24

## 2024-07-24 LAB
ANION GAP SERPL CALCULATED.3IONS-SCNC: 12 MMOL/L (ref 7–15)
BUN SERPL-MCNC: 15.5 MG/DL (ref 8–23)
CALCIUM SERPL-MCNC: 9.6 MG/DL (ref 8.8–10.4)
CHLORIDE SERPL-SCNC: 104 MMOL/L (ref 98–107)
CHOLEST SERPL-MCNC: 113 MG/DL
CREAT SERPL-MCNC: 0.81 MG/DL (ref 0.51–0.95)
CREAT UR-MCNC: 77.9 MG/DL
EGFRCR SERPLBLD CKD-EPI 2021: 79 ML/MIN/1.73M2
FASTING STATUS PATIENT QL REPORTED: NO
FASTING STATUS PATIENT QL REPORTED: NO
GLUCOSE SERPL-MCNC: 137 MG/DL (ref 70–99)
HBA1C MFR BLD: 7.2 % (ref 0–5.6)
HCO3 SERPL-SCNC: 24 MMOL/L (ref 22–29)
HDLC SERPL-MCNC: 41 MG/DL
LDLC SERPL CALC-MCNC: 47 MG/DL
MICROALBUMIN UR-MCNC: 14.3 MG/L
MICROALBUMIN/CREAT UR: 18.36 MG/G CR (ref 0–25)
NONHDLC SERPL-MCNC: 72 MG/DL
POTASSIUM SERPL-SCNC: 4.3 MMOL/L (ref 3.4–5.3)
SODIUM SERPL-SCNC: 140 MMOL/L (ref 135–145)
TRIGL SERPL-MCNC: 126 MG/DL

## 2024-07-24 PROCEDURE — 80061 LIPID PANEL: CPT | Performed by: FAMILY MEDICINE

## 2024-07-24 PROCEDURE — 83036 HEMOGLOBIN GLYCOSYLATED A1C: CPT | Performed by: FAMILY MEDICINE

## 2024-07-24 PROCEDURE — 82570 ASSAY OF URINE CREATININE: CPT | Performed by: FAMILY MEDICINE

## 2024-07-24 PROCEDURE — 99214 OFFICE O/P EST MOD 30 MIN: CPT | Performed by: FAMILY MEDICINE

## 2024-07-24 PROCEDURE — 36415 COLL VENOUS BLD VENIPUNCTURE: CPT | Performed by: FAMILY MEDICINE

## 2024-07-24 PROCEDURE — 82043 UR ALBUMIN QUANTITATIVE: CPT | Performed by: FAMILY MEDICINE

## 2024-07-24 PROCEDURE — G2211 COMPLEX E/M VISIT ADD ON: HCPCS | Performed by: FAMILY MEDICINE

## 2024-07-24 PROCEDURE — 80048 BASIC METABOLIC PNL TOTAL CA: CPT | Performed by: FAMILY MEDICINE

## 2024-07-24 NOTE — LETTER
July 26, 2024      Chanelle Billy  [address] [address]        Dear ,    We are writing to inform you of your test results.    Your hemoglobin A1c is improved but not yet at goal.  Lets continue to monitor this as we increase your Mounjaro.  Your urine microalbumin looks good.  It is much better than it was 1 year ago.  Good job!  Your cholesterol looks better than it did a year ago.  However, I would love for you to continue to exercise and do lots of walking to continue to try to get your good cholesterol up even higher.     Resulted Orders   Lipid panel reflex to direct LDL Non-fasting   Result Value Ref Range    Cholesterol 113 <200 mg/dL    Triglycerides 126 <150 mg/dL    Direct Measure HDL 41 (L) >=50 mg/dL    LDL Cholesterol Calculated 47 <=100 mg/dL    Non HDL Cholesterol 72 <130 mg/dL    Patient Fasting > 8hrs? No     Narrative    Cholesterol  Desirable:  <200 mg/dL    Triglycerides  Normal:  Less than 150 mg/dL  Borderline High:  150-199 mg/dL  High:  200-499 mg/dL  Very High:  Greater than or equal to 500 mg/dL    Direct Measure HDL  Female:  Greater than or equal to 50 mg/dL   Male:  Greater than or equal to 40 mg/dL    LDL Cholesterol  Desirable:  <100mg/dL  Above Desirable:  100-129 mg/dL   Borderline High:  130-159 mg/dL   High:  160-189 mg/dL   Very High:  >= 190 mg/dL    Non HDL Cholesterol  Desirable:  130 mg/dL  Above Desirable:  130-159 mg/dL  Borderline High:  160-189 mg/dL  High:  190-219 mg/dL  Very High:  Greater than or equal to 220 mg/dL   Albumin Random Urine Quantitative with Creat Ratio   Result Value Ref Range    Creatinine Urine mg/dL 77.9 mg/dL      Comment:      The reference ranges have not been established in urine creatinine. The results should be integrated into the clinical context for interpretation.    Albumin Urine mg/L 14.3 mg/L      Comment:      The reference ranges have not been established in urine albumin. The results should be  integrated into the clinical context for interpretation.    Albumin Urine mg/g Cr 18.36 0.00 - 25.00 mg/g Cr      Comment:      Microalbuminuria is defined as an albumin:creatinine ratio of 17 to 299 for males and 25 to 299 for females. A ratio of albumin:creatinine of 300 or higher is indicative of overt proteinuria.  Due to biologic variability, positive results should be confirmed by a second, first-morning random or 24-hour timed urine specimen. If there is discrepancy, a third specimen is recommended. When 2 out of 3 results are in the microalbuminuria range, this is evidence for incipient nephropathy and warrants increased efforts at glucose control, blood pressure control, and institution of therapy with an angiotensin-converting-enzyme (ACE) inhibitor (if the patient can tolerate it).     BASIC METABOLIC PANEL   Result Value Ref Range    Sodium 140 135 - 145 mmol/L    Potassium 4.3 3.4 - 5.3 mmol/L    Chloride 104 98 - 107 mmol/L    Carbon Dioxide (CO2) 24 22 - 29 mmol/L    Anion Gap 12 7 - 15 mmol/L    Urea Nitrogen 15.5 8.0 - 23.0 mg/dL    Creatinine 0.81 0.51 - 0.95 mg/dL    GFR Estimate 79 >60 mL/min/1.73m2      Comment:      eGFR calculated using 2021 CKD-EPI equation.    Calcium 9.6 8.8 - 10.4 mg/dL      Comment:      Reference intervals for this test were updated on 7/16/2024 to reflect our healthy population more accurately. There may be differences in the flagging of prior results with similar values performed with this method. Those prior results can be interpreted in the context of the updated reference intervals.    Glucose 137 (H) 70 - 99 mg/dL    Patient Fasting > 8hrs? No    Hemoglobin A1c   Result Value Ref Range    Hemoglobin A1C 7.2 (H) 0.0 - 5.6 %      Comment:      Normal <5.7%   Prediabetes 5.7-6.4%    Diabetes 6.5% or higher     Note: Adopted from ADA consensus guidelines.       If you have any questions or concerns, please call the clinic at the number listed above.        Sincerely,      Samanta Maza MD

## 2024-07-24 NOTE — PROGRESS NOTES
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) - Primary    Relevant Orders    Lipid panel reflex to direct LDL Non-fasting (Completed)    Type 2 diabetes mellitus with circulatory disorder, with long-term current use of insulin (H)    Relevant Medications    Continuous Glucose  (FREESTYLE ROSAURA 2 READER) LOUIS    Continuous Glucose Sensor (FREESTYLE ROSAURA 2 SENSOR) MISC    Other Relevant Orders    Albumin Random Urine Quantitative with Creat Ratio (Completed)    Adult Diabetes Education  Referral    Hemoglobin A1c (Completed)    Essential hypertension    Relevant Orders    BASIC METABOLIC PANEL (Completed)    Stable proliferative diabetic retinopathy of both eyes associated with type 2 diabetes mellitus (H)    Nonfasting labs      I have evaluated this patient in-person today and I am prescribing continuous glucose monitor for the following reasons:    -The patient has a diagnosis of diabetes mellitus.  She is using her continuous glucose monitor and uses insulin degludec 9 units at bedtime    Last eye exam 6/12/2024, mild diabetic retinopathy reported,     Hypertension currently at goal we will get updated basic metabolic panel and have patient continue her current medications.    Coronary artery disease will get updated lipid panel patient denies any angina symptoms at this time.      The longitudinal plan of care for the diagnosis(es)/condition(s) as documented were addressed during this visit. Due to the added complexity in care, I will continue to support Chanelle in the subsequent management and with ongoing continuity of care.               Subjective   Chanelle is a 68 year old, presenting for the following health issues:  Recheck Medication        7/24/2024    12:45 PM   Additional Questions   Roomed by Ruth Ann WISE                   Objective    /58 (BP Location: Right  "arm, Patient Position: Sitting)   Pulse 86   Temp 98.1  F (36.7  C) (Tympanic)   Resp 16   Ht 1.556 m (5' 1.25\")   Wt 57 kg (125 lb 11.2 oz)   LMP  (LMP Unknown)   SpO2 98%   BMI 23.56 kg/m    Body mass index is 23.56 kg/m .  Physical Exam               Signed Electronically by: Samanta Maza MD    "

## 2024-07-31 PROBLEM — E10.3293 MILD NONPROLIFERATIVE DIABETIC RETINOPATHY OF BOTH EYES WITHOUT MACULAR EDEMA ASSOCIATED WITH TYPE 1 DIABETES MELLITUS (H): Status: RESOLVED | Noted: 2024-07-24 | Resolved: 2024-07-31

## 2024-07-31 PROBLEM — E11.3553 STABLE PROLIFERATIVE DIABETIC RETINOPATHY OF BOTH EYES ASSOCIATED WITH TYPE 2 DIABETES MELLITUS (H): Status: ACTIVE | Noted: 2024-07-31

## 2024-08-14 DIAGNOSIS — I10 ESSENTIAL HYPERTENSION: ICD-10-CM

## 2024-08-14 DIAGNOSIS — F33.1 MAJOR DEPRESSIVE DISORDER, RECURRENT EPISODE, MODERATE WITH ANXIOUS DISTRESS (H): Primary | ICD-10-CM

## 2024-08-14 NOTE — TELEPHONE ENCOUNTER
Called to pharmacy for reason of gap.     Pt states she had some back up. She is currently taking it 2x daily as prescribed.     Richard VARGHESE RN

## 2024-08-15 RX ORDER — CARVEDILOL 12.5 MG/1
TABLET ORAL
Qty: 180 TABLET | Refills: 1 | Status: SHIPPED | OUTPATIENT
Start: 2024-08-15

## 2024-08-21 ENCOUNTER — OFFICE VISIT (OUTPATIENT)
Dept: EDUCATION SERVICES | Facility: CLINIC | Age: 69
End: 2024-08-21
Payer: COMMERCIAL

## 2024-08-21 VITALS — HEIGHT: 61 IN | WEIGHT: 123.1 LBS | BODY MASS INDEX: 23.24 KG/M2

## 2024-08-21 DIAGNOSIS — Z79.4 TYPE 2 DIABETES MELLITUS WITH OTHER CIRCULATORY COMPLICATION, WITH LONG-TERM CURRENT USE OF INSULIN (H): Primary | ICD-10-CM

## 2024-08-21 DIAGNOSIS — E11.59 TYPE 2 DIABETES MELLITUS WITH OTHER CIRCULATORY COMPLICATION, WITH LONG-TERM CURRENT USE OF INSULIN (H): Primary | ICD-10-CM

## 2024-08-21 PROCEDURE — 99207 PR DROP WITH A PROCEDURE: CPT | Performed by: DIETITIAN, REGISTERED

## 2024-08-21 PROCEDURE — G0108 DIAB MANAGE TRN  PER INDIV: HCPCS | Mod: AE | Performed by: DIETITIAN, REGISTERED

## 2024-08-21 RX ORDER — TIRZEPATIDE 10 MG/.5ML
10 INJECTION, SOLUTION SUBCUTANEOUS
Qty: 2 ML | Refills: 3 | Status: SHIPPED | OUTPATIENT
Start: 2024-08-21

## 2024-08-21 NOTE — LETTER
8/21/2024         RE: Chanelle Billy  [address]  [place] [address]        Dear Colleague,    Thank you for referring your patient, Chanelle Billy, to the [facility]. Please see a copy of my visit note below.    Diabetes Self-Management Education & Support    Presents for: Type 2 diabetes    Type of Service: In Person Visit      ASSESSMENT:  Improving diabetes control patient is currently taking Tresiba 90 units daily, Jardiance 25 mg daily metformin 500 mg taking 2 tablets twice daily, Mounjaro 10 mg weekly, rapid acting insulin via correction scale patient has not needed to use rapid acting insulin recently.  Freestyle roula 2 reader downloaded 28-day sensor glucose average 132 mg/dL 82% time in target range.  Patient has nicely transitioned from Victoza to Mounjaro 6/2024 and tolerating well.      Patient's most recent   Lab Results   Component Value Date    A1C 7.2 07/24/2024    A1C 8.6 02/11/2021     is meeting goal of <8.0    Diabetes knowledge and skills assessment:   Patient is knowledgeable in diabetes management concepts related to: Healthy Eating, Being Active, Monitoring, Taking Medication, Problem Solving, Reducing Risks, and Healthy Coping    Continue education with the following diabetes management concepts: Patient benefits from a reviewing all areas for ongoing motivation and to maintain glycemic control healthy Eating, Being Active, Monitoring, Taking Medication, Problem Solving, Reducing Risks, Healthy Coping    Based on learning assessment above, most appropriate setting for further diabetes education would be: Individual setting.      PLAN    Tresiba decrease from 9 units to 8 units     Mounjaro 10 mg weekly     Jardiance 25 mg daily     Metformin two tabs two times daily   Topics to cover at upcoming visits: Healthy Eating, Monitoring, and Taking Medication    Follow-up: 3 months    See Care Plan for co-developed, patient-state behavior  "change goals.  AVS provided for patient today.    Education Materials Provided:  Goals for Your Diabetes Care and My Plate Planner      SUBJECTIVE/OBJECTIVE:  Accompanied by: Self  Diabetes education in the past 24mo: Yes  Focus of Visit: Healthy Eating, Taking Medication, CGM  Diabetes type: Type 2  Disease course: Improving  How confident are you filling out medical forms by yourself:: A little bit  Diabetes management related comments/concerns: have daughter help me by reading it to me  Transportation concerns: Yes (Does not drive, will walk or get a ride to clinic)  Difficulty affording diabetes medication?: No  Difficulty affording diabetes testing supplies?: No  Other concerns:: None  Cultural Influences/Ethnic Background:  Not  or     Diabetes Symptoms & Complications:  Weight trend: Stable  Disease course: Improving  Complications assessed today?: Yes  Autonomic neuropathy: No  CVA: No  Heart disease: Yes  Nephropathy: No  Peripheral neuropathy: No  Peripheral Vascular Disease: No  Retinopathy: No  Sexual dysfunction: Yes    Patient Problem List and Family Medical History reviewed for relevant medical history, current medical status, and diabetes risk factors.    Vitals:  Ht 1.556 m (5' 1.25\")   Wt 55.8 kg (123 lb 1.6 oz)   LMP  (LMP Unknown)   BMI 23.07 kg/m    Estimated body mass index is 23.07 kg/m  as calculated from the following:    Height as of this encounter: 1.556 m (5' 1.25\").    Weight as of this encounter: 55.8 kg (123 lb 1.6 oz).   Last 3 BP:   BP Readings from Last 3 Encounters:   07/24/24 104/58   06/03/24 121/64   05/06/24 100/54       History   Smoking Status     Former   Smokeless Tobacco     Never       Labs:  Lab Results   Component Value Date    A1C 7.2 07/24/2024    A1C 8.6 02/11/2021     Lab Results   Component Value Date     07/24/2024    GLC 64 03/16/2022     02/11/2021     Lab Results   Component Value Date    LDL 47 07/24/2024    LDL 71 03/13/2020 " "    HDL Cholesterol   Date Value Ref Range Status   03/13/2020 60 >or=50 mg/dL Final     Direct Measure HDL   Date Value Ref Range Status   07/24/2024 41 (L) >=50 mg/dL Final   ]  GFR Estimate   Date Value Ref Range Status   07/24/2024 79 >60 mL/min/1.73m2 Final     Comment:     eGFR calculated using 2021 CKD-EPI equation.   02/11/2021 92 >or=60 ml/min/1.73m2 Final     GFR Estimate If Black   Date Value Ref Range Status   02/11/2021 106 >or=60 ml/min/1.73m2 Final     Lab Results   Component Value Date    CR 0.81 07/24/2024    CR 0.68 02/11/2021     No results found for: \"MICROALBUMIN\"    Healthy Eating:  Healthy Eating Assessed Today: Yes  Cultural/Worship diet restrictions?: No  Meal planning/habits: Low salt, Smaller portions, Low carb, Avoiding sweets  How many times a week on average do you eat food made away from home (restaurant/take-out)?: 0  Meals include: Lunch, Dinner  Beverages: Water, Tea, Coffee, Coffee drinks    Being Active:  Barrier to exercise: None    Monitoring:  Blood Glucose Meter: CGM, FreeStyle  Times checking blood sugar at home (number): 5+  Times checking blood sugar at home (per): Day  Blood glucose trend: Fluctuating          Taking Medications:  Diabetes Medication(s)       Biguanides       metFORMIN (GLUCOPHAGE XR) 500 MG 24 hr tablet TAKE TWO TABLETS (1,000 MG) BY MOUTH TWO TIMES DAILY (WITH MEALS) Strength: 500 mg       Diabetic Other       glucose (BD GLUCOSE) 4 g chewable tablet Take 15 g by mouth As needed for low blood sugars (less than 70 mg/dL)       Insulin       insulin degludec (TRESIBA FLEXTOUCH) 100 UNIT/ML pen INJECT 9-UNITS SUBCUTANEOUS AT BEDTIME     Patient taking differently: Inject 8 Units subcutaneously daily.     insulin lispro (HUMALOG KWIKPEN) 100 UNIT/ML (1 unit dial) KWIKPEN INJECT UNDER THE SKIN THREE TIMES DAILY BEFORE MEALS. CHECK YOUR BLOOD SUGAR. IF BLOOD SUGAR IS LESS THAN 100, NO SHORT ACTING INSULIN. IF BLOOD SUGAR  OR ABOVE TAKE FOUR UNITS OF " INSULIN; 150 OR ABOVE TAKE 6 UNITS; 200 OR ABOVE TAKE 8 UNITS.     Patient not taking: Reported on 7/24/2024       Sodium-Glucose Co-Transporter 2 (SGLT2) Inhibitors       empagliflozin (JARDIANCE) 25 MG TABS tablet Take 1 tablet (25 mg) by mouth daily.       Incretin Mimetic Agents       tirzepatide (MOUNJARO) 10 MG/0.5ML pen Inject 10 mg subcutaneously every 7 days.     tirzepatide (MOUNJARO) 5 MG/0.5ML pen Inject 5 mg Subcutaneous every 7 days     Patient not taking: Reported on 7/24/2024     tirzepatide (MOUNJARO) 7.5 MG/0.5ML pen Inject 7.5 mg Subcutaneous every 7 days            Taking Medication Assessed Today: Yes  Current Treatments: Insulin Injections, Oral Medication (taken by mouth), Non-insulin Injectables  Problems taking diabetes medications regularly?: No  Diabetes medication side effects?: No    Problem Solving:  Problem Solving Assessed Today: Yes  Is the patient at risk for hypoglycemia?: Yes  Hypoglycemia Frequency: Weekly  Hypoglycemia Treatment: Glucose (tablets or gel), Juice, Candy, Other food  Medical ID: Yes  Does patient have glucagon emergency kit?: Yes  Is the patient at risk for DKA?: No  Does patient have severe weather/disaster plan for diabetes management?: Yes  Does patient have sick day plan for diabetes management?: Yes    Hypoglycemia Symptoms  Hypoglycemia: Sweats, Dizziness/Lightheadedness         Reducing Risks:  Reducing Risks Assessed Today: Yes  Diabetes Risks: Age over 45 years, Sedentary Lifestyle  CAD Risks: Diabetes Mellitus, Dyslipidemia, Family history, Hypertension, Sedentary lifestyle, Stress  Has dilated eye exam at least once a year?: Yes  Sees dentist every 6 months?: No  Feet checked by healthcare provider in the last year?: Yes    Healthy Coping:  Healthy Coping Assessed Today: Yes  Emotional response to diabetes: Helplessness, Fear/Anxiety  Informal Support system:: Children  Stage of change: PREPARATION (Decided to change - considering how)  Support  resources: Websites, Magazines, In-person Offerings  Patient Activation Measure Survey Score:       No data to display                  Care Plan and Education Provided:  Care Plan: Diabetes   Updates made by Avril Martinez RD since 8/21/2024 12:00 AM        Problem: HbA1C Not In Goal         Goal: Establish Regular Follow-Ups with PCP         Task: Discuss with PCP the recommended timing for patient's next follow up visit(s)    Responsible User: Avril Martinez RD        Task: Discuss schedule for PCP visits with patient Completed 8/21/2024   Responsible User: Avril Martinez RD        Goal: Get HbA1C Level in Goal         Task: Educate patient on diabetes education self-management topics Completed 8/21/2024   Responsible User: Avril Martinez RD        Task: Educate patient on benefits of regular glucose monitoring    Responsible User: Avril Martinez RD        Task: Refer patient to appropriate extended care team member, as needed (Medication Therapy Management, Behavioral Health, Physical Therapy, etc.)    Responsible User: Avril Martinez RD        Task: Discuss diabetes treatment plan with patient Completed 8/21/2024   Responsible User: Avril Martinez RD        Problem: Diabetes Self-Management Education Needed to Optimize Self-Care Behaviors         Goal: Understand diabetes pathophysiology and disease progression         Task: Provide education on diabetes pathophysiology and disease progression specfic to patient's diabetes type Completed 8/21/2024   Responsible User: Avril Martinez RD        Goal: Healthy Eating - follow a healthy eating pattern for diabetes         Task: Provide education on portion control and consistency in amount, composition and timing of food intake    Responsible User: Avril Martinez RD        Task: Provide education on managing carbohydrate intake (carbohydrate counting, plate planning method, etc.)    Responsible User: Avril Martinez RD        Task: Provide education on  weight management    Responsible User: Avril Martinez RD        Task: Provide education on heart healthy eating Completed 8/21/2024   Responsible User: Avril Martinez RD        Task: Provide education on eating out Completed 8/21/2024   Responsible User: Avril Martinez RD        Task: Develop individualized healthy eating plan with patient    Responsible User: Avril Martinez RD        Goal: Being Active - get regular physical activity, working up to at least 150 minutes per week         Task: Provide education on relationship of activity to glucose and precautions to take if at risk for low glucose    Responsible User: Avril Martinez RD        Task: Discuss barriers to physical activity with patient    Responsible User: Avril Martinez RD        Task: Develop physical activity plan with patient Completed 8/21/2024   Responsible User: Avril Martinez RD        Task: Explore community resources including walking groups, assistance programs, and home videos    Responsible User: Avril Martinez RD        Goal: Monitoring - monitor glucose and ketones as directed    This Visit's Progress: 100%   Note:    Patient to use freestyle roula and BG testing as a backup       Task: Provide education on blood glucose monitoring (purpose, proper technique, frequency, glucose targets, interpreting results, when to use glucose control solution, sharps disposal) Completed 8/21/2024   Responsible User: Avril Martinez RD        Task: Provide education on continuous glucose monitoring (sensor placement, use of bettina or /reader, understanding glucose trends, alerts and alarms, differences between sensor glucose and blood glucose) Completed 8/21/2024   Responsible User: Avril Martinez RD        Task: Provide education on ketone monitoring (when to monitor, frequency, etc.)    Responsible User: Avril Martinez RD        Goal: Taking Medication - patient is consistently taking medications as directed         Task: Provide  education on action of prescribed medication, including when to take and possible side effects    Responsible User: Avril Martinez RD        Task: Provide education on insulin and injectable diabetes medications, including administration, storage, site selection and rotation for injection sites Completed 8/21/2024   Responsible User: Avril Martinez RD        Task: Discuss barriers to medication adherence with patient and provide management technique ideas as appropriate Completed 8/21/2024   Responsible User: Avril Martinez RD        Task: Provide education on frequency and refill details of medications    Responsible User: Avril Martinez RD        Goal: Problem Solving - know how to prevent and manage short-term diabetes complications         Task: Provide education on high blood glucose - causes, signs/symptoms, prevention and treatment    Responsible User: Avril Martinez RD        Task: Provide education on low blood glucose - causes, signs/symptoms, prevention, treatment, carrying a carbohydrate source at all times, and medical identification Completed 8/21/2024   Responsible User: Avril Martinez RD        Task: Provide education on safe travel with diabetes    Responsible User: Avril Martinez RD        Task: Provide education on how to care for diabetes on sick days    Responsible User: Avril Martinez RD        Task: Provide education on when to call a health care provider    Responsible User: Avril Martinez RD        Goal: Reducing Risks - know how to prevent and treat long-term diabetes complications         Task: Provide education on major complications of diabetes, prevention, early diagnostic measures and treatment of complications    Responsible User: Avril Martinez RD        Task: Provide education on recommended care for dental, eye and foot health Completed 8/21/2024   Responsible User: Avril Martinez RD        Task: Provide education on Hemoglobin A1c - goals and relationship to blood  glucose levels Completed 8/21/2024   Responsible User: Avril Martinez RD        Task: Provide education on recommendations for heart health - lipid levels and goals, blood pressure and goals, and aspirin therapy, if indicated    Responsible User: Avril Martinez RD        Task: Provide education on tobacco cessation    Responsible User: Avril Martinez RD        Goal: Healthy Coping - use available resources to cope with the challenges of managing diabetes         Task: Discuss recognizing feelings about having diabetes    Responsible User: Avril Martinez RD        Task: Provide education on the benefits of making appropriate lifestyle changes Completed 8/21/2024   Responsible User: Avril Martinez RD        Task: Provide education on benefits of utilizing support systems    Responsible User: Avril Martinez RD        Task: Discuss methods for coping with stress    Responsible User: Avril Martinez RD        Task: Provide education on when to seek professional counseling    Responsible User: Avril Martinez RD          Time Spent: 60 minutes  Encounter Type: Individual    Any diabetes medication dose changes were made via the CDE Protocol per the patient's referring provider. A copy of this encounter was shared with the provider.

## 2024-08-21 NOTE — PROGRESS NOTES
Diabetes Self-Management Education & Support    Presents for: Type 2 diabetes    Type of Service: In Person Visit      ASSESSMENT:  Improving diabetes control patient is currently taking Tresiba 90 units daily, Jardiance 25 mg daily metformin 500 mg taking 2 tablets twice daily, Mounjaro 10 mg weekly, rapid acting insulin via correction scale patient has not needed to use rapid acting insulin recently.  Freestyle roula 2 reader downloaded 28-day sensor glucose average 132 mg/dL 82% time in target range.  Patient has nicely transitioned from Victoza to Mounjaro 6/2024 and tolerating well.      Patient's most recent   Lab Results   Component Value Date    A1C 7.2 07/24/2024    A1C 8.6 02/11/2021     is meeting goal of <8.0    Diabetes knowledge and skills assessment:   Patient is knowledgeable in diabetes management concepts related to: Healthy Eating, Being Active, Monitoring, Taking Medication, Problem Solving, Reducing Risks, and Healthy Coping    Continue education with the following diabetes management concepts: Patient benefits from a reviewing all areas for ongoing motivation and to maintain glycemic control healthy Eating, Being Active, Monitoring, Taking Medication, Problem Solving, Reducing Risks, Healthy Coping    Based on learning assessment above, most appropriate setting for further diabetes education would be: Individual setting.      PLAN    Tresiba decrease from 9 units to 8 units     Mounjaro 10 mg weekly     Jardiance 25 mg daily     Metformin two tabs two times daily   Topics to cover at upcoming visits: Healthy Eating, Monitoring, and Taking Medication    Follow-up: 3 months    See Care Plan for co-developed, patient-state behavior change goals.  AVS provided for patient today.    Education Materials Provided:  Goals for Your Diabetes Care and My Plate Planner      SUBJECTIVE/OBJECTIVE:  Accompanied by: Self  Diabetes education in the past 24mo: Yes  Focus of Visit: Healthy Eating, Taking  "Medication, CGM  Diabetes type: Type 2  Disease course: Improving  How confident are you filling out medical forms by yourself:: A little bit  Diabetes management related comments/concerns: have daughter help me by reading it to me  Transportation concerns: Yes (Does not drive, will walk or get a ride to clinic)  Difficulty affording diabetes medication?: No  Difficulty affording diabetes testing supplies?: No  Other concerns:: None  Cultural Influences/Ethnic Background:  Not  or     Diabetes Symptoms & Complications:  Weight trend: Stable  Disease course: Improving  Complications assessed today?: Yes  Autonomic neuropathy: No  CVA: No  Heart disease: Yes  Nephropathy: No  Peripheral neuropathy: No  Peripheral Vascular Disease: No  Retinopathy: No  Sexual dysfunction: Yes    Patient Problem List and Family Medical History reviewed for relevant medical history, current medical status, and diabetes risk factors.    Vitals:  Ht 1.556 m (5' 1.25\")   Wt 55.8 kg (123 lb 1.6 oz)   LMP  (LMP Unknown)   BMI 23.07 kg/m    Estimated body mass index is 23.07 kg/m  as calculated from the following:    Height as of this encounter: 1.556 m (5' 1.25\").    Weight as of this encounter: 55.8 kg (123 lb 1.6 oz).   Last 3 BP:   BP Readings from Last 3 Encounters:   07/24/24 104/58   06/03/24 121/64   05/06/24 100/54       History   Smoking Status    Former   Smokeless Tobacco    Never       Labs:  Lab Results   Component Value Date    A1C 7.2 07/24/2024    A1C 8.6 02/11/2021     Lab Results   Component Value Date     07/24/2024    GLC 64 03/16/2022     02/11/2021     Lab Results   Component Value Date    LDL 47 07/24/2024    LDL 71 03/13/2020     HDL Cholesterol   Date Value Ref Range Status   03/13/2020 60 >or=50 mg/dL Final     Direct Measure HDL   Date Value Ref Range Status   07/24/2024 41 (L) >=50 mg/dL Final   ]  GFR Estimate   Date Value Ref Range Status   07/24/2024 79 >60 mL/min/1.73m2 Final     " "Comment:     eGFR calculated using 2021 CKD-EPI equation.   02/11/2021 92 >or=60 ml/min/1.73m2 Final     GFR Estimate If Black   Date Value Ref Range Status   02/11/2021 106 >or=60 ml/min/1.73m2 Final     Lab Results   Component Value Date    CR 0.81 07/24/2024    CR 0.68 02/11/2021     No results found for: \"MICROALBUMIN\"    Healthy Eating:  Healthy Eating Assessed Today: Yes  Cultural/Yazidism diet restrictions?: No  Meal planning/habits: Low salt, Smaller portions, Low carb, Avoiding sweets  How many times a week on average do you eat food made away from home (restaurant/take-out)?: 0  Meals include: Lunch, Dinner  Beverages: Water, Tea, Coffee, Coffee drinks    Being Active:  Barrier to exercise: None    Monitoring:  Blood Glucose Meter: CGM, FreeStyle  Times checking blood sugar at home (number): 5+  Times checking blood sugar at home (per): Day  Blood glucose trend: Fluctuating          Taking Medications:  Diabetes Medication(s)       Biguanides       metFORMIN (GLUCOPHAGE XR) 500 MG 24 hr tablet TAKE TWO TABLETS (1,000 MG) BY MOUTH TWO TIMES DAILY (WITH MEALS) Strength: 500 mg       Diabetic Other       glucose (BD GLUCOSE) 4 g chewable tablet Take 15 g by mouth As needed for low blood sugars (less than 70 mg/dL)       Insulin       insulin degludec (TRESIBA FLEXTOUCH) 100 UNIT/ML pen INJECT 9-UNITS SUBCUTANEOUS AT BEDTIME     Patient taking differently: Inject 8 Units subcutaneously daily.     insulin lispro (HUMALOG KWIKPEN) 100 UNIT/ML (1 unit dial) KWIKPEN INJECT UNDER THE SKIN THREE TIMES DAILY BEFORE MEALS. CHECK YOUR BLOOD SUGAR. IF BLOOD SUGAR IS LESS THAN 100, NO SHORT ACTING INSULIN. IF BLOOD SUGAR  OR ABOVE TAKE FOUR UNITS OF INSULIN; 150 OR ABOVE TAKE 6 UNITS; 200 OR ABOVE TAKE 8 UNITS.     Patient not taking: Reported on 7/24/2024       Sodium-Glucose Co-Transporter 2 (SGLT2) Inhibitors       empagliflozin (JARDIANCE) 25 MG TABS tablet Take 1 tablet (25 mg) by mouth daily.       Incretin " Mimetic Agents       tirzepatide (MOUNJARO) 10 MG/0.5ML pen Inject 10 mg subcutaneously every 7 days.     tirzepatide (MOUNJARO) 5 MG/0.5ML pen Inject 5 mg Subcutaneous every 7 days     Patient not taking: Reported on 7/24/2024     tirzepatide (MOUNJARO) 7.5 MG/0.5ML pen Inject 7.5 mg Subcutaneous every 7 days            Taking Medication Assessed Today: Yes  Current Treatments: Insulin Injections, Oral Medication (taken by mouth), Non-insulin Injectables  Problems taking diabetes medications regularly?: No  Diabetes medication side effects?: No    Problem Solving:  Problem Solving Assessed Today: Yes  Is the patient at risk for hypoglycemia?: Yes  Hypoglycemia Frequency: Weekly  Hypoglycemia Treatment: Glucose (tablets or gel), Juice, Candy, Other food  Medical ID: Yes  Does patient have glucagon emergency kit?: Yes  Is the patient at risk for DKA?: No  Does patient have severe weather/disaster plan for diabetes management?: Yes  Does patient have sick day plan for diabetes management?: Yes    Hypoglycemia Symptoms  Hypoglycemia: Sweats, Dizziness/Lightheadedness         Reducing Risks:  Reducing Risks Assessed Today: Yes  Diabetes Risks: Age over 45 years, Sedentary Lifestyle  CAD Risks: Diabetes Mellitus, Dyslipidemia, Family history, Hypertension, Sedentary lifestyle, Stress  Has dilated eye exam at least once a year?: Yes  Sees dentist every 6 months?: No  Feet checked by healthcare provider in the last year?: Yes    Healthy Coping:  Healthy Coping Assessed Today: Yes  Emotional response to diabetes: Helplessness, Fear/Anxiety  Informal Support system:: Children  Stage of change: PREPARATION (Decided to change - considering how)  Support resources: Websites, Magazines, In-person Offerings  Patient Activation Measure Survey Score:       No data to display                  Care Plan and Education Provided:  Care Plan: Diabetes   Updates made by Avril Martinez RD since 8/21/2024 12:00 AM        Problem: HbA1C Not  In Goal         Goal: Establish Regular Follow-Ups with PCP         Task: Discuss with PCP the recommended timing for patient's next follow up visit(s)    Responsible User: Avril Martinez RD        Task: Discuss schedule for PCP visits with patient Completed 8/21/2024   Responsible User: Avril Martinez RD        Goal: Get HbA1C Level in Goal         Task: Educate patient on diabetes education self-management topics Completed 8/21/2024   Responsible User: Avril Martinez RD        Task: Educate patient on benefits of regular glucose monitoring    Responsible User: Avril Martinez RD        Task: Refer patient to appropriate extended care team member, as needed (Medication Therapy Management, Behavioral Health, Physical Therapy, etc.)    Responsible User: Avril Martinez RD        Task: Discuss diabetes treatment plan with patient Completed 8/21/2024   Responsible User: Avril Martinez RD        Problem: Diabetes Self-Management Education Needed to Optimize Self-Care Behaviors         Goal: Understand diabetes pathophysiology and disease progression         Task: Provide education on diabetes pathophysiology and disease progression specfic to patient's diabetes type Completed 8/21/2024   Responsible User: Avril Martinez RD        Goal: Healthy Eating - follow a healthy eating pattern for diabetes         Task: Provide education on portion control and consistency in amount, composition and timing of food intake    Responsible User: Avril Martinez RD        Task: Provide education on managing carbohydrate intake (carbohydrate counting, plate planning method, etc.)    Responsible User: Avril Martinez RD        Task: Provide education on weight management    Responsible User: Avril Martinez RD        Task: Provide education on heart healthy eating Completed 8/21/2024   Responsible User: Avril Martinez RD        Task: Provide education on eating out Completed 8/21/2024   Responsible User: Avril Martinez RD         Task: Develop individualized healthy eating plan with patient    Responsible User: Avril Martinez RD        Goal: Being Active - get regular physical activity, working up to at least 150 minutes per week         Task: Provide education on relationship of activity to glucose and precautions to take if at risk for low glucose    Responsible User: Avril Martinez RD        Task: Discuss barriers to physical activity with patient    Responsible User: Avril Maritnez RD        Task: Develop physical activity plan with patient Completed 8/21/2024   Responsible User: Avril Martinez RD        Task: Explore community resources including walking groups, assistance programs, and home videos    Responsible User: Avril Martinez RD        Goal: Monitoring - monitor glucose and ketones as directed    This Visit's Progress: 100%   Note:    Patient to use freestyle roula and BG testing as a backup       Task: Provide education on blood glucose monitoring (purpose, proper technique, frequency, glucose targets, interpreting results, when to use glucose control solution, sharps disposal) Completed 8/21/2024   Responsible User: Avril Martinez RD        Task: Provide education on continuous glucose monitoring (sensor placement, use of bettina or /reader, understanding glucose trends, alerts and alarms, differences between sensor glucose and blood glucose) Completed 8/21/2024   Responsible User: Avril Martinez RD        Task: Provide education on ketone monitoring (when to monitor, frequency, etc.)    Responsible User: Avril Martinez RD        Goal: Taking Medication - patient is consistently taking medications as directed         Task: Provide education on action of prescribed medication, including when to take and possible side effects    Responsible User: Avril Martinez RD        Task: Provide education on insulin and injectable diabetes medications, including administration, storage, site selection and rotation for  injection sites Completed 8/21/2024   Responsible User: Avril Martinez RD        Task: Discuss barriers to medication adherence with patient and provide management technique ideas as appropriate Completed 8/21/2024   Responsible User: Avril Martinez RD        Task: Provide education on frequency and refill details of medications    Responsible User: Avril Martinez RD        Goal: Problem Solving - know how to prevent and manage short-term diabetes complications         Task: Provide education on high blood glucose - causes, signs/symptoms, prevention and treatment    Responsible User: Avril Martinez RD        Task: Provide education on low blood glucose - causes, signs/symptoms, prevention, treatment, carrying a carbohydrate source at all times, and medical identification Completed 8/21/2024   Responsible User: Avril Martinez RD        Task: Provide education on safe travel with diabetes    Responsible User: Avril Martinez RD        Task: Provide education on how to care for diabetes on sick days    Responsible User: Avril Martinez RD        Task: Provide education on when to call a health care provider    Responsible User: Avril Martinez RD        Goal: Reducing Risks - know how to prevent and treat long-term diabetes complications         Task: Provide education on major complications of diabetes, prevention, early diagnostic measures and treatment of complications    Responsible User: Avrli Martinez RD        Task: Provide education on recommended care for dental, eye and foot health Completed 8/21/2024   Responsible User: Avril Martinez RD        Task: Provide education on Hemoglobin A1c - goals and relationship to blood glucose levels Completed 8/21/2024   Responsible User: Avril Martinez RD        Task: Provide education on recommendations for heart health - lipid levels and goals, blood pressure and goals, and aspirin therapy, if indicated    Responsible User: Avril Martinez RD        Task:  Provide education on tobacco cessation    Responsible User: Avril Martinez RD        Goal: Healthy Coping - use available resources to cope with the challenges of managing diabetes         Task: Discuss recognizing feelings about having diabetes    Responsible User: Avril Martinez RD        Task: Provide education on the benefits of making appropriate lifestyle changes Completed 8/21/2024   Responsible User: Avril Martinez RD        Task: Provide education on benefits of utilizing support systems    Responsible User: Avril Martinez RD        Task: Discuss methods for coping with stress    Responsible User: Avril Martinez RD        Task: Provide education on when to seek professional counseling    Responsible User: Avril Martinez RD          Time Spent: 60 minutes  Encounter Type: Individual    Any diabetes medication dose changes were made via the CDE Protocol per the patient's referring provider. A copy of this encounter was shared with the provider.

## 2024-08-21 NOTE — PATIENT INSTRUCTIONS
Tresiba decrease from 9 units to 8 units     Mounjaro 10 mg weekly     Jardiance 25 mg daily     Metformin two tabs two times daily

## 2024-11-05 DIAGNOSIS — Z79.4 TYPE 2 DIABETES MELLITUS WITH OTHER CIRCULATORY COMPLICATION, WITH LONG-TERM CURRENT USE OF INSULIN (H): ICD-10-CM

## 2024-11-05 DIAGNOSIS — I50.9 CONGESTIVE HEART FAILURE, UNSPECIFIED HF CHRONICITY, UNSPECIFIED HEART FAILURE TYPE (H): ICD-10-CM

## 2024-11-05 DIAGNOSIS — E11.59 TYPE 2 DIABETES MELLITUS WITH OTHER CIRCULATORY COMPLICATION, WITH LONG-TERM CURRENT USE OF INSULIN (H): ICD-10-CM

## 2024-11-05 DIAGNOSIS — E11.69 TYPE 2 DIABETES MELLITUS WITH OTHER SPECIFIED COMPLICATION, WITH LONG-TERM CURRENT USE OF INSULIN (H): ICD-10-CM

## 2024-11-05 DIAGNOSIS — Z79.4 TYPE 2 DIABETES MELLITUS WITH OTHER SPECIFIED COMPLICATION, WITH LONG-TERM CURRENT USE OF INSULIN (H): ICD-10-CM

## 2024-11-05 RX ORDER — METFORMIN HYDROCHLORIDE 500 MG/1
TABLET, EXTENDED RELEASE ORAL
Qty: 360 TABLET | Refills: 0 | Status: SHIPPED | OUTPATIENT
Start: 2024-11-05 | End: 2024-11-07

## 2024-11-05 RX ORDER — FUROSEMIDE 20 MG/1
TABLET ORAL
Qty: 90 TABLET | Refills: 2 | Status: SHIPPED | OUTPATIENT
Start: 2024-11-05

## 2024-11-06 RX ORDER — INSULIN DEGLUDEC 100 U/ML
INJECTION, SOLUTION SUBCUTANEOUS
Qty: 15 ML | Refills: 3 | Status: SHIPPED | OUTPATIENT
Start: 2024-11-06

## 2024-11-07 ENCOUNTER — OFFICE VISIT (OUTPATIENT)
Dept: FAMILY MEDICINE | Facility: CLINIC | Age: 69
End: 2024-11-07
Payer: COMMERCIAL

## 2024-11-07 VITALS
HEART RATE: 78 BPM | DIASTOLIC BLOOD PRESSURE: 50 MMHG | WEIGHT: 113.1 LBS | BODY MASS INDEX: 21.35 KG/M2 | RESPIRATION RATE: 16 BRPM | OXYGEN SATURATION: 97 % | SYSTOLIC BLOOD PRESSURE: 100 MMHG | TEMPERATURE: 98 F | HEIGHT: 61 IN

## 2024-11-07 DIAGNOSIS — Z29.11 NEED FOR VACCINATION AGAINST RESPIRATORY SYNCYTIAL VIRUS: ICD-10-CM

## 2024-11-07 DIAGNOSIS — E11.3553 STABLE PROLIFERATIVE DIABETIC RETINOPATHY OF BOTH EYES ASSOCIATED WITH TYPE 2 DIABETES MELLITUS (H): Primary | ICD-10-CM

## 2024-11-07 DIAGNOSIS — I25.5 ISCHEMIC CARDIOMYOPATHY: ICD-10-CM

## 2024-11-07 DIAGNOSIS — Z79.4 TYPE 2 DIABETES MELLITUS WITH OTHER CIRCULATORY COMPLICATION, WITH LONG-TERM CURRENT USE OF INSULIN (H): ICD-10-CM

## 2024-11-07 DIAGNOSIS — Z12.31 VISIT FOR SCREENING MAMMOGRAM: ICD-10-CM

## 2024-11-07 DIAGNOSIS — I25.119 CORONARY ARTERY DISEASE INVOLVING NATIVE CORONARY ARTERY OF NATIVE HEART WITH ANGINA PECTORIS (H): ICD-10-CM

## 2024-11-07 DIAGNOSIS — E11.59 TYPE 2 DIABETES MELLITUS WITH OTHER CIRCULATORY COMPLICATION, WITH LONG-TERM CURRENT USE OF INSULIN (H): ICD-10-CM

## 2024-11-07 DIAGNOSIS — I50.9 CONGESTIVE HEART FAILURE, UNSPECIFIED HF CHRONICITY, UNSPECIFIED HEART FAILURE TYPE (H): ICD-10-CM

## 2024-11-07 DIAGNOSIS — Z12.11 SCREEN FOR COLON CANCER: ICD-10-CM

## 2024-11-07 DIAGNOSIS — Z12.31 ENCOUNTER FOR SCREENING MAMMOGRAM FOR BREAST CANCER: ICD-10-CM

## 2024-11-07 DIAGNOSIS — I25.2 HISTORY OF MYOCARDIAL INFARCTION: ICD-10-CM

## 2024-11-07 LAB
EST. AVERAGE GLUCOSE BLD GHB EST-MCNC: 128 MG/DL
HBA1C MFR BLD: 6.1 % (ref 0–5.6)

## 2024-11-07 PROCEDURE — 80048 BASIC METABOLIC PNL TOTAL CA: CPT | Performed by: FAMILY MEDICINE

## 2024-11-07 PROCEDURE — 83036 HEMOGLOBIN GLYCOSYLATED A1C: CPT | Performed by: FAMILY MEDICINE

## 2024-11-07 PROCEDURE — 99215 OFFICE O/P EST HI 40 MIN: CPT | Performed by: FAMILY MEDICINE

## 2024-11-07 PROCEDURE — 36415 COLL VENOUS BLD VENIPUNCTURE: CPT | Performed by: FAMILY MEDICINE

## 2024-11-07 PROCEDURE — G2211 COMPLEX E/M VISIT ADD ON: HCPCS | Performed by: FAMILY MEDICINE

## 2024-11-07 RX ORDER — METFORMIN HYDROCHLORIDE 500 MG/1
TABLET, EXTENDED RELEASE ORAL
Status: SHIPPED
Start: 2024-11-07

## 2024-11-07 RX ORDER — POTASSIUM CHLORIDE 750 MG/1
TABLET, EXTENDED RELEASE ORAL
Qty: 45 TABLET | Refills: 2 | Status: SHIPPED | OUTPATIENT
Start: 2024-11-07

## 2024-11-07 ASSESSMENT — ANXIETY QUESTIONNAIRES
GAD7 TOTAL SCORE: 2
7. FEELING AFRAID AS IF SOMETHING AWFUL MIGHT HAPPEN: NOT AT ALL
5. BEING SO RESTLESS THAT IT IS HARD TO SIT STILL: NOT AT ALL
GAD7 TOTAL SCORE: 2
1. FEELING NERVOUS, ANXIOUS, OR ON EDGE: SEVERAL DAYS
4. TROUBLE RELAXING: SEVERAL DAYS
7. FEELING AFRAID AS IF SOMETHING AWFUL MIGHT HAPPEN: NOT AT ALL
8. IF YOU CHECKED OFF ANY PROBLEMS, HOW DIFFICULT HAVE THESE MADE IT FOR YOU TO DO YOUR WORK, TAKE CARE OF THINGS AT HOME, OR GET ALONG WITH OTHER PEOPLE?: NOT DIFFICULT AT ALL
6. BECOMING EASILY ANNOYED OR IRRITABLE: NOT AT ALL
IF YOU CHECKED OFF ANY PROBLEMS ON THIS QUESTIONNAIRE, HOW DIFFICULT HAVE THESE PROBLEMS MADE IT FOR YOU TO DO YOUR WORK, TAKE CARE OF THINGS AT HOME, OR GET ALONG WITH OTHER PEOPLE: NOT DIFFICULT AT ALL
2. NOT BEING ABLE TO STOP OR CONTROL WORRYING: NOT AT ALL
3. WORRYING TOO MUCH ABOUT DIFFERENT THINGS: NOT AT ALL
GAD7 TOTAL SCORE: 2

## 2024-11-07 ASSESSMENT — PATIENT HEALTH QUESTIONNAIRE - PHQ9
SUM OF ALL RESPONSES TO PHQ QUESTIONS 1-9: 9
SUM OF ALL RESPONSES TO PHQ QUESTIONS 1-9: 9
10. IF YOU CHECKED OFF ANY PROBLEMS, HOW DIFFICULT HAVE THESE PROBLEMS MADE IT FOR YOU TO DO YOUR WORK, TAKE CARE OF THINGS AT HOME, OR GET ALONG WITH OTHER PEOPLE: NOT DIFFICULT AT ALL

## 2024-11-07 NOTE — LETTER
November 12, 2024      Chanelle Billy  [address] [id number]        Dear ,    We are writing to inform you of your test results.    Your results are in the expected range.     Resulted Orders   HEMOGLOBIN A1C   Result Value Ref Range    Estimated Average Glucose 128 (H) <117 mg/dL    Hemoglobin A1C 6.1 (H) 0.0 - 5.6 %      Comment:      Normal <5.7%   Prediabetes 5.7-6.4%    Diabetes 6.5% or higher     Note: Adopted from ADA consensus guidelines.   Basic metabolic panel   Result Value Ref Range    Sodium 140 135 - 145 mmol/L    Potassium 4.1 3.4 - 5.3 mmol/L    Chloride 106 98 - 107 mmol/L    Carbon Dioxide (CO2) 21 (L) 22 - 29 mmol/L    Anion Gap 13 7 - 15 mmol/L    Urea Nitrogen 14.4 8.0 - 23.0 mg/dL    Creatinine 0.60 0.51 - 0.95 mg/dL    GFR Estimate >90 >60 mL/min/1.73m2      Comment:      eGFR calculated using 2021 CKD-EPI equation.    Calcium 9.7 8.8 - 10.4 mg/dL      Comment:      Reference intervals for this test were updated on 7/16/2024 to reflect our healthy population more accurately. There may be differences in the flagging of prior results with similar values performed with this method. Those prior results can be interpreted in the context of the updated reference intervals.    Glucose 168 (H) 70 - 99 mg/dL       If you have any questions or concerns, please call the clinic at the number listed above.       Sincerely,      Samanta Maza MD

## 2024-11-07 NOTE — PATIENT INSTRUCTIONS
Metformin stop taking your morning dose and take 2 pills in the evening.  If you get constipated then you should go back to taking 2 pills twice a day.    Keep a journal of everything you eat for 3 days prior to your appointment with Shell Martinez.

## 2024-11-07 NOTE — PROGRESS NOTES
Assessment & Plan   Problem List Items Addressed This Visit       Coronary artery disease involving native coronary artery of native heart with angina pectoris (H)    Relevant Orders    Adult Cardiology Eval  Referral    Type 2 diabetes mellitus with circulatory disorder, with long-term current use of insulin (H)    Relevant Medications    metFORMIN (GLUCOPHAGE XR) 500 MG 24 hr tablet    Continuous Glucose Sensor (FREESTYLE ROSAURA 2 SENSOR) MISC    Other Relevant Orders    Adult Diabetes Education  Referral    Med Therapy Management Referral    Ischemic cardiomyopathy    Relevant Orders    Basic metabolic panel    Adult Cardiology Eval  Referral    History of myocardial infarction    Relevant Orders    Adult Cardiology Eval  Referral    Stable proliferative diabetic retinopathy of both eyes associated with type 2 diabetes mellitus (H) - Primary    Relevant Medications    metFORMIN (GLUCOPHAGE XR) 500 MG 24 hr tablet    Other Relevant Orders    HEMOGLOBIN A1C     Other Visit Diagnoses       Need for vaccination against respiratory syncytial virus        Visit for screening mammogram        Congestive heart failure, unspecified HF chronicity, unspecified heart failure type (H)        Relevant Medications    potassium chloride ER (K-TAB/KLOR-CON) 10 MEQ CR tablet    Other Relevant Orders    Basic metabolic panel    Med Therapy Management Referral    Screen for colon cancer        Relevant Orders    Fecal colorectal cancer screen (FIT)    Encounter for screening mammogram for breast cancer        Relevant Orders    MA Screening Bilateral w/ Michael           Congestive heart failure patient has started taking her potassium on Mondays Wednesdays and Fridays.  She feels like she tolerates that better than trying to take it daily.  She is taking her furosemide every day as well as her Jardiance.    Diabetes mellitus patient reports that she is having loose stools.  She is currently on  metformin 500 mg 2 pills twice a day.  We are going to try to decrease this to just 2 pills at bedtime.  If she starts to get constipated she can certainly add back that morning dose of metformin.  In the meantime she is going to continue her Ozempic.  She is no longer taking the short acting insulin but tells me that if she feels like her blood sugars are running high she will take some extra Tresiba.  Cautioned her not to do this and would like her to schedule follow-up appointment with our diabetes educator.  She also tells me that she does not feel like eating very much and a friend of hers told her that she should have a referral to talk to a psychiatrist about this.  I explained to her that this is probably a side effect of some of her diabetes medications and to be expected however would like her to keep track of what she has eaten for 3 days before coming in to see the diabetes educator so that they can determine if she is getting adequate nutrition.  Hemoglobin A1c is pending today    Due for colon cancer screening, provided with another FIT kit and new order and asked her to mail it in as directed      Total time spent reviewing chart and preparing for appointment, with patient for appointment, and time spent charting and coordinating care on the day of the appointment in minutes was: 48    The longitudinal plan of care for the diagnosis(es)/condition(s) as documented were addressed during this visit. Due to the added complexity in care, I will continue to support Chanelle in the subsequent management and with ongoing continuity of care.               Subjective   Chanelle is a 69 year old, presenting for the following health issues:  Recheck Medication (She wonders if she still needs potasium chloride?)        11/7/2024    11:35 AM   Additional Questions   Roomed by Ruth Ann WISE                   Objective    /50 (BP Location: Right arm, Patient Position: Sitting)   Pulse 78   Temp 98  F (36.7  C)  "(Tympanic)   Resp 16   Ht 1.556 m (5' 1.25\")   Wt 51.3 kg (113 lb 1.6 oz)   LMP  (LMP Unknown)   SpO2 97%   BMI 21.20 kg/m    Body mass index is 21.2 kg/m .  Physical Exam               Signed Electronically by: Samanta Maza MD    Answers submitted by the patient for this visit:  Patient Health Questionnaire (Submitted on 11/7/2024)  If you checked off any problems, how difficult have these problems made it for you to do your work, take care of things at home, or get along with other people?: Not difficult at all  PHQ9 TOTAL SCORE: 9  Patient Health Questionnaire (G7) (Submitted on 11/7/2024)  ALEJANDRA 7 TOTAL SCORE: 2    "

## 2024-11-08 ENCOUNTER — TELEPHONE (OUTPATIENT)
Dept: FAMILY MEDICINE | Facility: CLINIC | Age: 69
End: 2024-11-08
Payer: COMMERCIAL

## 2024-11-08 LAB
ANION GAP SERPL CALCULATED.3IONS-SCNC: 13 MMOL/L (ref 7–15)
BUN SERPL-MCNC: 14.4 MG/DL (ref 8–23)
CALCIUM SERPL-MCNC: 9.7 MG/DL (ref 8.8–10.4)
CHLORIDE SERPL-SCNC: 106 MMOL/L (ref 98–107)
CREAT SERPL-MCNC: 0.6 MG/DL (ref 0.51–0.95)
EGFRCR SERPLBLD CKD-EPI 2021: >90 ML/MIN/1.73M2
GLUCOSE SERPL-MCNC: 168 MG/DL (ref 70–99)
HCO3 SERPL-SCNC: 21 MMOL/L (ref 22–29)
POTASSIUM SERPL-SCNC: 4.1 MMOL/L (ref 3.4–5.3)
SODIUM SERPL-SCNC: 140 MMOL/L (ref 135–145)

## 2024-11-08 NOTE — TELEPHONE ENCOUNTER
MTM referral from: [facility] visit (referral by provider)    MTM referral outreach attempt #1 on November 8, 2024 at 1:30 PM      Outcome: Spoke with patient She would like to meet with cardio and dietician.      Use [facility] med adv  for the carrier/Plan on the flowsheet    Jocelyn Rollins CMA  MTM

## 2024-11-25 ENCOUNTER — OFFICE VISIT (OUTPATIENT)
Dept: EDUCATION SERVICES | Facility: CLINIC | Age: 69
End: 2024-11-25
Payer: COMMERCIAL

## 2024-11-25 VITALS — BODY MASS INDEX: 21.41 KG/M2 | WEIGHT: 113.4 LBS | HEIGHT: 61 IN

## 2024-11-25 DIAGNOSIS — Z79.4 TYPE 2 DIABETES MELLITUS WITH OTHER CIRCULATORY COMPLICATION, WITH LONG-TERM CURRENT USE OF INSULIN (H): Primary | ICD-10-CM

## 2024-11-25 DIAGNOSIS — E11.59 TYPE 2 DIABETES MELLITUS WITH OTHER CIRCULATORY COMPLICATION, WITH LONG-TERM CURRENT USE OF INSULIN (H): Primary | ICD-10-CM

## 2024-11-25 DIAGNOSIS — E78.5 HYPERLIPIDEMIA LDL GOAL <70: ICD-10-CM

## 2024-11-25 PROCEDURE — G0108 DIAB MANAGE TRN  PER INDIV: HCPCS | Mod: AE | Performed by: DIETITIAN, REGISTERED

## 2024-11-25 PROCEDURE — 99207 PR DROP WITH A PROCEDURE: CPT | Performed by: DIETITIAN, REGISTERED

## 2024-11-25 RX ORDER — TIRZEPATIDE 7.5 MG/.5ML
7.5 INJECTION, SOLUTION SUBCUTANEOUS
Qty: 2 ML | Refills: 2 | Status: SHIPPED | OUTPATIENT
Start: 2024-11-25

## 2024-11-25 NOTE — LETTER
11/25/2024         RE: Chanelle Billy  [address]  [facility] [id number]        Dear Colleague,    Thank you for referring your patient, Chanelle Billy, to the [facility]. Please see a copy of my visit note below.    Diabetes Self-Management Education & Support    Presents for: Type 2 diabetes    Type of Service: In Person Visit      ASSESSMENT:  Diabetes well-controlled currently taking Tresiba 8 units daily, Jardiance 25 mg daily metformin 500 mg taking 2 tablets daily, Mounjaro 10 mg weekly, rapid acting insulin via correction scale patient has not needed to use rapid acting insulin recently.  Freestyle roula 2 reader downloaded 28-day sensor glucose average 120 mg/dL 82% time in target range.  Patient has nicely transitioned from Victoza to Mounjaro 6/2024 and tolerating well.  Will decrease medications due to being 5% less than 70mg/dL and patient feels like she has to force herself to eat.  Weight is now and healthy body weight range.  Weight maintenance goal.    Patient's most recent   Lab Results   Component Value Date    A1C 6.1 11/07/2024    A1C 7.2 07/24/2024    A1C 7.8 01/18/2024    A1C 7.9 09/19/2023    A1C 7.4 06/20/2023    A1C 8.6 02/11/2021    A1C 11.4 10/22/2020    A1C 6.8 03/13/2020    A1C 11.7 12/06/2019       is meeting goal of <7.0    Diabetes knowledge and skills assessment:   Patient is knowledgeable in diabetes management concepts related to: Healthy Eating, Being Active, Monitoring, Taking Medication, Problem Solving, Reducing Risks, and Healthy Coping    Continue education with the following diabetes management concepts: Monitoring, Taking Medication, and Reducing Risks    Based on learning assessment above, most appropriate setting for further diabetes education would be: Individual setting.      PLAN    Total Carbohydrates (in grams) = Breakfast  30    Lunch  30    Supper  30    If desired snacks 15                                    Metformin  "500mg taking 1 tab twice a day   Mounjaro decreasing 10mg to 7.5mg   Jardiance 25mg daily     Tresiba decreasing from 8u down to 5u daily     Topics to cover at upcoming visits: Any area as needed for ongoing diabetes self-management education and support    Follow-up: 3 months    See Care Plan for co-developed, patient-state behavior change goals.  AVS provided for patient today.    Education Materials Provided:  Goals for Your Diabetes Care, Hypoglycemia Signs and Symptoms, and My Plate Planner      SUBJECTIVE/OBJECTIVE:  Presents for: CGM Review  Accompanied by: Self  Diabetes education in the past 24mo: Yes  Focus of Visit: Healthy Eating, Taking Medication, CGM  Diabetes type: Type 2  Disease course: Improving  How confident are you filling out medical forms by yourself:: A little bit  Diabetes management related comments/concerns: have daughter help me by reading it to me  Transportation concerns: Yes (Does not drive, will walk or get a ride to clinic)  Difficulty affording diabetes medication?: No  Difficulty affording diabetes testing supplies?: No  Other concerns:: None  Cultural Influences/Ethnic Background:  Not  or     Diabetes Symptoms & Complications:  Weight trend: Stable  Disease course: Improving  Complications assessed today?: Yes  Autonomic neuropathy: No  CVA: No  Heart disease: Yes  Nephropathy: No  Peripheral neuropathy: No  Peripheral Vascular Disease: No  Retinopathy: No  Sexual dysfunction: Yes    Patient Problem List and Family Medical History reviewed for relevant medical history, current medical status, and diabetes risk factors.    Vitals:  Ht 1.556 m (5' 1.25\")   Wt 51.4 kg (113 lb 6.4 oz)   LMP  (LMP Unknown)   BMI 21.25 kg/m    Estimated body mass index is 21.25 kg/m  as calculated from the following:    Height as of this encounter: 1.556 m (5' 1.25\").    Weight as of this encounter: 51.4 kg (113 lb 6.4 oz).   Last 3 BP:   BP Readings from Last 3 Encounters: " "  11/07/24 100/50   07/24/24 104/58   06/03/24 121/64       History   Smoking Status     Former   Smokeless Tobacco     Never       Labs:  Lab Results   Component Value Date    A1C 6.1 11/07/2024    A1C 8.6 02/11/2021     Lab Results   Component Value Date     11/07/2024    GLC 64 03/16/2022     02/11/2021     Lab Results   Component Value Date    LDL 47 07/24/2024    LDL 71 03/13/2020     HDL Cholesterol   Date Value Ref Range Status   03/13/2020 60 >or=50 mg/dL Final     Direct Measure HDL   Date Value Ref Range Status   07/24/2024 41 (L) >=50 mg/dL Final   ]  GFR Estimate   Date Value Ref Range Status   11/07/2024 >90 >60 mL/min/1.73m2 Final     Comment:     eGFR calculated using 2021 CKD-EPI equation.   02/11/2021 92 >or=60 ml/min/1.73m2 Final     GFR Estimate If Black   Date Value Ref Range Status   02/11/2021 106 >or=60 ml/min/1.73m2 Final     Lab Results   Component Value Date    CR 0.60 11/07/2024    CR 0.68 02/11/2021     No results found for: \"MICROALBUMIN\"    Healthy Eating:  Healthy Eating Assessed Today: Yes  Cultural/Sabianist diet restrictions?: No  Meal planning/habits: Low salt, Smaller portions, Low carb, Avoiding sweets  How many times a week on average do you eat food made away from home (restaurant/take-out)?: 0    Meals include:   3-day record provided  A.m. typically a protein shake  Noon meal soup or chili or 2 peanut butter sandwiches  Evening meal if no sandwiches at lunch then will have 2 sandwiches in the evening either peanut butter or grilled cheese or 3 pieces of fish    Decreased intake of fruits and vegetables.  Patient reports food insecurity, patient is on the government assisted food program and does have local resources.      Beverages: Water, Tea, Coffee, Coffee drinks    Being Active:  Barrier to exercise: None    Monitoring:  Blood Glucose Meter: CGM, FreeStyle  Times checking blood sugar at home (number): 5+  Times checking blood sugar at home (per): Day  Blood " glucose trend: Fluctuating              Taking Medications:  Diabetes Medication(s)       Biguanides       metFORMIN (GLUCOPHAGE XR) 500 MG 24 hr tablet TAKE TWO TABLETS (1,000 MG) BY MOUTH each evening       Diabetic Other       glucose (BD GLUCOSE) 4 g chewable tablet Take 15 g by mouth As needed for low blood sugars (less than 70 mg/dL)       Insulin       insulin degludec (TRESIBA FLEXTOUCH) 100 UNIT/ML pen INJECT 9-UNITS SUBCUTANEOUSLY AT BEDTIME     insulin lispro (HUMALOG KWIKPEN) 100 UNIT/ML (1 unit dial) KWIKPEN INJECT UNDER THE SKIN THREE TIMES DAILY BEFORE MEALS. CHECK YOUR BLOOD SUGAR. IF BLOOD SUGAR IS LESS THAN 100, NO SHORT ACTING INSULIN. IF BLOOD SUGAR  OR ABOVE TAKE FOUR UNITS OF INSULIN; 150 OR ABOVE TAKE 6 UNITS; 200 OR ABOVE TAKE 8 UNITS.       Sodium-Glucose Co-Transporter 2 (SGLT2) Inhibitors       empagliflozin (JARDIANCE) 25 MG TABS tablet Take 1 tablet (25 mg) by mouth daily.       Incretin Mimetic Agents       tirzepatide (MOUNJARO) 10 MG/0.5ML pen Inject 10 mg subcutaneously every 7 days.     Tirzepatide (MOUNJARO) 7.5 MG/0.5ML SOAJ Inject 0.5 mLs (7.5 mg) subcutaneously every 7 days.            Taking Medication Assessed Today: Yes  Current Treatments: Insulin Injections, Oral Medication (taken by mouth), Non-insulin Injectables  Problems taking diabetes medications regularly?: No  Diabetes medication side effects?: No    Problem Solving:  Problem Solving Assessed Today: Yes  Is the patient at risk for hypoglycemia?: Yes  Hypoglycemia Frequency: Weekly  Hypoglycemia Treatment: Glucose (tablets or gel), Juice, Candy, Other food  Medical ID: Yes  Does patient have glucagon emergency kit?: Yes  Is the patient at risk for DKA?: No  Does patient have severe weather/disaster plan for diabetes management?: Yes  Does patient have sick day plan for diabetes management?: Yes    Hypoglycemia Symptoms  Hypoglycemia: Sweats, Dizziness/Lightheadedness      Reducing Risks:  Reducing Risks  Assessed Today: Yes  Diabetes Risks: Age over 45 years, Sedentary Lifestyle  CAD Risks: Diabetes Mellitus, Dyslipidemia, Family history, Hypertension, Sedentary lifestyle, Stress  Has dilated eye exam at least once a year?: Yes  Sees dentist every 6 months?: No  Feet checked by healthcare provider in the last year?: Yes    Healthy Coping:  Healthy Coping Assessed Today: Yes  Emotional response to diabetes: Helplessness, Fear/Anxiety  Informal Support system:: Children  Stage of change: PREPARATION (Decided to change - considering how)  Support resources: Websites, Magazines, In-person Offerings  Patient Activation Measure Survey Score:       No data to display                  Care Plan and Education Provided:  Care Plan: Diabetes   Updates made by Avril Martinez RD since 11/25/2024 12:00 AM        Problem: HbA1C Not In Goal         Goal: Get HbA1C Level in Goal         Task: Refer patient to appropriate extended care team member, as needed (Medication Therapy Management, Behavioral Health, Physical Therapy, etc.) Completed 11/25/2024   Responsible User: Avril Martinez RD        Problem: Diabetes Self-Management Education Needed to Optimize Self-Care Behaviors         Goal: Healthy Eating - follow a healthy eating pattern for diabetes         Task: Provide education on managing carbohydrate intake (carbohydrate counting, plate planning method, etc.) Completed 11/25/2024   Responsible User: Avril Martinez RD        Task: Develop individualized healthy eating plan with patient Completed 11/25/2024   Responsible User: Avril Martinez RD        Goal: Being Active - get regular physical activity, working up to at least 150 minutes per week         Task: Discuss barriers to physical activity with patient Completed 11/25/2024   Responsible User: Avril Martinez RD        Goal: Monitoring - monitor glucose and ketones as directed    This Visit's Progress: 80%   Recent Progress: 100%   Note:    Patient to use freestyle  roula and BG testing as a backup       Goal: Taking Medication - patient is consistently taking medications as directed         Task: Provide education on action of prescribed medication, including when to take and possible side effects Completed 11/25/2024   Responsible User: Avril Martinez RD        Goal: Problem Solving - know how to prevent and manage short-term diabetes complications         Task: Provide education on how to care for diabetes on sick days Completed 11/25/2024   Responsible User: Avril Martinez RD        Goal: Reducing Risks - know how to prevent and treat long-term diabetes complications         Task: Provide education on major complications of diabetes, prevention, early diagnostic measures and treatment of complications Completed 11/25/2024   Responsible User: Avril Martinez RD        Goal: Healthy Coping - use available resources to cope with the challenges of managing diabetes         Task: Discuss methods for coping with stress Completed 11/25/2024   Responsible User: Avril Martinez RD            Time Spent: 60 minutes  Encounter Type: Individual    Any diabetes medication dose changes were made via the CDE Protocol per the patient's referring provider. A copy of this encounter was shared with the provider.

## 2024-11-25 NOTE — PROGRESS NOTES
Diabetes Self-Management Education & Support    Presents for: Type 2 diabetes    Type of Service: In Person Visit      ASSESSMENT:  Diabetes well-controlled currently taking Tresiba 8 units daily, Jardiance 25 mg daily metformin 500 mg taking 2 tablets daily, Mounjaro 10 mg weekly, rapid acting insulin via correction scale patient has not needed to use rapid acting insulin recently.  Freestyle roula 2 reader downloaded 28-day sensor glucose average 120 mg/dL 82% time in target range.  Patient has nicely transitioned from Victoza to Mounjaro 6/2024 and tolerating well.  Will decrease medications due to being 5% less than 70mg/dL and patient feels like she has to force herself to eat.  Weight is now and healthy body weight range.  Weight maintenance goal.    Patient's most recent   Lab Results   Component Value Date    A1C 6.1 11/07/2024    A1C 7.2 07/24/2024    A1C 7.8 01/18/2024    A1C 7.9 09/19/2023    A1C 7.4 06/20/2023    A1C 8.6 02/11/2021    A1C 11.4 10/22/2020    A1C 6.8 03/13/2020    A1C 11.7 12/06/2019       is meeting goal of <7.0    Diabetes knowledge and skills assessment:   Patient is knowledgeable in diabetes management concepts related to: Healthy Eating, Being Active, Monitoring, Taking Medication, Problem Solving, Reducing Risks, and Healthy Coping    Continue education with the following diabetes management concepts: Monitoring, Taking Medication, and Reducing Risks    Based on learning assessment above, most appropriate setting for further diabetes education would be: Individual setting.      PLAN    Total Carbohydrates (in grams) = Breakfast  30    Lunch  30    Supper  30    If desired snacks 15                                    Metformin 500mg taking 1 tab twice a day   Mounjaro decreasing 10mg to 7.5mg   Jardiance 25mg daily     Tresiba decreasing from 8u down to 5u daily     Topics to cover at upcoming visits: Any area as needed for ongoing diabetes self-management education and  "support    Follow-up: 3 months    See Care Plan for co-developed, patient-state behavior change goals.  AVS provided for patient today.    Education Materials Provided:  Goals for Your Diabetes Care, Hypoglycemia Signs and Symptoms, and My Plate Planner      SUBJECTIVE/OBJECTIVE:  Presents for: CGM Review  Accompanied by: Self  Diabetes education in the past 24mo: Yes  Focus of Visit: Healthy Eating, Taking Medication, CGM  Diabetes type: Type 2  Disease course: Improving  How confident are you filling out medical forms by yourself:: A little bit  Diabetes management related comments/concerns: have daughter help me by reading it to me  Transportation concerns: Yes (Does not drive, will walk or get a ride to clinic)  Difficulty affording diabetes medication?: No  Difficulty affording diabetes testing supplies?: No  Other concerns:: None  Cultural Influences/Ethnic Background:  Not  or     Diabetes Symptoms & Complications:  Weight trend: Stable  Disease course: Improving  Complications assessed today?: Yes  Autonomic neuropathy: No  CVA: No  Heart disease: Yes  Nephropathy: No  Peripheral neuropathy: No  Peripheral Vascular Disease: No  Retinopathy: No  Sexual dysfunction: Yes    Patient Problem List and Family Medical History reviewed for relevant medical history, current medical status, and diabetes risk factors.    Vitals:  Ht 1.556 m (5' 1.25\")   Wt 51.4 kg (113 lb 6.4 oz)   LMP  (LMP Unknown)   BMI 21.25 kg/m    Estimated body mass index is 21.25 kg/m  as calculated from the following:    Height as of this encounter: 1.556 m (5' 1.25\").    Weight as of this encounter: 51.4 kg (113 lb 6.4 oz).   Last 3 BP:   BP Readings from Last 3 Encounters:   11/07/24 100/50   07/24/24 104/58   06/03/24 121/64       History   Smoking Status    Former   Smokeless Tobacco    Never       Labs:  Lab Results   Component Value Date    A1C 6.1 11/07/2024    A1C 8.6 02/11/2021     Lab Results   Component Value Date    " " 11/07/2024    GLC 64 03/16/2022     02/11/2021     Lab Results   Component Value Date    LDL 47 07/24/2024    LDL 71 03/13/2020     HDL Cholesterol   Date Value Ref Range Status   03/13/2020 60 >or=50 mg/dL Final     Direct Measure HDL   Date Value Ref Range Status   07/24/2024 41 (L) >=50 mg/dL Final   ]  GFR Estimate   Date Value Ref Range Status   11/07/2024 >90 >60 mL/min/1.73m2 Final     Comment:     eGFR calculated using 2021 CKD-EPI equation.   02/11/2021 92 >or=60 ml/min/1.73m2 Final     GFR Estimate If Black   Date Value Ref Range Status   02/11/2021 106 >or=60 ml/min/1.73m2 Final     Lab Results   Component Value Date    CR 0.60 11/07/2024    CR 0.68 02/11/2021     No results found for: \"MICROALBUMIN\"    Healthy Eating:  Healthy Eating Assessed Today: Yes  Cultural/Anglican diet restrictions?: No  Meal planning/habits: Low salt, Smaller portions, Low carb, Avoiding sweets  How many times a week on average do you eat food made away from home (restaurant/take-out)?: 0    Meals include:   3-day record provided  A.m. typically a protein shake  Noon meal soup or chili or 2 peanut butter sandwiches  Evening meal if no sandwiches at lunch then will have 2 sandwiches in the evening either peanut butter or grilled cheese or 3 pieces of fish    Decreased intake of fruits and vegetables.  Patient reports food insecurity, patient is on the government assisted food program and does have local resources.      Beverages: Water, Tea, Coffee, Coffee drinks    Being Active:  Barrier to exercise: None    Monitoring:  Blood Glucose Meter: CGM, FreeStyle  Times checking blood sugar at home (number): 5+  Times checking blood sugar at home (per): Day  Blood glucose trend: Fluctuating              Taking Medications:  Diabetes Medication(s)       Biguanides       metFORMIN (GLUCOPHAGE XR) 500 MG 24 hr tablet TAKE TWO TABLETS (1,000 MG) BY MOUTH each evening       Diabetic Other       glucose (BD GLUCOSE) 4 g " chewable tablet Take 15 g by mouth As needed for low blood sugars (less than 70 mg/dL)       Insulin       insulin degludec (TRESIBA FLEXTOUCH) 100 UNIT/ML pen INJECT 9-UNITS SUBCUTANEOUSLY AT BEDTIME     insulin lispro (HUMALOG KWIKPEN) 100 UNIT/ML (1 unit dial) KWIKPEN INJECT UNDER THE SKIN THREE TIMES DAILY BEFORE MEALS. CHECK YOUR BLOOD SUGAR. IF BLOOD SUGAR IS LESS THAN 100, NO SHORT ACTING INSULIN. IF BLOOD SUGAR  OR ABOVE TAKE FOUR UNITS OF INSULIN; 150 OR ABOVE TAKE 6 UNITS; 200 OR ABOVE TAKE 8 UNITS.       Sodium-Glucose Co-Transporter 2 (SGLT2) Inhibitors       empagliflozin (JARDIANCE) 25 MG TABS tablet Take 1 tablet (25 mg) by mouth daily.       Incretin Mimetic Agents       tirzepatide (MOUNJARO) 10 MG/0.5ML pen Inject 10 mg subcutaneously every 7 days.     Tirzepatide (MOUNJARO) 7.5 MG/0.5ML SOAJ Inject 0.5 mLs (7.5 mg) subcutaneously every 7 days.            Taking Medication Assessed Today: Yes  Current Treatments: Insulin Injections, Oral Medication (taken by mouth), Non-insulin Injectables  Problems taking diabetes medications regularly?: No  Diabetes medication side effects?: No    Problem Solving:  Problem Solving Assessed Today: Yes  Is the patient at risk for hypoglycemia?: Yes  Hypoglycemia Frequency: Weekly  Hypoglycemia Treatment: Glucose (tablets or gel), Juice, Candy, Other food  Medical ID: Yes  Does patient have glucagon emergency kit?: Yes  Is the patient at risk for DKA?: No  Does patient have severe weather/disaster plan for diabetes management?: Yes  Does patient have sick day plan for diabetes management?: Yes    Hypoglycemia Symptoms  Hypoglycemia: Sweats, Dizziness/Lightheadedness      Reducing Risks:  Reducing Risks Assessed Today: Yes  Diabetes Risks: Age over 45 years, Sedentary Lifestyle  CAD Risks: Diabetes Mellitus, Dyslipidemia, Family history, Hypertension, Sedentary lifestyle, Stress  Has dilated eye exam at least once a year?: Yes  Sees dentist every 6 months?:  No  Feet checked by healthcare provider in the last year?: Yes    Healthy Coping:  Healthy Coping Assessed Today: Yes  Emotional response to diabetes: Helplessness, Fear/Anxiety  Informal Support system:: Children  Stage of change: PREPARATION (Decided to change - considering how)  Support resources: Websites, Magazines, In-person Offerings  Patient Activation Measure Survey Score:       No data to display                  Care Plan and Education Provided:  Care Plan: Diabetes   Updates made by Avril Martinez RD since 11/25/2024 12:00 AM        Problem: HbA1C Not In Goal         Goal: Get HbA1C Level in Goal         Task: Refer patient to appropriate extended care team member, as needed (Medication Therapy Management, Behavioral Health, Physical Therapy, etc.) Completed 11/25/2024   Responsible User: Avril Martinez RD        Problem: Diabetes Self-Management Education Needed to Optimize Self-Care Behaviors         Goal: Healthy Eating - follow a healthy eating pattern for diabetes         Task: Provide education on managing carbohydrate intake (carbohydrate counting, plate planning method, etc.) Completed 11/25/2024   Responsible User: Avril Martinez RD        Task: Develop individualized healthy eating plan with patient Completed 11/25/2024   Responsible User: Avril Martinez RD        Goal: Being Active - get regular physical activity, working up to at least 150 minutes per week         Task: Discuss barriers to physical activity with patient Completed 11/25/2024   Responsible User: Avril Martinez RD        Goal: Monitoring - monitor glucose and ketones as directed    This Visit's Progress: 80%   Recent Progress: 100%   Note:    Patient to use freestyle roula and BG testing as a backup       Goal: Taking Medication - patient is consistently taking medications as directed         Task: Provide education on action of prescribed medication, including when to take and possible side effects Completed 11/25/2024    Responsible User: Avril Martinez RD        Goal: Problem Solving - know how to prevent and manage short-term diabetes complications         Task: Provide education on how to care for diabetes on sick days Completed 11/25/2024   Responsible User: Avril Martinez RD        Goal: Reducing Risks - know how to prevent and treat long-term diabetes complications         Task: Provide education on major complications of diabetes, prevention, early diagnostic measures and treatment of complications Completed 11/25/2024   Responsible User: Avril Martinez RD        Goal: Healthy Coping - use available resources to cope with the challenges of managing diabetes         Task: Discuss methods for coping with stress Completed 11/25/2024   Responsible User: Avril Martinez RD            Time Spent: 60 minutes  Encounter Type: Individual    Any diabetes medication dose changes were made via the CDE Protocol per the patient's referring provider. A copy of this encounter was shared with the provider.

## 2024-11-25 NOTE — PATIENT INSTRUCTIONS
Goals:  Practice healthy stress management and mindful eating - think are you physically hungry or are you bored, stressed, emotional etc, make of list of things to do besides eat.    Try to get good quality sleep with a goal of 7-8 hours per night.  Stay physically active daily.  Recommend working up to a total of 30 minutes on 5 days/ week.  Recommend a fitness tracker.     Eat in a healthy way- eliminate trans fats, limit saturated fats and added sugars; follow the plate method - picture above.  Keep a food record (MyFitnessPal, Loseit).    A meal is 3 or more food groups; make it colorful for better nutrition.    Total Carbohydrates (in grams) = Breakfast  30    Lunch  30    Supper  30    If desired snacks 15                                    Metformin 500mg taking 1 tab twice a day   Mounjaro decreasing 10mg to 7.5mg   Jardiance 25mg daily     Tresiba decreasing from 8u down to 5u daily

## 2025-01-13 ENCOUNTER — TELEPHONE (OUTPATIENT)
Dept: FAMILY MEDICINE | Facility: CLINIC | Age: 70
End: 2025-01-13
Payer: COMMERCIAL

## 2025-01-13 NOTE — TELEPHONE ENCOUNTER
General Call      Reason for Call:  incoming fax    What are your questions or concerns:  Deven from [employer] [facility] [employer] calling to say that there will be an incoming fax for a freestyle Dm.    Date of last appointment with provider: 11/25/24    [facility]: [phone]

## 2025-01-16 NOTE — PROGRESS NOTES
HEART CARE FOLLOW UP NOTE      [facility]  [phone]      Assessment/Recommendations   Assessment: 69 year old female with CAD, cardiomyopathy    Plan:  CAD  Doing well s/p CABG x 3 in 2021      -Continue ASA 81mg, Coreg 12.5mg BID, Crestor 40mg    Cardiomyopathy, ischemic   EF 45%, no CHF symptoms , GDMT limited by hypotension       -Continue Lasix 20mg, weight 111      -Continue Coreg 12.5mg BID, Lisinopril 10mg, Jardiance 25mg      -Recheck echocardiogram     HTN  Runs low, no orthostatic symptoms       -Continue Coreg 12.5mg BID, Lisinopril 10mg    Hyperlipidemia   LDL = 47      -Continue Crestor 40mg     The longitudinal plan of care for the diagnosis(es)/condition(s) as documented were addressed during this visit. Due to the added complexity in care, I will continue to support Chanelle in the subsequent management and with ongoing continuity of care.          History of Present Illness/Subjective    Indication for visit:  Chanelle Billy returns for follow up of CAD, cardiomyopathy and was last seen on 10/17/2023 by Dr Arron Long.      HPI: Chanelle Billy is a 69 year old female with a history of diabetes, HTN, hyperlipidemia, CAD s/p CABG, ischemic cardiomyopathy who returns for follow up.    She reports prior to CABG she was feeling very tired, short of breath, daughter brought to ER.  Lately gets tired from time to time and out of breath, especially with a hill or when emotionally upset   Also reports chest pain which she relates to GERD.   Notes poor appetite.  Stopped wearing CPAP, no orthopnea or PND.    I reviewed notes from PCP prior to this visit.         Physical Examination  Past Cardiac History   BP 90/42 (BP Location: Right arm, Patient Position: Sitting, Cuff Size: Adult Regular)   Pulse 72   Resp 20   Wt 50.3 kg (111 lb)   LMP  (LMP Unknown)   SpO2 97%   BMI 20.80 kg/m       Wt Readings from Last 3 Encounters:   11/07/24 51.3 kg (113 lb 1.6 oz)   08/21/24 55.8 kg (123 lb  1.6 oz)   07/24/24 57 kg (125 lb 11.2 oz)       General Appearance:   no distress, normal body habitus   ENT/Mouth: membranes moist, no oral lesions or bleeding gums.      EYES:  no scleral icterus, normal conjunctivae   Neck: no carotid bruits or thyromegaly   Chest/Lungs:   lungs are clear to auscultation, no rales or wheezing,  sternal scar, equal chest wall expansion    Cardiovascular:   Regular. Normal first and second heart sounds with no murmurs, rubs, or gallops; the carotid, radial and posterior tibial pulses are intact, Jugular venous pressure normal , no edema bilaterally    Abdomen:  no organomegaly, masses, bruits, or tenderness; bowel sounds are present   Extremities: no cyanosis or clubbing   Skin: no xanthelasma, warm.    Neurologic: normal  bilateral, no tremors           Cardiomyopathy, ischemic    CAD:   S/p CABG 04/14/2021 with LIMA to LAD, SVG to OM, SVG to rPDA  s/p JIA to D1 and JIA x 2 to LAD 9/2/2011    Most Recent Echocardiogram: 9/1/2023  1. The left ventricle is normal in size. Left ventricular systolic performance  is mildly reduced. The ejection fraction is estimated to be 45%.  2. There is severe mid to distal inferior hypokinesis and moderate distal  lateral hypokinesis  3. There is mild aortic valve sclerosis without significant stenosis.  4. Normal right ventricular size and systolic performance.  5. There is mild left atrial enlargement.     When compared to the prior real-time echocardiogram dated 18 October 2022, the  findings are felt to be fairly similar on both examinations.    Most Recent Stress Test: 11/2/2011  1.  THERE IS NO EVIDENCE OF SIGNIFICANT MYOCARDIAL ISCHEMIA OR INFARCTION.   2. NORMAL LEFT VENTRICULAR EJECTION FRACTION OF APPROXIMATELY 65%.       Most Recent Angiogram: 4/12/2021  ? 99% in-stent stenosis in the Mid LAD   ? 99% in-stent stenosis in the 1st Diagonal   ? The LAD and diagonal appear larger in caliber in the 2011 study.   ? 50% stenosis in the Mid  to distal  Circumflex   ? 80% stenosis in the 1st Marginal   ? The RCA is dominant.   ? 80% stenosis in the Mid RCA   ? 70% stenosis in the Distal RCA   ? 50% stenosis in the RPDA            Medical History  Family History Social History   CAD  Cardiomyopathy  HTN  Hyperlipidemia   Diabetes  Family History   Problem Relation Age of Onset    Cerebrovascular Disease Mother     Diabetes Mother     Cerebrovascular Disease Father     Diabetes Father     Glaucoma Sister     Arthritis Maternal Grandmother     No Known Problems Daughter         Age: 46 years        Social History     Socioeconomic History    Marital status:      Spouse name: Not on file    Number of children: Not on file    Years of education: Not on file    Highest education level: Not on file   Occupational History    Not on file   Tobacco Use    Smoking status: Former     Passive exposure: Past    Smokeless tobacco: Never    Tobacco comments:     smoked in high school and a little after her  .   Vaping Use    Vaping status: Never Used   Substance and Sexual Activity    Alcohol use: Yes     Comment: occasionally    Drug use: Not on file    Sexual activity: Not on file   Other Topics Concern    Parent/sibling w/ CABG, MI or angioplasty before 65F 55M? Not Asked   Social History Narrative    Not on file     Social Drivers of Health     Financial Resource Strain: Low Risk  (2024)    Financial Resource Strain     Within the past 12 months, have you or your family members you live with been unable to get utilities (heat, electricity) when it was really needed?: No   Food Insecurity: Low Risk  (2024)    Food Insecurity     Within the past 12 months, did you worry that your food would run out before you got money to buy more?: No     Within the past 12 months, did the food you bought just not last and you didn t have money to get more?: No   Transportation Needs: Low Risk  (2024)    Transportation Needs     Within the past 12  months, has lack of transportation kept you from medical appointments, getting your medicines, non-medical meetings or appointments, work, or from getting things that you need?: No   Physical Activity: Not on file   Stress: Not on file   Social Connections: Unknown (1/1/2022)    Received from [facility]    Social Connections     Frequency of Communication with Friends and Family: Not on file   Interpersonal Safety: Not on file   Housing Stability: Low Risk  (1/18/2024)    Housing Stability     Do you have housing? : Yes     Are you worried about losing your housing?: No           Medications  Allergies      Current Outpatient Medications   Medication Sig Dispense Refill    aspirin (ASA) 81 MG chewable tablet Take 81 mg by mouth daily      blood glucose (NO BRAND SPECIFIED) test strip Use to test blood sugars 3 times daily or as directed 100 strip 11    carvedilol (COREG) 12.5 MG tablet TAKE ONE TABLET (12.5 MG) BY MOUTH TWO TIMES DAILY (WITH MEALS) 180 tablet 1    Continuous Glucose  (FREESTYLE ROSAURA 2 READER) [place] Use as directed 2 each 1    Continuous Glucose Sensor (FREESTYLE ROSAURA 2 SENSOR) [facility] Use as directed 6 each 1    empagliflozin (JARDIANCE) 25 MG TABS tablet Take 1 tablet (25 mg) by mouth daily. 90 tablet 3    FLUoxetine (PROZAC) 20 MG capsule TAKE ONE CAPSULE BY MOUTH EVERY MORNING 90 capsule 3    furosemide (LASIX) 20 MG tablet TAKE ONE TABLET (20 MG) BY MOUTH DAILY 90 tablet 2    glucose (BD GLUCOSE) 4 g chewable tablet Take 15 g by mouth As needed for low blood sugars (less than 70 mg/dL)      insulin degludec (TRESIBA FLEXTOUCH) 100 UNIT/ML pen INJECT 9-UNITS SUBCUTANEOUSLY AT BEDTIME 15 mL 3    insulin pen needle (32G X 4 MM) 32G X 4 MM miscellaneous Use 5 pen needles daily or as directed. 100 each 11    lisinopril (ZESTRIL) 10 MG tablet TAKE ONE TABLET (10 MG) BY MOUTH DAILY 100 tablet 1    loratadine  "(CLARITIN) 10 MG tablet Take 10 mg by mouth nightly as needed for allergies      metFORMIN (GLUCOPHAGE XR) 500 MG 24 hr tablet TAKE TWO TABLETS (1,000 MG) BY MOUTH each evening      multivitamin w/minerals (THERA-VIT-M) tablet Take 1 tablet by mouth daily      Naphazoline-Pheniramine (EQ EYE ALLERGY RELIEF OP) Pt does not know what the active drug is. \"Eye allergy relief\" using this as needed for eye allergy symptoms.      nitroGLYcerin (NITROSTAT) 0.4 MG sublingual tablet Place 1 tablet (0.4 mg) under the tongue every 5 minutes as needed for chest pain For chest pain place 1 tablet under the tongue every 5 minutes for 3 doses. If symptoms persist 5 minutes after 1st dose call 911. 30 tablet 1    polyethylene glycol-propylene glycol (SYSTANE ULTRA) 0.4-0.3 % SOLN ophthalmic solution Place 1 drop into both eyes every hour as needed for dry eyes      potassium chloride ER (K-TAB/KLOR-CON) 10 MEQ CR tablet 1 po q Monday, Wednesday and Friday 45 tablet 2    rosuvastatin (CRESTOR) 40 MG tablet Take 1 tablet (40 mg) by mouth at bedtime 90 tablet 3    Tirzepatide (MOUNJARO) 7.5 MG/0.5ML SOAJ Inject 0.5 mLs (7.5 mg) subcutaneously every 7 days. 2 mL 2    insulin lispro (HUMALOG KWIKPEN) 100 UNIT/ML (1 unit dial) KWIKPEN INJECT UNDER THE SKIN THREE TIMES DAILY BEFORE MEALS. CHECK YOUR BLOOD SUGAR. IF BLOOD SUGAR IS LESS THAN 100, NO SHORT ACTING INSULIN. IF BLOOD SUGAR  OR ABOVE TAKE FOUR UNITS OF INSULIN; 150 OR ABOVE TAKE 6 UNITS; 200 OR ABOVE TAKE 8 UNITS. (Patient not taking: Reported on 1/28/2025) 15 mL 3    tirzepatide (MOUNJARO) 10 MG/0.5ML pen Inject 10 mg subcutaneously every 7 days. (Patient not taking: Reported on 1/28/2025) 2 mL 3     No current facility-administered medications for this visit.        Allergies   Allergen Reactions    Atorvastatin Nausea and Vomiting and Diarrhea     Pt reported  Other reaction(s): Diarrhea, nausea, vomiting, Diarrhea,Nausea,Vomiting    Mirtazapine      Nightmares and " "suicidal ideation for 1 day (per Dr. Maza's note)  Other reaction(s): Nightmare, Suicidal ideation    Sodium Hypochlorite      Bleeding from nose and eyes after using a bleach product, patient/daughter reported  Other reaction(s): Runny nose    Animal Dander Other (See Comments)    Cats      Runny nose, pt reported.  Takes allergy medication for this and still lives with a cat.  Other reaction(s): Nasal congestion, Runny nose    Trazodone      nightmares  Other reaction(s): Nightmares          Lab Results    Chemistry/lipid CBC Cardiac Enzymes/BNP/TSH/INR   Recent Labs   Lab Test 07/24/24  1343 06/27/22  1248 08/19/21  1340   CHOL 113   < > 167   HDL 41*   < > 51   LDL 47   < > 88   TRIG 126   < > 185*  185*   CHOLHDLRATIO  --   --  3.3    < > = values in this interval not displayed.     Recent Labs   Lab Test 07/24/24  1343 06/20/23  0911 12/20/22  1609   LDL 47 38 90     Recent Labs   Lab Test 07/24/24  1343      POTASSIUM 4.3   CHLORIDE 104   CO2 24   *   BUN 15.5   CR 0.81   GFRESTIMATED 79   KURT 9.6     Recent Labs   Lab Test 07/24/24  1343 02/13/24  1459 09/19/23  1216   CR 0.81 0.61 0.64     Recent Labs   Lab Test 11/07/24  1238 07/24/24  1343 01/18/24  1411   A1C 6.1* 7.2* 7.8*          Recent Labs   Lab Test 01/18/24  1411   WBC 6.6   HGB 13.8   HCT 43.0   MCV 88        Recent Labs   Lab Test 01/18/24  1411 12/20/22  1609 11/30/21  1503   HGB 13.8 13.1 11.2*    No results for input(s): \"TROPONINI\" in the last 32016 hours.  Recent Labs   Lab Test 12/10/21  1419 11/30/21  1503 12/06/19  1122   * 564* 44     Recent Labs   Lab Test 12/20/22  1609   TSH 1.83     No results for input(s): \"INR\" in the last 45460 hours.     Caren Boyd MD  Noninvasive Cardiologist   [facility]"

## 2025-01-28 ENCOUNTER — OFFICE VISIT (OUTPATIENT)
Dept: CARDIOLOGY | Facility: CLINIC | Age: 70
End: 2025-01-28
Attending: FAMILY MEDICINE
Payer: COMMERCIAL

## 2025-01-28 VITALS
WEIGHT: 111 LBS | SYSTOLIC BLOOD PRESSURE: 90 MMHG | DIASTOLIC BLOOD PRESSURE: 42 MMHG | HEART RATE: 72 BPM | RESPIRATION RATE: 20 BRPM | BODY MASS INDEX: 20.8 KG/M2 | OXYGEN SATURATION: 97 %

## 2025-01-28 DIAGNOSIS — I25.118 CORONARY ARTERY DISEASE OF NATIVE ARTERY OF NATIVE HEART WITH STABLE ANGINA PECTORIS: Primary | ICD-10-CM

## 2025-01-28 DIAGNOSIS — I25.2 HISTORY OF MYOCARDIAL INFARCTION: ICD-10-CM

## 2025-01-28 DIAGNOSIS — I25.5 ISCHEMIC CARDIOMYOPATHY: ICD-10-CM

## 2025-01-28 DIAGNOSIS — I95.2 HYPOTENSION DUE TO DRUGS: ICD-10-CM

## 2025-01-28 DIAGNOSIS — E78.5 HYPERLIPIDEMIA LDL GOAL <70: ICD-10-CM

## 2025-01-28 PROCEDURE — G2211 COMPLEX E/M VISIT ADD ON: HCPCS | Performed by: INTERNAL MEDICINE

## 2025-01-28 PROCEDURE — 99214 OFFICE O/P EST MOD 30 MIN: CPT | Performed by: INTERNAL MEDICINE

## 2025-01-28 RX ORDER — NITROGLYCERIN 0.4 MG/1
TABLET SUBLINGUAL
Qty: 20 TABLET | Refills: 3 | Status: SHIPPED | OUTPATIENT
Start: 2025-01-28

## 2025-01-28 NOTE — LETTER
1/28/2025    Samanta Maza MD  [address] [facility] [address]    RE: Chanelle Billy       Dear Colleague,     I had the pleasure of seeing Chanelle Billy in the [facility].  HEART CARE FOLLOW UP NOTE      [facility]  [phone]      Assessment/Recommendations   Assessment: 69 year old female with CAD, cardiomyopathy    Plan:  CAD  Doing well s/p CABG x 3 in 2021      -Continue ASA 81mg, Coreg 12.5mg BID, Crestor 40mg    Cardiomyopathy, ischemic   EF 45%, no CHF symptoms , GDMT limited by hypotension       -Continue Lasix 20mg, weight 111      -Continue Coreg 12.5mg BID, Lisinopril 10mg, Jardiance 25mg      -Recheck echocardiogram     HTN  Runs low, no orthostatic symptoms       -Continue Coreg 12.5mg BID, Lisinopril 10mg    Hyperlipidemia   LDL = 47      -Continue Crestor 40mg     The longitudinal plan of care for the diagnosis(es)/condition(s) as documented were addressed during this visit. Due to the added complexity in care, I will continue to support Chanelle in the subsequent management and with ongoing continuity of care.          History of Present Illness/Subjective    Indication for visit:  Chanelle Billy returns for follow up of CAD, cardiomyopathy and was last seen on 10/17/2023 by Dr Arron Long.      HPI: Chanelle Billy is a 69 year old female with a history of diabetes, HTN, hyperlipidemia, CAD s/p CABG, ischemic cardiomyopathy who returns for follow up.    She reports prior to CABG she was feeling very tired, short of breath, daughter brought to ER.  Lately gets tired from time to time and out of breath, especially with a hill or when emotionally upset   Also reports chest pain which she relates to GERD.   Notes poor appetite.  Stopped wearing CPAP, no orthopnea or PND.    I reviewed notes from PCP prior to this visit.         Physical Examination  Past Cardiac History   BP 90/42 (BP Location: Right arm, Patient Position: Sitting, Cuff Size: Adult Regular)    Pulse 72   Resp 20   Wt 50.3 kg (111 lb)   LMP  (LMP Unknown)   SpO2 97%   BMI 20.80 kg/m       Wt Readings from Last 3 Encounters:   11/07/24 51.3 kg (113 lb 1.6 oz)   08/21/24 55.8 kg (123 lb 1.6 oz)   07/24/24 57 kg (125 lb 11.2 oz)       General Appearance:   no distress, normal body habitus   ENT/Mouth: membranes moist, no oral lesions or bleeding gums.      EYES:  no scleral icterus, normal conjunctivae   Neck: no carotid bruits or thyromegaly   Chest/Lungs:   lungs are clear to auscultation, no rales or wheezing,  sternal scar, equal chest wall expansion    Cardiovascular:   Regular. Normal first and second heart sounds with no murmurs, rubs, or gallops; the carotid, radial and posterior tibial pulses are intact, Jugular venous pressure normal , no edema bilaterally    Abdomen:  no organomegaly, masses, bruits, or tenderness; bowel sounds are present   Extremities: no cyanosis or clubbing   Skin: no xanthelasma, warm.    Neurologic: normal  bilateral, no tremors           Cardiomyopathy, ischemic    CAD:   S/p CABG 04/14/2021 with LIMA to LAD, SVG to OM, SVG to rPDA  s/p JIA to D1 and JIA x 2 to LAD 9/2/2011    Most Recent Echocardiogram: 9/1/2023  1. The left ventricle is normal in size. Left ventricular systolic performance  is mildly reduced. The ejection fraction is estimated to be 45%.  2. There is severe mid to distal inferior hypokinesis and moderate distal  lateral hypokinesis  3. There is mild aortic valve sclerosis without significant stenosis.  4. Normal right ventricular size and systolic performance.  5. There is mild left atrial enlargement.     When compared to the prior real-time echocardiogram dated 18 October 2022, the  findings are felt to be fairly similar on both examinations.    Most Recent Stress Test: 11/2/2011  1.  THERE IS NO EVIDENCE OF SIGNIFICANT MYOCARDIAL ISCHEMIA OR INFARCTION.   2. NORMAL LEFT VENTRICULAR EJECTION FRACTION OF APPROXIMATELY 65%.       Most Recent  Angiogram: 2021  ? 99% in-stent stenosis in the Mid LAD   ? 99% in-stent stenosis in the 1st Diagonal   ? The LAD and diagonal appear larger in caliber in the 2011 study.   ? 50% stenosis in the Mid to distal  Circumflex   ? 80% stenosis in the 1st Marginal   ? The RCA is dominant.   ? 80% stenosis in the Mid RCA   ? 70% stenosis in the Distal RCA   ? 50% stenosis in the RPDA            Medical History  Family History Social History   CAD  Cardiomyopathy  HTN  Hyperlipidemia   Diabetes  Family History   Problem Relation Age of Onset     Cerebrovascular Disease Mother      Diabetes Mother      Cerebrovascular Disease Father      Diabetes Father      Glaucoma Sister      Arthritis Maternal Grandmother      No Known Problems Daughter         Age: 46 years        Social History     Socioeconomic History     Marital status:      Spouse name: Not on file     Number of children: Not on file     Years of education: Not on file     Highest education level: Not on file   Occupational History     Not on file   Tobacco Use     Smoking status: Former     Passive exposure: Past     Smokeless tobacco: Never     Tobacco comments:     smoked in high school and a little after her  .   Vaping Use     Vaping status: Never Used   Substance and Sexual Activity     Alcohol use: Yes     Comment: occasionally     Drug use: Not on file     Sexual activity: Not on file   Other Topics Concern     Parent/sibling w/ CABG, MI or angioplasty before 65F 55M? Not Asked   Social History Narrative     Not on file     Social Drivers of Health     Financial Resource Strain: Low Risk  (2024)    Financial Resource Strain      Within the past 12 months, have you or your family members you live with been unable to get utilities (heat, electricity) when it was really needed?: No   Food Insecurity: Low Risk  (2024)    Food Insecurity      Within the past 12 months, did you worry that your food would run out before you got  money to buy more?: No      Within the past 12 months, did the food you bought just not last and you didn t have money to get more?: No   Transportation Needs: Low Risk  (1/18/2024)    Transportation Needs      Within the past 12 months, has lack of transportation kept you from medical appointments, getting your medicines, non-medical meetings or appointments, work, or from getting things that you need?: No   Physical Activity: Not on file   Stress: Not on file   Social Connections: Unknown (1/1/2022)    [email or web address]    Social Connections      Frequency of Communication with Friends and Family: Not on file   Interpersonal Safety: Not on file   Housing Stability: Low Risk  (1/18/2024)    Housing Stability      Do you have housing? : Yes      Are you worried about losing your housing?: No           Medications  Allergies      Current Outpatient Medications   Medication Sig Dispense Refill     aspirin (ASA) 81 MG chewable tablet Take 81 mg by mouth daily       blood glucose (NO BRAND SPECIFIED) test strip Use to test blood sugars 3 times daily or as directed 100 strip 11     carvedilol (COREG) 12.5 MG tablet TAKE ONE TABLET (12.5 MG) BY MOUTH TWO TIMES DAILY (WITH MEALS) 180 tablet 1     Continuous Glucose  (FREESTYLE ROSAURA 2 READER) [place] Use as directed 2 each 1     Continuous Glucose Sensor (FREESTYLE ROSAURA 2 SENSOR) MISC Use as directed 6 each 1     empagliflozin (JARDIANCE) 25 MG TABS tablet Take 1 tablet (25 mg) by mouth daily. 90 tablet 3     FLUoxetine (PROZAC) 20 MG capsule TAKE ONE CAPSULE BY MOUTH EVERY MORNING 90 capsule 3     furosemide (LASIX) 20 MG tablet TAKE ONE TABLET (20 MG) BY MOUTH DAILY 90 tablet 2     glucose (BD GLUCOSE) 4 g chewable tablet Take 15 g by mouth As needed for low blood sugars (less than 70 mg/dL)       insulin degludec (TRESIBA FLEXTOUCH) 100 UNIT/ML pen INJECT 9-UNITS SUBCUTANEOUSLY  "AT BEDTIME 15 mL 3     insulin pen needle (32G X 4 MM) 32G X 4 MM miscellaneous Use 5 pen needles daily or as directed. 100 each 11     lisinopril (ZESTRIL) 10 MG tablet TAKE ONE TABLET (10 MG) BY MOUTH DAILY 100 tablet 1     loratadine (CLARITIN) 10 MG tablet Take 10 mg by mouth nightly as needed for allergies       metFORMIN (GLUCOPHAGE XR) 500 MG 24 hr tablet TAKE TWO TABLETS (1,000 MG) BY MOUTH each evening       multivitamin w/minerals (THERA-VIT-M) tablet Take 1 tablet by mouth daily       Naphazoline-Pheniramine (EQ EYE ALLERGY RELIEF OP) Pt does not know what the active drug is. \"Eye allergy relief\" using this as needed for eye allergy symptoms.       nitroGLYcerin (NITROSTAT) 0.4 MG sublingual tablet Place 1 tablet (0.4 mg) under the tongue every 5 minutes as needed for chest pain For chest pain place 1 tablet under the tongue every 5 minutes for 3 doses. If symptoms persist 5 minutes after 1st dose call 911. 30 tablet 1     polyethylene glycol-propylene glycol (SYSTANE ULTRA) 0.4-0.3 % SOLN ophthalmic solution Place 1 drop into both eyes every hour as needed for dry eyes       potassium chloride ER (K-TAB/KLOR-CON) 10 MEQ CR tablet 1 po q Monday, Wednesday and Friday 45 tablet 2     rosuvastatin (CRESTOR) 40 MG tablet Take 1 tablet (40 mg) by mouth at bedtime 90 tablet 3     Tirzepatide (MOUNJARO) 7.5 MG/0.5ML SOAJ Inject 0.5 mLs (7.5 mg) subcutaneously every 7 days. 2 mL 2     insulin lispro (HUMALOG KWIKPEN) 100 UNIT/ML (1 unit dial) KWIKPEN INJECT UNDER THE SKIN THREE TIMES DAILY BEFORE MEALS. CHECK YOUR BLOOD SUGAR. IF BLOOD SUGAR IS LESS THAN 100, NO SHORT ACTING INSULIN. IF BLOOD SUGAR  OR ABOVE TAKE FOUR UNITS OF INSULIN; 150 OR ABOVE TAKE 6 UNITS; 200 OR ABOVE TAKE 8 UNITS. (Patient not taking: Reported on 1/28/2025) 15 mL 3     tirzepatide (MOUNJARO) 10 MG/0.5ML pen Inject 10 mg subcutaneously every 7 days. (Patient not taking: Reported on 1/28/2025) 2 mL 3     No current " "facility-administered medications for this visit.        Allergies   Allergen Reactions     Atorvastatin Nausea and Vomiting and Diarrhea     Pt reported  Other reaction(s): Diarrhea, nausea, vomiting, Diarrhea,Nausea,Vomiting     Mirtazapine      Nightmares and suicidal ideation for 1 day (per Dr. Maza's note)  Other reaction(s): Nightmare, Suicidal ideation     Sodium Hypochlorite      Bleeding from nose and eyes after using a bleach product, patient/daughter reported  Other reaction(s): Runny nose     Animal Dander Other (See Comments)     Cats      Runny nose, pt reported.  Takes allergy medication for this and still lives with a cat.  Other reaction(s): Nasal congestion, Runny nose     Trazodone      nightmares  Other reaction(s): Nightmares          Lab Results    Chemistry/lipid CBC Cardiac Enzymes/BNP/TSH/INR   Recent Labs   Lab Test 07/24/24  1343 06/27/22  1248 08/19/21  1340   CHOL 113   < > 167   HDL 41*   < > 51   LDL 47   < > 88   TRIG 126   < > 185*  185*   CHOLHDLRATIO  --   --  3.3    < > = values in this interval not displayed.     Recent Labs   Lab Test 07/24/24  1343 06/20/23  0911 12/20/22  1609   LDL 47 38 90     Recent Labs   Lab Test 07/24/24  1343      POTASSIUM 4.3   CHLORIDE 104   CO2 24   *   BUN 15.5   CR 0.81   GFRESTIMATED 79   KURT 9.6     Recent Labs   Lab Test 07/24/24  1343 02/13/24  1459 09/19/23  1216   CR 0.81 0.61 0.64     Recent Labs   Lab Test 11/07/24  1238 07/24/24  1343 01/18/24  1411   A1C 6.1* 7.2* 7.8*          Recent Labs   Lab Test 01/18/24  1411   WBC 6.6   HGB 13.8   HCT 43.0   MCV 88        Recent Labs   Lab Test 01/18/24  1411 12/20/22  1609 11/30/21  1503   HGB 13.8 13.1 11.2*    No results for input(s): \"TROPONINI\" in the last 39084 hours.  Recent Labs   Lab Test 12/10/21  1419 11/30/21  1503 12/06/19  1122   * 564* 44     Recent Labs   Lab Test 12/20/22  1609   TSH 1.83     No results for input(s): \"INR\" in the last 05062 hours. "     Caren Boyd MD  Noninvasive Cardiologist   [facility] [place]                  Thank you for allowing me to participate in the care of your patient.      Sincerely,     Caren Boyd MD     [facility]  cc:   Samanta Maza MD  [address] [place] [id number]

## 2025-01-28 NOTE — PATIENT INSTRUCTIONS
It is nice to meet you.  No medication changes needed but you are due for recheck echocardiogram       
pt was sent for high INR, with dizziness

## 2025-02-10 DIAGNOSIS — E78.00 HYPERCHOLESTEROLEMIA: ICD-10-CM

## 2025-02-10 RX ORDER — ROSUVASTATIN CALCIUM 40 MG/1
TABLET, COATED ORAL
Qty: 90 TABLET | Refills: 3 | Status: SHIPPED | OUTPATIENT
Start: 2025-02-10

## 2025-02-10 NOTE — TELEPHONE ENCOUNTER
Medication passed protocol, however, refill RN could not approve because of the medication warning/interaction. Provider, please approve or deny the prescription.

## 2025-02-13 ENCOUNTER — ANCILLARY PROCEDURE (OUTPATIENT)
Dept: MAMMOGRAPHY | Facility: CLINIC | Age: 70
End: 2025-02-13
Attending: FAMILY MEDICINE
Payer: COMMERCIAL

## 2025-02-13 DIAGNOSIS — Z12.31 ENCOUNTER FOR SCREENING MAMMOGRAM FOR BREAST CANCER: ICD-10-CM

## 2025-02-13 PROCEDURE — 77063 BREAST TOMOSYNTHESIS BI: CPT | Mod: TC | Performed by: STUDENT IN AN ORGANIZED HEALTH CARE EDUCATION/TRAINING PROGRAM

## 2025-02-13 PROCEDURE — 77067 SCR MAMMO BI INCL CAD: CPT | Mod: TC | Performed by: STUDENT IN AN ORGANIZED HEALTH CARE EDUCATION/TRAINING PROGRAM

## 2025-02-19 ENCOUNTER — ANCILLARY PROCEDURE (OUTPATIENT)
Dept: CARDIOLOGY | Facility: CLINIC | Age: 70
End: 2025-02-19
Attending: INTERNAL MEDICINE
Payer: COMMERCIAL

## 2025-02-19 DIAGNOSIS — I25.5 ISCHEMIC CARDIOMYOPATHY: ICD-10-CM

## 2025-02-19 PROCEDURE — 93306 TTE W/DOPPLER COMPLETE: CPT | Performed by: INTERNAL MEDICINE

## 2025-02-24 ENCOUNTER — OFFICE VISIT (OUTPATIENT)
Dept: EDUCATION SERVICES | Facility: CLINIC | Age: 70
End: 2025-02-24
Payer: COMMERCIAL

## 2025-02-24 VITALS — BODY MASS INDEX: 21.43 KG/M2 | HEIGHT: 61 IN | WEIGHT: 113.5 LBS

## 2025-02-24 DIAGNOSIS — Z79.4 TYPE 2 DIABETES MELLITUS WITH CIRCULATORY DISORDER, WITH LONG-TERM CURRENT USE OF INSULIN (H): Primary | ICD-10-CM

## 2025-02-24 DIAGNOSIS — E78.5 HYPERLIPIDEMIA LDL GOAL <70: ICD-10-CM

## 2025-02-24 DIAGNOSIS — E11.59 TYPE 2 DIABETES MELLITUS WITH CIRCULATORY DISORDER, WITH LONG-TERM CURRENT USE OF INSULIN (H): Primary | ICD-10-CM

## 2025-02-24 PROCEDURE — G0108 DIAB MANAGE TRN  PER INDIV: HCPCS | Mod: AE | Performed by: DIETITIAN, REGISTERED

## 2025-02-24 PROCEDURE — 99207 PR DROP WITH A PROCEDURE: CPT | Performed by: DIETITIAN, REGISTERED

## 2025-02-24 RX ORDER — HYDROCHLOROTHIAZIDE 12.5 MG/1
CAPSULE ORAL
Qty: 6 EACH | Refills: 1 | Status: SHIPPED | OUTPATIENT
Start: 2025-02-24

## 2025-02-24 NOTE — LETTER
"    2/24/2025         RE: Chanelle Billy  [address]  [place] [address]        Dear Colleague,    Thank you for referring your patient, Chanelle Billy, to the [facility]. Please see a copy of my visit note below.    Diabetes Self-Management Education & Support    Presents for: Type 2 diabetes with proliferative diabetic retinopathy in both eyes, hyperlipidemia, coronary artery disease    Type of Service: In Person Visit      Assessment  11/25/2024 Diabetes well-controlled currently taking Tresiba 8 units daily, Jardiance 25 mg daily metformin 500 mg taking 2 tablets daily, Mounjaro 10 mg weekly, rapid acting insulin via correction scale patient has not needed to use rapid acting insulin recently.  Freestyle roula 2 reader downloaded 28-day sensor glucose average 120 mg/dL 82% time in target range.  Patient has nicely transitioned from Victoza to Mounjaro 6/2024 and tolerating well.  Will decrease medications due to being 5% less than 70mg/dL and patient feels like she has to force herself to eat.  Weight is now and healthy body weight range.  Weight maintenance goal.     2/24/2025 patient currently taking Mounjaro 7.5 mg weekly and has had an improved appetite now enjoying food more.  Metformin is also split dosing 500 mg 1 tab twice daily which also helps patient's GI.  Jardiance 25 mg daily -tolerating well no reported UTI.  Tresiba 5 units occasionally 8 units if \"eating naughty\"  Dietary interventions provided today as evening postprandial readings averaging greater than 200 with patient's higher carb intake i.e. cereal.  No medication adjustments today.  With next review will have A1c which may be in the 7% range.    Patient's most recent   Lab Results   Component Value Date    A1C 6.1 11/07/2024    A1C 7.2 07/24/2024    A1C 7.8 01/18/2024    A1C 7.9 09/19/2023    A1C 7.4 06/20/2023    A1C 8.6 02/11/2021    A1C 11.4 10/22/2020    A1C 6.8 03/13/2020    A1C 11.7 " 12/06/2019       is meeting goal of <7.0    Diabetes knowledge and skills assessment:   Patient is knowledgeable in diabetes management concepts related to: Monitoring, Taking Medication, and Healthy Coping    Based on learning assessment above, most appropriate setting for further diabetes education would be: Individual setting.    Care Plan and Education Provided:  Healthy Eating: Balanced meals, Consistency in amount and timing of carbohydrate intake, Heart healthy diet, Plate planning method, and Portion control,     Being Active: Finding a physical activity routine that works for you and Relationship of activity to glucose,     Monitoring: Blood glucose versus Continuous Glucose Monitoring, Individual glucose targets, and CGM instruction: Patient was instructed on Freestyle Dm System: Within the next couple weeks patient will be starting on freestyle dm 3 patient brought the reader and reader was set up with appropriate alarms and targets discussed difference between dm 2 and dm 3/dm 3+,     Taking Medication: Action of prescribed medication(s), Proper site selection and rotation for injections, Side effects of prescribed medication(s), and When to take medication(s),     Problem Solving: High glucose - causes, signs/symptoms, treatment and prevention, Low glucose - causes, signs/symptoms, treatment and prevention, and Sick day arrangements,     Reducing Risks: Complications of diabetes, Goal for A1c, how it relates to glucose and how often to check, and Preventing cardiovascular disease, including blood pressure goals, lipid goals, recommendations for cardioprotective medications, statins, and aspirin, and     Healthy Coping: Identifying helpful resources    Patient verbalized understanding of diabetes self-management education concepts discussed, opportunities for ongoing education and support, and recommendations provided today.    Plan    A meal is 3 or more food groups; make it colorful for  "better nutrition.    Total Carbohydrates (in grams) = Breakfast  30    Lunch  30    Supper  30    If desired snacks 15         Metformin 500mg taking 1 tab twice a day   Mounjaro 7.5mg   Jardiance 25mg daily      Tresiba 5u daily (up to 8u daily)  Topics to cover at upcoming visits: Any area as needed for ongoing diabetes self-management education and support/ motivational counseling.      Follow-up: 3 months  Upcoming Diabetes Ed Appointments     Visit Type Date Time Department    RETURN ADULT GENERAL NUTRITION 2/24/2025  1:00 PM [facility] DIABETES EDUCATION          See Care Plan for co-developed, patient-state behavior change goals.    Education Materials Provided:  No new materials provided today      Subjective/Objective  Chanelle is an 69 year old year old, presenting for the following diabetes education related to: Presents for: CGM Review  Accompanied by: Self  Diabetes education in the past 24mo: Yes  Focus of Visit: Healthy Eating, Taking Medication, CGM  Diabetes type: Type 2  Disease course: Stable  How confident are you filling out medical forms by yourself:: A little bit  Diabetes management related comments/concerns: have daughter help me by reading it to me - assists as needed  Transportation concerns: Yes (Does not drive, will walk or get a ride to clinic)  Difficulty affording diabetes medication?: No  Difficulty affording diabetes testing supplies?: No  Other concerns:: None  Cultural Influences/Ethnic Background:  Not  or     Diabetes Symptoms & Complications:  Weight trend: Stable  Disease course: Stable  Complications assessed today?: Yes  Autonomic neuropathy: No  CVA: No  Heart disease: Yes  Nephropathy: No  Peripheral neuropathy: No  Peripheral Vascular Disease: No  Retinopathy: No  Sexual dysfunction: Yes    Patient Problem List and Family Medical History reviewed for relevant medical history, current medical status, and diabetes risk factors.    Vitals:  Ht 1.556 m (5' 1.25\")   Wt " "51.5 kg (113 lb 8 oz)   LMP  (LMP Unknown)   BMI 21.27 kg/m    Estimated body mass index is 21.27 kg/m  as calculated from the following:    Height as of this encounter: 1.556 m (5' 1.25\").    Weight as of this encounter: 51.5 kg (113 lb 8 oz).   Last 3 BP:   BP Readings from Last 3 Encounters:   01/28/25 90/42   11/07/24 100/50   07/24/24 104/58       History   Smoking Status     Former   Smokeless Tobacco     Never       Labs:  Lab Results   Component Value Date    A1C 6.1 11/07/2024    A1C 8.6 02/11/2021     Lab Results   Component Value Date     11/07/2024    GLC 64 03/16/2022     02/11/2021     Lab Results   Component Value Date    LDL 47 07/24/2024    LDL 71 03/13/2020     HDL Cholesterol   Date Value Ref Range Status   03/13/2020 60 >or=50 mg/dL Final     Direct Measure HDL   Date Value Ref Range Status   07/24/2024 41 (L) >=50 mg/dL Final   ]  GFR Estimate   Date Value Ref Range Status   11/07/2024 >90 >60 mL/min/1.73m2 Final     Comment:     eGFR calculated using 2021 CKD-EPI equation.   02/11/2021 92 >or=60 ml/min/1.73m2 Final     GFR Estimate If Black   Date Value Ref Range Status   02/11/2021 106 >or=60 ml/min/1.73m2 Final     Lab Results   Component Value Date    CR 0.60 11/07/2024    CR 0.68 02/11/2021     No results found for: \"MICROALBUMIN\"    Healthy Eating:  Healthy Eating Assessed Today: Yes  Cultural/Sabianism diet restrictions?: No  Do you have any food allergies or intolerances?: No  Meal planning/habits: Low salt, Smaller portions, Low carb, Avoiding sweets  Who cooks/prepares meals for you?: Self  Who purchases food in  your home?: Self  How many times a week on average do you eat food made away from home (restaurant/take-out)?: 0  Meals include: Lunch, Dinner  Lunch: microwave \" whatever I can find\"  Dinner: has been having more cereal  Snacks: Received some majority of her food through food shelf  Beverages: Water, Tea, Coffee, Coffee drinks  Has patient met with a dietitian " "in the past?: Yes    Being Active:  Being Active Assessed Today: Yes  Exercise:: Currently not exercising (Unstable, knee \"giving out\")  Barrier to exercise: None    Monitoring:  Monitoring Assessed Today: Yes  Did patient bring glucose meter to appointment? : Yes  Blood Glucose Meter: CGM  Blood glucose trend: Increasing (Increased appetite with decreasing Mounjaro last consult)          Taking Medications:  Diabetes Medication(s)       Biguanides       metFORMIN (GLUCOPHAGE XR) 500 MG 24 hr tablet TAKE TWO TABLETS (1,000 MG) BY MOUTH each evening       Diabetic Other       glucose (BD GLUCOSE) 4 g chewable tablet Take 15 g by mouth As needed for low blood sugars (less than 70 mg/dL)       Insulin       insulin degludec (TRESIBA FLEXTOUCH) 100 UNIT/ML pen INJECT 9-UNITS SUBCUTANEOUSLY AT BEDTIME     insulin lispro (HUMALOG KWIKPEN) 100 UNIT/ML (1 unit dial) KWIKPEN INJECT UNDER THE SKIN THREE TIMES DAILY BEFORE MEALS. CHECK YOUR BLOOD SUGAR. IF BLOOD SUGAR IS LESS THAN 100, NO SHORT ACTING INSULIN. IF BLOOD SUGAR  OR ABOVE TAKE FOUR UNITS OF INSULIN; 150 OR ABOVE TAKE 6 UNITS; 200 OR ABOVE TAKE 8 UNITS.     Patient not taking: Reported on 2/24/2025       Sodium-Glucose Co-Transporter 2 (SGLT2) Inhibitors       empagliflozin (JARDIANCE) 25 MG TABS tablet Take 1 tablet (25 mg) by mouth daily.       Incretin Mimetic Agents       tirzepatide (MOUNJARO) 10 MG/0.5ML pen Inject 10 mg subcutaneously every 7 days.     Patient not taking: Reported on 2/24/2025     Tirzepatide (MOUNJARO) 7.5 MG/0.5ML SOAJ Inject 0.5 mLs (7.5 mg) subcutaneously every 7 days.          Taking Medication Assessed Today: Yes  Current Treatments: Insulin Injections, Oral Medication (taken by mouth), Non-insulin Injectables  Problems taking diabetes medications regularly?: No  Diabetes medication side effects?: No    Problem Solving:  Problem Solving Assessed Today: Yes  Is the patient at risk for hypoglycemia?: Yes  Hypoglycemia Frequency: " Rarely  Hypoglycemia Treatment: Juice, Glucose (tablets or gel), Candy  Medical ID: Yes  Does patient have glucagon emergency kit?: Yes  Is the patient at risk for DKA?: No  Does patient have severe weather/disaster plan for diabetes management?: Yes  Does patient have sick day plan for diabetes management?: Yes  Hypoglycemia: Sweats, Dizziness/Lightheadedness  Hypoglycemia Complications: None  Reducing Risks:  Reducing Risks Assessed Today: Yes  Diabetes Risks: Age over 45 years, Sedentary Lifestyle  CAD Risks: Diabetes Mellitus, Dyslipidemia, Family history, Hypertension, Sedentary lifestyle, Stress  Has dilated eye exam at least once a year?: Yes  Sees dentist every 6 months?: No  Feet checked by healthcare provider in the last year?: Yes    Healthy Coping:  Healthy Coping Assessed Today: Yes  Emotional response to diabetes: Helplessness, Fear/Anxiety  Informal Support system:: Children  Stage of change: PREPARATION (Decided to change - considering how)  Support resources: Websites, Magazines, In-person Offerings    Avril Martinez RD  Time Spent: 30 minutes  Encounter Type: Individual    Any diabetes medication dose changes were made via the CDCES Standing Orders under the patient's referring provider.

## 2025-02-24 NOTE — PROGRESS NOTES
"Diabetes Self-Management Education & Support    Presents for: Type 2 diabetes with proliferative diabetic retinopathy in both eyes, hyperlipidemia, coronary artery disease    Type of Service: In Person Visit      Assessment  11/25/2024 Diabetes well-controlled currently taking Tresiba 8 units daily, Jardiance 25 mg daily metformin 500 mg taking 2 tablets daily, Mounjaro 10 mg weekly, rapid acting insulin via correction scale patient has not needed to use rapid acting insulin recently.  Freestyle roula 2 reader downloaded 28-day sensor glucose average 120 mg/dL 82% time in target range.  Patient has nicely transitioned from Victoza to Mounjaro 6/2024 and tolerating well.  Will decrease medications due to being 5% less than 70mg/dL and patient feels like she has to force herself to eat.  Weight is now and healthy body weight range.  Weight maintenance goal.     2/24/2025 patient currently taking Mounjaro 7.5 mg weekly and has had an improved appetite now enjoying food more.  Metformin is also split dosing 500 mg 1 tab twice daily which also helps patient's GI.  Jardiance 25 mg daily -tolerating well no reported UTI.  Tresiba 5 units occasionally 8 units if \"eating naughty\"  Dietary interventions provided today as evening postprandial readings averaging greater than 200 with patient's higher carb intake i.e. cereal.  No medication adjustments today.  With next review will have A1c which may be in the 7% range.    Patient's most recent   Lab Results   Component Value Date    A1C 6.1 11/07/2024    A1C 7.2 07/24/2024    A1C 7.8 01/18/2024    A1C 7.9 09/19/2023    A1C 7.4 06/20/2023    A1C 8.6 02/11/2021    A1C 11.4 10/22/2020    A1C 6.8 03/13/2020    A1C 11.7 12/06/2019       is meeting goal of <7.0    Diabetes knowledge and skills assessment:   Patient is knowledgeable in diabetes management concepts related to: Monitoring, Taking Medication, and Healthy Coping    Based on learning assessment above, most appropriate " setting for further diabetes education would be: Individual setting.    Care Plan and Education Provided:  Healthy Eating: Balanced meals, Consistency in amount and timing of carbohydrate intake, Heart healthy diet, Plate planning method, and Portion control,     Being Active: Finding a physical activity routine that works for you and Relationship of activity to glucose,     Monitoring: Blood glucose versus Continuous Glucose Monitoring, Individual glucose targets, and CGM instruction: Patient was instructed on Freestyle Dm System: Within the next couple weeks patient will be starting on freestyle dm 3 patient brought the reader and reader was set up with appropriate alarms and targets discussed difference between dm 2 and dm 3/dm 3+,     Taking Medication: Action of prescribed medication(s), Proper site selection and rotation for injections, Side effects of prescribed medication(s), and When to take medication(s),     Problem Solving: High glucose - causes, signs/symptoms, treatment and prevention, Low glucose - causes, signs/symptoms, treatment and prevention, and Sick day arrangements,     Reducing Risks: Complications of diabetes, Goal for A1c, how it relates to glucose and how often to check, and Preventing cardiovascular disease, including blood pressure goals, lipid goals, recommendations for cardioprotective medications, statins, and aspirin, and     Healthy Coping: Identifying helpful resources    Patient verbalized understanding of diabetes self-management education concepts discussed, opportunities for ongoing education and support, and recommendations provided today.    Plan    A meal is 3 or more food groups; make it colorful for better nutrition.    Total Carbohydrates (in grams) = Breakfast  30    Lunch  30    Supper  30    If desired snacks 15         Metformin 500mg taking 1 tab twice a day   Mounjaro 7.5mg   Jardiance 25mg daily      Tresiba 5u daily (up to 8u daily)  Topics to cover  "at upcoming visits: Any area as needed for ongoing diabetes self-management education and support/ motivational counseling.      Follow-up: 3 months  Upcoming Diabetes Ed Appointments     Visit Type Date Time Department    RETURN ADULT GENERAL NUTRITION 2/24/2025  1:00 PM [facility] DIABETES EDUCATION          See Care Plan for co-developed, patient-state behavior change goals.    Education Materials Provided:  No new materials provided today      Subjective/Objective  Chanelle is an 69 year old year old, presenting for the following diabetes education related to: Presents for: CGM Review  Accompanied by: Self  Diabetes education in the past 24mo: Yes  Focus of Visit: Healthy Eating, Taking Medication, CGM  Diabetes type: Type 2  Disease course: Stable  How confident are you filling out medical forms by yourself:: A little bit  Diabetes management related comments/concerns: have daughter help me by reading it to me - assists as needed  Transportation concerns: Yes (Does not drive, will walk or get a ride to clinic)  Difficulty affording diabetes medication?: No  Difficulty affording diabetes testing supplies?: No  Other concerns:: None  Cultural Influences/Ethnic Background:  Not  or     Diabetes Symptoms & Complications:  Weight trend: Stable  Disease course: Stable  Complications assessed today?: Yes  Autonomic neuropathy: No  CVA: No  Heart disease: Yes  Nephropathy: No  Peripheral neuropathy: No  Peripheral Vascular Disease: No  Retinopathy: No  Sexual dysfunction: Yes    Patient Problem List and Family Medical History reviewed for relevant medical history, current medical status, and diabetes risk factors.    Vitals:  Ht 1.556 m (5' 1.25\")   Wt 51.5 kg (113 lb 8 oz)   LMP  (LMP Unknown)   BMI 21.27 kg/m    Estimated body mass index is 21.27 kg/m  as calculated from the following:    Height as of this encounter: 1.556 m (5' 1.25\").    Weight as of this encounter: 51.5 kg (113 lb 8 oz).   Last 3 BP:   BP " "Readings from Last 3 Encounters:   01/28/25 90/42   11/07/24 100/50   07/24/24 104/58       History   Smoking Status    Former   Smokeless Tobacco    Never       Labs:  Lab Results   Component Value Date    A1C 6.1 11/07/2024    A1C 8.6 02/11/2021     Lab Results   Component Value Date     11/07/2024    GLC 64 03/16/2022     02/11/2021     Lab Results   Component Value Date    LDL 47 07/24/2024    LDL 71 03/13/2020     HDL Cholesterol   Date Value Ref Range Status   03/13/2020 60 >or=50 mg/dL Final     Direct Measure HDL   Date Value Ref Range Status   07/24/2024 41 (L) >=50 mg/dL Final   ]  GFR Estimate   Date Value Ref Range Status   11/07/2024 >90 >60 mL/min/1.73m2 Final     Comment:     eGFR calculated using 2021 CKD-EPI equation.   02/11/2021 92 >or=60 ml/min/1.73m2 Final     GFR Estimate If Black   Date Value Ref Range Status   02/11/2021 106 >or=60 ml/min/1.73m2 Final     Lab Results   Component Value Date    CR 0.60 11/07/2024    CR 0.68 02/11/2021     No results found for: \"MICROALBUMIN\"    Healthy Eating:  Healthy Eating Assessed Today: Yes  Cultural/Advent diet restrictions?: No  Do you have any food allergies or intolerances?: No  Meal planning/habits: Low salt, Smaller portions, Low carb, Avoiding sweets  Who cooks/prepares meals for you?: Self  Who purchases food in  your home?: Self  How many times a week on average do you eat food made away from home (restaurant/take-out)?: 0  Meals include: Lunch, Dinner  Lunch: microwave \" whatever I can find\"  Dinner: has been having more cereal  Snacks: Received some majority of her food through food shelf  Beverages: Water, Tea, Coffee, Coffee drinks  Has patient met with a dietitian in the past?: Yes    Being Active:  Being Active Assessed Today: Yes  Exercise:: Currently not exercising (Unstable, knee \"giving out\")  Barrier to exercise: None    Monitoring:  Monitoring Assessed Today: Yes  Did patient bring glucose meter to appointment? : " Yes  Blood Glucose Meter: CGM  Blood glucose trend: Increasing (Increased appetite with decreasing Mounjaro last consult)          Taking Medications:  Diabetes Medication(s)       Biguanides       metFORMIN (GLUCOPHAGE XR) 500 MG 24 hr tablet TAKE TWO TABLETS (1,000 MG) BY MOUTH each evening       Diabetic Other       glucose (BD GLUCOSE) 4 g chewable tablet Take 15 g by mouth As needed for low blood sugars (less than 70 mg/dL)       Insulin       insulin degludec (TRESIBA FLEXTOUCH) 100 UNIT/ML pen INJECT 9-UNITS SUBCUTANEOUSLY AT BEDTIME     insulin lispro (HUMALOG KWIKPEN) 100 UNIT/ML (1 unit dial) KWIKPEN INJECT UNDER THE SKIN THREE TIMES DAILY BEFORE MEALS. CHECK YOUR BLOOD SUGAR. IF BLOOD SUGAR IS LESS THAN 100, NO SHORT ACTING INSULIN. IF BLOOD SUGAR  OR ABOVE TAKE FOUR UNITS OF INSULIN; 150 OR ABOVE TAKE 6 UNITS; 200 OR ABOVE TAKE 8 UNITS.     Patient not taking: Reported on 2/24/2025       Sodium-Glucose Co-Transporter 2 (SGLT2) Inhibitors       empagliflozin (JARDIANCE) 25 MG TABS tablet Take 1 tablet (25 mg) by mouth daily.       Incretin Mimetic Agents       tirzepatide (MOUNJARO) 10 MG/0.5ML pen Inject 10 mg subcutaneously every 7 days.     Patient not taking: Reported on 2/24/2025     Tirzepatide (MOUNJARO) 7.5 MG/0.5ML SOAJ Inject 0.5 mLs (7.5 mg) subcutaneously every 7 days.          Taking Medication Assessed Today: Yes  Current Treatments: Insulin Injections, Oral Medication (taken by mouth), Non-insulin Injectables  Problems taking diabetes medications regularly?: No  Diabetes medication side effects?: No    Problem Solving:  Problem Solving Assessed Today: Yes  Is the patient at risk for hypoglycemia?: Yes  Hypoglycemia Frequency: Rarely  Hypoglycemia Treatment: Juice, Glucose (tablets or gel), Candy  Medical ID: Yes  Does patient have glucagon emergency kit?: Yes  Is the patient at risk for DKA?: No  Does patient have severe weather/disaster plan for diabetes management?: Yes  Does  patient have sick day plan for diabetes management?: Yes  Hypoglycemia: Sweats, Dizziness/Lightheadedness  Hypoglycemia Complications: None  Reducing Risks:  Reducing Risks Assessed Today: Yes  Diabetes Risks: Age over 45 years, Sedentary Lifestyle  CAD Risks: Diabetes Mellitus, Dyslipidemia, Family history, Hypertension, Sedentary lifestyle, Stress  Has dilated eye exam at least once a year?: Yes  Sees dentist every 6 months?: No  Feet checked by healthcare provider in the last year?: Yes    Healthy Coping:  Healthy Coping Assessed Today: Yes  Emotional response to diabetes: Helplessness, Fear/Anxiety  Informal Support system:: Children  Stage of change: PREPARATION (Decided to change - considering how)  Support resources: Websites, Magazines, In-person Offerings    Avril Martinez RD  Time Spent: 30 minutes  Encounter Type: Individual    Any diabetes medication dose changes were made via the CDCES Standing Orders under the patient's referring provider.

## 2025-02-24 NOTE — PATIENT INSTRUCTIONS
Total Carbohydrates (in grams) = Breakfast  30    Lunch  30    Supper  30    If desired snacks 15                                     Metformin 500mg taking 1 tab twice a day   Mounjaro 7.5mg   Jardiance 25mg daily      Tresiba 5u daily (up to 8u daily)        Goals:  Practice healthy stress management and mindful eating - think are you physically hungry or are you bored, stressed, emotional etc, make of list of things to do besides eat.    Try to get good quality sleep with a goal of 7-8 hours per night.  Stay physically active daily.  Recommend working up to a total of 30 minutes on 5 days/ week.  Recommend a fitness tracker.     Eat in a healthy way- eliminate trans fats, limit saturated fats and added sugars; follow the plate method - picture above.  Keep a food record (MyFitnessPal, [employer]).    A meal is 3 or more food groups; make it colorful for better nutrition.    Total Carbohydrates (in grams) = Breakfast  30    Lunch  30    Supper  30    If desired snacks 15

## 2025-02-26 DIAGNOSIS — Z79.4 TYPE 2 DIABETES MELLITUS WITH OTHER CIRCULATORY COMPLICATION, WITH LONG-TERM CURRENT USE OF INSULIN (H): ICD-10-CM

## 2025-02-26 DIAGNOSIS — E78.5 HYPERLIPIDEMIA LDL GOAL <70: ICD-10-CM

## 2025-02-26 DIAGNOSIS — E11.59 TYPE 2 DIABETES MELLITUS WITH OTHER CIRCULATORY COMPLICATION, WITH LONG-TERM CURRENT USE OF INSULIN (H): ICD-10-CM

## 2025-02-26 RX ORDER — TIRZEPATIDE 7.5 MG/.5ML
7.5 INJECTION, SOLUTION SUBCUTANEOUS
Qty: 6 ML | Refills: 1 | Status: SHIPPED | OUTPATIENT
Start: 2025-02-26

## 2025-02-26 NOTE — TELEPHONE ENCOUNTER
Patient has [facility] coverage and is part of Medicare Part-D Low Income Subsidy program. With this program, the patient is eligible to get certain prescriptions as a 90-day supply at the 30-day prescription supply cost.      Prescriptions to be updated to 90-day supply: Mounjaro    New prescription needed given insufficient refills remaining so pended for PCP review and signature.       Thank you!    Catrina Raya, PharmD, [facility]  Population Health Pharmacist  [phone]

## 2025-03-13 DIAGNOSIS — I10 ESSENTIAL HYPERTENSION: ICD-10-CM

## 2025-03-13 RX ORDER — CARVEDILOL 12.5 MG/1
TABLET ORAL
Qty: 180 TABLET | Refills: 1 | Status: SHIPPED | OUTPATIENT
Start: 2025-03-13

## 2025-04-07 ENCOUNTER — TELEPHONE (OUTPATIENT)
Dept: FAMILY MEDICINE | Facility: CLINIC | Age: 70
End: 2025-04-07

## 2025-04-07 NOTE — TELEPHONE ENCOUNTER
Care navigator, [facility] calling patient has received her Freestyle Dm. Patient is a little unsure what DME/pharmacy store it came from. Because she wants to make sure that there are refills ordered.    RN let Care Navigator know, and also let her know that [facility] will help with setting up machine and making sure refills and machine are set up appropriately.     Appt with [facility] Martinez is not for 3 weeks yet so wondering if patient would need assistance sooner since she is opening and wants to get started on CGM.    JOSE CARLOS Goodrich  [facility]  [phone]    [facility]   Monday  - Thursday 7 AM - 6 PM    Friday  7 AM - 5 PM     -Please call your clinic for assistance from a nurse after hours.

## 2025-05-13 ENCOUNTER — OFFICE VISIT (OUTPATIENT)
Dept: FAMILY MEDICINE | Facility: CLINIC | Age: 70
End: 2025-05-13
Payer: COMMERCIAL

## 2025-05-13 ENCOUNTER — NURSE TRIAGE (OUTPATIENT)
Dept: FAMILY MEDICINE | Facility: CLINIC | Age: 70
End: 2025-05-13

## 2025-05-13 ENCOUNTER — ALLIED HEALTH/NURSE VISIT (OUTPATIENT)
Dept: FAMILY MEDICINE | Facility: CLINIC | Age: 70
End: 2025-05-13
Payer: COMMERCIAL

## 2025-05-13 ENCOUNTER — OFFICE VISIT (OUTPATIENT)
Dept: EDUCATION SERVICES | Facility: CLINIC | Age: 70
End: 2025-05-13
Payer: COMMERCIAL

## 2025-05-13 VITALS — OXYGEN SATURATION: 97 % | HEART RATE: 71 BPM | SYSTOLIC BLOOD PRESSURE: 93 MMHG | DIASTOLIC BLOOD PRESSURE: 59 MMHG

## 2025-05-13 VITALS
BODY MASS INDEX: 21.03 KG/M2 | WEIGHT: 111.4 LBS | SYSTOLIC BLOOD PRESSURE: 93 MMHG | HEIGHT: 61 IN | RESPIRATION RATE: 20 BRPM | OXYGEN SATURATION: 97 % | DIASTOLIC BLOOD PRESSURE: 59 MMHG | HEART RATE: 71 BPM

## 2025-05-13 DIAGNOSIS — E78.5 HYPERLIPIDEMIA LDL GOAL <70: ICD-10-CM

## 2025-05-13 DIAGNOSIS — Z79.4 TYPE 2 DIABETES MELLITUS WITH CIRCULATORY DISORDER, WITH LONG-TERM CURRENT USE OF INSULIN (H): Primary | ICD-10-CM

## 2025-05-13 DIAGNOSIS — Z79.4 TYPE 2 DIABETES MELLITUS WITH OTHER CIRCULATORY COMPLICATION, WITH LONG-TERM CURRENT USE OF INSULIN (H): ICD-10-CM

## 2025-05-13 DIAGNOSIS — I95.9 HYPOTENSION, UNSPECIFIED HYPOTENSION TYPE: Primary | ICD-10-CM

## 2025-05-13 DIAGNOSIS — E11.59 TYPE 2 DIABETES MELLITUS WITH CIRCULATORY DISORDER, WITH LONG-TERM CURRENT USE OF INSULIN (H): Primary | ICD-10-CM

## 2025-05-13 DIAGNOSIS — I50.20 HEART FAILURE WITH REDUCED EJECTION FRACTION (H): Primary | ICD-10-CM

## 2025-05-13 DIAGNOSIS — E78.00 HYPERCHOLESTEROLEMIA: ICD-10-CM

## 2025-05-13 DIAGNOSIS — E11.59 TYPE 2 DIABETES MELLITUS WITH OTHER CIRCULATORY COMPLICATION, WITH LONG-TERM CURRENT USE OF INSULIN (H): ICD-10-CM

## 2025-05-13 DIAGNOSIS — E11.3553 STABLE PROLIFERATIVE DIABETIC RETINOPATHY OF BOTH EYES ASSOCIATED WITH TYPE 2 DIABETES MELLITUS (H): ICD-10-CM

## 2025-05-13 DIAGNOSIS — F33.1 MAJOR DEPRESSIVE DISORDER, RECURRENT EPISODE, MODERATE WITH ANXIOUS DISTRESS (H): ICD-10-CM

## 2025-05-13 DIAGNOSIS — I10 ESSENTIAL HYPERTENSION: ICD-10-CM

## 2025-05-13 PROCEDURE — 3078F DIAST BP <80 MM HG: CPT | Performed by: FAMILY MEDICINE

## 2025-05-13 PROCEDURE — 3074F SYST BP LT 130 MM HG: CPT | Performed by: FAMILY MEDICINE

## 2025-05-13 PROCEDURE — 99207 PR NO CHARGE NURSE ONLY: CPT

## 2025-05-13 PROCEDURE — G2211 COMPLEX E/M VISIT ADD ON: HCPCS | Performed by: FAMILY MEDICINE

## 2025-05-13 PROCEDURE — 99207 PR DROP WITH A PROCEDURE: CPT | Performed by: DIETITIAN, REGISTERED

## 2025-05-13 PROCEDURE — G0108 DIAB MANAGE TRN  PER INDIV: HCPCS | Mod: AE | Performed by: DIETITIAN, REGISTERED

## 2025-05-13 PROCEDURE — 99214 OFFICE O/P EST MOD 30 MIN: CPT | Performed by: FAMILY MEDICINE

## 2025-05-13 PROCEDURE — 3078F DIAST BP <80 MM HG: CPT

## 2025-05-13 PROCEDURE — 3074F SYST BP LT 130 MM HG: CPT

## 2025-05-13 RX ORDER — LISINOPRIL 2.5 MG/1
2.5 TABLET ORAL DAILY
Qty: 90 TABLET | Refills: 1 | Status: SHIPPED | OUTPATIENT
Start: 2025-05-13

## 2025-05-13 RX ORDER — HYDROCHLOROTHIAZIDE 12.5 MG/1
CAPSULE ORAL
Qty: 6 EACH | Refills: 1 | Status: SHIPPED | OUTPATIENT
Start: 2025-05-13

## 2025-05-13 RX ORDER — FUROSEMIDE 20 MG/1
20 TABLET ORAL DAILY
Qty: 90 TABLET | Refills: 3 | Status: SHIPPED | OUTPATIENT
Start: 2025-05-13

## 2025-05-13 RX ORDER — CARVEDILOL 12.5 MG/1
12.5 TABLET ORAL 2 TIMES DAILY WITH MEALS
Qty: 180 TABLET | Refills: 1 | Status: SHIPPED | OUTPATIENT
Start: 2025-05-13

## 2025-05-13 RX ORDER — POTASSIUM CHLORIDE 750 MG/1
TABLET, EXTENDED RELEASE ORAL
Qty: 45 TABLET | Refills: 2 | Status: SHIPPED | OUTPATIENT
Start: 2025-05-13

## 2025-05-13 RX ORDER — ROSUVASTATIN CALCIUM 40 MG/1
40 TABLET, COATED ORAL DAILY
Qty: 90 TABLET | Refills: 3 | Status: SHIPPED | OUTPATIENT
Start: 2025-05-13

## 2025-05-13 ASSESSMENT — PATIENT HEALTH QUESTIONNAIRE - PHQ9: SUM OF ALL RESPONSES TO PHQ QUESTIONS 1-9: 8

## 2025-05-13 NOTE — PATIENT INSTRUCTIONS
A meal is 3 or more food groups; make it colorful for better nutrition.    Total Carbohydrates (in grams) = Breakfast  30    Lunch  30    Supper  30    If desired snacks 15          Mounjaro 7.5mg   Jardiance 25mg daily      Tresiba 5u daily (up to 7u daily)    Make sure you are buying adequate supply of chicken, tuna, eggs, black beans, cottage cheese

## 2025-05-13 NOTE — PROGRESS NOTES
Assessment & Plan   Problem List Items Addressed This Visit       RESOLVED: Type 2 diabetes mellitus with circulatory disorder, with long-term current use of insulin (H)    Relevant Medications    Continuous Glucose Sensor (FREESTYLE ROSAURA 3 PLUS SENSOR) MISC    Major depressive disorder, recurrent episode, moderate with anxious distress (H)    Relevant Medications    FLUoxetine (PROZAC) 20 MG capsule    Essential hypertension    Relevant Medications    lisinopril (ZESTRIL) 2.5 MG tablet    carvedilol (COREG) 12.5 MG tablet    RESOLVED: Stable proliferative diabetic retinopathy of both eyes associated with type 2 diabetes mellitus (H)    Heart failure with reduced ejection fraction (H) - Primary    Relevant Medications    furosemide (LASIX) 20 MG tablet    potassium chloride ER (K-TAB/KLOR-CON) 10 MEQ CR tablet     Other Visit Diagnoses         Hypercholesterolemia        Relevant Medications    rosuvastatin (CRESTOR) 40 MG tablet         Assessment & Plan  Type 2 diabetes mellitus with other circulatory complication, with long-term current use of insulin (H)  - Diabetes is well controlled.  - Stop metformin due to patient preference. Recheck diabetes labs in 3 months. Consider increasing Mounjaro to 10 mg if blood sugars become poorly controlled.    Essential hypertension  - Blood pressure is low, possibly due to current medication regimen.  - Reduce lisinopril from 10 mg daily to 2.5 mg daily. Schedule follow-up appointment in two weeks to recheck blood pressure. Monitor blood pressure at home.    Hypercholesterolemia  - Renew rosuvastatin 40 mg.     Consent was obtained from the patient to use an AI documentation tool in the creation of  this note.  Voice recognition software was also used.  There may be associated transcribing errors.     The longitudinal plan of care for the diagnosis(es)/condition(s) as documented were addressed during this visit. Due to the added complexity in care, I will continue to  "support Chanelle in the subsequent management and with ongoing continuity of care.              Subjective   Chanelle is a 69 year old, presenting for the following health issues:  Hypertension        5/13/2025    11:26 AM   Additional Questions   Roomed by Cheli WISE - Chanelle Billy, 69-year-old female.  - Reports feeling tired but not excessively so.  - Blood pressure readings from the previous day were 72/42 and 68/40.  - No lightheadedness or dizziness reported despite low blood pressure.  - Diabetes is well controlled.  - No adverse side effects from metformin, except occasional fishy taste.  - Has not been taking naps.  - Walked to the appointment without issues.                  Objective    BP 93/59 (BP Location: Right arm, Patient Position: Sitting)   Pulse 71   Resp 20   Ht 1.556 m (5' 1.25\")   Wt 50.5 kg (111 lb 6.4 oz)   LMP  (LMP Unknown)   SpO2 97%   BMI 20.88 kg/m    Body mass index is 20.88 kg/m .  Physical Exam         General: alert and oriented ×3 no acute distress.    HEENT: Normocephalic and atraumatic.   Eyes pupils are equal round and reactive to light     Hearing is grossly normal     Nares are patent there is no rhinorrhea.     Mucous membranes are moist and pink.    Chest: has bilateral rise with no increased work of breathing. clear to auscultation without wheezes, rhonchi, or rales.    Cardiovascular: normal perfusion and brisk capillary refill. S1S2 with regular rate and rhythm and no murmurs, gallops or rubs.    Musculoskeletal: no gross focal abnormalities and normal gait.    Neuro: no gross focal abnormalities and memory seems intact. CN 2-12 are grossly intact.    Psychiatric: speech is clear and coherent and fluent. Patient dressed appropriately for the weather. Mood is appropriate and affect is full.                Signed Electronically by: Samanta Maza MD    "

## 2025-05-13 NOTE — LETTER
Readings from Last 10 Encounters:   05/13/25 50.5 kg (111 lb 5.3 oz)   05/13/25 50.5 kg (111 lb 6.4 oz)   02/24/25 51.5 kg (113 lb 8 oz)   01/28/25 50.3 kg (111 lb)   11/25/24 51.4 kg (113 lb 6.4 oz)   11/07/24 51.3 kg (113 lb 1.6 oz)   08/21/24 55.8 kg (123 lb 1.6 oz)   07/24/24 57 kg (125 lb 11.2 oz)   05/06/24 59.8 kg (131 lb 14.4 oz)   02/13/24 57.1 kg (125 lb 12.8 oz)         Patient's most recent   Lab Results   Component Value Date    A1C 6.1 11/07/2024    A1C 8.6 02/11/2021     is meeting goal of <8.0    Diabetes knowledge and skills assessment:   Patient is knowledgeable in diabetes management concepts related to: Being Active, Monitoring, Taking Medication, Problem Solving, Reducing Risks, and Healthy Coping    Based on learning assessment above, most appropriate setting for further diabetes education would be: Individual setting.    Care Plan and Education Provided:  Healthy Eating: healthy eating on a budget, calculated food stamp allowance and ideas for meals, stock grocerys    Being Active: Finding a physical activity routine that works for you and Relationship of activity to glucose,     Monitoring: Blood glucose versus Continuous Glucose Monitoring and Individual glucose targets,     Taking Medication: Action of prescribed medication(s), Proper site selection and rotation for injections, and When to take medication(s),     Problem Solving: Rule of 15 and carrying a carbohydrate source at all times in case of low glucose,     Reducing Risks: Complications of diabetes, Goal for A1c, how it relates to glucose and how often to check, and Preventing cardiovascular disease, including blood pressure goals, lipid goals, recommendations for cardioprotective medications, statins, and aspirin, and     Healthy Coping: Benefits of making appropriate lifestyle changes and Identifying helpful resources    Patient verbalized understanding of diabetes self-management education concepts discussed, opportunities for  "ongoing education and support, and recommendations provided today.    Plan    Mounjaro 7.5mg   Jardiance 25mg daily      Tresiba 5u daily (up to 7u daily)    Make sure you are buying adequate supply of chicken, tuna, eggs, black beans, cottage cheese   Topics to cover at upcoming visits: Any area as needed for ongoing diabetes self-management education and support/ motivational counseling.    See Care Plan for co-developed, patient-state behavior change goals.    Education Materials Provided:      Subjective/Objective  Chanelle is an 69 year old, presenting for the following diabetes education related to: CGM Review  Accompanied by: Self  Diabetes education in the past 24mo: Yes  Focus of Visit: Healthy Eating, Taking Medication, CGM  Diabetes type: Type 2  Disease course: Stable  How confident are you filling out medical forms by yourself:: A little bit  Diabetes management related comments/concerns: have daughter help me by reading it to me - assists as needed  Transportation concerns: Yes (Does not drive, will walk or get a ride to clinic)  Difficulty affording diabetes medication?: No  Difficulty affording diabetes testing supplies?: No  Other concerns: None  Cultural Influences/Ethnic Background:  Not  or     Diabetes Symptoms & Complications:  Weight trend: Stable  Disease course: Stable  Complications assessed today?: Yes  Autonomic neuropathy: No  CVA: No  Heart disease: Yes  Nephropathy: No  Peripheral neuropathy: No  Peripheral Vascular Disease: No  Retinopathy: No  Sexual dysfunction: Yes    Patient Problem List and Family Medical History reviewed for relevant medical history, current medical status, and diabetes risk factors.    Vitals:  Ht 1.556 m (5' 1.25\")   Wt 50.5 kg (111 lb 5.3 oz)   LMP  (LMP Unknown)   BMI 20.86 kg/m    Estimated body mass index is 20.86 kg/m  as calculated from the following:    Height as of this encounter: 1.556 m (5' 1.25\").    Weight as of this encounter: 50.5 kg " "(111 lb 5.3 oz).   Last 3 BP:   BP Readings from Last 3 Encounters:   05/13/25 93/59   05/13/25 93/59   01/28/25 90/42       History   Smoking Status     Former   Smokeless Tobacco     Never       Labs:  Lab Results   Component Value Date    A1C 6.1 11/07/2024    A1C 8.6 02/11/2021     Lab Results   Component Value Date     11/07/2024    GLC 64 03/16/2022     02/11/2021     Lab Results   Component Value Date    LDL 47 07/24/2024    LDL 71 03/13/2020     HDL Cholesterol   Date Value Ref Range Status   03/13/2020 60 >or=50 mg/dL Final     Direct Measure HDL   Date Value Ref Range Status   07/24/2024 41 (L) >=50 mg/dL Final     GFR Estimate   Date Value Ref Range Status   11/07/2024 >90 >60 mL/min/1.73m2 Final     Comment:     eGFR calculated using 2021 CKD-EPI equation.   02/11/2021 92 >or=60 ml/min/1.73m2 Final     GFR Estimate If Black   Date Value Ref Range Status   02/11/2021 106 >or=60 ml/min/1.73m2 Final     Lab Results   Component Value Date    CR 0.60 11/07/2024    CR 0.68 02/11/2021     Lab Results   Component Value Date    MICROL 14.3 07/24/2024    UMALCR 18.36 07/24/2024    UCRR 77.9 07/24/2024 5/13/2025   Healthy Eating   Healthy Eating Assessed Today Yes   Cultural/Mosque diet restrictions? No   Do you have any food allergies or intolerances? No   Meal planning/habits Low salt;Smaller portions;Low carb;Avoiding sweets   Who cooks/prepares meals for you? Self   Who purchases food in  your home? Self       daughter will take her grocery shopping   How many times a week on average do you eat food made away from home (restaurant/take-out)? 0   Meals include Lunch;Dinner   Lunch microwave \" whatever I can find\"   Dinner did have steak, potato and cupcake for Easter   Snacks Received some majority of her food through food shelf   Other reports some foods have been tasting bad   Beverages Water;Tea;Coffee;Coffee drinks   Has patient met with a dietitian in the past? Yes         " "5/13/2025   Being Active   Being Active Assessed Today Yes   Exercise: Currently not exercising       Unstable, knee \"giving out\"   Barrier to exercise None         5/13/2025   Monitoring   Monitoring Assessed Today Yes   Did patient bring glucose meter to appointment?  Yes   Blood Glucose Meter CGM   Blood glucose trend --        28 day avg 148mg/dL          Diabetes Medication(s)       Diabetic Other       glucose (BD GLUCOSE) 4 g chewable tablet Take 15 g by mouth As needed for low blood sugars (less than 70 mg/dL)       Insulin       insulin degludec (TRESIBA FLEXTOUCH) 100 UNIT/ML pen INJECT 9-UNITS SUBCUTANEOUSLY AT BEDTIME     insulin lispro (HUMALOG KWIKPEN) 100 UNIT/ML (1 unit dial) KWIKPEN INJECT UNDER THE SKIN THREE TIMES DAILY BEFORE MEALS. CHECK YOUR BLOOD SUGAR. IF BLOOD SUGAR IS LESS THAN 100, NO SHORT ACTING INSULIN. IF BLOOD SUGAR  OR ABOVE TAKE FOUR UNITS OF INSULIN; 150 OR ABOVE TAKE 6 UNITS; 200 OR ABOVE TAKE 8 UNITS.     Patient not taking: Reported on 5/13/2025       Sodium-Glucose Co-Transporter 2 (SGLT2) Inhibitors       empagliflozin (JARDIANCE) 25 MG TABS tablet Take 1 tablet (25 mg) by mouth daily.       Incretin Mimetic Agents       Tirzepatide (MOUNJARO) 7.5 MG/0.5ML SOAJ Inject 0.5 mLs (7.5 mg) subcutaneously every 7 days.              5/13/2025   Taking Medications   Taking Medication Assessed Today Yes   Current Treatments Insulin Injections;Oral Medication (taken by mouth);Non-insulin Injectables   Given by Patient   Injection/Infusion sites Abdomen   Problems taking diabetes medications regularly? No   Diabetes medication side effects? No         5/13/2025   Problem Solving   Problem Solving Assessed Today Yes   Is the patient at risk for hypoglycemia? Yes   Hypoglycemia Frequency Rarely   Hypoglycemia Treatment Juice;Glucose (tablets or gel);Candy   Medical ID Yes   Does patient have glucagon emergency kit? Yes   Is the patient at risk for DKA? No   Does patient have severe " weather/disaster plan for diabetes management? Yes   Does patient have sick day plan for diabetes management? Yes   Hypoglycemia Sweats;Dizziness/Lightheadedness   Hypoglycemia Complications None           5/13/2025   Reducing Risks   Reducing Risks Assessed Today Yes   Diabetes Risks Age over 45 years;Sedentary Lifestyle   CAD Risks Diabetes Mellitus;Dyslipidemia;Family history;Hypertension;Sedentary lifestyle;Stress   Has dilated eye exam at least once a year? Yes   Sees dentist every 6 months? No   Feet checked by healthcare provider in the last year? Yes         5/13/2025   Healthy Coping: Diabetes Distress Assessment   Healthy Coping Assessed Today Yes   Informal Support system: Children     Time Spent: 60 minutes  Encounter Type: Individual    Any diabetes medication dose changes were made via the CDCES Standing Orders under the patient's referring provider.

## 2025-05-13 NOTE — Clinical Note
5/13/2025         RE: Chanelle Billy  [address]  [place] [id number]        Dear Colleague,    Thank you for referring your patient, Chanelle Billy, to the [facility]. Please see a copy of my visit note below.    No notes on file

## 2025-05-13 NOTE — TELEPHONE ENCOUNTER
S-(situation): Patient presents to clinic for BP Check.    B-(background): Hx of heart failure with reduced ejection fraction. Home Health nurse faxed paperwork from visit yesterday, 5/12/25 with low bp readings. Wanted her to come to the clinic today for recheck. Reviewed medications, she does take BP medications.  Lisinopril, lasix, carvedilol. Does see cardiology.     A-(assessment): BP in clinic today, 93/59. Patient reports walking here to the clinic. She denies any symptoms.No lightheadedness, weakness, dizziness, feeling faint or like she is going to pass out. Feeling a little tired lately. She does not have much of an appetite lately, tries to stay hydrated.     R-(recommendations): Huddled with provider. RN scheduled appointment with PCP today, 5/13 for patient to be evaluated. Patient is stable while here in clinic.      Reason for Disposition   Systolic BP  while taking blood pressure medications    Additional Information   Negative: Systolic BP < 90 and feeling weak or lightheaded (e.g., woozy, feeling like they might faint)   Negative: Started suddenly after an allergic medicine, an allergic food, or bee sting   Negative: Shock suspected (e.g., cold/pale/clammy skin, too weak to stand, low BP, rapid pulse)   Negative: Difficult to awaken or acting confused (e.g., disoriented, slurred speech)   Negative: Fainted   Negative: Chest pain   Negative: Bleeding (e.g., vomiting blood, rectal bleeding or tarry stools, severe vaginal bleeding)   Negative: Extra heartbeats, irregular heart beating, or heart is beating very fast (i.e., 'palpitations')   Negative: Sounds like a life-threatening emergency to the triager   Negative: Systolic BP < 80 and NOT feeling weak or lightheaded   Negative: Abdominal pain   Negative: Major surgery in the past month   Negative: Fever > 100.4 F (38.0 C)   Negative: Drinking very little and dehydration suspected (e.g., no urine > 12 hours, very dry mouth, very lightheaded)    Negative: Fall in systolic BP > 20 mm Hg from normal and feeling weak or lightheaded   Negative: Patient sounds very sick or weak to the triager   Negative: Systolic BP < 90 and NOT feeling weak or lightheaded    Protocols used: Blood Pressure - Low-A-OH

## 2025-05-13 NOTE — PROGRESS NOTES
Patient presents to the clinic today after a home health nurse visit yesterday. Had low BP readings. BP continues to be low today. I spoke with JOSE CARLOS Bower and she will triage patient.

## 2025-05-14 ENCOUNTER — TELEPHONE (OUTPATIENT)
Dept: FAMILY MEDICINE | Facility: CLINIC | Age: 70
End: 2025-05-14
Payer: COMMERCIAL

## 2025-05-14 NOTE — TELEPHONE ENCOUNTER
Patient Quality Outreach    Patient is due for the following:   Physical Annual Wellness Visit    Action(s) Taken:   Schedule a Annual Wellness Visit    Type of outreach:    In Person    Questions for provider review:             Ruth Ann Adams MA  Chart routed to .

## 2025-05-18 VITALS — WEIGHT: 111.33 LBS | HEIGHT: 61 IN | BODY MASS INDEX: 21.02 KG/M2

## 2025-05-19 NOTE — PROGRESS NOTES
"Diabetes Self-Management Education & Support    Presents for: CGM Review Type II diabetes with proliferative diabetic retinopathy in both eyes, hyperlipidemia, coronary artery disease     Type of Service: In Person Visit        Assessment  11/25/2024 Diabetes well-controlled currently taking Tresiba 8 units daily, Jardiance 25 mg daily metformin 500 mg taking 2 tablets daily, Mounjaro 10 mg weekly, rapid acting insulin via correction scale patient has not needed to use rapid acting insulin recently.  Freestyle roula 2 reader downloaded 28-day sensor glucose average 120 mg/dL 82% time in target range.  Patient has nicely transitioned from Victoza to Mounjaro 6/2024 and tolerating well.  Will decrease medications due to being 5% less than 70mg/dL and patient feels like she has to force herself to eat.  Weight is now and healthy body weight range.  Weight maintenance goal.      2/24/2025 patient currently taking Mounjaro 7.5 mg weekly and has had an improved appetite now enjoying food more.  Metformin is also split dosing 500 mg 1 tab twice daily which also helps patient's GI.  Jardiance 25 mg daily -tolerating well no reported UTI.  Tresiba 5 units occasionally 8 units if \"eating naughty\"  Dietary interventions provided today as evening postprandial readings averaging greater than 200 with patient's higher carb intake i.e. cereal.  No medication adjustments today.  With next review will have A1c which may be in the 7% range.    5/13/2025  Pt continues with Mounjaro 7.5mg weekly, weight and A1c stable.  Pt has stopped metformin independently due to helping her feel better.  Jardiance 25mg daily - no UTI.  Tresiba 7u if readings >200mgdL, 5u Typically     Wt Readings from Last 10 Encounters:   05/13/25 50.5 kg (111 lb 5.3 oz)   05/13/25 50.5 kg (111 lb 6.4 oz)   02/24/25 51.5 kg (113 lb 8 oz)   01/28/25 50.3 kg (111 lb)   11/25/24 51.4 kg (113 lb 6.4 oz)   11/07/24 51.3 kg (113 lb 1.6 oz)   08/21/24 55.8 kg (123 lb 1.6 " oz)   07/24/24 57 kg (125 lb 11.2 oz)   05/06/24 59.8 kg (131 lb 14.4 oz)   02/13/24 57.1 kg (125 lb 12.8 oz)         Patient's most recent   Lab Results   Component Value Date    A1C 6.1 11/07/2024    A1C 8.6 02/11/2021     is meeting goal of <8.0    Diabetes knowledge and skills assessment:   Patient is knowledgeable in diabetes management concepts related to: Being Active, Monitoring, Taking Medication, Problem Solving, Reducing Risks, and Healthy Coping    Based on learning assessment above, most appropriate setting for further diabetes education would be: Individual setting.    Care Plan and Education Provided:  Healthy Eating: healthy eating on a budget, calculated food stamp allowance and ideas for meals, stock grocerys    Being Active: Finding a physical activity routine that works for you and Relationship of activity to glucose,     Monitoring: Blood glucose versus Continuous Glucose Monitoring and Individual glucose targets,     Taking Medication: Action of prescribed medication(s), Proper site selection and rotation for injections, and When to take medication(s),     Problem Solving: Rule of 15 and carrying a carbohydrate source at all times in case of low glucose,     Reducing Risks: Complications of diabetes, Goal for A1c, how it relates to glucose and how often to check, and Preventing cardiovascular disease, including blood pressure goals, lipid goals, recommendations for cardioprotective medications, statins, and aspirin, and     Healthy Coping: Benefits of making appropriate lifestyle changes and Identifying helpful resources    Patient verbalized understanding of diabetes self-management education concepts discussed, opportunities for ongoing education and support, and recommendations provided today.    Plan    Mounjaro 7.5mg   Jardiance 25mg daily      Tresiba 5u daily (up to 7u daily)    Make sure you are buying adequate supply of chicken, tuna, eggs, black beans, cottage cheese   Topics to cover  "at upcoming visits: Any area as needed for ongoing diabetes self-management education and support/ motivational counseling.    See Care Plan for co-developed, patient-state behavior change goals.    Education Materials Provided:      Subjective/Objective  Chanelle is an 69 year old, presenting for the following diabetes education related to: CGM Review  Accompanied by: Self  Diabetes education in the past 24mo: Yes  Focus of Visit: Healthy Eating, Taking Medication, CGM  Diabetes type: Type 2  Disease course: Stable  How confident are you filling out medical forms by yourself:: A little bit  Diabetes management related comments/concerns: have daughter help me by reading it to me - assists as needed  Transportation concerns: Yes (Does not drive, will walk or get a ride to clinic)  Difficulty affording diabetes medication?: No  Difficulty affording diabetes testing supplies?: No  Other concerns: None  Cultural Influences/Ethnic Background:  Not  or     Diabetes Symptoms & Complications:  Weight trend: Stable  Disease course: Stable  Complications assessed today?: Yes  Autonomic neuropathy: No  CVA: No  Heart disease: Yes  Nephropathy: No  Peripheral neuropathy: No  Peripheral Vascular Disease: No  Retinopathy: No  Sexual dysfunction: Yes    Patient Problem List and Family Medical History reviewed for relevant medical history, current medical status, and diabetes risk factors.    Vitals:  Ht 1.556 m (5' 1.25\")   Wt 50.5 kg (111 lb 5.3 oz)   LMP  (LMP Unknown)   BMI 20.86 kg/m    Estimated body mass index is 20.86 kg/m  as calculated from the following:    Height as of this encounter: 1.556 m (5' 1.25\").    Weight as of this encounter: 50.5 kg (111 lb 5.3 oz).   Last 3 BP:   BP Readings from Last 3 Encounters:   05/13/25 93/59   05/13/25 93/59   01/28/25 90/42       History   Smoking Status    Former   Smokeless Tobacco    Never       Labs:  Lab Results   Component Value Date    A1C 6.1 11/07/2024    A1C " "8.6 02/11/2021     Lab Results   Component Value Date     11/07/2024    GLC 64 03/16/2022     02/11/2021     Lab Results   Component Value Date    LDL 47 07/24/2024    LDL 71 03/13/2020     HDL Cholesterol   Date Value Ref Range Status   03/13/2020 60 >or=50 mg/dL Final     Direct Measure HDL   Date Value Ref Range Status   07/24/2024 41 (L) >=50 mg/dL Final     GFR Estimate   Date Value Ref Range Status   11/07/2024 >90 >60 mL/min/1.73m2 Final     Comment:     eGFR calculated using 2021 CKD-EPI equation.   02/11/2021 92 >or=60 ml/min/1.73m2 Final     GFR Estimate If Black   Date Value Ref Range Status   02/11/2021 106 >or=60 ml/min/1.73m2 Final     Lab Results   Component Value Date    CR 0.60 11/07/2024    CR 0.68 02/11/2021     Lab Results   Component Value Date    MICROL 14.3 07/24/2024    UMALCR 18.36 07/24/2024    UCRR 77.9 07/24/2024 5/13/2025   Healthy Eating   Healthy Eating Assessed Today Yes   Cultural/[place] diet restrictions? No   Do you have any food allergies or intolerances? No   Meal planning/habits Low salt;Smaller portions;Low carb;Avoiding sweets   Who cooks/prepares meals for you? Self   Who purchases food in  your home? Self       daughter will take her grocery shopping   How many times a week on average do you eat food made away from home (restaurant/take-out)? 0   Meals include Lunch;Dinner   Lunch microwave \" whatever I can find\"   Dinner did have steak, potato and cupcake for Easter   Snacks Received some majority of her food through food shelf   Other reports some foods have been tasting bad   Beverages Water;Tea;Coffee;Coffee drinks   Has patient met with a dietitian in the past? Yes         5/13/2025   Being Active   Being Active Assessed Today Yes   Exercise: Currently not exercising       Unstable, knee \"giving out\"   Barrier to exercise None         5/13/2025   Monitoring   Monitoring Assessed Today Yes   Did patient bring glucose meter to appointment?  Yes "   Blood Glucose Meter CGM   Blood glucose trend --        28 day avg 148mg/dL          Diabetes Medication(s)       Diabetic Other       glucose (BD GLUCOSE) 4 g chewable tablet Take 15 g by mouth As needed for low blood sugars (less than 70 mg/dL)       Insulin       insulin degludec (TRESIBA FLEXTOUCH) 100 UNIT/ML pen INJECT 9-UNITS SUBCUTANEOUSLY AT BEDTIME     insulin lispro (HUMALOG KWIKPEN) 100 UNIT/ML (1 unit dial) KWIKPEN INJECT UNDER THE SKIN THREE TIMES DAILY BEFORE MEALS. CHECK YOUR BLOOD SUGAR. IF BLOOD SUGAR IS LESS THAN 100, NO SHORT ACTING INSULIN. IF BLOOD SUGAR  OR ABOVE TAKE FOUR UNITS OF INSULIN; 150 OR ABOVE TAKE 6 UNITS; 200 OR ABOVE TAKE 8 UNITS.     Patient not taking: Reported on 5/13/2025       Sodium-Glucose Co-Transporter 2 (SGLT2) Inhibitors       empagliflozin (JARDIANCE) 25 MG TABS tablet Take 1 tablet (25 mg) by mouth daily.       Incretin Mimetic Agents       Tirzepatide (MOUNJARO) 7.5 MG/0.5ML SOAJ Inject 0.5 mLs (7.5 mg) subcutaneously every 7 days.              5/13/2025   Taking Medications   Taking Medication Assessed Today Yes   Current Treatments Insulin Injections;Oral Medication (taken by mouth);Non-insulin Injectables   Given by Patient   Injection/Infusion sites Abdomen   Problems taking diabetes medications regularly? No   Diabetes medication side effects? No         5/13/2025   Problem Solving   Problem Solving Assessed Today Yes   Is the patient at risk for hypoglycemia? Yes   Hypoglycemia Frequency Rarely   Hypoglycemia Treatment Juice;Glucose (tablets or gel);Candy   Medical ID Yes   Does patient have glucagon emergency kit? Yes   Is the patient at risk for DKA? No   Does patient have severe weather/disaster plan for diabetes management? Yes   Does patient have sick day plan for diabetes management? Yes   Hypoglycemia Sweats;Dizziness/Lightheadedness   Hypoglycemia Complications None           5/13/2025   Reducing Risks   Reducing Risks Assessed Today Yes    Diabetes Risks Age over 45 years;Sedentary Lifestyle   CAD Risks Diabetes Mellitus;Dyslipidemia;Family history;Hypertension;Sedentary lifestyle;Stress   Has dilated eye exam at least once a year? Yes   Sees dentist every 6 months? No   Feet checked by healthcare provider in the last year? Yes         5/13/2025   Healthy Coping: Diabetes Distress Assessment   Healthy Coping Assessed Today Yes   Informal Support system: Children     Time Spent: 60 minutes  Encounter Type: Individual    Any diabetes medication dose changes were made via the CDCES Standing Orders under the patient's referring provider.

## 2025-05-27 ENCOUNTER — RESULTS FOLLOW-UP (OUTPATIENT)
Dept: FAMILY MEDICINE | Facility: CLINIC | Age: 70
End: 2025-05-27

## 2025-05-27 ENCOUNTER — TELEPHONE (OUTPATIENT)
Dept: FAMILY MEDICINE | Facility: CLINIC | Age: 70
End: 2025-05-27

## 2025-05-27 ENCOUNTER — OFFICE VISIT (OUTPATIENT)
Dept: FAMILY MEDICINE | Facility: CLINIC | Age: 70
End: 2025-05-27
Payer: COMMERCIAL

## 2025-05-27 VITALS
HEIGHT: 61 IN | SYSTOLIC BLOOD PRESSURE: 104 MMHG | HEART RATE: 70 BPM | RESPIRATION RATE: 16 BRPM | BODY MASS INDEX: 20.56 KG/M2 | DIASTOLIC BLOOD PRESSURE: 52 MMHG | WEIGHT: 108.9 LBS | OXYGEN SATURATION: 97 % | TEMPERATURE: 97.5 F

## 2025-05-27 DIAGNOSIS — Z79.4 TYPE 2 DIABETES MELLITUS WITH OTHER SPECIFIED COMPLICATION, WITH LONG-TERM CURRENT USE OF INSULIN (H): ICD-10-CM

## 2025-05-27 DIAGNOSIS — E78.5 HYPERLIPIDEMIA LDL GOAL <70: ICD-10-CM

## 2025-05-27 DIAGNOSIS — Z23 NEED FOR VACCINATION: ICD-10-CM

## 2025-05-27 DIAGNOSIS — E11.59 TYPE 2 DIABETES MELLITUS WITH OTHER CIRCULATORY COMPLICATION, WITH LONG-TERM CURRENT USE OF INSULIN (H): ICD-10-CM

## 2025-05-27 DIAGNOSIS — Z79.4 TYPE 2 DIABETES MELLITUS WITH OTHER CIRCULATORY COMPLICATION, WITH LONG-TERM CURRENT USE OF INSULIN (H): ICD-10-CM

## 2025-05-27 DIAGNOSIS — I50.9 CONGESTIVE HEART FAILURE, UNSPECIFIED HF CHRONICITY, UNSPECIFIED HEART FAILURE TYPE (H): ICD-10-CM

## 2025-05-27 DIAGNOSIS — Z79.4 TYPE 2 DIABETES MELLITUS WITH OTHER CIRCULATORY COMPLICATION, WITH LONG-TERM CURRENT USE OF INSULIN (H): Primary | ICD-10-CM

## 2025-05-27 DIAGNOSIS — Z12.11 SCREEN FOR COLON CANCER: ICD-10-CM

## 2025-05-27 DIAGNOSIS — Z71.89 ACP (ADVANCE CARE PLANNING): ICD-10-CM

## 2025-05-27 DIAGNOSIS — Z00.00 ENCOUNTER FOR MEDICARE ANNUAL WELLNESS EXAM: Primary | ICD-10-CM

## 2025-05-27 DIAGNOSIS — M85.852 OSTEOPENIA OF NECKS OF BOTH FEMURS: ICD-10-CM

## 2025-05-27 DIAGNOSIS — E11.69 TYPE 2 DIABETES MELLITUS WITH OTHER SPECIFIED COMPLICATION, WITH LONG-TERM CURRENT USE OF INSULIN (H): ICD-10-CM

## 2025-05-27 DIAGNOSIS — M85.851 OSTEOPENIA OF NECKS OF BOTH FEMURS: ICD-10-CM

## 2025-05-27 DIAGNOSIS — I10 ESSENTIAL HYPERTENSION: ICD-10-CM

## 2025-05-27 DIAGNOSIS — E11.59 TYPE 2 DIABETES MELLITUS WITH OTHER CIRCULATORY COMPLICATION, WITH LONG-TERM CURRENT USE OF INSULIN (H): Primary | ICD-10-CM

## 2025-05-27 DIAGNOSIS — R74.8 ELEVATED LIVER ENZYMES: Primary | ICD-10-CM

## 2025-05-27 LAB
ALT SERPL W P-5'-P-CCNC: 91 U/L (ref 0–50)
ANION GAP SERPL CALCULATED.3IONS-SCNC: 10 MMOL/L (ref 7–15)
BUN SERPL-MCNC: 18.4 MG/DL (ref 8–23)
CALCIUM SERPL-MCNC: 9.9 MG/DL (ref 8.8–10.4)
CHLORIDE SERPL-SCNC: 103 MMOL/L (ref 98–107)
CREAT SERPL-MCNC: 0.65 MG/DL (ref 0.51–0.95)
EGFRCR SERPLBLD CKD-EPI 2021: >90 ML/MIN/1.73M2
ERYTHROCYTE [DISTWIDTH] IN BLOOD BY AUTOMATED COUNT: 13.4 % (ref 10–15)
EST. AVERAGE GLUCOSE BLD GHB EST-MCNC: 171 MG/DL
GLUCOSE SERPL-MCNC: 161 MG/DL (ref 70–99)
HBA1C MFR BLD: 7.6 % (ref 0–5.6)
HCO3 SERPL-SCNC: 25 MMOL/L (ref 22–29)
HCT VFR BLD AUTO: 41.9 % (ref 35–47)
HGB BLD-MCNC: 13.7 G/DL (ref 11.7–15.7)
MCH RBC QN AUTO: 28.7 PG (ref 26.5–33)
MCHC RBC AUTO-ENTMCNC: 32.7 G/DL (ref 31.5–36.5)
MCV RBC AUTO: 88 FL (ref 78–100)
PLATELET # BLD AUTO: 247 10E3/UL (ref 150–450)
POTASSIUM SERPL-SCNC: 4.6 MMOL/L (ref 3.4–5.3)
RBC # BLD AUTO: 4.78 10E6/UL (ref 3.8–5.2)
SODIUM SERPL-SCNC: 138 MMOL/L (ref 135–145)
WBC # BLD AUTO: 9.3 10E3/UL (ref 4–11)

## 2025-05-27 PROCEDURE — 80048 BASIC METABOLIC PNL TOTAL CA: CPT | Performed by: FAMILY MEDICINE

## 2025-05-27 PROCEDURE — 83036 HEMOGLOBIN GLYCOSYLATED A1C: CPT | Performed by: FAMILY MEDICINE

## 2025-05-27 PROCEDURE — 85027 COMPLETE CBC AUTOMATED: CPT | Performed by: FAMILY MEDICINE

## 2025-05-27 PROCEDURE — 3078F DIAST BP <80 MM HG: CPT | Performed by: FAMILY MEDICINE

## 2025-05-27 PROCEDURE — 3074F SYST BP LT 130 MM HG: CPT | Performed by: FAMILY MEDICINE

## 2025-05-27 PROCEDURE — G2211 COMPLEX E/M VISIT ADD ON: HCPCS | Performed by: FAMILY MEDICINE

## 2025-05-27 PROCEDURE — 36415 COLL VENOUS BLD VENIPUNCTURE: CPT | Performed by: FAMILY MEDICINE

## 2025-05-27 PROCEDURE — 3051F HG A1C>EQUAL 7.0%<8.0%: CPT | Performed by: FAMILY MEDICINE

## 2025-05-27 PROCEDURE — 90480 ADMN SARSCOV2 VAC 1/ONLY CMP: CPT | Performed by: FAMILY MEDICINE

## 2025-05-27 PROCEDURE — G0439 PPPS, SUBSEQ VISIT: HCPCS | Performed by: FAMILY MEDICINE

## 2025-05-27 PROCEDURE — 91320 SARSCV2 VAC 30MCG TRS-SUC IM: CPT | Performed by: FAMILY MEDICINE

## 2025-05-27 PROCEDURE — 84460 ALANINE AMINO (ALT) (SGPT): CPT | Performed by: FAMILY MEDICINE

## 2025-05-27 PROCEDURE — 99214 OFFICE O/P EST MOD 30 MIN: CPT | Mod: 25 | Performed by: FAMILY MEDICINE

## 2025-05-27 RX ORDER — LISINOPRIL 2.5 MG/1
2.5 TABLET ORAL DAILY
Qty: 90 TABLET | Refills: 1 | Status: SHIPPED | OUTPATIENT
Start: 2025-05-27

## 2025-05-27 SDOH — HEALTH STABILITY: PHYSICAL HEALTH: ON AVERAGE, HOW MANY DAYS PER WEEK DO YOU ENGAGE IN MODERATE TO STRENUOUS EXERCISE (LIKE A BRISK WALK)?: 3 DAYS

## 2025-05-27 ASSESSMENT — SOCIAL DETERMINANTS OF HEALTH (SDOH): HOW OFTEN DO YOU GET TOGETHER WITH FRIENDS OR RELATIVES?: MORE THAN THREE TIMES A WEEK

## 2025-05-27 ASSESSMENT — PATIENT HEALTH QUESTIONNAIRE - PHQ9
SUM OF ALL RESPONSES TO PHQ QUESTIONS 1-9: 2
SUM OF ALL RESPONSES TO PHQ QUESTIONS 1-9: 2
10. IF YOU CHECKED OFF ANY PROBLEMS, HOW DIFFICULT HAVE THESE PROBLEMS MADE IT FOR YOU TO DO YOUR WORK, TAKE CARE OF THINGS AT HOME, OR GET ALONG WITH OTHER PEOPLE: NOT DIFFICULT AT ALL

## 2025-05-27 NOTE — TELEPHONE ENCOUNTER
Attempted to reach patient to: Discuss test results    Test name: A1c    When patient returns call, please take this action: OK to relay (verbatim): A1c is 7.6 and is not at goal under 7. Dr. Maza would like pt to schedule diabetes follow up visit with Shell Martinez.  Diabetes educator scheduling number [phone]

## 2025-05-27 NOTE — PATIENT INSTRUCTIONS
Patient Education   Preventive Care Advice   This is general advice given by our system to help you stay healthy. However, your care team may have specific advice just for you. Please talk to your care team about your preventive care needs.  Nutrition  Eat 5 or more servings of fruits and vegetables each day.  Try wheat bread, brown rice and whole grain pasta (instead of white bread, rice, and pasta).  Get enough calcium and vitamin D. Check the label on foods and aim for 100% of the RDA (recommended daily allowance).  Lifestyle  Exercise at least 150 minutes each week  (30 minutes a day, 5 days a week).  Do muscle strengthening activities 2 days a week. These help control your weight and prevent disease.  No smoking.  Wear sunscreen to prevent skin cancer.  Have a dental exam and cleaning every 6 months.  Yearly exams  See your health care team every year to talk about:  Any changes in your health.  Any medicines your care team has prescribed.  Preventive care, family planning, and ways to prevent chronic diseases.  Shots (vaccines)   HPV shots (up to age 26), if you've never had them before.  Hepatitis B shots (up to age 59), if you've never had them before.  COVID-19 shot: Get this shot when it's due.  Flu shot: Get a flu shot every year.  Tetanus shot: Get a tetanus shot every 10 years.  Pneumococcal, hepatitis A, and RSV shots: Ask your care team if you need these based on your risk.  Shingles shot (for age 50 and up)  General health tests  Diabetes screening:  Starting at age 35, Get screened for diabetes at least every 3 years.  If you are younger than age 35, ask your care team if you should be screened for diabetes.  Cholesterol test: At age 39, start having a cholesterol test every 5 years, or more often if advised.  Bone density scan (DEXA): At age 50, ask your care team if you should have this scan for osteoporosis (brittle bones).  Hepatitis C: Get tested at least once in your life.  STIs (sexually  transmitted infections)  Before age 24: Ask your care team if you should be screened for STIs.  After age 24: Get screened for STIs if you're at risk. You are at risk for STIs (including HIV) if:  You are sexually active with more than one person.  You don't use condoms every time.  You or a partner was diagnosed with a sexually transmitted infection.  If you are at risk for HIV, ask about PrEP medicine to prevent HIV.  Get tested for HIV at least once in your life, whether you are at risk for HIV or not.  Cancer screening tests  Cervical cancer screening: If you have a cervix, begin getting regular cervical cancer screening tests starting at age 21.  Breast cancer scan (mammogram): If you've ever had breasts, begin having regular mammograms starting at age 40. This is a scan to check for breast cancer.  Colon cancer screening: It is important to start screening for colon cancer at age 45.  Have a colonoscopy test every 10 years (or more often if you're at risk) Or, ask your provider about stool tests like a FIT test every year or Cologuard test every 3 years.  To learn more about your testing options, visit:   .  For help making a decision, visit:   https://bit.ly/tc03288.  Prostate cancer screening test: If you have a prostate, ask your care team if a prostate cancer screening test (PSA) at age 55 is right for you.  Lung cancer screening: If you are a current or former smoker ages 50 to 80, ask your care team if ongoing lung cancer screenings are right for you.  For informational purposes only. Not to replace the advice of your health care provider. Copyright   2023 [facility] [employer]. All rights reserved. Clinically reviewed by the [facility] Transitions Program. [employer] 167422 - REV 01/24.  Bladder Training: Care Instructions  Your Care Instructions     Bladder training is used to treat urge incontinence and stress incontinence. Urge incontinence means that the need to urinate comes on so fast  that you can't get to a toilet in time. Stress incontinence means that you leak urine because of pressure on your bladder. For example, it may happen when you laugh, cough, or lift something heavy.  Bladder training can increase how long you can wait before you have to urinate. It can also help your bladder hold more urine. And it can give you better control over the urge to urinate.  It is important to remember that bladder training takes a few weeks to a few months to make a difference. You may not see results right away, but don't give up.  Follow-up care is a key part of your treatment and safety. Be sure to make and go to all appointments, and call your doctor if you are having problems. It's also a good idea to know your test results and keep a list of the medicines you take.  How can you care for yourself at home?  Work with your doctor to come up with a bladder training program that is right for you. You may use one or more of the following methods.  Delayed urination  In the beginning, try to keep from urinating for 5 minutes after you first feel the need to go.  While you wait, take deep, slow breaths to relax. Kegel exercises can also help you delay the need to go to the bathroom.  After some practice, when you can easily wait 5 minutes to urinate, try to wait 10 minutes before you urinate.  Slowly increase the waiting period until you are able to control when you have to urinate.  Scheduled urination  Empty your bladder when you first wake up in the morning.  Schedule times throughout the day when you will urinate.  Start by going to the bathroom every hour, even if you don't need to go.  Slowly increase the time between trips to the bathroom.  When you have found a schedule that works well for you, keep doing it.  If you wake up during the night and have to urinate, do it. Apply your schedule to waking hours only.  Kegel exercises  These tighten and strengthen pelvic muscles, which can help you control  "the flow of urine. (If doing these exercises causes pain, stop doing them and talk with your doctor.) To do Kegel exercises:  Squeeze your muscles as if you were trying not to pass gas. Or squeeze your muscles as if you were stopping the flow of urine. Your belly, legs, and buttocks shouldn't move.  Hold the squeeze for 3 seconds, then relax for 5 to 10 seconds.  Start with 3 seconds, then add 1 second each week until you are able to squeeze for 10 seconds.  Repeat the exercise 10 times a session. Do 3 to 8 sessions a day.  When should you call for help?  Watch closely for changes in your health, and be sure to contact your doctor if:    Your incontinence is getting worse.     You do not get better as expected.   Where can you learn more?  Go to https://www.[employer].net/patiented  Enter V684 in the search box to learn more about \"Bladder Training: Care Instructions.\"  Current as of: April 30, 2024  Content Version: 14.4    0647-5760 [employer].   Care instructions adapted under license by your healthcare professional. If you have questions about a medical condition or this instruction, always ask your healthcare professional. [employer] disclaims any warranty or liability for your use of this information.       "

## 2025-05-27 NOTE — PROGRESS NOTES
Preventive Care Visit  [facility]  Samanta Maza MD, Family Medicine  May 27, 2025      Assessment & Plan   Problem List Items Addressed This Visit       Hyperlipidemia LDL goal <70    Relevant Orders    ALT    Essential hypertension    Relevant Medications    lisinopril (ZESTRIL) 2.5 MG tablet    Other Relevant Orders    BASIC METABOLIC PANEL     Other Visit Diagnoses         Encounter for Medicare annual wellness exam    -  Primary      Need for vaccination        Relevant Medications    RSV vaccine, bivalent, ABRYSVO, injection      Congestive heart failure, unspecified HF chronicity, unspecified heart failure type (H)        Relevant Orders    CBC with Platelets      Type 2 diabetes mellitus with other circulatory complication, with long-term current use of insulin (H)        Relevant Medications    empagliflozin (JARDIANCE) 25 MG TABS tablet    Other Relevant Orders    HEMOGLOBIN A1C    Adult Eye  Referral      Screen for colon cancer        Relevant Orders    Fecal colorectal cancer screen FIT - Future (S+30)      Type 2 diabetes mellitus with other specified complication, with long-term current use of insulin (H)        Relevant Medications    empagliflozin (JARDIANCE) 25 MG TABS tablet      ACP (advance care planning)          Osteopenia of necks of both femurs        Relevant Orders    DX Bone Density         Assessment & Plan  Need for vaccination:  - Eligible for RSV vaccine; cannot be administered in the office due to Medicare restrictions.  - Obtain RSV vaccine at the pharmacy. Order COVID-19 vaccine.    Type 2 diabetes mellitus with other circulatory complication, with long-term current use of insulin (H):  - Check hemoglobin A1c. Continue current medications including Humalog quick pen and Mounjaro 7.5.    Essential hypertension:  - Renew lisinopril prescription.    Screen for colon cancer:  - Provide new FIT kit for colon cancer screening.    ACP (advance  "care planning):  - Discussed the importance of completing an advance directive and getting it notarized.    Osteopenia of necks of both femurs:  - Osteopenia present in both hips.  - Order repeat bone mineral density study. Follow-up if results are not received within two weeks.     Consent was obtained from the patient to use an AI documentation tool in the creation of  this note.  Voice recognition software was also used.  There may be associated transcribing errors.            Counseling  Appropriate preventive services were addressed with this patient via screening, questionnaire, or discussion as appropriate for fall prevention, nutrition, physical activity, Tobacco-use cessation, social engagement, weight loss and cognition.  Checklist reviewing preventive services available has been given to the patient.  Reviewed patient's diet, addressing concerns and/or questions.   She is at risk for lack of exercise and has been provided with information to increase physical activity for the benefit of her well-being.   The patient was instructed to see the dentist every 6 months.   Information on urinary incontinence and treatment options given to patient.         Bhanu Roca is a 69 year old, presenting for the following:  Physical        2025    10:44 AM   Additional Questions   Roomed by Ruth Ann WISE  Chanelle Billy, 69-year-old female    - Last eye exam was last year.  - Attempted to get RSV vaccine last year but was told she wasn't old enough.  - Has a test kit for colon cancer screening that might be ; originally ordered in November.  - Reports sometimes feeling \"high\" and is considering measuring food intake to avoid medication.  - Weighs 110 lbs and has been losing weight.  - Occasionally experiences urinary incontinence, uses pads at night.  - Previously hospitalized after a COVID-19 vaccine, but believes it was a coincidence.  - Does not use metformin due to feeling sick from " it.      Advance Care Planning    Discussed advance care planning with patient; informed AVS has link to [employer].        5/27/2025   General Health   How would you rate your overall physical health? Good   Feel stress (tense, anxious, or unable to sleep) Only a little   (!) STRESS CONCERN      5/27/2025   Nutrition   Diet: Diabetic         5/27/2025   Exercise   Days per week of moderate/strenous exercise 3 days         5/27/2025   Social Factors   Frequency of gathering with friends or relatives More than three times a week   Worry food won't last until get money to buy more No   Food not last or not have enough money for food? No   Do you have housing? (Housing is defined as stable permanent housing and does not include staying outside in a car, in a tent, in an abandoned building, in an overnight shelter, or couch-surfing.) Yes   Are you worried about losing your housing? No   Lack of transportation? No   Unable to get utilities (heat,electricity)? No         5/27/2025   Fall Risk   Fallen 2 or more times in the past year? No   Trouble with walking or balance? No          5/27/2025   Activities of Daily Living- Home Safety   Needs help with the following daily activites None of the above   Safety concerns in the home None of the above         5/27/2025   Dental   Dentist two times every year? (!) NO         5/27/2025   Hearing Screening   Hearing concerns? None of the above         5/27/2025   Driving Risk Screening   Patient/family members have concerns about driving No         5/27/2025   General Alertness/Fatigue Screening   Have you been more tired than usual lately? No         5/27/2025   Urinary Incontinence Screening   Bothered by leaking urine in past 6 months Yes       Today's PHQ-9 Score:       5/27/2025    10:09 AM   PHQ-9 SCORE   PHQ-9 Total Score MyChart 2 (Minimal depression)   PHQ-9 Total Score 2        Patient-reported         5/27/2025   Substance Use   Alcohol more than 3/day or more  than 7/wk Not Applicable   Do you have a current opioid prescription? No   How severe/bad is pain from 1 to 10? 0/10 (No Pain)   Do you use any other substances recreationally? No    (!) DECLINE       Multiple values from one day are sorted in reverse-chronological order     Social History     Tobacco Use    Smoking status: Former     Passive exposure: Past    Smokeless tobacco: Never    Tobacco comments:     smoked in high school and a little after her  .   Vaping Use    Vaping status: Never Used   Substance Use Topics    Alcohol use: Yes     Comment: occasionally           2025   LAST FHS-7 RESULTS   1st degree relative breast or ovarian cancer No   Any relative bilateral breast cancer No   Any male have breast cancer No   Any ONE woman have BOTH breast AND ovarian cancer No   Any woman with breast cancer before 50yrs No   2 or more relatives with breast AND/OR ovarian cancer No   2 or more relatives with breast AND/OR bowel cancer No                     ASCVD Risk   The ASCVD Risk score (Luis Felipe PRUETT, et al., 2019) failed to calculate for the following reasons:    Risk score cannot be calculated because patient has a medical history suggesting prior/existing ASCVD            Reviewed and updated as needed this visit by Provider   Tobacco  Allergies  Meds  Problems  Med Hx  Surg Hx  Fam Hx     Sexual Activity            Current providers sharing in care for this patient include:  Patient Care Team:  Samanta Maza MD as PCP - [facility] (Family Medicine)  [facility]  Samanta Maza MD (Family Practice)  Samanta Maza MD as Assigned PCP  Avril Martinez RD as Diabetes Educator  Anyi Pisano [facility] as Assigned MT Pharmacist  Lan Long MD as MD (Cardiovascular Disease)  Caren Boyd MD as Assigned Heart and Vascular Provider  Avril Martinez RD as Diabetes Educator (Dietitian, Registered)    The following health maintenance items are reviewed  "in Epic and correct as of today:  Health Maintenance   Topic Date Due    HF ACTION PLAN  Never done    COLORECTAL CANCER SCREENING  Never done    RSV VACCINE (1 - Risk 60-74 years 1-dose series) Never done    DIABETIC FOOT EXAM  01/18/2025    CBC  01/18/2025    A1C  02/07/2025    ALT  02/13/2025    BMP  05/07/2025    EYE EXAM  06/12/2025    LIPID  07/24/2025    MICROALBUMIN  07/24/2025    INFLUENZA VACCINE (Season Ended) 09/01/2025    PHQ-9  11/27/2025    COVID-19 VACCINE (8 - 2024-25 season) 11/27/2025    MEDICARE ANNUAL WELLNESS VISIT  05/27/2026    ANNUAL REVIEW OF HM ORDERS  05/27/2026    FALL RISK ASSESSMENT  05/27/2026    MAMMO SCREENING  02/13/2027    DTAP/TDAP/TD VACCINE (3 - Td or Tdap) 03/23/2028    ADVANCE CARE PLANNING  05/27/2030    DEXA  08/23/2036    TSH W/FREE T4 REFLEX  Completed    HEPATITIS C SCREENING  Completed    DEPRESSION ACTION PLAN  Completed    PNEUMOCOCCAL VACCINE 50+ YEARS  Completed    ZOSTER VACCINE  Completed    HPV VACCINE  Aged Out    MENINGITIS VACCINE  Aged Out    LUNG CANCER SCREENING  Discontinued            Objective    Exam  /52 (BP Location: Right arm, Patient Position: Sitting)   Pulse 70   Temp 97.5  F (36.4  C) (Tympanic)   Resp 16   Ht 1.556 m (5' 1.25\")   Wt 49.4 kg (108 lb 14.4 oz)   LMP  (LMP Unknown)   SpO2 97%   BMI 20.41 kg/m     Estimated body mass index is 20.41 kg/m  as calculated from the following:    Height as of this encounter: 1.556 m (5' 1.25\").    Weight as of this encounter: 49.4 kg (108 lb 14.4 oz).    Physical Exam         General: alert and oriented ×3 no acute distress.    HEENT: Normocephalic and atraumatic.   Eyes pupils are equal round and reactive to light     Hearing is grossly normal     Nares are patent there is no rhinorrhea.     Mucous membranes are moist and pink.    Chest: has bilateral rise with no increased work of breathing. clear to auscultation without wheezes, rhonchi, or rales.    Cardiovascular: normal perfusion and " brisk capillary refill. S1S2 with regular rate and rhythm and no murmurs, gallops or rubs.    Musculoskeletal: no gross focal abnormalities and normal gait.    Neuro: no gross focal abnormalities and memory seems intact. CN 2-12 are grossly intact.    Psychiatric: speech is clear and coherent and fluent. Patient dressed appropriately for the weather. Mood is appropriate and affect is full.            5/27/2025   Mini Cog   Clock Draw Score 2 Normal   3 Item Recall 2 objects recalled   Mini Cog Total Score 4              Signed Electronically by: Samanta Maza MD    Answers submitted by the patient for this visit:  Patient Health Questionnaire (Submitted on 5/27/2025)  If you checked off any problems, how difficult have these problems made it for you to do your work, take care of things at home, or get along with other people?: Not difficult at all  PHQ9 TOTAL SCORE: 2

## 2025-05-27 NOTE — TELEPHONE ENCOUNTER
Patient Quality Outreach    Patient is due for the following:   Physical Annual Wellness Visit    Action(s) Taken:   Patient was scheduled for Office Visit on 5/27/2025, FD staff offered to change her appointment to an Annual Wellness Visit. Chanelle agreed, appointment was changed.    Type of outreach:    In person    Questions for provider review:    None         Tatyana Asher  Chart routed to None.

## 2025-05-27 NOTE — CONFIDENTIAL NOTE
Please let patient know that the repeat lipid A1c is to have it be under 7.  She is not at goal.  I would like her to schedule a follow-up with Shell Martinez.  Thank you

## 2025-05-28 NOTE — TELEPHONE ENCOUNTER
Patient notified of provider's message as written, she verbalized understanding. Diabetic education appointment scheduled for 7/15.   Encouraged patient to call the clinic with further questions/concerns.     Andre LOVE RN  [facility]

## 2025-06-05 ENCOUNTER — LAB (OUTPATIENT)
Dept: LAB | Facility: CLINIC | Age: 70
End: 2025-06-05
Payer: COMMERCIAL

## 2025-06-05 DIAGNOSIS — R74.8 ELEVATED LIVER ENZYMES: ICD-10-CM

## 2025-06-05 PROCEDURE — 80076 HEPATIC FUNCTION PANEL: CPT | Performed by: FAMILY MEDICINE

## 2025-06-10 ENCOUNTER — ORDERS ONLY (AUTO-RELEASED) (OUTPATIENT)
Dept: FAMILY MEDICINE | Facility: CLINIC | Age: 70
End: 2025-06-10
Payer: COMMERCIAL

## 2025-06-10 DIAGNOSIS — Z12.11 SCREEN FOR COLON CANCER: ICD-10-CM

## 2025-07-15 ENCOUNTER — OFFICE VISIT (OUTPATIENT)
Dept: EDUCATION SERVICES | Facility: CLINIC | Age: 70
End: 2025-07-15
Payer: COMMERCIAL

## 2025-07-15 DIAGNOSIS — E11.69 TYPE 2 DIABETES MELLITUS WITH OTHER SPECIFIED COMPLICATION, WITH LONG-TERM CURRENT USE OF INSULIN (H): ICD-10-CM

## 2025-07-15 DIAGNOSIS — E11.59 TYPE 2 DIABETES MELLITUS WITH OTHER CIRCULATORY COMPLICATION, WITH LONG-TERM CURRENT USE OF INSULIN (H): Primary | ICD-10-CM

## 2025-07-15 DIAGNOSIS — R74.8 ELEVATED LIVER ENZYMES: ICD-10-CM

## 2025-07-15 DIAGNOSIS — Z79.4 TYPE 2 DIABETES MELLITUS WITH OTHER CIRCULATORY COMPLICATION, WITH LONG-TERM CURRENT USE OF INSULIN (H): Primary | ICD-10-CM

## 2025-07-15 DIAGNOSIS — Z79.4 TYPE 2 DIABETES MELLITUS WITH OTHER SPECIFIED COMPLICATION, WITH LONG-TERM CURRENT USE OF INSULIN (H): ICD-10-CM

## 2025-07-15 PROCEDURE — G0108 DIAB MANAGE TRN  PER INDIV: HCPCS | Mod: AE | Performed by: DIETITIAN, REGISTERED

## 2025-07-15 PROCEDURE — 99207 PR DROP WITH A PROCEDURE: CPT | Performed by: DIETITIAN, REGISTERED

## 2025-07-15 RX ORDER — INSULIN LISPRO 100 [IU]/ML
INJECTION, SOLUTION INTRAVENOUS; SUBCUTANEOUS
Qty: 15 ML | Refills: 3 | Status: SHIPPED | OUTPATIENT
Start: 2025-07-15

## 2025-07-15 NOTE — LETTER
"    7/15/2025         RE: Chanelle Billy  [address]  [place] [address]        Dear Colleague,    Thank you for referring your patient, Chanelle Billy, to the [facility]. Please see a copy of my visit note below.    Diabetes Self-Management Education & Support    Presents for: Type 2 diabetes    Type of Service: In Person Visit      Assessment  11/25/2024 Diabetes well-controlled currently taking Tresiba 8 units daily, Jardiance 25 mg daily metformin 500 mg taking 2 tablets daily, Mounjaro 10 mg weekly, rapid acting insulin via correction scale patient has not needed to use rapid acting insulin recently.  Freestyle roula 2 reader downloaded 28-day sensor glucose average 120 mg/dL 82% time in target range.  Patient has nicely transitioned from Victoza to Mounjaro 6/2024 and tolerating well.  Will decrease medications due to being 5% less than 70mg/dL and patient feels like she has to force herself to eat.  Weight is now and healthy body weight range.  Weight maintenance goal.      2/24/2025 patient currently taking Mounjaro 7.5 mg weekly and has had an improved appetite now enjoying food more.  Metformin is also split dosing 500 mg 1 tab twice daily which also helps patient's GI.  Jardiance 25 mg daily -tolerating well no reported UTI.  Tresiba 5 units occasionally 8 units if \"eating naughty\"  Dietary interventions provided today as evening postprandial readings averaging greater than 200 with patient's higher carb intake i.e. cereal.  No medication adjustments today.  With next review will have A1c which may be in the 7% range.     5/13/2025  Pt continues with Mounjaro 7.5mg weekly, weight and A1c stable.  Pt has stopped metformin independently due to helping her feel better.  Jardiance 25mg daily - no UTI.  Tresiba 7u if readings >200mgdL, 5u Typically    7/15/2025   patient's A1c increased weight has declined -undesired weight maintenance recommended.  Will decrease " Mounjaro from 7.5 mg weekly to 5 mg weekly and will reintroduce rapid acting insulin with largest meal of the day as postprandial readings are spiking >250mg/dL 8/14 days.  Dietary recommendations also for elevated liver function tests.      Patient's most recent   Lab Results   Component Value Date    A1C 7.6 05/27/2025    A1C 6.1 11/07/2024    A1C 7.2 07/24/2024    A1C 7.8 01/18/2024    A1C 7.9 09/19/2023    A1C 8.6 02/11/2021    A1C 11.4 10/22/2020    A1C 6.8 03/13/2020    A1C 11.7 12/06/2019     is meeting goal of <8.0    Diabetes knowledge and skills assessment:   Patient is knowledgeable in diabetes management concepts related to: Being Active, Monitoring, Problem Solving, Reducing Risks, and Healthy Coping    Based on learning assessment above, most appropriate setting for further diabetes education would be: Individual setting.    Care Plan and Education Provided:  Healthy Eating: healthy eating on a budget, calculated food stamp allowance and ideas for meals, dietary recommendations for elevated LFTs  Taking Medication: Humalog taking with the largest meal of the day   3 units small meal (start with this dose)  4 units medium   5 units high carb cereal, pasta, rice     Tresiba 5 units and may need 6 units   once Mounjaro decreased to 5mg weekly     Patient verbalized understanding of diabetes self-management education concepts discussed, opportunities for ongoing education and support, and recommendations provided today.    Plan    Humalog taking with the largest meal of the day   3 units small meal (start with this dose)  4 units medium   5 units high carb cereal, pasta, rice     Tresiba 5 units and may need 6 units   once Mounjaro decreased to 5mg weekly     Topics to cover at upcoming visits: Any area as needed for ongoing diabetes self-management education and support/ motivational counseling.      See Care Plan for co-developed, patient-state behavior change goals.    Education Materials Provided:  No  "new materials provided today      Subjective/Objective  Chanelle is an 69 year old, presenting for the following diabetes education related to: CGM Review  Accompanied by: Self  Diabetes education in the past 24mo: Yes  Focus of Visit: Healthy Eating, Taking Medication, CGM  Diabetes type: Type 2  Disease course: Stable  How confident are you filling out medical forms by yourself:: A little bit  Diabetes management related comments/concerns: have daughter help me by reading it to me - assists as needed  Transportation concerns: Yes (Does not drive, will walk or get a ride to clinic)  Difficulty affording diabetes medication?: No  Difficulty affording diabetes testing supplies?: No  Other concerns: None  Cultural Influences/Ethnic Background:  Not  or     Diabetes Symptoms & Complications:  Weight trend: Stable  Disease course: Stable  Complications assessed today?: Yes  Autonomic neuropathy: No  CVA: No  Heart disease: Yes  Nephropathy: No  Peripheral neuropathy: No  Peripheral Vascular Disease: No  Retinopathy: No  Sexual dysfunction: Yes    Patient Problem List and Family Medical History reviewed for relevant medical history, current medical status, and diabetes risk factors.    Vitals:  Ht 1.556 m (5' 1.25\")   Wt 49.1 kg (108 lb 4.8 oz)   LMP  (LMP Unknown)   BMI 20.30 kg/m    Estimated body mass index is 20.3 kg/m  as calculated from the following:    Height as of this encounter: 1.556 m (5' 1.25\").    Weight as of this encounter: 49.1 kg (108 lb 4.8 oz).   Last 3 BP:   BP Readings from Last 3 Encounters:   05/27/25 104/52   05/13/25 93/59   05/13/25 93/59       History   Smoking Status     Former   Smokeless Tobacco     Never       Labs:  Lab Results   Component Value Date    A1C 7.6 05/27/2025    A1C 8.6 02/11/2021     Lab Results   Component Value Date     05/27/2025    GLC 64 03/16/2022     02/11/2021     Lab Results   Component Value Date    LDL 47 07/24/2024    LDL 71 03/13/2020 " "    HDL Cholesterol   Date Value Ref Range Status   03/13/2020 60 >or=50 mg/dL Final     Direct Measure HDL   Date Value Ref Range Status   07/24/2024 41 (L) >=50 mg/dL Final     GFR Estimate   Date Value Ref Range Status   05/27/2025 >90 >60 mL/min/1.73m2 Final     Comment:     eGFR calculated using 2021 CKD-EPI equation.   02/11/2021 92 >or=60 ml/min/1.73m2 Final     GFR Estimate If Black   Date Value Ref Range Status   02/11/2021 106 >or=60 ml/min/1.73m2 Final     Lab Results   Component Value Date    CR 0.65 05/27/2025    CR 0.68 02/11/2021     Lab Results   Component Value Date    MICROL 14.3 07/24/2024    UMALCR 18.36 07/24/2024    UCRR 77.9 07/24/2024           7/15/2025   Healthy Eating   Healthy Eating Assessed Today Yes   Cultural/Mosque diet restrictions? No   Do you have any food allergies or intolerances? No   Meal planning/habits Low salt;Smaller portions;Low carb;Avoiding sweets   Who cooks/prepares meals for you? Self   Who purchases food in  your home? Self       daughter will take her grocery shopping   How many times a week on average do you eat food made away from home (restaurant/take-out)? 0   Meals include Lunch;Dinner   Breakfast Once a week will go to fee breakfast and will have 1 pancake and eggs   Lunch microwave \" whatever I can find\"   Dinner trying to have tuna and vegetable more often   Snacks Received some majority of her food through food shelf   Beverages Water;Tea;Coffee;Coffee drinks   Has patient met with a dietitian in the past? Yes         7/15/2025   Being Active   Being Active Assessed Today Yes   Exercise: Currently not exercising       Unstable, knee \"giving out\"   Barrier to exercise None         7/15/2025   Monitoring   Monitoring Assessed Today Yes   Did patient bring glucose meter to appointment?  Yes   Blood Glucose Meter CGM   Blood glucose trend --        28 day sensor glucose average 131 mg/dL with 77% time in target range         Plan    Diabetes Medication(s)  "      Diabetic Other       glucose (BD GLUCOSE) 4 g chewable tablet Take 15 g by mouth As needed for low blood sugars (less than 70 mg/dL)       Insulin       insulin degludec (TRESIBA FLEXTOUCH) 100 UNIT/ML pen INJECT 9-UNITS SUBCUTANEOUSLY AT BEDTIME     insulin lispro (HUMALOG KWIKPEN) 100 UNIT/ML (1 unit dial) KWIKPEN INJECT UNDER THE SKIN up to THREE TIMES DAILY BEFORE MEALS. Start with biggest meal of the day 3 units then potentially up to 5 units max per day would be 12u       Sodium-Glucose Co-Transporter 2 (SGLT2) Inhibitors       empagliflozin (JARDIANCE) 25 MG TABS tablet Take 1 tablet (25 mg) by mouth daily.       Incretin Mimetic Agents       Tirzepatide (MOUNJARO) 7.5 MG/0.5ML SOAJ Inject 0.5 mLs (7.5 mg) subcutaneously every 7 days.     tirzepatide (MOUNJARO) 5 MG/0.5ML SOAJ auto-injector pen Inject 0.5 mLs (5 mg) subcutaneously once a week.              7/15/2025   Taking Medications   Taking Medication Assessed Today Yes   Current Treatments Insulin Injections;Oral Medication (taken by mouth);Non-insulin Injectables   Given by Patient   Injection/Infusion sites Abdomen   Problems taking diabetes medications regularly? No   Diabetes medication side effects? No         7/15/2025   Problem Solving   Problem Solving Assessed Today Yes   Is the patient at risk for hypoglycemia? Yes   Hypoglycemia Frequency Weekly   Hypoglycemia Treatment Juice;Glucose (tablets or gel);Candy   Medical ID Yes   Does patient have glucagon emergency kit? Yes   Is the patient at risk for DKA? No   Does patient have severe weather/disaster plan for diabetes management? Yes   Does patient have sick day plan for diabetes management? Yes   Hypoglycemia Sweats;Dizziness/Lightheadedness   Hypoglycemia Complications None           7/15/2025   Reducing Risks   Reducing Risks Assessed Today Yes   Diabetes Risks Age over 45 years;Sedentary Lifestyle   CAD Risks Diabetes Mellitus;Dyslipidemia;Family history;Hypertension;Sedentary  lifestyle;Stress   Has dilated eye exam at least once a year? Yes   Sees dentist every 6 months? No   Feet checked by healthcare provider in the last year? Yes         7/15/2025   Healthy Coping: Diabetes Distress Assessment   Healthy Coping Assessed Today Yes   I feel burned out by all of the attention and effort that diabetes demands of me. 2 - A Little Problem   It bothers me that diabetes seems to control my life. 2 - A Little Problem   I am frustrated that even when I do what I am supposed to for my diabetes, it doesn't seem to make a difference. 2 - A Little Problem   No matter how hard I try with my diabetes, it feels like it will never be good enough. 2 - A Little Problem   I am so tired of having to worry about diabetes all the time. 2 - A Little Problem   It depresses me when I realize that my diabetes will likely never go away. 3 - A Moderate Problem   Living with diabetes is overwhelming for me. 3 - A Moderate Problem   T2 DDAS Total Score (0 - 1.9 Little or no DD, 2.0 - 2.9 Moderate DD,  3.0+ High DD) 2.3   Informal Support system: John Martinez RD, CD, [facility] [place]     Time Spent: 30 minutes  Encounter Type: Individual    Any diabetes medication dose changes were made via the [facility] [place] Standing Orders under the patient's referring provider.

## 2025-07-15 NOTE — PROGRESS NOTES
"Diabetes Self-Management Education & Support    Presents for: Type 2 diabetes    Type of Service: In Person Visit      Assessment  11/25/2024 Diabetes well-controlled currently taking Tresiba 8 units daily, Jardiance 25 mg daily metformin 500 mg taking 2 tablets daily, Mounjaro 10 mg weekly, rapid acting insulin via correction scale patient has not needed to use rapid acting insulin recently.  Freestyle roula 2 reader downloaded 28-day sensor glucose average 120 mg/dL 82% time in target range.  Patient has nicely transitioned from Victoza to Mounjaro 6/2024 and tolerating well.  Will decrease medications due to being 5% less than 70mg/dL and patient feels like she has to force herself to eat.  Weight is now and healthy body weight range.  Weight maintenance goal.      2/24/2025 patient currently taking Mounjaro 7.5 mg weekly and has had an improved appetite now enjoying food more.  Metformin is also split dosing 500 mg 1 tab twice daily which also helps patient's GI.  Jardiance 25 mg daily -tolerating well no reported UTI.  Tresiba 5 units occasionally 8 units if \"eating naughty\"  Dietary interventions provided today as evening postprandial readings averaging greater than 200 with patient's higher carb intake i.e. cereal.  No medication adjustments today.  With next review will have A1c which may be in the 7% range.     5/13/2025  Pt continues with Mounjaro 7.5mg weekly, weight and A1c stable.  Pt has stopped metformin independently due to helping her feel better.  Jardiance 25mg daily - no UTI.  Tresiba 7u if readings >200mgdL, 5u Typically    7/15/2025   patient's A1c increased weight has declined -undesired weight maintenance recommended.  Will decrease Mounjaro from 7.5 mg weekly to 5 mg weekly and will reintroduce rapid acting insulin with largest meal of the day as postprandial readings are spiking >250mg/dL 8/14 days.  Dietary recommendations also for elevated liver function tests.      Patient's most recent "   Lab Results   Component Value Date    A1C 7.6 05/27/2025    A1C 6.1 11/07/2024    A1C 7.2 07/24/2024    A1C 7.8 01/18/2024    A1C 7.9 09/19/2023    A1C 8.6 02/11/2021    A1C 11.4 10/22/2020    A1C 6.8 03/13/2020    A1C 11.7 12/06/2019     is meeting goal of <8.0    Diabetes knowledge and skills assessment:   Patient is knowledgeable in diabetes management concepts related to: Being Active, Monitoring, Problem Solving, Reducing Risks, and Healthy Coping    Based on learning assessment above, most appropriate setting for further diabetes education would be: Individual setting.    Care Plan and Education Provided:  Healthy Eating: healthy eating on a budget, calculated food stamp allowance and ideas for meals, dietary recommendations for elevated LFTs  Taking Medication: Humalog taking with the largest meal of the day   3 units small meal (start with this dose)  4 units medium   5 units high carb cereal, pasta, rice     Tresiba 5 units and may need 6 units   once Mounjaro decreased to 5mg weekly     Patient verbalized understanding of diabetes self-management education concepts discussed, opportunities for ongoing education and support, and recommendations provided today.    Plan    Humalog taking with the largest meal of the day   3 units small meal (start with this dose)  4 units medium   5 units high carb cereal, pasta, rice     Tresiba 5 units and may need 6 units   once Mounjaro decreased to 5mg weekly     Topics to cover at upcoming visits: Any area as needed for ongoing diabetes self-management education and support/ motivational counseling.      See Care Plan for co-developed, patient-state behavior change goals.    Education Materials Provided:  No new materials provided today      Subjective/Objective  Chanelle is an 69 year old, presenting for the following diabetes education related to: CGM Review  Accompanied by: Self  Diabetes education in the past 24mo: Yes  Focus of Visit: Healthy Eating, Taking  "Medication, CGM  Diabetes type: Type 2  Disease course: Stable  How confident are you filling out medical forms by yourself:: A little bit  Diabetes management related comments/concerns: have daughter help me by reading it to me - assists as needed  Transportation concerns: Yes (Does not drive, will walk or get a ride to clinic)  Difficulty affording diabetes medication?: No  Difficulty affording diabetes testing supplies?: No  Other concerns: None  Cultural Influences/Ethnic Background:  Not  or     Diabetes Symptoms & Complications:  Weight trend: Stable  Disease course: Stable  Complications assessed today?: Yes  Autonomic neuropathy: No  CVA: No  Heart disease: Yes  Nephropathy: No  Peripheral neuropathy: No  Peripheral Vascular Disease: No  Retinopathy: No  Sexual dysfunction: Yes    Patient Problem List and Family Medical History reviewed for relevant medical history, current medical status, and diabetes risk factors.    Vitals:  Ht 1.556 m (5' 1.25\")   Wt 49.1 kg (108 lb 4.8 oz)   LMP  (LMP Unknown)   BMI 20.30 kg/m    Estimated body mass index is 20.3 kg/m  as calculated from the following:    Height as of this encounter: 1.556 m (5' 1.25\").    Weight as of this encounter: 49.1 kg (108 lb 4.8 oz).   Last 3 BP:   BP Readings from Last 3 Encounters:   05/27/25 104/52   05/13/25 93/59   05/13/25 93/59       History   Smoking Status    Former   Smokeless Tobacco    Never       Labs:  Lab Results   Component Value Date    A1C 7.6 05/27/2025    A1C 8.6 02/11/2021     Lab Results   Component Value Date     05/27/2025    GLC 64 03/16/2022     02/11/2021     Lab Results   Component Value Date    LDL 47 07/24/2024    LDL 71 03/13/2020     HDL Cholesterol   Date Value Ref Range Status   03/13/2020 60 >or=50 mg/dL Final     Direct Measure HDL   Date Value Ref Range Status   07/24/2024 41 (L) >=50 mg/dL Final     GFR Estimate   Date Value Ref Range Status   05/27/2025 >90 >60 mL/min/1.73m2 " "Final     Comment:     eGFR calculated using 2021 CKD-EPI equation.   02/11/2021 92 >or=60 ml/min/1.73m2 Final     GFR Estimate If Black   Date Value Ref Range Status   02/11/2021 106 >or=60 ml/min/1.73m2 Final     Lab Results   Component Value Date    CR 0.65 05/27/2025    CR 0.68 02/11/2021     Lab Results   Component Value Date    MICROL 14.3 07/24/2024    UMALCR 18.36 07/24/2024    UCRR 77.9 07/24/2024           7/15/2025   Healthy Eating   Healthy Eating Assessed Today Yes   Cultural/Yarsani diet restrictions? No   Do you have any food allergies or intolerances? No   Meal planning/habits Low salt;Smaller portions;Low carb;Avoiding sweets   Who cooks/prepares meals for you? Self   Who purchases food in  your home? Self       daughter will take her grocery shopping   How many times a week on average do you eat food made away from home (restaurant/take-out)? 0   Meals include Lunch;Dinner   Breakfast Once a week will go to [employer] breakfast and will have 1 pancake and eggs   Lunch microwave \" whatever I can find\"   Dinner trying to have tuna and vegetable more often   Snacks Received some majority of her food through food shelf   Beverages Water;Tea;Coffee;Coffee drinks   Has patient met with a dietitian in the past? Yes         7/15/2025   Being Active   Being Active Assessed Today Yes   Exercise: Currently not exercising       Unstable, knee \"giving out\"   Barrier to exercise None         7/15/2025   Monitoring   Monitoring Assessed Today Yes   Did patient bring glucose meter to appointment?  Yes   Blood Glucose Meter CGM   Blood glucose trend --        28 day sensor glucose average 131 mg/dL with 77% time in target range         Plan    Diabetes Medication(s)       Diabetic Other       glucose (BD GLUCOSE) 4 g chewable tablet Take 15 g by mouth As needed for low blood sugars (less than 70 mg/dL)       Insulin       insulin degludec (TRESIBA FLEXTOUCH) 100 UNIT/ML pen INJECT 9-UNITS SUBCUTANEOUSLY AT BEDTIME     " insulin lispro (HUMALOG KWIKPEN) 100 UNIT/ML (1 unit dial) KWIKPEN INJECT UNDER THE SKIN up to THREE TIMES DAILY BEFORE MEALS. Start with biggest meal of the day 3 units then potentially up to 5 units max per day would be 12u       Sodium-Glucose Co-Transporter 2 (SGLT2) Inhibitors       empagliflozin (JARDIANCE) 25 MG TABS tablet Take 1 tablet (25 mg) by mouth daily.       Incretin Mimetic Agents       Tirzepatide (MOUNJARO) 7.5 MG/0.5ML SOAJ Inject 0.5 mLs (7.5 mg) subcutaneously every 7 days.     tirzepatide (MOUNJARO) 5 MG/0.5ML SOAJ auto-injector pen Inject 0.5 mLs (5 mg) subcutaneously once a week.              7/15/2025   Taking Medications   Taking Medication Assessed Today Yes   Current Treatments Insulin Injections;Oral Medication (taken by mouth);Non-insulin Injectables   Given by Patient   Injection/Infusion sites Abdomen   Problems taking diabetes medications regularly? No   Diabetes medication side effects? No         7/15/2025   Problem Solving   Problem Solving Assessed Today Yes   Is the patient at risk for hypoglycemia? Yes   Hypoglycemia Frequency Weekly   Hypoglycemia Treatment Juice;Glucose (tablets or gel);Candy   Medical ID Yes   Does patient have glucagon emergency kit? Yes   Is the patient at risk for DKA? No   Does patient have severe weather/disaster plan for diabetes management? Yes   Does patient have sick day plan for diabetes management? Yes   Hypoglycemia Sweats;Dizziness/Lightheadedness   Hypoglycemia Complications None           7/15/2025   Reducing Risks   Reducing Risks Assessed Today Yes   Diabetes Risks Age over 45 years;Sedentary Lifestyle   CAD Risks Diabetes Mellitus;Dyslipidemia;Family history;Hypertension;Sedentary lifestyle;Stress   Has dilated eye exam at least once a year? Yes   Sees dentist every 6 months? No   Feet checked by healthcare provider in the last year? Yes         7/15/2025   Healthy Coping: Diabetes Distress Assessment   Healthy Coping Assessed Today Yes    I feel burned out by all of the attention and effort that diabetes demands of me. 2 - A Little Problem   It bothers me that diabetes seems to control my life. 2 - A Little Problem   I am frustrated that even when I do what I am supposed to for my diabetes, it doesn't seem to make a difference. 2 - A Little Problem   No matter how hard I try with my diabetes, it feels like it will never be good enough. 2 - A Little Problem   I am so tired of having to worry about diabetes all the time. 2 - A Little Problem   It depresses me when I realize that my diabetes will likely never go away. 3 - A Moderate Problem   Living with diabetes is overwhelming for me. 3 - A Moderate Problem   T2 DDAS Total Score (0 - 1.9 Little or no DD, 2.0 - 2.9 Moderate DD,  3.0+ High DD) 2.3   Informal Support system: John Martinez RD, CD, [facility]     Time Spent: 30 minutes  Encounter Type: Individual    Any diabetes medication dose changes were made via the [facility] Standing Orders under the patient's referring provider.

## 2025-07-15 NOTE — PATIENT INSTRUCTIONS
Humalog taking with the largest meal of the day   3 units small meal (start with this dose)  4 units medium   5 units high carb cereal, pasta, rice     Tresiba 5 units and may need 6 units   once Mounjaro decreased to 5mg weekly       NUTRITION INTERVENTION for Liver:  Education  o Avoid sugar and limit starchy foods (bread, pasta, rice, potatoes)  o Reduce your intake of saturated and trans fats   o Avoid high fructose corn syrup containing foods and beverages  o Avoid alcohol  o Increase your dietary fiber intake   Exercise more  o Moderate aerobic exercise for at least 20-30 minutes/day (i.e. brisk walking or stationary bike)  o Resistance or strength training at least 2-3 days/week  Diet Basics:   Eat 3-4 times daily. Do not go more than 3-4 hours without eating.   Consume whole foods: meat, vegetables, fruits, nuts, seeds, legumes, and whole grains.   Avoid sugar-sweetened beverages, added sugars, processed meats, refined grains, hydrogenated oils,   and other highly processed foods.   Never eat carbohydrate foods alone.    Include a balance of healthy fat, protein, and carbohydrate each time you eat      Limit carbohydrate intake to 2 servings (30 grams) per meal and 0-1 serving (<15 grams) at snacks   Examples of 1 carbohydrate serving (15 grams):  ? 1 slice bread  ?   large bagel  ? 1 tortilla (6 inch)  ?   hamburger or hotdog bun  ?   cup cooked cereal  ? 1/3 cup cooked rice and pasta  ?   cup dry cereal  ? 5-6 crackers  ?   cup starchy vegetable  ?   cup beans (also a protein source)  ? 1 cup or 1 small piece fruit  ? 1 cup milk or soy milk  ?   cup unsweetened yogurt    Meat & other protein sources  Try to plan at least two servings of fish each week. Also aim to fit three servings of lean meat into your   meal plan every week.    Dairy & Dairy substitutes  Watch out for added sugars in yogurt. Choose unsweetened milk alternatives.    Grains  Look for  whole grain,   whole wheat,   sprouted grain,  and   high fiber  on package labels. Choose   foods with at least three grams of dietary fiber and fewer than eight grams of sugar per serving.    FATS AND OILS    1 serving per meal  o 1 tablespoon plant-based oil (olive, sunflower, avocado, etc.)  o 1 ounce nuts or seeds   o 1 tablespoon nut spread  o 2 tablespoons salad dressing  o 1 ounce cheese  o   avocado  o 1 tablespoon butter  NON-STARCHY VEGETABLES   Allowed in unlimited quantities - list provided   BEVERAGES   All beverages should be sugar free  o Good choices: water, coffee, tea, sparkling water (i.e. Pee, Bubbly, etc.), Zevia (soda   sweetened with Stevia)  SWEETENERS   Limit to 1 tsp or less daily  o Good choices: Stevia, Monk fruit, Honey   Limit foods and beverages with ADDED sugar, especially those with high fructose corn syrup

## 2025-07-16 VITALS — BODY MASS INDEX: 20.45 KG/M2 | HEIGHT: 61 IN | WEIGHT: 108.3 LBS

## 2025-07-17 ENCOUNTER — TRANSFERRED RECORDS (OUTPATIENT)
Dept: HEALTH INFORMATION MANAGEMENT | Facility: CLINIC | Age: 70
End: 2025-07-17
Payer: COMMERCIAL

## 2025-07-17 LAB — RETINOPATHY: POSITIVE
